# Patient Record
Sex: MALE | Race: WHITE | NOT HISPANIC OR LATINO | Employment: UNEMPLOYED | URBAN - NONMETROPOLITAN AREA
[De-identification: names, ages, dates, MRNs, and addresses within clinical notes are randomized per-mention and may not be internally consistent; named-entity substitution may affect disease eponyms.]

---

## 2017-03-23 ENCOUNTER — OFFICE VISIT (OUTPATIENT)
Dept: FAMILY MEDICINE CLINIC | Facility: CLINIC | Age: 67
End: 2017-03-23

## 2017-03-23 VITALS
SYSTOLIC BLOOD PRESSURE: 122 MMHG | DIASTOLIC BLOOD PRESSURE: 80 MMHG | OXYGEN SATURATION: 99 % | BODY MASS INDEX: 28.99 KG/M2 | WEIGHT: 214 LBS | HEIGHT: 72 IN | HEART RATE: 90 BPM

## 2017-03-23 DIAGNOSIS — E11.9 TYPE 2 DIABETES MELLITUS WITHOUT COMPLICATION, UNSPECIFIED LONG TERM INSULIN USE STATUS: Chronic | ICD-10-CM

## 2017-03-23 DIAGNOSIS — E78.5 HYPERLIPIDEMIA, UNSPECIFIED: Primary | Chronic | ICD-10-CM

## 2017-03-23 PROCEDURE — 99213 OFFICE O/P EST LOW 20 MIN: CPT | Performed by: INTERNAL MEDICINE

## 2017-03-23 RX ORDER — ROSUVASTATIN CALCIUM 5 MG/1
5 TABLET, COATED ORAL DAILY
Qty: 30 TABLET | Refills: 5 | Status: SHIPPED | OUTPATIENT
Start: 2017-03-23 | End: 2017-05-22 | Stop reason: SDUPTHER

## 2017-05-16 ENCOUNTER — OFFICE VISIT (OUTPATIENT)
Dept: FAMILY MEDICINE CLINIC | Facility: CLINIC | Age: 67
End: 2017-05-16

## 2017-05-16 VITALS
WEIGHT: 207.4 LBS | RESPIRATION RATE: 16 BRPM | DIASTOLIC BLOOD PRESSURE: 70 MMHG | OXYGEN SATURATION: 99 % | BODY MASS INDEX: 28.09 KG/M2 | HEIGHT: 72 IN | TEMPERATURE: 96.4 F | SYSTOLIC BLOOD PRESSURE: 116 MMHG | HEART RATE: 105 BPM

## 2017-05-16 DIAGNOSIS — E78.01 FAMILIAL HYPERCHOLESTEROLEMIA: ICD-10-CM

## 2017-05-16 DIAGNOSIS — M51.35 DDD (DEGENERATIVE DISC DISEASE), THORACOLUMBAR: ICD-10-CM

## 2017-05-16 DIAGNOSIS — Z79.4 TYPE 2 DIABETES MELLITUS WITH COMPLICATION, WITH LONG-TERM CURRENT USE OF INSULIN (HCC): ICD-10-CM

## 2017-05-16 DIAGNOSIS — E11.8 TYPE 2 DIABETES MELLITUS WITH COMPLICATION, WITH LONG-TERM CURRENT USE OF INSULIN (HCC): ICD-10-CM

## 2017-05-16 DIAGNOSIS — K21.9 GASTROESOPHAGEAL REFLUX DISEASE, ESOPHAGITIS PRESENCE NOT SPECIFIED: ICD-10-CM

## 2017-05-16 DIAGNOSIS — I10 ESSENTIAL HYPERTENSION: ICD-10-CM

## 2017-05-16 DIAGNOSIS — Z01.818 PRE-OP EVALUATION: Primary | ICD-10-CM

## 2017-05-16 PROCEDURE — 99214 OFFICE O/P EST MOD 30 MIN: CPT | Performed by: FAMILY MEDICINE

## 2017-05-16 PROCEDURE — 93000 ELECTROCARDIOGRAM COMPLETE: CPT | Performed by: FAMILY MEDICINE

## 2017-05-16 RX ORDER — LISINOPRIL AND HYDROCHLOROTHIAZIDE 25; 20 MG/1; MG/1
1 TABLET ORAL DAILY
COMMUNITY
Start: 2017-03-12 | End: 2020-02-19 | Stop reason: SDUPTHER

## 2017-05-16 RX ORDER — GABAPENTIN 300 MG/1
1 CAPSULE ORAL 4 TIMES DAILY PRN
COMMUNITY
Start: 2017-05-08 | End: 2022-12-15

## 2017-05-16 RX ORDER — GLIPIZIDE 5 MG/1
1 TABLET ORAL DAILY
COMMUNITY
Start: 2017-03-20 | End: 2017-05-22

## 2017-05-17 DIAGNOSIS — E11.8 TYPE 2 DIABETES MELLITUS WITH COMPLICATION, WITH LONG-TERM CURRENT USE OF INSULIN (HCC): Primary | ICD-10-CM

## 2017-05-17 DIAGNOSIS — Z79.4 TYPE 2 DIABETES MELLITUS WITH COMPLICATION, WITH LONG-TERM CURRENT USE OF INSULIN (HCC): Primary | ICD-10-CM

## 2017-05-17 DIAGNOSIS — E78.5 HYPERLIPIDEMIA, UNSPECIFIED HYPERLIPIDEMIA TYPE: ICD-10-CM

## 2017-05-17 LAB
ANION GAP SERPL CALCULATED.3IONS-SCNC: 10 MMOL/L (ref 5–15)
BASOPHILS # BLD AUTO: 0.04 10*3/MM3 (ref 0–0.2)
BASOPHILS NFR BLD AUTO: 0.4 % (ref 0–2)
BUN BLD-MCNC: 16 MG/DL (ref 8–25)
BUN/CREAT SERPL: 14.5 (ref 7–25)
CALCIUM SPEC-SCNC: 9.5 MG/DL (ref 8.4–10.8)
CHLORIDE SERPL-SCNC: 97 MMOL/L (ref 100–112)
CHOLEST SERPL-MCNC: 155 MG/DL (ref 150–200)
CO2 SERPL-SCNC: 31 MMOL/L (ref 20–32)
CREAT BLD-MCNC: 1.1 MG/DL (ref 0.4–1.3)
DEPRECATED RDW RBC AUTO: 47.6 FL (ref 35.1–43.9)
EOSINOPHIL # BLD AUTO: 0.26 10*3/MM3 (ref 0–0.7)
EOSINOPHIL NFR BLD AUTO: 2.7 % (ref 0–7)
ERYTHROCYTE [DISTWIDTH] IN BLOOD BY AUTOMATED COUNT: 16.1 % (ref 11.5–14.5)
GFR SERPL CREATININE-BSD FRML MDRD: 67 ML/MIN/1.73 (ref 49–113)
GLUCOSE BLD-MCNC: 146 MG/DL (ref 70–100)
HCT VFR BLD AUTO: 41.2 % (ref 39–49)
HDLC SERPL-MCNC: 38 MG/DL (ref 35–100)
HGB BLD-MCNC: 13.5 G/DL (ref 13.7–17.3)
LDLC SERPL CALC-MCNC: 85 MG/DL
LDLC/HDLC SERPL: 2.24 {RATIO}
LYMPHOCYTES # BLD AUTO: 2.25 10*3/MM3 (ref 0.6–4.2)
LYMPHOCYTES NFR BLD AUTO: 23.6 % (ref 10–50)
MCH RBC QN AUTO: 26.9 PG (ref 26.5–34)
MCHC RBC AUTO-ENTMCNC: 32.8 G/DL (ref 31.5–36.3)
MCV RBC AUTO: 82.2 FL (ref 80–98)
MONOCYTES # BLD AUTO: 0.72 10*3/MM3 (ref 0–0.9)
MONOCYTES NFR BLD AUTO: 7.6 % (ref 0–12)
NEUTROPHILS # BLD AUTO: 6.26 10*3/MM3 (ref 2–8.6)
NEUTROPHILS NFR BLD AUTO: 65.7 % (ref 37–80)
PLATELET # BLD AUTO: 248 10*3/MM3 (ref 150–450)
PMV BLD AUTO: 10.1 FL (ref 8–12)
POTASSIUM BLD-SCNC: 4.2 MMOL/L (ref 3.4–5.4)
RBC # BLD AUTO: 5.01 10*6/MM3 (ref 4.37–5.74)
SODIUM BLD-SCNC: 138 MMOL/L (ref 134–146)
TRIGL SERPL-MCNC: 159 MG/DL (ref 35–160)
VLDLC SERPL-MCNC: 31.8 MG/DL
WBC NRBC COR # BLD: 9.53 10*3/MM3 (ref 3.2–9.8)

## 2017-05-17 PROCEDURE — 83036 HEMOGLOBIN GLYCOSYLATED A1C: CPT | Performed by: FAMILY MEDICINE

## 2017-05-17 PROCEDURE — 85025 COMPLETE CBC W/AUTO DIFF WBC: CPT | Performed by: FAMILY MEDICINE

## 2017-05-17 PROCEDURE — 36415 COLL VENOUS BLD VENIPUNCTURE: CPT | Performed by: FAMILY MEDICINE

## 2017-05-17 PROCEDURE — 80048 BASIC METABOLIC PNL TOTAL CA: CPT | Performed by: FAMILY MEDICINE

## 2017-05-17 PROCEDURE — 82570 ASSAY OF URINE CREATININE: CPT | Performed by: FAMILY MEDICINE

## 2017-05-17 PROCEDURE — 82043 UR ALBUMIN QUANTITATIVE: CPT | Performed by: FAMILY MEDICINE

## 2017-05-17 PROCEDURE — 80061 LIPID PANEL: CPT | Performed by: FAMILY MEDICINE

## 2017-05-17 PROCEDURE — 84443 ASSAY THYROID STIM HORMONE: CPT | Performed by: FAMILY MEDICINE

## 2017-05-18 LAB
ALBUMIN UR-MCNC: 17.6 MG/L
CREAT UR-MCNC: 270.4 MG/DL
HBA1C MFR BLD: 10.39 % (ref 4–5.6)
MICROALBUMIN/CREAT UR: 65.1 MG/G (ref 0–30)
TSH SERPL DL<=0.05 MIU/L-ACNC: 2.77 MIU/ML (ref 0.46–4.68)

## 2017-05-19 RX ORDER — DEXLANSOPRAZOLE 60 MG/1
CAPSULE, DELAYED RELEASE ORAL
Qty: 90 CAPSULE | Refills: 2 | Status: CANCELLED | OUTPATIENT
Start: 2017-05-19

## 2017-05-22 ENCOUNTER — OFFICE VISIT (OUTPATIENT)
Dept: FAMILY MEDICINE CLINIC | Facility: CLINIC | Age: 67
End: 2017-05-22

## 2017-05-22 VITALS
TEMPERATURE: 96.8 F | BODY MASS INDEX: 28.04 KG/M2 | RESPIRATION RATE: 18 BRPM | DIASTOLIC BLOOD PRESSURE: 82 MMHG | HEART RATE: 108 BPM | SYSTOLIC BLOOD PRESSURE: 128 MMHG | OXYGEN SATURATION: 98 % | WEIGHT: 207 LBS | HEIGHT: 72 IN

## 2017-05-22 DIAGNOSIS — E78.01 FAMILIAL HYPERCHOLESTEROLEMIA: ICD-10-CM

## 2017-05-22 DIAGNOSIS — E11.8 TYPE 2 DIABETES MELLITUS WITH COMPLICATION, WITH LONG-TERM CURRENT USE OF INSULIN (HCC): ICD-10-CM

## 2017-05-22 DIAGNOSIS — Z79.4 TYPE 2 DIABETES MELLITUS WITH COMPLICATION, WITH LONG-TERM CURRENT USE OF INSULIN (HCC): ICD-10-CM

## 2017-05-22 DIAGNOSIS — I10 ESSENTIAL HYPERTENSION: Primary | ICD-10-CM

## 2017-05-22 PROCEDURE — 99214 OFFICE O/P EST MOD 30 MIN: CPT | Performed by: FAMILY MEDICINE

## 2017-05-22 RX ORDER — ROSUVASTATIN CALCIUM 5 MG/1
5 TABLET, COATED ORAL DAILY
Qty: 90 TABLET | Refills: 3 | Status: SHIPPED | OUTPATIENT
Start: 2017-05-22 | End: 2017-05-22 | Stop reason: SDUPTHER

## 2017-05-22 RX ORDER — ROSUVASTATIN CALCIUM 5 MG/1
5 TABLET, COATED ORAL DAILY
Qty: 30 TABLET | Refills: 3 | Status: SHIPPED | OUTPATIENT
Start: 2017-05-22 | End: 2019-12-17

## 2017-05-23 RX ORDER — DEXLANSOPRAZOLE 60 MG/1
60 CAPSULE, DELAYED RELEASE ORAL DAILY
Qty: 90 CAPSULE | Refills: 2 | Status: SHIPPED | OUTPATIENT
Start: 2017-05-23 | End: 2017-08-08 | Stop reason: SDUPTHER

## 2017-06-27 NOTE — TELEPHONE ENCOUNTER
Called patient to inform him that doc was going to send him some doxycycline 50 mg 2 cap twice daily. Patient understood and thanked and ask that it be sent to clinic pharmacy

## 2017-06-28 RX ORDER — DOXYCYCLINE 50 MG/1
100 CAPSULE ORAL 2 TIMES DAILY
Qty: 28 CAPSULE | Refills: 0 | Status: SHIPPED | OUTPATIENT
Start: 2017-06-28 | End: 2017-07-03

## 2017-07-03 ENCOUNTER — OFFICE VISIT (OUTPATIENT)
Dept: FAMILY MEDICINE CLINIC | Facility: CLINIC | Age: 67
End: 2017-07-03

## 2017-07-03 VITALS
SYSTOLIC BLOOD PRESSURE: 138 MMHG | HEART RATE: 95 BPM | DIASTOLIC BLOOD PRESSURE: 76 MMHG | BODY MASS INDEX: 29.04 KG/M2 | HEIGHT: 72 IN | OXYGEN SATURATION: 99 % | WEIGHT: 214.4 LBS | TEMPERATURE: 97.5 F | RESPIRATION RATE: 18 BRPM

## 2017-07-03 DIAGNOSIS — E11.8 TYPE 2 DIABETES MELLITUS WITH COMPLICATION, WITH LONG-TERM CURRENT USE OF INSULIN (HCC): ICD-10-CM

## 2017-07-03 DIAGNOSIS — Z79.4 TYPE 2 DIABETES MELLITUS WITH COMPLICATION, WITH LONG-TERM CURRENT USE OF INSULIN (HCC): ICD-10-CM

## 2017-07-03 DIAGNOSIS — J32.9 CHRONIC SINUSITIS, UNSPECIFIED LOCATION: Primary | ICD-10-CM

## 2017-07-03 PROCEDURE — 99213 OFFICE O/P EST LOW 20 MIN: CPT | Performed by: FAMILY MEDICINE

## 2017-07-03 PROCEDURE — 96372 THER/PROPH/DIAG INJ SC/IM: CPT | Performed by: FAMILY MEDICINE

## 2017-07-03 RX ORDER — METHOCARBAMOL 750 MG/1
1 TABLET, FILM COATED ORAL NIGHTLY PRN
Refills: 1 | COMMUNITY
Start: 2017-05-26 | End: 2018-02-05

## 2017-07-03 RX ORDER — GLIPIZIDE 5 MG/1
1 TABLET ORAL 2 TIMES DAILY
COMMUNITY
Start: 2017-06-26 | End: 2017-08-08 | Stop reason: SDDI

## 2017-07-03 RX ORDER — METFORMIN HYDROCHLORIDE 500 MG/1
500 TABLET, EXTENDED RELEASE ORAL
Qty: 30 TABLET | Refills: 6 | Status: SHIPPED | OUTPATIENT
Start: 2017-07-03 | End: 2017-08-08

## 2017-07-03 RX ORDER — CLINDAMYCIN HYDROCHLORIDE 300 MG/1
300 CAPSULE ORAL 3 TIMES DAILY
Qty: 30 CAPSULE | Refills: 0 | Status: SHIPPED | OUTPATIENT
Start: 2017-07-03 | End: 2017-07-14

## 2017-07-03 RX ORDER — CEFTRIAXONE 1 G/1
1 INJECTION, POWDER, FOR SOLUTION INTRAMUSCULAR; INTRAVENOUS ONCE
Status: COMPLETED | OUTPATIENT
Start: 2017-07-03 | End: 2017-07-03

## 2017-07-03 RX ORDER — FUROSEMIDE 40 MG/1
40 TABLET ORAL 2 TIMES DAILY
Qty: 30 TABLET | Refills: 1 | Status: SHIPPED | OUTPATIENT
Start: 2017-07-03 | End: 2017-08-28 | Stop reason: SDDI

## 2017-07-03 RX ADMIN — CEFTRIAXONE 1 G: 1 INJECTION, POWDER, FOR SOLUTION INTRAMUSCULAR; INTRAVENOUS at 14:15

## 2017-07-03 NOTE — PROGRESS NOTES
Subjective   Jadiel Dutton is a 66 y.o. male.   Chief Complaint   Patient presents with   • Ear Fullness     left is worse than right. patient states it feels like something is crawling around       History of Present Illness   Patient with history of chronic sinusitis is in today for reevaluation.  Patient has been complaining of left ear fullness and some pain for the last 2-3 weeks.  Has completed a course of antibiotics without improvement.  He is also complaining of his Humalog causing fluid retention and has not been taking that over the last 2-3 days.    The following portions of the patient's history were reviewed and updated as appropriate: allergies, current medications, past family history, past medical history, past social history, past surgical history and problem list.    Review of Systems   Constitutional: Positive for activity change and fatigue.   HENT: Positive for congestion, ear pain and sinus pressure.    Eyes: Negative.    Respiratory: Positive for shortness of breath.    Cardiovascular: Negative.    Gastrointestinal: Positive for constipation.   Endocrine: Negative.    Genitourinary: Negative.    Musculoskeletal: Positive for arthralgias and back pain.   Skin: Negative.    Allergic/Immunologic: Negative.    Neurological: Positive for weakness and numbness.   Hematological: Negative.    Psychiatric/Behavioral: Negative.    All other systems reviewed and are negative.      Objective   Physical Exam   Constitutional: He is oriented to person, place, and time. He appears well-developed and well-nourished.   HENT:   Head: Normocephalic and atraumatic.   Right Ear: External ear normal.   Left Ear: External ear normal.   Nose: Mucosal edema and rhinorrhea present.   Mouth/Throat: Oropharynx is clear and moist.   Eyes: Conjunctivae and EOM are normal. Pupils are equal, round, and reactive to light.   Neck: Normal range of motion. Neck supple.   Cardiovascular: Normal rate, regular rhythm, normal  heart sounds and intact distal pulses.  Exam reveals no gallop and no friction rub.    No murmur heard.  Pulmonary/Chest: Effort normal and breath sounds normal. He has no wheezes. He has no rales.   Abdominal: Soft. Bowel sounds are normal. He exhibits no mass. There is no tenderness. There is no rebound and no guarding.   obese   Musculoskeletal:        Right shoulder: He exhibits decreased range of motion and tenderness.        Lumbar back: He exhibits decreased range of motion and tenderness.   Neurological: He is alert and oriented to person, place, and time. He displays abnormal reflex. A sensory deficit is present. He exhibits normal muscle tone.   Skin: Skin is warm and dry. No rash noted.   Psychiatric: He has a normal mood and affect. His behavior is normal. Judgment and thought content normal.   Nursing note and vitals reviewed.      Assessment/Plan   Jadiel was seen today for ear fullness.    Diagnoses and all orders for this visit:    Chronic sinusitis, unspecified location  -     cefTRIAXone (ROCEPHIN) injection 1 g; Inject 1 g into the shoulder, thigh, or buttocks 1 (One) Time.    Type 2 diabetes mellitus with complication, with long-term current use of insulin    Other orders  -     clindamycin (CLEOCIN) 300 MG capsule; Take 1 capsule by mouth 3 (Three) Times a Day.  -     metFORMIN ER (GLUCOPHAGE XR) 500 MG 24 hr tablet; Take 1 tablet by mouth Daily With Breakfast.  -     furosemide (LASIX) 40 MG tablet; Take 1 tablet by mouth 2 (Two) Times a Day.

## 2017-07-14 ENCOUNTER — OFFICE VISIT (OUTPATIENT)
Dept: FAMILY MEDICINE CLINIC | Facility: CLINIC | Age: 67
End: 2017-07-14

## 2017-07-14 VITALS
WEIGHT: 218 LBS | OXYGEN SATURATION: 93 % | TEMPERATURE: 97.6 F | SYSTOLIC BLOOD PRESSURE: 130 MMHG | RESPIRATION RATE: 18 BRPM | BODY MASS INDEX: 29.53 KG/M2 | HEIGHT: 72 IN | HEART RATE: 103 BPM | DIASTOLIC BLOOD PRESSURE: 70 MMHG

## 2017-07-14 DIAGNOSIS — E11.8 TYPE 2 DIABETES MELLITUS WITH COMPLICATION, WITH LONG-TERM CURRENT USE OF INSULIN (HCC): ICD-10-CM

## 2017-07-14 DIAGNOSIS — J32.0 CHRONIC MAXILLARY SINUSITIS: Primary | ICD-10-CM

## 2017-07-14 DIAGNOSIS — K21.9 GASTROESOPHAGEAL REFLUX DISEASE, ESOPHAGITIS PRESENCE NOT SPECIFIED: ICD-10-CM

## 2017-07-14 DIAGNOSIS — Z79.4 TYPE 2 DIABETES MELLITUS WITH COMPLICATION, WITH LONG-TERM CURRENT USE OF INSULIN (HCC): ICD-10-CM

## 2017-07-14 DIAGNOSIS — I10 ESSENTIAL HYPERTENSION: ICD-10-CM

## 2017-07-14 PROCEDURE — 99213 OFFICE O/P EST LOW 20 MIN: CPT | Performed by: NURSE PRACTITIONER

## 2017-07-14 RX ORDER — DIAPER,BRIEF,INFANT-TODD,DISP
EACH MISCELLANEOUS 2 TIMES DAILY
Qty: 14 G | Refills: 0 | Status: SHIPPED | OUTPATIENT
Start: 2017-07-14 | End: 2017-08-11

## 2017-07-14 NOTE — PATIENT INSTRUCTIONS
Earache  An earache, also called otalgia, can be caused by many things. Pain from an earache can be sharp, dull, or burning. The pain may be temporary or constant.  Earaches can be caused by problems with the ear, such as infection in either the middle ear or the ear canal, injury, impacted ear wax, middle ear pressure, or a foreign body in the ear. Ear pain can also result from problems in other areas. This is called referred pain. For example, pain can come from a sore throat, a tooth infection, or problems with the jaw or the joint between the jaw and the skull (temporomandibular joint, or TMJ).  The cause of an earache is not always easy to identify. Watchful waiting may be appropriate for some earaches until a clear cause of the pain can be found.  HOME CARE INSTRUCTIONS  Watch your condition for any changes. The following actions may help to lessen any discomfort that you are feeling:  · Take medicines only as directed by your health care provider. This includes ear drops.  · Apply ice to your outer ear to help reduce pain.    Put ice in a plastic bag.    Place a towel between your skin and the bag.    Leave the ice on for 20 minutes, 2-3 times per day.  · Do not put anything in your ear other than medicine that is prescribed by your health care provider.  · Try resting in an upright position instead of lying down. This may help to reduce pressure in the middle ear and relieve pain.  · Chew gum if it helps to relieve your ear pain.  · Control any allergies that you have.  · Keep all follow-up visits as directed by your health care provider. This is important.  SEEK MEDICAL CARE IF:  · Your pain does not improve within 2 days.  · You have a fever.  · You have new or worsening symptoms.  SEEK IMMEDIATE MEDICAL CARE IF:  · You have a severe headache.  · You have a stiff neck.  · You have difficulty swallowing.  · You have redness or swelling behind your ear.  · You have drainage from your ear.  · You have hearing  loss.  · You feel dizzy.     This information is not intended to replace advice given to you by your health care provider. Make sure you discuss any questions you have with your health care provider.     Document Released: 08/04/2005 Document Revised: 01/08/2016 Document Reviewed: 07/19/2015  Elsevier Interactive Patient Education ©2017 [employer]

## 2017-07-14 NOTE — PROGRESS NOTES
"Chief Complaint   Patient presents with   • Earache     was in L ear now in both       Subjective   HPI   Jadiel Dutton is a 66 y.o. male presents to the office for evaluation of bilateral ear discomfort and \"chirping\" noise that began after his back surgery.   He has been treated by PCP for chronic sinusitis x 2 office visits. PO abx have been changed due to GI discomfort. He has multiple allergies and can not tolerate PO abx well.    He presents today with continued bilateral ear discomfort and inability to take cleocin. Associated symptoms include yellow nasal drainage, however he also states his sinuses feel \"dry\". He takes nightly zyrtec for allergies. He denies use of nasal sinus rinse.     Of note:  Patient would also like to discuss changing T2DM medications, back pain, GI discomfort, and possibly changing dixilant today.   He has also repeatedly told me that he has a serious allergy to ceftin, stating \"it burnt my stomach up over there in [place]\".       Earache    There is pain in both ears. This is a new problem. The current episode started 1 to 4 weeks ago. The problem occurs constantly. The problem has been unchanged. There has been no fever. The fever has been present for less than 1 day. The pain is at a severity of 8/10. The pain is mild. Associated symptoms include coughing and a sore throat. Pertinent negatives include no abdominal pain, diarrhea, ear discharge, headaches, rash, rhinorrhea or vomiting. He has tried antibiotics for the symptoms. The treatment provided no relief.       Family History   Problem Relation Age of Onset   • No Known Problems Other    • No Known Problems Mother    • No Known Problems Father    • No Known Problems Sister    • No Known Problems Brother    • No Known Problems Daughter    • No Known Problems Son    • No Known Problems Maternal Aunt    • No Known Problems Maternal Uncle    • No Known Problems Paternal Aunt    • No Known Problems Paternal Uncle    • No " Known Problems Maternal Grandmother    • No Known Problems Maternal Grandfather    • No Known Problems Paternal Grandmother    • No Known Problems Paternal Grandfather      Social History     Social History   • Marital status: Single     Spouse name: N/A   • Number of children: N/A   • Years of education: N/A     Occupational History   • Not on file.     Social History Main Topics   • Smoking status: Never Smoker   • Smokeless tobacco: Never Used   • Alcohol use No   • Drug use: No   • Sexual activity: Yes     Partners: Female     Other Topics Concern   • Not on file     Social History Narrative       The following portions of the patient's history were reviewed and updated as appropriate: allergies, current medications, past family history, past medical history, past social history, past surgical history and problem list.    Review of Systems   Constitutional: Positive for fatigue. Negative for activity change, appetite change, chills, fever and unexpected weight change.   HENT: Positive for ear pain, sinus pressure and sore throat. Negative for congestion, drooling, ear discharge, facial swelling, postnasal drip, rhinorrhea, trouble swallowing and voice change.    Eyes: Negative.  Negative for photophobia, pain, discharge and visual disturbance.   Respiratory: Positive for cough. Negative for apnea, choking and wheezing.    Cardiovascular: Negative.  Negative for chest pain, palpitations and leg swelling.   Gastrointestinal: Negative.  Negative for abdominal distention, abdominal pain, constipation, diarrhea, nausea and vomiting.   Endocrine: Negative.  Negative for polyphagia and polyuria.   Genitourinary: Negative.  Negative for decreased urine volume, difficulty urinating and dysuria.   Musculoskeletal: Positive for back pain. Negative for arthralgias, gait problem and myalgias.   Skin: Negative.  Negative for rash and wound.   Allergic/Immunologic: Negative.    Neurological: Negative.  Negative for dizziness,  "syncope, weakness, light-headedness, numbness and headaches.   Hematological: Negative.    Psychiatric/Behavioral: Negative.  Negative for confusion, decreased concentration and sleep disturbance. The patient is not nervous/anxious.      14 Point ROS completed with pertinent positives discussed. All other systems reviewed and are negative.       Objective   Encounter Vitals  /70  Pulse 103  Temp 97.6 °F (36.4 °C) (Tympanic)   Resp 18  Ht 72\" (182.9 cm)  Wt 218 lb (98.9 kg)  SpO2 93%  BMI 29.57 kg/m2    Physical Exam   Constitutional: He is oriented to person, place, and time. Vital signs are normal. He appears well-developed and well-nourished.   HENT:   Head: Normocephalic and atraumatic.   Right Ear: Tympanic membrane, external ear and ear canal normal.   Left Ear: Tympanic membrane, external ear and ear canal normal.   Nose: Left sinus exhibits maxillary sinus tenderness.   Mouth/Throat: Uvula is midline, oropharynx is clear and moist and mucous membranes are normal. No posterior oropharyngeal edema or posterior oropharyngeal erythema.   Dry skin noted to bilateral external ear canal  Appears to have dried soap on bilateral TM- white crackling appearance   Eyes: Lids are normal. Pupils are equal, round, and reactive to light.   Neck: Trachea normal and normal range of motion. Neck supple. No thyroid mass present.   Cardiovascular: Normal rate, regular rhythm, S1 normal, S2 normal, normal heart sounds and intact distal pulses.  Exam reveals no gallop and no friction rub.    No murmur heard.  Pulmonary/Chest: Effort normal and breath sounds normal. No respiratory distress. He has no wheezes. He has no rales.   Abdominal: Soft. Normal appearance and bowel sounds are normal. He exhibits no mass. There is no rebound and no guarding.   Musculoskeletal: Normal range of motion.   Lymphadenopathy:     He has no cervical adenopathy.   Neurological: He is alert and oriented to person, place, and time. He has " normal strength. No cranial nerve deficit or sensory deficit. Gait normal.   Skin: Skin is warm and dry. No rash noted.   Psychiatric: He has a normal mood and affect. His speech is normal and behavior is normal. Judgment and thought content normal. Cognition and memory are normal.   Nursing note and vitals reviewed.      Pertinent Labs  Orders Only on 05/17/2017   Component Date Value Ref Range Status   • TSH 05/17/2017 2.770  0.460 - 4.680 mIU/mL Final   • Microalbumin/Creatinine Ratio 05/17/2017 65.1* 0.0 - 30.0 mg/g Final   • Creatinine, Urine 05/17/2017 270.4  mg/dL Final   • Microalbumin, Urine 05/17/2017 17.6  mg/L Final   • Total Cholesterol 05/17/2017 155  150 - 200 mg/dL Final   • Triglycerides 05/17/2017 159  35 - 160 mg/dL Final   • HDL Cholesterol 05/17/2017 38  35 - 100 mg/dL Final   • LDL Cholesterol  05/17/2017 85  mg/dL Final   • VLDL Cholesterol 05/17/2017 31.8  mg/dL Final   • LDL/HDL Ratio 05/17/2017 2.24   Final   • Hemoglobin A1C 05/17/2017 10.39* 4 - 5.6 % Final   • Glucose 05/17/2017 146* 70 - 100 mg/dL Final   • BUN 05/17/2017 16  8 - 25 mg/dL Final   • Creatinine 05/17/2017 1.10  0.40 - 1.30 mg/dL Final   • Sodium 05/17/2017 138  134 - 146 mmol/L Final   • Potassium 05/17/2017 4.2  3.4 - 5.4 mmol/L Final   • Chloride 05/17/2017 97* 100 - 112 mmol/L Final   • CO2 05/17/2017 31.0  20.0 - 32.0 mmol/L Final   • Calcium 05/17/2017 9.5  8.4 - 10.8 mg/dL Final   • eGFR Non  Amer 05/17/2017 67  49 - 113 mL/min/1.73 Final   • BUN/Creatinine Ratio 05/17/2017 14.5  7.0 - 25.0 Final   • Anion Gap 05/17/2017 10.0  5.0 - 15.0 mmol/L Final   • WBC 05/17/2017 9.53  3.20 - 9.80 10*3/mm3 Final   • RBC 05/17/2017 5.01  4.37 - 5.74 10*6/mm3 Final   • Hemoglobin 05/17/2017 13.5* 13.7 - 17.3 g/dL Final   • Hematocrit 05/17/2017 41.2  39.0 - 49.0 % Final   • MCV 05/17/2017 82.2  80.0 - 98.0 fL Final   • MCH 05/17/2017 26.9  26.5 - 34.0 pg Final   • MCHC 05/17/2017 32.8  31.5 - 36.3 g/dL Final   • RDW  "05/17/2017 16.1* 11.5 - 14.5 % Final   • RDW-SD 05/17/2017 47.6* 35.1 - 43.9 fl Final   • MPV 05/17/2017 10.1  8.0 - 12.0 fL Final   • Platelets 05/17/2017 248  150 - 450 10*3/mm3 Final   • Neutrophil % 05/17/2017 65.7  37.0 - 80.0 % Final   • Lymphocyte % 05/17/2017 23.6  10.0 - 50.0 % Final   • Monocyte % 05/17/2017 7.6  0.0 - 12.0 % Final   • Eosinophil % 05/17/2017 2.7  0.0 - 7.0 % Final   • Basophil % 05/17/2017 0.4  0.0 - 2.0 % Final   • Neutrophils, Absolute 05/17/2017 6.26  2.00 - 8.60 10*3/mm3 Final   • Lymphocytes, Absolute 05/17/2017 2.25  0.60 - 4.20 10*3/mm3 Final   • Monocytes, Absolute 05/17/2017 0.72  0.00 - 0.90 10*3/mm3 Final   • Eosinophils, Absolute 05/17/2017 0.26  0.00 - 0.70 10*3/mm3 Final   • Basophils, Absolute 05/17/2017 0.04  0.00 - 0.20 10*3/mm3 Final     Labs have been independently reviewed    Key Imaging/Tracings/POC Testing    Assessment and Medications  Problems Addressed this Visit        Cardiovascular and Mediastinum    Essential hypertension       Digestive    Gastroesophageal reflux disease       Endocrine    Type 2 diabetes mellitus with complication, with long-term current use of insulin    Relevant Orders    Hemoglobin A1c    Basic Metabolic Panel      Other Visit Diagnoses     Chronic maxillary sinusitis    -  Primary    Relevant Orders    CT sinus w contrast    Ambulatory Referral to ENT (Otolaryngology)        Side effects of ordered medications discussed with patient.     Plan/Additional Notes/Instructions  Plan   1. Stop cleocin today  2. Discussed POC with Dr. Jiménez  3. Begin saline sinus rinses  4. No changes to other medications today  5. Will repeat A1c and BMP next month. Patient can f/u with PCP do discuss changing T2DM medications at that time.   6. Although patient has previously informed me that he can not take ceftin, he is now requesting ceftin \"to knock this out\". He states \"I need something\". Given his allergy list and reported GI issues, he is limited as " to what he can take PO. PE is negative for acute infection. I have discussed my findings with the the patient and have advised patient to d/c all ABX, as suggested by PCP, at this time. Discussed possibility of inoculated infection and poor likelihood of penetration with PO medication.   7. He is requesting to continue cleocin at BID dosing. Although this will likely ineffective, if he can tolerate this dosing, I am ok with continuing BID for remainder of 10 day regimen  8. Again, I have suggested to proceed with POC as advised by PCP.   9. CT of sinuses ordered  10. Refer to ENT for chronic sinusitis symptoms      Follow-Up  Return for After ordered studies are completed.    Patient/caregiver verbalizes understanding of all orders and instructions in this plan of care.       This document has been electronically signed by MARY Gloria on July 14, 2017 9:25 AM

## 2017-08-08 ENCOUNTER — OFFICE VISIT (OUTPATIENT)
Dept: FAMILY MEDICINE CLINIC | Facility: CLINIC | Age: 67
End: 2017-08-08

## 2017-08-08 VITALS
HEART RATE: 74 BPM | RESPIRATION RATE: 16 BRPM | OXYGEN SATURATION: 97 % | TEMPERATURE: 97.6 F | WEIGHT: 219.2 LBS | DIASTOLIC BLOOD PRESSURE: 80 MMHG | BODY MASS INDEX: 29.69 KG/M2 | HEIGHT: 72 IN | SYSTOLIC BLOOD PRESSURE: 132 MMHG

## 2017-08-08 DIAGNOSIS — R19.7 DIARRHEA, UNSPECIFIED TYPE: ICD-10-CM

## 2017-08-08 DIAGNOSIS — K21.9 GASTROESOPHAGEAL REFLUX DISEASE, ESOPHAGITIS PRESENCE NOT SPECIFIED: Primary | ICD-10-CM

## 2017-08-08 PROCEDURE — 99213 OFFICE O/P EST LOW 20 MIN: CPT | Performed by: FAMILY MEDICINE

## 2017-08-08 RX ORDER — BLOOD SUGAR DIAGNOSTIC
STRIP MISCELLANEOUS
COMMUNITY
Start: 2017-08-05

## 2017-08-08 RX ORDER — DEXLANSOPRAZOLE 60 MG/1
60 CAPSULE, DELAYED RELEASE ORAL DAILY
Qty: 90 CAPSULE | Refills: 2 | Status: SHIPPED | OUTPATIENT
Start: 2017-08-08 | End: 2017-08-18 | Stop reason: SDUPTHER

## 2017-08-08 NOTE — PROGRESS NOTES
Subjective   Jadiel Dutton is a 66 y.o. male.   Chief Complaint   Patient presents with   • Hernia     wanting referral       Abdominal Pain   This is a new problem. The current episode started 1 to 4 weeks ago. The onset quality is gradual. The problem occurs constantly. The problem has been waxing and waning. The pain is located in the epigastric region. The pain is at a severity of 6/10. The pain is moderate. The quality of the pain is colicky. The abdominal pain does not radiate. Associated symptoms include arthralgias, belching, constipation, diarrhea and nausea. The pain is aggravated by eating. The pain is relieved by bowel movements. He has tried proton pump inhibitors and antacids for the symptoms. The treatment provided moderate relief. His past medical history is significant for GERD.        The following portions of the patient's history were reviewed and updated as appropriate: allergies, current medications, past family history, past medical history, past social history, past surgical history and problem list.    Review of Systems   Constitutional: Positive for fatigue.   HENT: Negative.    Eyes: Negative.    Respiratory: Negative.    Cardiovascular: Negative.    Gastrointestinal: Positive for abdominal distention, abdominal pain, constipation, diarrhea and nausea.   Endocrine: Negative.    Genitourinary: Negative.    Musculoskeletal: Positive for arthralgias and back pain.   Skin: Negative.    Allergic/Immunologic: Negative.    Neurological: Negative.    Hematological: Negative.    Psychiatric/Behavioral: Negative.    All other systems reviewed and are negative.      Objective   Physical Exam   Constitutional: He is oriented to person, place, and time. He appears well-developed and well-nourished.   HENT:   Head: Normocephalic and atraumatic.   Right Ear: External ear normal.   Left Ear: External ear normal.   Nose: Mucosal edema and rhinorrhea present.   Mouth/Throat: Oropharynx is clear and  moist.   Eyes: Conjunctivae and EOM are normal. Pupils are equal, round, and reactive to light.   Neck: Normal range of motion. Neck supple.   Cardiovascular: Normal rate, regular rhythm, normal heart sounds and intact distal pulses.  Exam reveals no gallop and no friction rub.    No murmur heard.  Pulmonary/Chest: Effort normal and breath sounds normal. He has no wheezes. He has no rales.   Abdominal: Soft. Bowel sounds are normal. He exhibits no mass. There is no tenderness. There is no rebound and no guarding.   obese   Musculoskeletal:        Right shoulder: He exhibits decreased range of motion and tenderness.        Lumbar back: He exhibits decreased range of motion and tenderness.   Neurological: He is alert and oriented to person, place, and time. He displays abnormal reflex. A sensory deficit is present. He exhibits normal muscle tone.   Skin: Skin is warm and dry. No rash noted.   Psychiatric: He has a normal mood and affect. His behavior is normal. Judgment and thought content normal.   Nursing note and vitals reviewed.      Assessment/Plan   Jadiel was seen today for hernia.    Diagnoses and all orders for this visit:    Gastroesophageal reflux disease, esophagitis presence not specified  -     Ambulatory Referral to Gastroenterology    Diarrhea, unspecified type  -     Ambulatory Referral to Gastroenterology    Other orders  -     dexlansoprazole (DEXILANT) 60 MG capsule; Take 1 capsule by mouth Daily.

## 2017-08-11 ENCOUNTER — OFFICE VISIT (OUTPATIENT)
Dept: GASTROENTEROLOGY | Facility: CLINIC | Age: 67
End: 2017-08-11

## 2017-08-11 VITALS
BODY MASS INDEX: 29.84 KG/M2 | SYSTOLIC BLOOD PRESSURE: 152 MMHG | DIASTOLIC BLOOD PRESSURE: 82 MMHG | WEIGHT: 220.3 LBS | HEIGHT: 72 IN | HEART RATE: 76 BPM

## 2017-08-11 DIAGNOSIS — K21.9 GASTROESOPHAGEAL REFLUX DISEASE, ESOPHAGITIS PRESENCE NOT SPECIFIED: Primary | ICD-10-CM

## 2017-08-11 DIAGNOSIS — K59.03 DRUG-INDUCED CONSTIPATION: ICD-10-CM

## 2017-08-11 DIAGNOSIS — R10.84 GENERALIZED ABDOMINAL PAIN: ICD-10-CM

## 2017-08-11 DIAGNOSIS — R11.0 NAUSEA: ICD-10-CM

## 2017-08-11 DIAGNOSIS — R13.10 DYSPHAGIA, UNSPECIFIED TYPE: ICD-10-CM

## 2017-08-11 PROCEDURE — 99214 OFFICE O/P EST MOD 30 MIN: CPT | Performed by: NURSE PRACTITIONER

## 2017-08-11 RX ORDER — DEXTROSE AND SODIUM CHLORIDE 5; .45 G/100ML; G/100ML
30 INJECTION, SOLUTION INTRAVENOUS CONTINUOUS PRN
Status: CANCELLED | OUTPATIENT
Start: 2017-08-22

## 2017-08-11 RX ORDER — DEXTROSE AND SODIUM CHLORIDE 5; .45 G/100ML; G/100ML
30 INJECTION, SOLUTION INTRAVENOUS CONTINUOUS PRN
Status: CANCELLED | OUTPATIENT
Start: 2017-08-11

## 2017-08-11 RX ORDER — SODIUM, POTASSIUM,MAG SULFATES 17.5-3.13G
1 SOLUTION, RECONSTITUTED, ORAL ORAL EVERY 12 HOURS
Qty: 2 BOTTLE | Refills: 0 | Status: ON HOLD | OUTPATIENT
Start: 2017-08-11 | End: 2017-08-22

## 2017-08-11 NOTE — PROGRESS NOTES
Chief Complaint   Patient presents with   • Diarrhea   • Heartburn     GERD       Subjective    Jadiel Dutton is a 66 y.o. male. he is being seen for consultation today at the request of Brody Jiménez MD    History of Present Illness    66-year-old male presents with worsening GERD, dysphagia, nausea and changes in bowel habits.  States epigastric pain feels like razor blade in his chest and he started trulicity  about 3 weeks ago and had severe onset of nausea, vomiting, and diarrhea.  States those symptoms have slightly improved however he still having nausea and intermittent bouts of pain.  Reports sensation of food sitting in his mid chest.  He states he is chronically constipated and has been placed on opioids due to back surgery which has further worsened constipation.  He takes Dulcolax or over-the-counter laxatives to have a bowel movement.  He denies any melena or hematochezia.  He was seen by primary care and started on dexilant and Carafate with mild relief in symptoms.  He does have history of EGD noting slight stricture in the distal esophagus and gastritis.  He has followed up with Dr. Corbett in [facility] where it's felt that he has gastroparesis with delayed gastric emptying study done in 2014.  Plan; schedule patient for EGD due to worsening reflux possible ulceration and/or esophageal stricture.  Will start patient on Moban tic for opioid-induced constipation and schedule colonoscopy due to abdominal pain and changes in bowel habits.  Follow-up after test return to office sooner if needed.     The following portions of the patient's history were reviewed and updated as appropriate:   Past Medical History:   Diagnosis Date   • Abdominal pain    • Abnormal weight loss    • Acute bronchitis    • Anxiety state    • Benign essential hypertension    • Chronic sinusitis    • Constipation    • Cystitis    • Degenerative joint disease involving multiple joints    • Diabetic neuropathy       bilateral hands      • Diastolic dysfunction    • Diverticular disease of colon    • Dyspnea    • Epigastric pain    • Essential hypertension    • Gastroesophageal reflux disease    • Generalized anxiety disorder    • Hyperlipidemia    • Inflammatory bowel disease     Possible Inflammatory Bowel Disease      • Lumbar pain     Pain radiating to lumbar region of back   n      • Pleuritic pain    • Radiculopathy     site unspecified      • Type 2 diabetes mellitus    • Vesicular eczema of hands and feet      Past Surgical History:   Procedure Laterality Date   • CERVICAL SPINE SURGERY  2012    Surgery for spinal stenosis   • COLONOSCOPY  06/11/2012    Internal & external hemorrhoids found.   • ENDOSCOPY  06/11/2012    Slight stricture in the distal esophagus. Gastritis in stomach. Biopsy taken. Normal duodenum.   • ENDOSCOPY     • ENTEROSCOPY SMALL BOWEL  08/27/2012    Gastritis in stomach. Normal jejunum. Biopsy taken. Normal duodenum   • INJECTION OF MEDICATION  02/28/2014    Depo Medrol   • INJECTION OF MEDICATION  03/07/2014    Rocephin(2)     Family History   Problem Relation Age of Onset   • No Known Problems Other    • Hypertension Mother    • Heart attack Father    • No Known Problems Sister    • No Known Problems Brother    • No Known Problems Daughter    • No Known Problems Son    • No Known Problems Maternal Aunt    • No Known Problems Maternal Uncle    • No Known Problems Paternal Aunt    • No Known Problems Paternal Uncle    • No Known Problems Maternal Grandmother    • No Known Problems Maternal Grandfather    • No Known Problems Paternal Grandmother    • No Known Problems Paternal Grandfather        Current Outpatient Prescriptions   Medication Sig Dispense Refill   • dexlansoprazole (DEXILANT) 60 MG capsule Take 1 capsule by mouth Daily. 90 capsule 2   • furosemide (LASIX) 40 MG tablet Take 1 tablet by mouth 2 (Two) Times a Day. (Patient taking differently: Take 40 mg by mouth As Needed.) 30 tablet  1   • gabapentin (NEURONTIN) 300 MG capsule Take 1 capsule by mouth As Needed.     • HYDROcodone-acetaminophen (LORTAB) 7.5-325 MG per tablet Take 1 tablet by mouth Every 6 (Six) Hours As Needed for Moderate Pain (4-6).     • insulin detemir (LEVEMIR) 100 UNIT/ML injection Inject 65 Units under the skin Daily. 27 each 6   • Insulin Pen Needle (B-D UF III MINI PEN NEEDLES) 31G X 5 MM misc Use 4x daily with insulin pens 400 each 2   • lisinopril-hydrochlorothiazide (PRINZIDE,ZESTORETIC) 20-25 MG per tablet Take 1 tablet by mouth Daily.     • methocarbamol (ROBAXIN) 750 MG tablet Take 1 tablet by mouth Every Night.  1   • ONETOUCH VERIO test strip      • PROAIR  (90 BASE) MCG/ACT inhaler 2 puffs As Needed.     • rosuvastatin (CRESTOR) 5 MG tablet Take 1 tablet by mouth Daily. 30 tablet 3   • Naloxegol Oxalate 12.5 MG tablet Take 12.5 mg by mouth Daily. 30 tablet 5   • sodium-potassium-magnesium sulfates (SUPREP BOWEL PREP KIT) 17.5-3.13-1.6 GM/180ML solution oral solution Take 1 bottle by mouth Every 12 (Twelve) Hours. 2 bottle 0     No current facility-administered medications for this visit.      Allergies   Allergen Reactions   • Bactrim [Sulfamethoxazole-Trimethoprim]    • Ceftin [Cefuroxime Axetil]    • Penicillins    • Sulfa Antibiotics    • Ciprofloxacin Itching and Rash     Social History     Social History   • Marital status: Single     Spouse name: N/A   • Number of children: N/A   • Years of education: N/A     Social History Main Topics   • Smoking status: Never Smoker   • Smokeless tobacco: Never Used   • Alcohol use No   • Drug use: No   • Sexual activity: Yes     Partners: Female     Other Topics Concern   • None     Social History Narrative       Review of Systems  Review of Systems   Constitutional: Positive for appetite change and fatigue. Negative for activity change, chills, diaphoresis, fever and unexpected weight change.   HENT: Negative for sore throat and trouble swallowing.   "  Respiratory: Negative for shortness of breath.    Gastrointestinal: Positive for abdominal pain (feels like razor in midchest vibrating ) and constipation (takes ducolax, MOM daily. on opiods after back surgery ). Negative for abdominal distention, anal bleeding, blood in stool, diarrhea, nausea, rectal pain and vomiting.   Musculoskeletal: Negative for arthralgias.   Skin: Negative for pallor.   Neurological: Negative for light-headedness.        /82 (BP Location: Left arm, Patient Position: Sitting, Cuff Size: Adult)  Pulse 76  Ht 72\" (182.9 cm)  Wt 220 lb 4.8 oz (99.9 kg)  BMI 29.88 kg/m2    Objective    Physical Exam   Constitutional: He is oriented to person, place, and time. He appears well-developed and well-nourished. He is cooperative. No distress.   HENT:   Head: Normocephalic and atraumatic.   Neck: Normal range of motion. Neck supple. No thyromegaly present.   Cardiovascular: Normal rate, regular rhythm and normal heart sounds.    Pulmonary/Chest: Effort normal and breath sounds normal. He has no wheezes. He has no rhonchi. He has no rales.   Abdominal: Soft. Normal appearance and bowel sounds are normal. He exhibits no shifting dullness and no distension. There is no hepatosplenomegaly. There is tenderness in the epigastric area. There is no rigidity and no guarding. No hernia.   Lymphadenopathy:     He has no cervical adenopathy.   Neurological: He is alert and oriented to person, place, and time.   Skin: Skin is warm, dry and intact. No rash noted. No pallor.   Psychiatric: He has a normal mood and affect. His speech is normal.     Orders Only on 05/17/2017   Component Date Value Ref Range Status   • TSH 05/17/2017 2.770  0.460 - 4.680 mIU/mL Final   • Microalbumin/Creatinine Ratio 05/17/2017 65.1* 0.0 - 30.0 mg/g Final   • Creatinine, Urine 05/17/2017 270.4  mg/dL Final   • Microalbumin, Urine 05/17/2017 17.6  mg/L Final   • Total Cholesterol 05/17/2017 155  150 - 200 mg/dL Final   • " Triglycerides 05/17/2017 159  35 - 160 mg/dL Final   • HDL Cholesterol 05/17/2017 38  35 - 100 mg/dL Final   • LDL Cholesterol  05/17/2017 85  mg/dL Final   • VLDL Cholesterol 05/17/2017 31.8  mg/dL Final   • LDL/HDL Ratio 05/17/2017 2.24   Final   • Hemoglobin A1C 05/17/2017 10.39* 4 - 5.6 % Final   • Glucose 05/17/2017 146* 70 - 100 mg/dL Final   • BUN 05/17/2017 16  8 - 25 mg/dL Final   • Creatinine 05/17/2017 1.10  0.40 - 1.30 mg/dL Final   • Sodium 05/17/2017 138  134 - 146 mmol/L Final   • Potassium 05/17/2017 4.2  3.4 - 5.4 mmol/L Final   • Chloride 05/17/2017 97* 100 - 112 mmol/L Final   • CO2 05/17/2017 31.0  20.0 - 32.0 mmol/L Final   • Calcium 05/17/2017 9.5  8.4 - 10.8 mg/dL Final   • eGFR Non  Amer 05/17/2017 67  49 - 113 mL/min/1.73 Final   • BUN/Creatinine Ratio 05/17/2017 14.5  7.0 - 25.0 Final   • Anion Gap 05/17/2017 10.0  5.0 - 15.0 mmol/L Final   • WBC 05/17/2017 9.53  3.20 - 9.80 10*3/mm3 Final   • RBC 05/17/2017 5.01  4.37 - 5.74 10*6/mm3 Final   • Hemoglobin 05/17/2017 13.5* 13.7 - 17.3 g/dL Final   • Hematocrit 05/17/2017 41.2  39.0 - 49.0 % Final   • MCV 05/17/2017 82.2  80.0 - 98.0 fL Final   • MCH 05/17/2017 26.9  26.5 - 34.0 pg Final   • MCHC 05/17/2017 32.8  31.5 - 36.3 g/dL Final   • RDW 05/17/2017 16.1* 11.5 - 14.5 % Final   • RDW-SD 05/17/2017 47.6* 35.1 - 43.9 fl Final   • MPV 05/17/2017 10.1  8.0 - 12.0 fL Final   • Platelets 05/17/2017 248  150 - 450 10*3/mm3 Final   • Neutrophil % 05/17/2017 65.7  37.0 - 80.0 % Final   • Lymphocyte % 05/17/2017 23.6  10.0 - 50.0 % Final   • Monocyte % 05/17/2017 7.6  0.0 - 12.0 % Final   • Eosinophil % 05/17/2017 2.7  0.0 - 7.0 % Final   • Basophil % 05/17/2017 0.4  0.0 - 2.0 % Final   • Neutrophils, Absolute 05/17/2017 6.26  2.00 - 8.60 10*3/mm3 Final   • Lymphocytes, Absolute 05/17/2017 2.25  0.60 - 4.20 10*3/mm3 Final   • Monocytes, Absolute 05/17/2017 0.72  0.00 - 0.90 10*3/mm3 Final   • Eosinophils, Absolute 05/17/2017 0.26  0.00 -  0.70 10*3/mm3 Final   • Basophils, Absolute 05/17/2017 0.04  0.00 - 0.20 10*3/mm3 Final     Assessment/Plan      1. Gastroesophageal reflux disease, esophagitis presence not specified    2. Drug-induced constipation    3. Dysphagia, unspecified type    4. Generalized abdominal pain    5. Nausea    .       Orders placed during this encounter include:  EGD and     COLONOSCOPY (N/A)    Review and/or summary of lab tests, radiology, procedures, medications. Review and summary of old records and obtaining of history. The risks and benefits of my recommendations, as well as other treatment options were discussed with the patient today. Questions were answered.    New Medications Ordered This Visit   Medications   • Naloxegol Oxalate 12.5 MG tablet     Sig: Take 12.5 mg by mouth Daily.     Dispense:  30 tablet     Refill:  5   • sodium-potassium-magnesium sulfates (SUPREP BOWEL PREP KIT) 17.5-3.13-1.6 GM/180ML solution oral solution     Sig: Take 1 bottle by mouth Every 12 (Twelve) Hours.     Dispense:  2 bottle     Refill:  0       Follow-up: Return in about 4 weeks (around 9/8/2017).          This document has been electronically signed by MARY Bravo on August 14, 2017 8:33 AM             Results for orders placed or performed in visit on 05/17/17   CBC Auto Differential   Result Value Ref Range    WBC 9.53 3.20 - 9.80 10*3/mm3    RBC 5.01 4.37 - 5.74 10*6/mm3    Hemoglobin 13.5 (L) 13.7 - 17.3 g/dL    Hematocrit 41.2 39.0 - 49.0 %    MCV 82.2 80.0 - 98.0 fL    MCH 26.9 26.5 - 34.0 pg    MCHC 32.8 31.5 - 36.3 g/dL    RDW 16.1 (H) 11.5 - 14.5 %    RDW-SD 47.6 (H) 35.1 - 43.9 fl    MPV 10.1 8.0 - 12.0 fL    Platelets 248 150 - 450 10*3/mm3    Neutrophil % 65.7 37.0 - 80.0 %    Lymphocyte % 23.6 10.0 - 50.0 %    Monocyte % 7.6 0.0 - 12.0 %    Eosinophil % 2.7 0.0 - 7.0 %    Basophil % 0.4 0.0 - 2.0 %    Neutrophils, Absolute 6.26 2.00 - 8.60 10*3/mm3    Lymphocytes, Absolute 2.25 0.60 - 4.20 10*3/mm3    Monocytes,  Absolute 0.72 0.00 - 0.90 10*3/mm3    Eosinophils, Absolute 0.26 0.00 - 0.70 10*3/mm3    Basophils, Absolute 0.04 0.00 - 0.20 10*3/mm3   Microalbumin / Creatinine Urine Ratio   Result Value Ref Range    Microalbumin/Creatinine Ratio 65.1 (H) 0.0 - 30.0 mg/g    Creatinine, Urine 270.4 mg/dL    Microalbumin, Urine 17.6 mg/L   TSH   Result Value Ref Range    TSH 2.770 0.460 - 4.680 mIU/mL   Hemoglobin A1c   Result Value Ref Range    Hemoglobin A1C 10.39 (H) 4 - 5.6 %   Lipid Panel   Result Value Ref Range    Total Cholesterol 155 150 - 200 mg/dL    Triglycerides 159 35 - 160 mg/dL    HDL Cholesterol 38 35 - 100 mg/dL    LDL Cholesterol  85 mg/dL    VLDL Cholesterol 31.8 mg/dL    LDL/HDL Ratio 2.24    Basic Metabolic Panel   Result Value Ref Range    Glucose 146 (H) 70 - 100 mg/dL    BUN 16 8 - 25 mg/dL    Creatinine 1.10 0.40 - 1.30 mg/dL    Sodium 138 134 - 146 mmol/L    Potassium 4.2 3.4 - 5.4 mmol/L    Chloride 97 (L) 100 - 112 mmol/L    CO2 31.0 20.0 - 32.0 mmol/L    Calcium 9.5 8.4 - 10.8 mg/dL    eGFR Non  Amer 67 49 - 113 mL/min/1.73    BUN/Creatinine Ratio 14.5 7.0 - 25.0    Anion Gap 10.0 5.0 - 15.0 mmol/L   Results for orders placed or performed during the hospital encounter of 07/12/16   Basic metabolic panel   Result Value Ref Range    Glucose 213 (H) 70.0 - 100.0 mg/dl    BUN 13 8.0 - 25.0 mg/dl    Creatinine 1.2 0.4 - 1.3 mg/dl    Sodium 137.0 134 - 146 mmol/L    Potassium 3.6 3.4 - 5.4 mmol/L    Chloride 98.0 (L) 100.0 - 112.0 mmol/L    CO2 31.0 20.0 - 32.0 mmol/L    Calcium 9.6 8.4 - 10.8 mg/dl    GFR MDRD Non  61 49 - 113 mL/min/1.73 sq.M    GFR MDRD  74 49 - 113 mL/min/1.73 sq.M    Anion Gap 8.0 5.0 - 15.0 mmol/L   Results for orders placed or performed during the hospital encounter of 05/03/16   Basic metabolic panel   Result Value Ref Range    Glucose 220 (H) 70.0 - 100.0 mg/dl    BUN 14 8.0 - 25.0 mg/dl    Creatinine 1.3 0.4 - 1.3 mg/dl    Sodium 136.0 134 -  146 mmol/L    Potassium 3.5 3.4 - 5.4 mmol/L    Chloride 95.0 (L) 100.0 - 112.0 mmol/L    CO2 32.0 20.0 - 32.0 mmol/L    Calcium 8.6 8.4 - 10.8 mg/dl    GFR MDRD Non  56 49 - 113 mL/min/1.73 sq.M    GFR MDRD  67 49 - 113 mL/min/1.73 sq.M    Anion Gap 9.0 5.0 - 15.0 mmol/L   Results for orders placed or performed during the hospital encounter of 03/28/16   Protein, Random Urine, Ql   Result Value Ref Range    Protein, UA NEGATIVE NEGATIVE   CBC and Differential   Result Value Ref Range    WBC 7.7 3.2 - 9.8 x1000/uL    RBC 5.10 4.37 - 5.74 claudia/mm3    Hemoglobin 13.7 13.7 - 17.3 gm/dl    Hematocrit 41.2 39.0 - 49.0 %    MCV 80.8 80.0 - 98.0 fl    MCH 26.9 26.0 - 34.0 pg    MCHC 33.3 31.5 - 36.3 gm/dl    Platelets 238 150 - 450 x1000/mm3    RDW 15.5 (H) 11.5 - 14.5 %    MPV 10.8 8.0 - 12.0 fl    Neutrophil Rel % 58.2 37.0 - 80.0 %    Lymphocyte Rel % 24.4 10.0 - 50.0 %    Monocyte Rel % 8.4 0.0 - 12.0 %    Eosinophil Rel % 8.1 (H) 0.0 - 7.0 %    Basophil Rel % 0.9 0.0 - 2.0 %    nRBC 0     nRBC 0    TSH   Result Value Ref Range    TSH 2.77 0.46 - 4.68 uIU/ml   T4, free   Result Value Ref Range    Free T4 1.27 0.78 - 2.19 ng/dl   PSA   Result Value Ref Range    PSA 0.43 0.00 - 4.00 ng/ml   Hemoglobin A1c   Result Value Ref Range    Hemoglobin A1C 11.2 (H) 4.0 - 5.6 %TotHgb   Vitamin B12   Result Value Ref Range    Vitamin B-12 >1000 (H) 239 - 931 pg/ml   Lipid panel   Result Value Ref Range    Total Cholesterol 215 (H) 150 - 200 mg/dl    Triglycerides 356 (H) 35 - 160 mg/dl    HDL Cholesterol 32.5 (L) 35.0 - 100.0 mg/dl    LDL Cholesterol  111 mg/dl   Comprehensive metabolic panel   Result Value Ref Range    Glucose 216 (H) 70.0 - 100.0 mg/dl    BUN 14 8.0 - 25.0 mg/dl    Creatinine 1.4 (H) 0.4 - 1.3 mg/dl    Sodium 137.0 134 - 146 mmol/L    Potassium 4.6 3.4 - 5.4 mmol/L    Chloride 96.0 (L) 100.0 - 112.0 mmol/L    CO2 31.0 20.0 - 32.0 mmol/L    Calcium 9.2 8.4 - 10.8 mg/dl    Total  Protein 7.1 6.7 - 8.2 gm/dl    Albumin 3.7 3.2 - 5.5 gm/dl    Total Bilirubin 0.6 0.2 - 1.0 mg/dl    Alkaline Phosphatase 104 15 - 121 U/L    ALT (SGPT) 22 10 - 60 U/L    AST (SGOT) 31 10 - 60 U/L    GFR MDRD Non  51 49 - 113 mL/min/1.73 sq.M    GFR MDRD  62 49 - 113 mL/min/1.73 sq.M    Anion Gap 10.0 5.0 - 15.0 mmol/L     *Note: Due to a large number of results and/or encounters for the requested time period, some results have not been displayed. A complete set of results can be found in Results Review.

## 2017-08-17 RX ORDER — IBUPROFEN 200 MG
200 TABLET ORAL DAILY PRN
COMMUNITY
End: 2018-02-05

## 2017-08-17 RX ORDER — HYDROCODONE BITARTRATE AND ACETAMINOPHEN 10; 325 MG/1; MG/1
1 TABLET ORAL EVERY 6 HOURS PRN
COMMUNITY
End: 2018-12-26

## 2017-08-18 RX ORDER — DEXLANSOPRAZOLE 60 MG/1
60 CAPSULE, DELAYED RELEASE ORAL DAILY
Qty: 30 CAPSULE | Refills: 0 | Status: SHIPPED | OUTPATIENT
Start: 2017-08-18 | End: 2017-11-27 | Stop reason: SDUPTHER

## 2017-08-22 ENCOUNTER — ANESTHESIA (OUTPATIENT)
Dept: GASTROENTEROLOGY | Facility: HOSPITAL | Age: 67
End: 2017-08-22

## 2017-08-22 ENCOUNTER — HOSPITAL ENCOUNTER (OUTPATIENT)
Facility: HOSPITAL | Age: 67
Setting detail: HOSPITAL OUTPATIENT SURGERY
Discharge: HOME | End: 2017-08-22
Attending: INTERNAL MEDICINE | Admitting: INTERNAL MEDICINE

## 2017-08-22 ENCOUNTER — ANESTHESIA EVENT (OUTPATIENT)
Dept: GASTROENTEROLOGY | Facility: HOSPITAL | Age: 67
End: 2017-08-22

## 2017-08-22 VITALS
HEIGHT: 72 IN | BODY MASS INDEX: 29.26 KG/M2 | OXYGEN SATURATION: 99 % | DIASTOLIC BLOOD PRESSURE: 48 MMHG | SYSTOLIC BLOOD PRESSURE: 98 MMHG | HEART RATE: 82 BPM | RESPIRATION RATE: 20 BRPM | TEMPERATURE: 97.2 F | WEIGHT: 216.05 LBS

## 2017-08-22 DIAGNOSIS — R13.10 DYSPHAGIA, UNSPECIFIED TYPE: ICD-10-CM

## 2017-08-22 DIAGNOSIS — R11.0 NAUSEA: ICD-10-CM

## 2017-08-22 DIAGNOSIS — R10.84 GENERALIZED ABDOMINAL CRAMPING: ICD-10-CM

## 2017-08-22 DIAGNOSIS — K59.03 DRUG-INDUCED CONSTIPATION: ICD-10-CM

## 2017-08-22 DIAGNOSIS — K21.9 GASTROESOPHAGEAL REFLUX DISEASE, ESOPHAGITIS PRESENCE NOT SPECIFIED: ICD-10-CM

## 2017-08-22 LAB — GLUCOSE BLDC GLUCOMTR-MCNC: 103 MG/DL (ref 70–130)

## 2017-08-22 PROCEDURE — 43239 EGD BIOPSY SINGLE/MULTIPLE: CPT | Performed by: INTERNAL MEDICINE

## 2017-08-22 PROCEDURE — 88305 TISSUE EXAM BY PATHOLOGIST: CPT | Performed by: PATHOLOGY

## 2017-08-22 PROCEDURE — 88305 TISSUE EXAM BY PATHOLOGIST: CPT | Performed by: INTERNAL MEDICINE

## 2017-08-22 PROCEDURE — 25010000002 PROPOFOL 10 MG/ML EMULSION: Performed by: NURSE ANESTHETIST, CERTIFIED REGISTERED

## 2017-08-22 PROCEDURE — 45385 COLONOSCOPY W/LESION REMOVAL: CPT | Performed by: INTERNAL MEDICINE

## 2017-08-22 PROCEDURE — 25010000002 FENTANYL CITRATE (PF) 100 MCG/2ML SOLUTION: Performed by: NURSE ANESTHETIST, CERTIFIED REGISTERED

## 2017-08-22 PROCEDURE — 25010000002 MIDAZOLAM PER 1 MG: Performed by: NURSE ANESTHETIST, CERTIFIED REGISTERED

## 2017-08-22 PROCEDURE — 82962 GLUCOSE BLOOD TEST: CPT

## 2017-08-22 RX ORDER — PROMETHAZINE HYDROCHLORIDE 25 MG/ML
12.5 INJECTION, SOLUTION INTRAMUSCULAR; INTRAVENOUS ONCE AS NEEDED
Status: DISCONTINUED | OUTPATIENT
Start: 2017-08-22 | End: 2017-08-22 | Stop reason: HOSPADM

## 2017-08-22 RX ORDER — PROMETHAZINE HYDROCHLORIDE 25 MG/1
25 SUPPOSITORY RECTAL ONCE AS NEEDED
Status: DISCONTINUED | OUTPATIENT
Start: 2017-08-22 | End: 2017-08-22 | Stop reason: HOSPADM

## 2017-08-22 RX ORDER — FENTANYL CITRATE 50 UG/ML
INJECTION, SOLUTION INTRAMUSCULAR; INTRAVENOUS AS NEEDED
Status: DISCONTINUED | OUTPATIENT
Start: 2017-08-22 | End: 2017-08-22 | Stop reason: SURG

## 2017-08-22 RX ORDER — ONDANSETRON 2 MG/ML
4 INJECTION INTRAMUSCULAR; INTRAVENOUS ONCE AS NEEDED
Status: DISCONTINUED | OUTPATIENT
Start: 2017-08-22 | End: 2017-08-22 | Stop reason: HOSPADM

## 2017-08-22 RX ORDER — DEXTROSE AND SODIUM CHLORIDE 5; .45 G/100ML; G/100ML
30 INJECTION, SOLUTION INTRAVENOUS CONTINUOUS PRN
Status: DISCONTINUED | OUTPATIENT
Start: 2017-08-22 | End: 2017-08-22 | Stop reason: HOSPADM

## 2017-08-22 RX ORDER — MIDAZOLAM HYDROCHLORIDE 1 MG/ML
INJECTION INTRAMUSCULAR; INTRAVENOUS AS NEEDED
Status: DISCONTINUED | OUTPATIENT
Start: 2017-08-22 | End: 2017-08-22 | Stop reason: SURG

## 2017-08-22 RX ORDER — PROMETHAZINE HYDROCHLORIDE 25 MG/1
25 TABLET ORAL ONCE AS NEEDED
Status: DISCONTINUED | OUTPATIENT
Start: 2017-08-22 | End: 2017-08-22 | Stop reason: HOSPADM

## 2017-08-22 RX ORDER — DEXAMETHASONE SODIUM PHOSPHATE 4 MG/ML
8 INJECTION, SOLUTION INTRA-ARTICULAR; INTRALESIONAL; INTRAMUSCULAR; INTRAVENOUS; SOFT TISSUE ONCE AS NEEDED
Status: DISCONTINUED | OUTPATIENT
Start: 2017-08-22 | End: 2017-08-22 | Stop reason: HOSPADM

## 2017-08-22 RX ORDER — PROPOFOL 10 MG/ML
VIAL (ML) INTRAVENOUS AS NEEDED
Status: DISCONTINUED | OUTPATIENT
Start: 2017-08-22 | End: 2017-08-22 | Stop reason: SURG

## 2017-08-22 RX ADMIN — PROPOFOL 50 MG: 10 INJECTION, EMULSION INTRAVENOUS at 12:03

## 2017-08-22 RX ADMIN — DEXTROSE AND SODIUM CHLORIDE: 5; 450 INJECTION, SOLUTION INTRAVENOUS at 11:34

## 2017-08-22 RX ADMIN — PROPOFOL 50 MG: 10 INJECTION, EMULSION INTRAVENOUS at 11:52

## 2017-08-22 RX ADMIN — DEXTROSE AND SODIUM CHLORIDE 30 ML/HR: 5; 450 INJECTION, SOLUTION INTRAVENOUS at 11:22

## 2017-08-22 RX ADMIN — MIDAZOLAM 1 MG: 1 INJECTION INTRAMUSCULAR; INTRAVENOUS at 11:51

## 2017-08-22 RX ADMIN — PROPOFOL 50 MG: 10 INJECTION, EMULSION INTRAVENOUS at 12:10

## 2017-08-22 RX ADMIN — FENTANYL CITRATE 50 MCG: 50 INJECTION, SOLUTION INTRAMUSCULAR; INTRAVENOUS at 11:51

## 2017-08-22 RX ADMIN — PROPOFOL 50 MG: 10 INJECTION, EMULSION INTRAVENOUS at 11:59

## 2017-08-22 RX ADMIN — PROPOFOL 50 MG: 10 INJECTION, EMULSION INTRAVENOUS at 11:56

## 2017-08-22 RX ADMIN — PROPOFOL 50 MG: 10 INJECTION, EMULSION INTRAVENOUS at 12:05

## 2017-08-22 NOTE — H&P (VIEW-ONLY)
Chief Complaint   Patient presents with   • Diarrhea   • Heartburn     GERD       Subjective    Jadiel Dutton is a 66 y.o. male. he is being seen for consultation today at the request of Brody Jiménez MD    History of Present Illness    66-year-old male presents with worsening GERD, dysphagia, nausea and changes in bowel habits.  States epigastric pain feels like razor blade in his chest and he started trulicity  about 3 weeks ago and had severe onset of nausea, vomiting, and diarrhea.  States those symptoms have slightly improved however he still having nausea and intermittent bouts of pain.  Reports sensation of food sitting in his mid chest.  He states he is chronically constipated and has been placed on opioids due to back surgery which has further worsened constipation.  He takes Dulcolax or over-the-counter laxatives to have a bowel movement.  He denies any melena or hematochezia.  He was seen by primary care and started on dexilant and Carafate with mild relief in symptoms.  He does have history of EGD noting slight stricture in the distal esophagus and gastritis.  He has followed up with Dr. Corbett in [facility] where it's felt that he has gastroparesis with delayed gastric emptying study done in 2014.  Plan; schedule patient for EGD due to worsening reflux possible ulceration and/or esophageal stricture.  Will start patient on Moban tic for opioid-induced constipation and schedule colonoscopy due to abdominal pain and changes in bowel habits.  Follow-up after test return to office sooner if needed.     The following portions of the patient's history were reviewed and updated as appropriate:   Past Medical History:   Diagnosis Date   • Abdominal pain    • Abnormal weight loss    • Acute bronchitis    • Anxiety state    • Benign essential hypertension    • Chronic sinusitis    • Constipation    • Cystitis    • Degenerative joint disease involving multiple joints    • Diabetic neuropathy         bilateral hands      • Diastolic dysfunction    • Diverticular disease of colon    • Dyspnea    • Epigastric pain    • Essential hypertension    • Gastroesophageal reflux disease    • Generalized anxiety disorder    • Hyperlipidemia    • Inflammatory bowel disease     Possible Inflammatory Bowel Disease      • Lumbar pain     Pain radiating to lumbar region of back   n      • Pleuritic pain    • Radiculopathy     site unspecified      • Type 2 diabetes mellitus    • Vesicular eczema of hands and feet      Past Surgical History:   Procedure Laterality Date   • CERVICAL SPINE SURGERY  2012    Surgery for spinal stenosis   • COLONOSCOPY  06/11/2012    Internal & external hemorrhoids found.   • ENDOSCOPY  06/11/2012    Slight stricture in the distal esophagus. Gastritis in stomach. Biopsy taken. Normal duodenum.   • ENDOSCOPY     • ENTEROSCOPY SMALL BOWEL  08/27/2012    Gastritis in stomach. Normal jejunum. Biopsy taken. Normal duodenum   • INJECTION OF MEDICATION  02/28/2014    Depo Medrol   • INJECTION OF MEDICATION  03/07/2014    Rocephin(2)     Family History   Problem Relation Age of Onset   • No Known Problems Other    • Hypertension Mother    • Heart attack Father    • No Known Problems Sister    • No Known Problems Brother    • No Known Problems Daughter    • No Known Problems Son    • No Known Problems Maternal Aunt    • No Known Problems Maternal Uncle    • No Known Problems Paternal Aunt    • No Known Problems Paternal Uncle    • No Known Problems Maternal Grandmother    • No Known Problems Maternal Grandfather    • No Known Problems Paternal Grandmother    • No Known Problems Paternal Grandfather        Current Outpatient Prescriptions   Medication Sig Dispense Refill   • dexlansoprazole (DEXILANT) 60 MG capsule Take 1 capsule by mouth Daily. 90 capsule 2   • furosemide (LASIX) 40 MG tablet Take 1 tablet by mouth 2 (Two) Times a Day. (Patient taking differently: Take 40 mg by mouth As Needed.) 30 tablet  1   • gabapentin (NEURONTIN) 300 MG capsule Take 1 capsule by mouth As Needed.     • HYDROcodone-acetaminophen (LORTAB) 7.5-325 MG per tablet Take 1 tablet by mouth Every 6 (Six) Hours As Needed for Moderate Pain (4-6).     • insulin detemir (LEVEMIR) 100 UNIT/ML injection Inject 65 Units under the skin Daily. 27 each 6   • Insulin Pen Needle (B-D UF III MINI PEN NEEDLES) 31G X 5 MM misc Use 4x daily with insulin pens 400 each 2   • lisinopril-hydrochlorothiazide (PRINZIDE,ZESTORETIC) 20-25 MG per tablet Take 1 tablet by mouth Daily.     • methocarbamol (ROBAXIN) 750 MG tablet Take 1 tablet by mouth Every Night.  1   • ONETOUCH VERIO test strip      • PROAIR  (90 BASE) MCG/ACT inhaler 2 puffs As Needed.     • rosuvastatin (CRESTOR) 5 MG tablet Take 1 tablet by mouth Daily. 30 tablet 3   • Naloxegol Oxalate 12.5 MG tablet Take 12.5 mg by mouth Daily. 30 tablet 5   • sodium-potassium-magnesium sulfates (SUPREP BOWEL PREP KIT) 17.5-3.13-1.6 GM/180ML solution oral solution Take 1 bottle by mouth Every 12 (Twelve) Hours. 2 bottle 0     No current facility-administered medications for this visit.      Allergies   Allergen Reactions   • Bactrim [Sulfamethoxazole-Trimethoprim]    • Ceftin [Cefuroxime Axetil]    • Penicillins    • Sulfa Antibiotics    • Ciprofloxacin Itching and Rash     Social History     Social History   • Marital status: Single     Spouse name: N/A   • Number of children: N/A   • Years of education: N/A     Social History Main Topics   • Smoking status: Never Smoker   • Smokeless tobacco: Never Used   • Alcohol use No   • Drug use: No   • Sexual activity: Yes     Partners: Female     Other Topics Concern   • None     Social History Narrative       Review of Systems  Review of Systems   Constitutional: Positive for appetite change and fatigue. Negative for activity change, chills, diaphoresis, fever and unexpected weight change.   HENT: Negative for sore throat and trouble swallowing.   "  Respiratory: Negative for shortness of breath.    Gastrointestinal: Positive for abdominal pain (feels like razor in midchest vibrating ) and constipation (takes ducolax, MOM daily. on opiods after back surgery ). Negative for abdominal distention, anal bleeding, blood in stool, diarrhea, nausea, rectal pain and vomiting.   Musculoskeletal: Negative for arthralgias.   Skin: Negative for pallor.   Neurological: Negative for light-headedness.        /82 (BP Location: Left arm, Patient Position: Sitting, Cuff Size: Adult)  Pulse 76  Ht 72\" (182.9 cm)  Wt 220 lb 4.8 oz (99.9 kg)  BMI 29.88 kg/m2    Objective    Physical Exam   Constitutional: He is oriented to person, place, and time. He appears well-developed and well-nourished. He is cooperative. No distress.   HENT:   Head: Normocephalic and atraumatic.   Neck: Normal range of motion. Neck supple. No thyromegaly present.   Cardiovascular: Normal rate, regular rhythm and normal heart sounds.    Pulmonary/Chest: Effort normal and breath sounds normal. He has no wheezes. He has no rhonchi. He has no rales.   Abdominal: Soft. Normal appearance and bowel sounds are normal. He exhibits no shifting dullness and no distension. There is no hepatosplenomegaly. There is tenderness in the epigastric area. There is no rigidity and no guarding. No hernia.   Lymphadenopathy:     He has no cervical adenopathy.   Neurological: He is alert and oriented to person, place, and time.   Skin: Skin is warm, dry and intact. No rash noted. No pallor.   Psychiatric: He has a normal mood and affect. His speech is normal.     Orders Only on 05/17/2017   Component Date Value Ref Range Status   • TSH 05/17/2017 2.770  0.460 - 4.680 mIU/mL Final   • Microalbumin/Creatinine Ratio 05/17/2017 65.1* 0.0 - 30.0 mg/g Final   • Creatinine, Urine 05/17/2017 270.4  mg/dL Final   • Microalbumin, Urine 05/17/2017 17.6  mg/L Final   • Total Cholesterol 05/17/2017 155  150 - 200 mg/dL Final   • " Triglycerides 05/17/2017 159  35 - 160 mg/dL Final   • HDL Cholesterol 05/17/2017 38  35 - 100 mg/dL Final   • LDL Cholesterol  05/17/2017 85  mg/dL Final   • VLDL Cholesterol 05/17/2017 31.8  mg/dL Final   • LDL/HDL Ratio 05/17/2017 2.24   Final   • Hemoglobin A1C 05/17/2017 10.39* 4 - 5.6 % Final   • Glucose 05/17/2017 146* 70 - 100 mg/dL Final   • BUN 05/17/2017 16  8 - 25 mg/dL Final   • Creatinine 05/17/2017 1.10  0.40 - 1.30 mg/dL Final   • Sodium 05/17/2017 138  134 - 146 mmol/L Final   • Potassium 05/17/2017 4.2  3.4 - 5.4 mmol/L Final   • Chloride 05/17/2017 97* 100 - 112 mmol/L Final   • CO2 05/17/2017 31.0  20.0 - 32.0 mmol/L Final   • Calcium 05/17/2017 9.5  8.4 - 10.8 mg/dL Final   • eGFR Non  Amer 05/17/2017 67  49 - 113 mL/min/1.73 Final   • BUN/Creatinine Ratio 05/17/2017 14.5  7.0 - 25.0 Final   • Anion Gap 05/17/2017 10.0  5.0 - 15.0 mmol/L Final   • WBC 05/17/2017 9.53  3.20 - 9.80 10*3/mm3 Final   • RBC 05/17/2017 5.01  4.37 - 5.74 10*6/mm3 Final   • Hemoglobin 05/17/2017 13.5* 13.7 - 17.3 g/dL Final   • Hematocrit 05/17/2017 41.2  39.0 - 49.0 % Final   • MCV 05/17/2017 82.2  80.0 - 98.0 fL Final   • MCH 05/17/2017 26.9  26.5 - 34.0 pg Final   • MCHC 05/17/2017 32.8  31.5 - 36.3 g/dL Final   • RDW 05/17/2017 16.1* 11.5 - 14.5 % Final   • RDW-SD 05/17/2017 47.6* 35.1 - 43.9 fl Final   • MPV 05/17/2017 10.1  8.0 - 12.0 fL Final   • Platelets 05/17/2017 248  150 - 450 10*3/mm3 Final   • Neutrophil % 05/17/2017 65.7  37.0 - 80.0 % Final   • Lymphocyte % 05/17/2017 23.6  10.0 - 50.0 % Final   • Monocyte % 05/17/2017 7.6  0.0 - 12.0 % Final   • Eosinophil % 05/17/2017 2.7  0.0 - 7.0 % Final   • Basophil % 05/17/2017 0.4  0.0 - 2.0 % Final   • Neutrophils, Absolute 05/17/2017 6.26  2.00 - 8.60 10*3/mm3 Final   • Lymphocytes, Absolute 05/17/2017 2.25  0.60 - 4.20 10*3/mm3 Final   • Monocytes, Absolute 05/17/2017 0.72  0.00 - 0.90 10*3/mm3 Final   • Eosinophils, Absolute 05/17/2017 0.26  0.00 -  0.70 10*3/mm3 Final   • Basophils, Absolute 05/17/2017 0.04  0.00 - 0.20 10*3/mm3 Final     Assessment/Plan      1. Gastroesophageal reflux disease, esophagitis presence not specified    2. Drug-induced constipation    3. Dysphagia, unspecified type    4. Generalized abdominal pain    5. Nausea    .       Orders placed during this encounter include:  EGD and     COLONOSCOPY (N/A)    Review and/or summary of lab tests, radiology, procedures, medications. Review and summary of old records and obtaining of history. The risks and benefits of my recommendations, as well as other treatment options were discussed with the patient today. Questions were answered.    New Medications Ordered This Visit   Medications   • Naloxegol Oxalate 12.5 MG tablet     Sig: Take 12.5 mg by mouth Daily.     Dispense:  30 tablet     Refill:  5   • sodium-potassium-magnesium sulfates (SUPREP BOWEL PREP KIT) 17.5-3.13-1.6 GM/180ML solution oral solution     Sig: Take 1 bottle by mouth Every 12 (Twelve) Hours.     Dispense:  2 bottle     Refill:  0       Follow-up: Return in about 4 weeks (around 9/8/2017).          This document has been electronically signed by MARY Bravo on August 14, 2017 8:33 AM             Results for orders placed or performed in visit on 05/17/17   CBC Auto Differential   Result Value Ref Range    WBC 9.53 3.20 - 9.80 10*3/mm3    RBC 5.01 4.37 - 5.74 10*6/mm3    Hemoglobin 13.5 (L) 13.7 - 17.3 g/dL    Hematocrit 41.2 39.0 - 49.0 %    MCV 82.2 80.0 - 98.0 fL    MCH 26.9 26.5 - 34.0 pg    MCHC 32.8 31.5 - 36.3 g/dL    RDW 16.1 (H) 11.5 - 14.5 %    RDW-SD 47.6 (H) 35.1 - 43.9 fl    MPV 10.1 8.0 - 12.0 fL    Platelets 248 150 - 450 10*3/mm3    Neutrophil % 65.7 37.0 - 80.0 %    Lymphocyte % 23.6 10.0 - 50.0 %    Monocyte % 7.6 0.0 - 12.0 %    Eosinophil % 2.7 0.0 - 7.0 %    Basophil % 0.4 0.0 - 2.0 %    Neutrophils, Absolute 6.26 2.00 - 8.60 10*3/mm3    Lymphocytes, Absolute 2.25 0.60 - 4.20 10*3/mm3    Monocytes,  Absolute 0.72 0.00 - 0.90 10*3/mm3    Eosinophils, Absolute 0.26 0.00 - 0.70 10*3/mm3    Basophils, Absolute 0.04 0.00 - 0.20 10*3/mm3   Microalbumin / Creatinine Urine Ratio   Result Value Ref Range    Microalbumin/Creatinine Ratio 65.1 (H) 0.0 - 30.0 mg/g    Creatinine, Urine 270.4 mg/dL    Microalbumin, Urine 17.6 mg/L   TSH   Result Value Ref Range    TSH 2.770 0.460 - 4.680 mIU/mL   Hemoglobin A1c   Result Value Ref Range    Hemoglobin A1C 10.39 (H) 4 - 5.6 %   Lipid Panel   Result Value Ref Range    Total Cholesterol 155 150 - 200 mg/dL    Triglycerides 159 35 - 160 mg/dL    HDL Cholesterol 38 35 - 100 mg/dL    LDL Cholesterol  85 mg/dL    VLDL Cholesterol 31.8 mg/dL    LDL/HDL Ratio 2.24    Basic Metabolic Panel   Result Value Ref Range    Glucose 146 (H) 70 - 100 mg/dL    BUN 16 8 - 25 mg/dL    Creatinine 1.10 0.40 - 1.30 mg/dL    Sodium 138 134 - 146 mmol/L    Potassium 4.2 3.4 - 5.4 mmol/L    Chloride 97 (L) 100 - 112 mmol/L    CO2 31.0 20.0 - 32.0 mmol/L    Calcium 9.5 8.4 - 10.8 mg/dL    eGFR Non  Amer 67 49 - 113 mL/min/1.73    BUN/Creatinine Ratio 14.5 7.0 - 25.0    Anion Gap 10.0 5.0 - 15.0 mmol/L   Results for orders placed or performed during the hospital encounter of 07/12/16   Basic metabolic panel   Result Value Ref Range    Glucose 213 (H) 70.0 - 100.0 mg/dl    BUN 13 8.0 - 25.0 mg/dl    Creatinine 1.2 0.4 - 1.3 mg/dl    Sodium 137.0 134 - 146 mmol/L    Potassium 3.6 3.4 - 5.4 mmol/L    Chloride 98.0 (L) 100.0 - 112.0 mmol/L    CO2 31.0 20.0 - 32.0 mmol/L    Calcium 9.6 8.4 - 10.8 mg/dl    GFR MDRD Non  61 49 - 113 mL/min/1.73 sq.M    GFR MDRD  74 49 - 113 mL/min/1.73 sq.M    Anion Gap 8.0 5.0 - 15.0 mmol/L   Results for orders placed or performed during the hospital encounter of 05/03/16   Basic metabolic panel   Result Value Ref Range    Glucose 220 (H) 70.0 - 100.0 mg/dl    BUN 14 8.0 - 25.0 mg/dl    Creatinine 1.3 0.4 - 1.3 mg/dl    Sodium 136.0 134 -  146 mmol/L    Potassium 3.5 3.4 - 5.4 mmol/L    Chloride 95.0 (L) 100.0 - 112.0 mmol/L    CO2 32.0 20.0 - 32.0 mmol/L    Calcium 8.6 8.4 - 10.8 mg/dl    GFR MDRD Non  56 49 - 113 mL/min/1.73 sq.M    GFR MDRD  67 49 - 113 mL/min/1.73 sq.M    Anion Gap 9.0 5.0 - 15.0 mmol/L   Results for orders placed or performed during the hospital encounter of 03/28/16   Protein, Random Urine, Ql   Result Value Ref Range    Protein, UA NEGATIVE NEGATIVE   CBC and Differential   Result Value Ref Range    WBC 7.7 3.2 - 9.8 x1000/uL    RBC 5.10 4.37 - 5.74 claudia/mm3    Hemoglobin 13.7 13.7 - 17.3 gm/dl    Hematocrit 41.2 39.0 - 49.0 %    MCV 80.8 80.0 - 98.0 fl    MCH 26.9 26.0 - 34.0 pg    MCHC 33.3 31.5 - 36.3 gm/dl    Platelets 238 150 - 450 x1000/mm3    RDW 15.5 (H) 11.5 - 14.5 %    MPV 10.8 8.0 - 12.0 fl    Neutrophil Rel % 58.2 37.0 - 80.0 %    Lymphocyte Rel % 24.4 10.0 - 50.0 %    Monocyte Rel % 8.4 0.0 - 12.0 %    Eosinophil Rel % 8.1 (H) 0.0 - 7.0 %    Basophil Rel % 0.9 0.0 - 2.0 %    nRBC 0     nRBC 0    TSH   Result Value Ref Range    TSH 2.77 0.46 - 4.68 uIU/ml   T4, free   Result Value Ref Range    Free T4 1.27 0.78 - 2.19 ng/dl   PSA   Result Value Ref Range    PSA 0.43 0.00 - 4.00 ng/ml   Hemoglobin A1c   Result Value Ref Range    Hemoglobin A1C 11.2 (H) 4.0 - 5.6 %TotHgb   Vitamin B12   Result Value Ref Range    Vitamin B-12 >1000 (H) 239 - 931 pg/ml   Lipid panel   Result Value Ref Range    Total Cholesterol 215 (H) 150 - 200 mg/dl    Triglycerides 356 (H) 35 - 160 mg/dl    HDL Cholesterol 32.5 (L) 35.0 - 100.0 mg/dl    LDL Cholesterol  111 mg/dl   Comprehensive metabolic panel   Result Value Ref Range    Glucose 216 (H) 70.0 - 100.0 mg/dl    BUN 14 8.0 - 25.0 mg/dl    Creatinine 1.4 (H) 0.4 - 1.3 mg/dl    Sodium 137.0 134 - 146 mmol/L    Potassium 4.6 3.4 - 5.4 mmol/L    Chloride 96.0 (L) 100.0 - 112.0 mmol/L    CO2 31.0 20.0 - 32.0 mmol/L    Calcium 9.2 8.4 - 10.8 mg/dl    Total  Protein 7.1 6.7 - 8.2 gm/dl    Albumin 3.7 3.2 - 5.5 gm/dl    Total Bilirubin 0.6 0.2 - 1.0 mg/dl    Alkaline Phosphatase 104 15 - 121 U/L    ALT (SGPT) 22 10 - 60 U/L    AST (SGOT) 31 10 - 60 U/L    GFR MDRD Non  51 49 - 113 mL/min/1.73 sq.M    GFR MDRD  62 49 - 113 mL/min/1.73 sq.M    Anion Gap 10.0 5.0 - 15.0 mmol/L     *Note: Due to a large number of results and/or encounters for the requested time period, some results have not been displayed. A complete set of results can be found in Results Review.

## 2017-08-22 NOTE — ANESTHESIA POSTPROCEDURE EVALUATION
Patient: Jadiel Dutton    Procedure Summary     Date Anesthesia Start Anesthesia Stop Room / Location    08/22/17 1151 1222 [facility] ENDOSCOPY 1 / [facility] ENDOSCOPY       Procedure Diagnosis Surgeon Provider    ESOPHAGOGASTRODUODENOSCOPY possible dilation  (N/A Esophagus); COLONOSCOPY (N/A ) Drug-induced constipation; Gastroesophageal reflux disease, esophagitis presence not specified; Dysphagia, unspecified type; Nausea; Generalized abdominal cramping  (Drug-induced constipation [K59.03]; Gastroesophageal reflux disease, esophagitis presence not specified [K21.9]; Dysphagia, unspecified type [R13.10]) MD Heena Linton CRNA          Anesthesia Type: No value filed.  Last vitals  BP        Temp        Pulse       Resp        SpO2          Post Anesthesia Care and Evaluation    Patient location during evaluation: bedside  Patient participation: complete - patient participated  Level of consciousness: awake and alert  Pain management: adequate  Airway patency: patent  Anesthetic complications: No anesthetic complications    Cardiovascular status: acceptable  Respiratory status: acceptable  Hydration status: acceptable

## 2017-08-22 NOTE — PLAN OF CARE
Problem: Patient Care Overview (Adult)  Goal: Plan of Care Review    08/22/17 1227   Coping/Psychosocial Response Interventions   Plan Of Care Reviewed With patient   Patient Care Overview   Progress no change   Outcome Evaluation   Outcome Summary/Follow up Plan vss         Problem: GI Endoscopy (Adult)  Goal: Signs and Symptoms of Listed Potential Problems Will be Absent or Manageable (GI Endoscopy)    08/22/17 1227   GI Endoscopy   Problems Assessed (GI Endoscopy) all   Problems Present (GI Endoscopy) none

## 2017-08-22 NOTE — PLAN OF CARE
Problem: Patient Care Overview (Adult)  Goal: Plan of Care Review    08/22/17 1241   Coping/Psychosocial Response Interventions   Plan Of Care Reviewed With patient;family   Patient Care Overview   Progress no change         Problem: GI Endoscopy (Adult)  Goal: Signs and Symptoms of Listed Potential Problems Will be Absent or Manageable (GI Endoscopy)    08/22/17 1241   GI Endoscopy   Problems Assessed (GI Endoscopy) all   Problems Present (GI Endoscopy) none

## 2017-08-22 NOTE — ANESTHESIA PREPROCEDURE EVALUATION
Anesthesia Evaluation     Patient summary reviewed and Nursing notes reviewed   NPO Solid Status: > 8 hours  NPO Liquid Status: > 4 hours     Airway   Mallampati: III  TM distance: >3 FB  Neck ROM: full  no difficulty expected  Dental - normal exam     Pulmonary - normal exam   (+) a smoker Current, shortness of breath,   Cardiovascular - normal exam    (+) hypertension well controlled, hyperlipidemia      Neuro/Psych  (+) numbness, psychiatric history Anxiety,    GI/Hepatic/Renal/Endo    (+)  GERD poorly controlled, diabetes mellitus type 2,     Musculoskeletal     (+) back pain, chronic pain,   Abdominal  - normal exam   Substance History      OB/GYN          Other   (+) arthritis                                     Anesthesia Plan    ASA 3     Anesthetic plan and risks discussed with patient.

## 2017-08-23 LAB
LAB AP CASE REPORT: NORMAL
Lab: NORMAL
PATH REPORT.FINAL DX SPEC: NORMAL
PATH REPORT.GROSS SPEC: NORMAL

## 2017-08-28 ENCOUNTER — OFFICE VISIT (OUTPATIENT)
Dept: GASTROENTEROLOGY | Facility: CLINIC | Age: 67
End: 2017-08-28

## 2017-08-28 ENCOUNTER — OFFICE VISIT (OUTPATIENT)
Dept: OTOLARYNGOLOGY | Facility: CLINIC | Age: 67
End: 2017-08-28

## 2017-08-28 VITALS — HEIGHT: 72 IN | WEIGHT: 220 LBS | BODY MASS INDEX: 29.8 KG/M2 | TEMPERATURE: 97.5 F

## 2017-08-28 VITALS
DIASTOLIC BLOOD PRESSURE: 69 MMHG | HEART RATE: 96 BPM | BODY MASS INDEX: 29.87 KG/M2 | WEIGHT: 220.5 LBS | SYSTOLIC BLOOD PRESSURE: 108 MMHG | HEIGHT: 72 IN

## 2017-08-28 DIAGNOSIS — K59.03 DRUG-INDUCED CONSTIPATION: Primary | ICD-10-CM

## 2017-08-28 DIAGNOSIS — J34.2 NASAL SEPTAL DEVIATION: ICD-10-CM

## 2017-08-28 DIAGNOSIS — J34.3 NASAL TURBINATE HYPERTROPHY: ICD-10-CM

## 2017-08-28 DIAGNOSIS — H68.002 EUSTACHIAN CATARRH, LEFT: Primary | ICD-10-CM

## 2017-08-28 DIAGNOSIS — K21.9 GASTROESOPHAGEAL REFLUX DISEASE WITHOUT ESOPHAGITIS: ICD-10-CM

## 2017-08-28 DIAGNOSIS — R12 HEARTBURN: ICD-10-CM

## 2017-08-28 PROCEDURE — 99203 OFFICE O/P NEW LOW 30 MIN: CPT | Performed by: OTOLARYNGOLOGY

## 2017-08-28 PROCEDURE — 99213 OFFICE O/P EST LOW 20 MIN: CPT | Performed by: NURSE PRACTITIONER

## 2017-08-28 RX ORDER — RANITIDINE 300 MG/1
300 TABLET ORAL NIGHTLY
Qty: 90 TABLET | Refills: 3 | Status: SHIPPED | OUTPATIENT
Start: 2017-08-28 | End: 2018-02-05

## 2017-08-28 RX ORDER — AZELASTINE 1 MG/ML
2 SPRAY, METERED NASAL 2 TIMES DAILY
Qty: 30 ML | Refills: 11 | Status: SHIPPED | OUTPATIENT
Start: 2017-08-28 | End: 2018-11-08 | Stop reason: SDUPTHER

## 2017-08-28 RX ORDER — FLUTICASONE PROPIONATE 50 MCG
2 SPRAY, SUSPENSION (ML) NASAL 2 TIMES DAILY
Qty: 16 G | Refills: 11 | Status: SHIPPED | OUTPATIENT
Start: 2017-08-28 | End: 2021-05-20 | Stop reason: SDUPTHER

## 2017-08-28 NOTE — PROGRESS NOTES
Chief Complaint   Patient presents with   • Colonoscopy     follow up   • EGD     follow up       Subjective    Jadiel Dutton is a 66 y.o. male. he is here today for follow-up.    Heartburn   He complains of heartburn. He reports no abdominal pain, no early satiety, no nausea or no sore throat. This is a chronic problem. The current episode started more than 1 month ago. The problem has been waxing and waning. The heartburn wakes him from sleep. The heartburn changes with position. Pertinent negatives include no fatigue.   Constipation   This is a chronic problem. The current episode started more than 1 month ago. Pertinent negatives include no abdominal pain, diarrhea, fever, nausea, rectal pain or vomiting.   66-year-old male presents for EGD and colonoscopy follow-up.  States Movantic has been working well for constipation and he is able to have bowel movement daily or every other day.  Reports he had a severe episode of reflux 2 days ago after he ate a taco for dinner.  EGD noted normal esophagus, gastritis in the antrum.  Polyps in the fundus.  Duodenum was normal.  Antrum of stomach noted reactive gastropathy, polyp stomach was fundic gland polyp.  Colonoscopy had a fair prep and noted a few medium mouth diverticula in the sigmoid.  2 polyps were removed from the descending and mid transverse colon.  Nonbleeding hemorrhoids were found.  Polyps were tubular adenomas on pathology.  Plan; recommend daily fiber supplementation for diverticular disease.  Continue Movantic 25 mg per day.  Continue patient on Dexilant and Zantac at bedtime as needed for breakthrough symptoms.  Follow-up in 6 months return to office sooner if needed.         The following portions of the patient's history were reviewed and updated as appropriate:   Past Medical History:   Diagnosis Date   • Abdominal pain    • Abnormal weight loss    • Acute bronchitis    • Anxiety state    • Benign essential hypertension    • Chronic sinusitis     • Constipation    • Cystitis    • Degenerative joint disease involving multiple joints    • Diabetic neuropathy      bilateral hands      • Diastolic dysfunction    • Diverticular disease of colon    • Dyspnea    • Epigastric pain    • Essential hypertension    • Gastroesophageal reflux disease    • Generalized anxiety disorder    • Hyperlipidemia    • Inflammatory bowel disease     Possible Inflammatory Bowel Disease      • Lumbar pain     Pain radiating to lumbar region of back   n      • Pleuritic pain    • Radiculopathy     site unspecified      • Type 2 diabetes mellitus    • Vesicular eczema of hands and feet      Past Surgical History:   Procedure Laterality Date   • BACK SURGERY      LOWER BACK SURGERY   • COLONOSCOPY  06/11/2012    Internal & external hemorrhoids found.   • COLONOSCOPY N/A 8/22/2017    Procedure: COLONOSCOPY;  Surgeon: Cesar Hollis MD;  Location: [facility] ENDOSCOPY;  Service:    • ENDOSCOPY  06/11/2012    Slight stricture in the distal esophagus. Gastritis in stomach. Biopsy taken. Normal duodenum.   • ENDOSCOPY     • ENDOSCOPY N/A 8/22/2017    Procedure: ESOPHAGOGASTRODUODENOSCOPY possible dilation ;  Surgeon: Cesar Hollis MD;  Location: [facility] ENDOSCOPY;  Service:    • ENTEROSCOPY SMALL BOWEL  08/27/2012    Gastritis in stomach. Normal jejunum. Biopsy taken. Normal duodenum   • INJECTION OF MEDICATION  02/28/2014    Depo Medrol   • INJECTION OF MEDICATION  03/07/2014    Rocephin(2)     Family History   Problem Relation Age of Onset   • No Known Problems Other    • Hypertension Mother    • Heart attack Father    • No Known Problems Sister    • No Known Problems Brother    • No Known Problems Daughter    • No Known Problems Son    • No Known Problems Maternal Aunt    • No Known Problems Maternal Uncle    • No Known Problems Paternal Aunt    • No Known Problems Paternal Uncle    • No Known Problems Maternal Grandmother    • No Known Problems Maternal Grandfather    • No Known  Problems Paternal Grandmother    • No Known Problems Paternal Grandfather        Current Outpatient Prescriptions   Medication Sig Dispense Refill   • dexlansoprazole (DEXILANT) 60 MG capsule Take 1 capsule by mouth Daily. 30 capsule 0   • gabapentin (NEURONTIN) 300 MG capsule Take 1 capsule by mouth 4 (Four) Times a Day As Needed.     • HYDROcodone-acetaminophen (NORCO)  MG per tablet Take 1 tablet by mouth Every 6 (Six) Hours As Needed for Moderate Pain .     • ibuprofen (ADVIL,MOTRIN) 200 MG tablet Take 200 mg by mouth Daily As Needed for Mild Pain .     • insulin detemir (LEVEMIR) 100 UNIT/ML injection Inject 65 Units under the skin Daily. (Patient taking differently: Inject 60 Units under the skin Daily.) 27 each 6   • Insulin Pen Needle (B-D UF III MINI PEN NEEDLES) 31G X 5 MM misc Use 4x daily with insulin pens 400 each 2   • lisinopril-hydrochlorothiazide (PRINZIDE,ZESTORETIC) 20-25 MG per tablet Take 1 tablet by mouth Daily.     • methocarbamol (ROBAXIN) 750 MG tablet Take 1 tablet by mouth At Night As Needed.  1   • ONETOUCH VERIO test strip      • PROAIR  (90 BASE) MCG/ACT inhaler 2 puffs As Needed.     • rosuvastatin (CRESTOR) 5 MG tablet Take 1 tablet by mouth Daily. 30 tablet 3   • SAXagliptin (ONGLYZA) 5 MG tablet Take 5 mg by mouth Daily.     • Naloxegol Oxalate 25 MG tablet Take 25 mg by mouth Daily. 90 tablet 3   • raNITIdine (ZANTAC) 300 MG tablet Take 1 tablet by mouth Every Night. 90 tablet 3     No current facility-administered medications for this visit.      Allergies   Allergen Reactions   • Penicillins Shortness Of Breath   • Bactrim [Sulfamethoxazole-Trimethoprim] GI Intolerance   • Ceftin [Cefuroxime Axetil] GI Intolerance   • Sulfa Antibiotics GI Intolerance   • Ciprofloxacin Itching and Rash     Social History     Social History   • Marital status: Single     Spouse name: N/A   • Number of children: N/A   • Years of education: N/A     Social History Main Topics   • Smoking  "status: Never Smoker   • Smokeless tobacco: Never Used   • Alcohol use No   • Drug use: No   • Sexual activity: Yes     Partners: Female     Other Topics Concern   • None     Social History Narrative       Review of Systems  Review of Systems   Constitutional: Negative for activity change, appetite change, chills, diaphoresis, fatigue, fever and unexpected weight change.   HENT: Negative for sore throat and trouble swallowing.    Respiratory: Negative for shortness of breath.    Gastrointestinal: Positive for heartburn. Negative for abdominal distention, abdominal pain, anal bleeding, blood in stool, constipation, diarrhea, nausea, rectal pain and vomiting.   Musculoskeletal: Negative for arthralgias.   Skin: Negative for pallor.   Neurological: Negative for light-headedness.        /69 (BP Location: Left arm, Patient Position: Sitting, Cuff Size: Adult)  Pulse 96  Ht 72\" (182.9 cm)  Wt 220 lb 8 oz (100 kg)  BMI 29.91 kg/m2    Objective    Physical Exam   Constitutional: He is oriented to person, place, and time. He appears well-developed and well-nourished. He is cooperative. No distress.   HENT:   Head: Normocephalic and atraumatic.   Neck: Normal range of motion. Neck supple. No thyromegaly present.   Cardiovascular: Normal rate, regular rhythm and normal heart sounds.    Pulmonary/Chest: Effort normal and breath sounds normal. He has no wheezes. He has no rhonchi. He has no rales.   Abdominal: Soft. Normal appearance and bowel sounds are normal. He exhibits no shifting dullness, no distension, no fluid wave and no ascites. There is no hepatosplenomegaly. There is no tenderness. There is no rigidity and no guarding. No hernia.   Lymphadenopathy:     He has no cervical adenopathy.   Neurological: He is alert and oriented to person, place, and time.   Skin: Skin is warm, dry and intact. No rash noted. No pallor.   Psychiatric: He has a normal mood and affect. His speech is normal.     Admission on " 08/22/2017, Discharged on 08/22/2017   Component Date Value Ref Range Status   • Glucose 08/22/2017 103  70 - 130 mg/dL Final    Meter: [id number]: [id number] SMILEY DENT   • Case Report 08/22/2017    Final                    Value:Surgical Pathology Report                         Case: [id number]                                  Authorizing Provider:  Cesar Hollis MD    Collected:           08/22/2017 12:04 PM          Ordering Location:     [facility]             Received:            08/22/2017 02:19 PM                                 [place] [facility]                                                     Pathologist:           Amrit David MD                                                          Specimens:   1) - Gastric, Antrum, antrum bx                                                                     2) - Gastric, Body, gastric polyp                                                                   3) - Large Intestine, Right / Ascending Colon, right colon polyp  (hot snare)                       4) - Large Intestine, Transverse Colon, transverse colon polyp  (hot snare )              • Final Diagnosis 08/22/2017    Final                    Value:This result contains rich text formatting which cannot be displayed here.   • Gross Description 08/22/2017    Final                    Value:This result contains rich text formatting which cannot be displayed here.     Assessment/Plan      1. Drug-induced constipation    2. Gastroesophageal reflux disease without esophagitis    3. Heartburn    .       Review and/or summary of lab tests, radiology, procedures, medications. Review and summary of old records and obtaining of history. The risks and benefits of my recommendations, as well as other treatment options were discussed with the patient today. Questions were answered.    New Medications Ordered This Visit   Medications   • Naloxegol Oxalate 25 MG tablet     Sig: Take 25 mg by  mouth Daily.     Dispense:  90 tablet     Refill:  3   • raNITIdine (ZANTAC) 300 MG tablet     Sig: Take 1 tablet by mouth Every Night.     Dispense:  90 tablet     Refill:  3       Follow-up: Return in about 6 months (around 2/28/2018).          This document has been electronically signed by MARY Bravo on August 28, 2017 10:21 AM             Results for orders placed or performed during the hospital encounter of 08/22/17   Tissue Pathology Exam   Result Value Ref Range    Case Report       Surgical Pathology Report                         Case: [id number]                                  Authorizing Provider:  Cesar Hollis MD    Collected:           08/22/2017 12:04 PM          Ordering Location:     [facility]             Received:            08/22/2017 02:19 PM                                 [place] [facility]                                                     Pathologist:           Amrit David MD                                                          Specimens:   1) - Gastric, Antrum, antrum bx                                                                     2) - Gastric, Body, gastric polyp                                                                   3) - Large Intestine, Right / Ascending Colon, right colon polyp  (hot snare)                       4) - Large Intestine, Transverse Colon, transverse colon polyp  (hot snare )               Final Diagnosis       1.  MUCOSA, ANTRUM OF STOMACH:  REACTIVE GASTROPATHY.    2.  POLYP, BODY OF STOMACH:  FUNDIC GLAND POLYP.    3.  POLYP, ASCENDING COLON:  TUBULAR ADENOMA.    4.  POLYP, TRANSVERSE COLON:  TUBULAR ADENOMA.      Gross Description       Received for examination are 4 containers, each of which has nodular bits of white soft tissue measuring 0.3-0.5 cc in aggregate.  All specimens are embedded as labeled.  1A antrum of stomach; 2A polyp, body of stomach; 3A polyp, ascending colon (hot snare); 4A polyp, transverse  colon (hot snare).      Embedded Images     POC Glucose Fingerstick   Result Value Ref Range    Glucose 103 70 - 130 mg/dL   Results for orders placed or performed in visit on 05/17/17   CBC Auto Differential   Result Value Ref Range    WBC 9.53 3.20 - 9.80 10*3/mm3    RBC 5.01 4.37 - 5.74 10*6/mm3    Hemoglobin 13.5 (L) 13.7 - 17.3 g/dL    Hematocrit 41.2 39.0 - 49.0 %    MCV 82.2 80.0 - 98.0 fL    MCH 26.9 26.5 - 34.0 pg    MCHC 32.8 31.5 - 36.3 g/dL    RDW 16.1 (H) 11.5 - 14.5 %    RDW-SD 47.6 (H) 35.1 - 43.9 fl    MPV 10.1 8.0 - 12.0 fL    Platelets 248 150 - 450 10*3/mm3    Neutrophil % 65.7 37.0 - 80.0 %    Lymphocyte % 23.6 10.0 - 50.0 %    Monocyte % 7.6 0.0 - 12.0 %    Eosinophil % 2.7 0.0 - 7.0 %    Basophil % 0.4 0.0 - 2.0 %    Neutrophils, Absolute 6.26 2.00 - 8.60 10*3/mm3    Lymphocytes, Absolute 2.25 0.60 - 4.20 10*3/mm3    Monocytes, Absolute 0.72 0.00 - 0.90 10*3/mm3    Eosinophils, Absolute 0.26 0.00 - 0.70 10*3/mm3    Basophils, Absolute 0.04 0.00 - 0.20 10*3/mm3   Microalbumin / Creatinine Urine Ratio   Result Value Ref Range    Microalbumin/Creatinine Ratio 65.1 (H) 0.0 - 30.0 mg/g    Creatinine, Urine 270.4 mg/dL    Microalbumin, Urine 17.6 mg/L   TSH   Result Value Ref Range    TSH 2.770 0.460 - 4.680 mIU/mL   Hemoglobin A1c   Result Value Ref Range    Hemoglobin A1C 10.39 (H) 4 - 5.6 %   Lipid Panel   Result Value Ref Range    Total Cholesterol 155 150 - 200 mg/dL    Triglycerides 159 35 - 160 mg/dL    HDL Cholesterol 38 35 - 100 mg/dL    LDL Cholesterol  85 mg/dL    VLDL Cholesterol 31.8 mg/dL    LDL/HDL Ratio 2.24    Basic Metabolic Panel   Result Value Ref Range    Glucose 146 (H) 70 - 100 mg/dL    BUN 16 8 - 25 mg/dL    Creatinine 1.10 0.40 - 1.30 mg/dL    Sodium 138 134 - 146 mmol/L    Potassium 4.2 3.4 - 5.4 mmol/L    Chloride 97 (L) 100 - 112 mmol/L    CO2 31.0 20.0 - 32.0 mmol/L    Calcium 9.5 8.4 - 10.8 mg/dL    eGFR Non  Amer 67 49 - 113 mL/min/1.73    BUN/Creatinine Ratio  14.5 7.0 - 25.0    Anion Gap 10.0 5.0 - 15.0 mmol/L   Results for orders placed or performed during the hospital encounter of 07/12/16   Basic metabolic panel   Result Value Ref Range    Glucose 213 (H) 70.0 - 100.0 mg/dl    BUN 13 8.0 - 25.0 mg/dl    Creatinine 1.2 0.4 - 1.3 mg/dl    Sodium 137.0 134 - 146 mmol/L    Potassium 3.6 3.4 - 5.4 mmol/L    Chloride 98.0 (L) 100.0 - 112.0 mmol/L    CO2 31.0 20.0 - 32.0 mmol/L    Calcium 9.6 8.4 - 10.8 mg/dl    GFR MDRD Non  61 49 - 113 mL/min/1.73 sq.M    GFR MDRD  74 49 - 113 mL/min/1.73 sq.M    Anion Gap 8.0 5.0 - 15.0 mmol/L   Results for orders placed or performed during the hospital encounter of 05/03/16   Basic metabolic panel   Result Value Ref Range    Glucose 220 (H) 70.0 - 100.0 mg/dl    BUN 14 8.0 - 25.0 mg/dl    Creatinine 1.3 0.4 - 1.3 mg/dl    Sodium 136.0 134 - 146 mmol/L    Potassium 3.5 3.4 - 5.4 mmol/L    Chloride 95.0 (L) 100.0 - 112.0 mmol/L    CO2 32.0 20.0 - 32.0 mmol/L    Calcium 8.6 8.4 - 10.8 mg/dl    GFR MDRD Non  56 49 - 113 mL/min/1.73 sq.M    GFR MDRD  67 49 - 113 mL/min/1.73 sq.M    Anion Gap 9.0 5.0 - 15.0 mmol/L   Results for orders placed or performed during the hospital encounter of 03/28/16   Protein, Random Urine, Ql   Result Value Ref Range    Protein, UA NEGATIVE NEGATIVE   CBC and Differential   Result Value Ref Range    WBC 7.7 3.2 - 9.8 x1000/uL    RBC 5.10 4.37 - 5.74 claudia/mm3    Hemoglobin 13.7 13.7 - 17.3 gm/dl    Hematocrit 41.2 39.0 - 49.0 %    MCV 80.8 80.0 - 98.0 fl    MCH 26.9 26.0 - 34.0 pg    MCHC 33.3 31.5 - 36.3 gm/dl    Platelets 238 150 - 450 x1000/mm3    RDW 15.5 (H) 11.5 - 14.5 %    MPV 10.8 8.0 - 12.0 fl    Neutrophil Rel % 58.2 37.0 - 80.0 %    Lymphocyte Rel % 24.4 10.0 - 50.0 %    Monocyte Rel % 8.4 0.0 - 12.0 %    Eosinophil Rel % 8.1 (H) 0.0 - 7.0 %    Basophil Rel % 0.9 0.0 - 2.0 %    nRBC 0     nRBC 0    TSH   Result Value Ref Range    TSH 2.77  0.46 - 4.68 uIU/ml   T4, free   Result Value Ref Range    Free T4 1.27 0.78 - 2.19 ng/dl   PSA   Result Value Ref Range    PSA 0.43 0.00 - 4.00 ng/ml   Hemoglobin A1c   Result Value Ref Range    Hemoglobin A1C 11.2 (H) 4.0 - 5.6 %TotHgb   Vitamin B12   Result Value Ref Range    Vitamin B-12 >1000 (H) 239 - 931 pg/ml   Lipid panel   Result Value Ref Range    Total Cholesterol 215 (H) 150 - 200 mg/dl    Triglycerides 356 (H) 35 - 160 mg/dl    HDL Cholesterol 32.5 (L) 35.0 - 100.0 mg/dl    LDL Cholesterol  111 mg/dl   Comprehensive metabolic panel   Result Value Ref Range    Glucose 216 (H) 70.0 - 100.0 mg/dl    BUN 14 8.0 - 25.0 mg/dl    Creatinine 1.4 (H) 0.4 - 1.3 mg/dl    Sodium 137.0 134 - 146 mmol/L    Potassium 4.6 3.4 - 5.4 mmol/L    Chloride 96.0 (L) 100.0 - 112.0 mmol/L    CO2 31.0 20.0 - 32.0 mmol/L    Calcium 9.2 8.4 - 10.8 mg/dl    Total Protein 7.1 6.7 - 8.2 gm/dl    Albumin 3.7 3.2 - 5.5 gm/dl    Total Bilirubin 0.6 0.2 - 1.0 mg/dl    Alkaline Phosphatase 104 15 - 121 U/L    ALT (SGPT) 22 10 - 60 U/L    AST (SGOT) 31 10 - 60 U/L    GFR MDRD Non  51 49 - 113 mL/min/1.73 sq.M    GFR MDRD  62 49 - 113 mL/min/1.73 sq.M    Anion Gap 10.0 5.0 - 15.0 mmol/L     *Note: Due to a large number of results and/or encounters for the requested time period, some results have not been displayed. A complete set of results can be found in Results Review.

## 2017-08-28 NOTE — PROGRESS NOTES
Subjective   Jadiel Dutton is a 66 y.o. male.   CC: ear/sinus /throat  History of Present Illness     Patient complains of left-sided nasal instruction rhinitis was has been reflux is seeing a GI specialist started on Zantac as needed EGD and colonoscopy has history of allergy testing inhalant allergies also complains about tinnitus is like his left ear stopped up more than right says sore throat which is related apparently reflux which is just beginning treatment for received this medication 30 had a tonsillectomy.  Is not having   No fever or major changes voice   Allergic to many antibiotics    The following portions of the patient's history were reviewed and updated as appropriate: allergies, current medications, past family history, past medical history, past social history, past surgical history and problem list.      Jadiel Dutton reports that he has never smoked. He has never used smokeless tobacco. He reports that he does not drink alcohol or use illicit drugs.  Patient is not a tobacco user and has not been counseled for use of tobacco products    Family History   Problem Relation Age of Onset   • No Known Problems Other    • Hypertension Mother    • Heart attack Father    • No Known Problems Sister    • No Known Problems Brother    • No Known Problems Daughter    • No Known Problems Son    • No Known Problems Maternal Aunt    • No Known Problems Maternal Uncle    • No Known Problems Paternal Aunt    • No Known Problems Paternal Uncle    • No Known Problems Maternal Grandmother    • No Known Problems Maternal Grandfather    • No Known Problems Paternal Grandmother    • No Known Problems Paternal Grandfather          Current Outpatient Prescriptions:   •  dexlansoprazole (DEXILANT) 60 MG capsule, Take 1 capsule by mouth Daily., Disp: 30 capsule, Rfl: 0  •  gabapentin (NEURONTIN) 300 MG capsule, Take 1 capsule by mouth 4 (Four) Times a Day As Needed., Disp: , Rfl:   •  HYDROcodone-acetaminophen  (NORCO)  MG per tablet, Take 1 tablet by mouth Every 6 (Six) Hours As Needed for Moderate Pain ., Disp: , Rfl:   •  ibuprofen (ADVIL,MOTRIN) 200 MG tablet, Take 200 mg by mouth Daily As Needed for Mild Pain ., Disp: , Rfl:   •  insulin detemir (LEVEMIR) 100 UNIT/ML injection, Inject 65 Units under the skin Daily. (Patient taking differently: Inject 60 Units under the skin Daily.), Disp: 27 each, Rfl: 6  •  Insulin Pen Needle (B-D UF III MINI PEN NEEDLES) 31G X 5 MM misc, Use 4x daily with insulin pens, Disp: 400 each, Rfl: 2  •  lisinopril-hydrochlorothiazide (PRINZIDE,ZESTORETIC) 20-25 MG per tablet, Take 1 tablet by mouth Daily., Disp: , Rfl:   •  methocarbamol (ROBAXIN) 750 MG tablet, Take 1 tablet by mouth At Night As Needed., Disp: , Rfl: 1  •  Naloxegol Oxalate 25 MG tablet, Take 25 mg by mouth Daily., Disp: 90 tablet, Rfl: 3  •  ONETOUCH VERIO test strip, , Disp: , Rfl:   •  PROAIR  (90 BASE) MCG/ACT inhaler, 2 puffs As Needed., Disp: , Rfl:   •  raNITIdine (ZANTAC) 300 MG tablet, Take 1 tablet by mouth Every Night., Disp: 90 tablet, Rfl: 3  •  rosuvastatin (CRESTOR) 5 MG tablet, Take 1 tablet by mouth Daily., Disp: 30 tablet, Rfl: 3  •  SAXagliptin (ONGLYZA) 5 MG tablet, Take 5 mg by mouth Daily., Disp: , Rfl:   •  azelastine (ASTELIN) 0.1 % nasal spray, 2 sprays into each nostril 2 (Two) Times a Day. Use in each nostril as directed, Disp: 30 mL, Rfl: 11  •  fluticasone (FLONASE) 50 MCG/ACT nasal spray, 2 sprays into each nostril 2 (Two) Times a Day for 30 days., Disp: 16 g, Rfl: 11    Allergies   Allergen Reactions   • Penicillins Shortness Of Breath   • Bactrim [Sulfamethoxazole-Trimethoprim] GI Intolerance   • Ceftin [Cefuroxime Axetil] GI Intolerance   • Sulfa Antibiotics GI Intolerance   • Ciprofloxacin Itching and Rash       Past Medical History:   Diagnosis Date   • Abdominal pain    • Abnormal weight loss    • Acute bronchitis    • Anxiety state    • Benign essential hypertension    •  Chronic sinusitis    • Constipation    • Cystitis    • Degenerative joint disease involving multiple joints    • Diabetic neuropathy      bilateral hands      • Diastolic dysfunction    • Diverticular disease of colon    • Dyspnea    • Epigastric pain    • Essential hypertension    • Gastroesophageal reflux disease    • Generalized anxiety disorder    • Hyperlipidemia    • Inflammatory bowel disease     Possible Inflammatory Bowel Disease      • Lumbar pain     Pain radiating to lumbar region of back   n      • Pleuritic pain    • Radiculopathy     site unspecified      • Type 2 diabetes mellitus    • Vesicular eczema of hands and feet          Review of Systems   HENT: Positive for congestion and sore throat.    Respiratory: Positive for cough.    Gastrointestinal:        Heart burn   Musculoskeletal: Positive for back pain.   Neurological: Positive for numbness.   All other systems reviewed and are negative.          Objective   Physical Exam   Constitutional: He is oriented to person, place, and time. He appears well-developed and well-nourished.   HENT:   Head: Normocephalic and atraumatic.   Right Ear: Hearing, tympanic membrane, external ear and ear canal normal.   Left Ear: Hearing, tympanic membrane, external ear and ear canal normal.   Nose: Mucosal edema, rhinorrhea and septal deviation present. No nasal deformity. No epistaxis. Right sinus exhibits no maxillary sinus tenderness and no frontal sinus tenderness. Left sinus exhibits no maxillary sinus tenderness and no frontal sinus tenderness.   Mouth/Throat: Uvula is midline, oropharynx is clear and moist and mucous membranes are normal. No oral lesions. No trismus in the jaw. Normal dentition. No oropharyngeal exudate or posterior oropharyngeal edema. Tonsils are 0 on the right. Tonsils are 0 on the left. No tonsillar exudate.   Eyes: Conjunctivae and EOM are normal. Pupils are equal, round, and reactive to light.   Neck: Normal range of motion. Neck  supple. No JVD present. No tracheal deviation present. No thyromegaly present.   Cardiovascular: Normal rate and regular rhythm.    Pulmonary/Chest: Effort normal and breath sounds normal.   Musculoskeletal: Normal range of motion.   Lymphadenopathy:        Head (right side): No submental, no submandibular, no tonsillar, no preauricular, no posterior auricular and no occipital adenopathy present.        Head (left side): No submental, no submandibular, no tonsillar, no preauricular, no posterior auricular and no occipital adenopathy present.     He has no cervical adenopathy.        Right cervical: No superficial cervical, no deep cervical and no posterior cervical adenopathy present.       Left cervical: No superficial cervical, no deep cervical and no posterior cervical adenopathy present.   Neurological: He is alert and oriented to person, place, and time. No cranial nerve deficit.   Skin: Skin is warm.   Psychiatric: He has a normal mood and affect. His speech is normal and behavior is normal. Thought content normal.   Nursing note and vitals reviewed.            Assessment/Plan   Jadiel was seen today for sore throat, ear problem and sinusitis.    Diagnoses and all orders for this visit:    Eustachian catarrh, left    Nasal septal deviation    Nasal turbinate hypertrophy    Other orders  -     fluticasone (FLONASE) 50 MCG/ACT nasal spray; 2 sprays into each nostril 2 (Two) Times a Day for 30 days.  -     azelastine (ASTELIN) 0.1 % nasal spray; 2 sprays into each nostril 2 (Two) Times a Day. Use in each nostril as directed      Symptoms persist will need to consider CT sinus certainly avoid antibiotics much as possible because was drug allergies.  Consider nasal sprays to help with eustachian tube problems no evidence of fluid.  An audiogram tympanogram follow-up is GI specialist is treating sore throat for his reflux parasternal Zantac soon.    He is going to see his neurosurgeon near future

## 2017-09-25 ENCOUNTER — TELEPHONE (OUTPATIENT)
Dept: OTOLARYNGOLOGY | Facility: CLINIC | Age: 67
End: 2017-09-25

## 2017-09-25 RX ORDER — DOXYCYCLINE HYCLATE 100 MG/1
100 TABLET, DELAYED RELEASE ORAL 2 TIMES DAILY
Qty: 20 TABLET | Refills: 0 | Status: SHIPPED | OUTPATIENT
Start: 2017-09-25 | End: 2018-01-08

## 2017-09-25 NOTE — TELEPHONE ENCOUNTER
Patient called the ENT  stating the following ----- Message from Manju Gaytan sent at 9/25/2017 12:10 PM CDT -----  Contact: [phone]  Patient called and would like to speak with someone. He said a few days ago he pulled a thick blood crusted piece out of his nose and then he said he is getting a lot of thick mucus and he think he needs an antibiotic. He states he feels like he is running a little bit of a fever, but he has not taken his temperature yet.       After speaking with , he has decided to call in  Doxycycline 100mg, 2 times daily. He also stated that the patient should call his eye DR who he is currently post op with and make him aware of this issue to see if any medications on that end need to be changed. We will follow up with this patient in the office on Friday. Patient understands all of the above. He is to call with any other questions or concerns.

## 2017-11-27 RX ORDER — DEXLANSOPRAZOLE 60 MG/1
60 CAPSULE, DELAYED RELEASE ORAL DAILY
Qty: 90 CAPSULE | Refills: 2 | Status: SHIPPED | OUTPATIENT
Start: 2017-11-27 | End: 2018-08-02 | Stop reason: SDUPTHER

## 2018-01-03 ENCOUNTER — OFFICE VISIT (OUTPATIENT)
Dept: FAMILY MEDICINE CLINIC | Facility: CLINIC | Age: 68
End: 2018-01-03

## 2018-01-03 VITALS
HEART RATE: 123 BPM | OXYGEN SATURATION: 98 % | TEMPERATURE: 99 F | RESPIRATION RATE: 16 BRPM | DIASTOLIC BLOOD PRESSURE: 78 MMHG | WEIGHT: 223 LBS | HEIGHT: 72 IN | SYSTOLIC BLOOD PRESSURE: 132 MMHG | BODY MASS INDEX: 30.2 KG/M2

## 2018-01-03 DIAGNOSIS — Z79.4 TYPE 2 DIABETES MELLITUS WITH COMPLICATION, WITH LONG-TERM CURRENT USE OF INSULIN (HCC): ICD-10-CM

## 2018-01-03 DIAGNOSIS — J32.4 CHRONIC PANSINUSITIS: Primary | ICD-10-CM

## 2018-01-03 DIAGNOSIS — E11.8 TYPE 2 DIABETES MELLITUS WITH COMPLICATION, WITH LONG-TERM CURRENT USE OF INSULIN (HCC): ICD-10-CM

## 2018-01-03 PROCEDURE — 96372 THER/PROPH/DIAG INJ SC/IM: CPT | Performed by: FAMILY MEDICINE

## 2018-01-03 PROCEDURE — 99214 OFFICE O/P EST MOD 30 MIN: CPT | Performed by: FAMILY MEDICINE

## 2018-01-03 RX ORDER — CEFDINIR 300 MG/1
300 CAPSULE ORAL 2 TIMES DAILY
Qty: 20 CAPSULE | Refills: 0 | Status: SHIPPED | OUTPATIENT
Start: 2018-01-03 | End: 2018-01-29

## 2018-01-03 RX ORDER — METHYLPREDNISOLONE ACETATE 80 MG/ML
80 INJECTION, SUSPENSION INTRA-ARTICULAR; INTRALESIONAL; INTRAMUSCULAR; SOFT TISSUE ONCE
Status: COMPLETED | OUTPATIENT
Start: 2018-01-03 | End: 2018-01-03

## 2018-01-03 RX ORDER — OMEPRAZOLE 40 MG/1
1 CAPSULE, DELAYED RELEASE ORAL DAILY
COMMUNITY
Start: 2017-11-20 | End: 2018-02-05

## 2018-01-03 RX ORDER — LIRAGLUTIDE 6 MG/ML
1.8 INJECTION SUBCUTANEOUS DAILY
COMMUNITY
Start: 2017-10-07 | End: 2018-11-08

## 2018-01-03 RX ORDER — CEFTRIAXONE 1 G/1
1 INJECTION, POWDER, FOR SOLUTION INTRAMUSCULAR; INTRAVENOUS ONCE
Status: COMPLETED | OUTPATIENT
Start: 2018-01-03 | End: 2018-01-03

## 2018-01-03 RX ADMIN — CEFTRIAXONE 1 G: 1 INJECTION, POWDER, FOR SOLUTION INTRAMUSCULAR; INTRAVENOUS at 13:36

## 2018-01-03 RX ADMIN — METHYLPREDNISOLONE ACETATE 80 MG: 80 INJECTION, SUSPENSION INTRA-ARTICULAR; INTRALESIONAL; INTRAMUSCULAR; SOFT TISSUE at 13:36

## 2018-01-03 NOTE — PROGRESS NOTES
Subjective   Jadiel Dutton is a 67 y.o. male.   Chief Complaint   Patient presents with   • Sinusitis     onset last thursday, complaining of headache, and bodyaches       Sinusitis   This is a chronic problem. The current episode started in the past 7 days. The problem has been gradually worsening since onset. Associated symptoms include congestion, coughing, headaches, sinus pressure, sneezing and a sore throat.        The following portions of the patient's history were reviewed and updated as appropriate: allergies, current medications, past family history, past medical history, past social history, past surgical history and problem list.    Review of Systems   Constitutional: Negative.    HENT: Positive for congestion, sinus pressure, sneezing and sore throat.    Eyes: Negative.    Respiratory: Positive for cough.    Cardiovascular: Negative.    Gastrointestinal: Negative.    Endocrine: Negative.    Genitourinary: Negative.    Musculoskeletal: Negative.    Skin: Negative.    Allergic/Immunologic: Negative.    Neurological: Positive for headaches.   Hematological: Negative.    Psychiatric/Behavioral: Negative.    All other systems reviewed and are negative.      Objective   Physical Exam   Constitutional: He is oriented to person, place, and time. He appears well-developed and well-nourished.   HENT:   Head: Normocephalic and atraumatic.   Right Ear: External ear normal.   Left Ear: External ear normal.   Nose: Mucosal edema and rhinorrhea present. Right sinus exhibits maxillary sinus tenderness and frontal sinus tenderness. Left sinus exhibits maxillary sinus tenderness and frontal sinus tenderness.   Mouth/Throat: Posterior oropharyngeal edema and posterior oropharyngeal erythema present.   Eyes: Conjunctivae and EOM are normal. Pupils are equal, round, and reactive to light.   Neck: Normal range of motion. Neck supple.   Cardiovascular: Normal rate, regular rhythm, normal heart sounds and intact distal  pulses.  Exam reveals no gallop and no friction rub.    No murmur heard.  Pulmonary/Chest: Effort normal and breath sounds normal. He has no wheezes. He has no rales.   Abdominal: Soft. Bowel sounds are normal. He exhibits no mass. There is no tenderness. There is no rebound and no guarding.   obese   Musculoskeletal:        Right shoulder: He exhibits decreased range of motion and tenderness.        Lumbar back: He exhibits decreased range of motion and tenderness.   Neurological: He is alert and oriented to person, place, and time. He displays abnormal reflex. A sensory deficit is present. He exhibits normal muscle tone.   Skin: Skin is warm and dry. No rash noted.   Psychiatric: He has a normal mood and affect. His behavior is normal. Judgment and thought content normal.   Nursing note and vitals reviewed.      Assessment/Plan   Jadiel was seen today for sinusitis.    Diagnoses and all orders for this visit:    Chronic pansinusitis  -     methylPREDNISolone acetate (DEPO-medrol) injection 80 mg; Inject 1 mL into the shoulder, thigh, or buttocks 1 (One) Time.  -     cefTRIAXone (ROCEPHIN) injection 1 g; Inject 1 g into the shoulder, thigh, or buttocks 1 (One) Time.    Type 2 diabetes mellitus with complication, with long-term current use of insulin    Other orders  -     cefdinir (OMNICEF) 300 MG capsule; Take 1 capsule by mouth 2 (Two) Times a Day.

## 2018-01-08 ENCOUNTER — OFFICE VISIT (OUTPATIENT)
Dept: FAMILY MEDICINE CLINIC | Facility: CLINIC | Age: 68
End: 2018-01-08

## 2018-01-08 VITALS
HEIGHT: 72 IN | OXYGEN SATURATION: 96 % | HEART RATE: 95 BPM | DIASTOLIC BLOOD PRESSURE: 88 MMHG | BODY MASS INDEX: 29.72 KG/M2 | RESPIRATION RATE: 16 BRPM | TEMPERATURE: 96.8 F | WEIGHT: 219.4 LBS | SYSTOLIC BLOOD PRESSURE: 130 MMHG

## 2018-01-08 DIAGNOSIS — J40 BRONCHITIS: Primary | ICD-10-CM

## 2018-01-08 DIAGNOSIS — J32.4 CHRONIC PANSINUSITIS: ICD-10-CM

## 2018-01-08 PROCEDURE — 99213 OFFICE O/P EST LOW 20 MIN: CPT | Performed by: FAMILY MEDICINE

## 2018-01-08 PROCEDURE — 96372 THER/PROPH/DIAG INJ SC/IM: CPT | Performed by: FAMILY MEDICINE

## 2018-01-08 RX ORDER — AZITHROMYCIN 250 MG/1
TABLET, FILM COATED ORAL
Qty: 6 TABLET | Refills: 0 | Status: SHIPPED | OUTPATIENT
Start: 2018-01-08 | End: 2018-01-29

## 2018-01-08 RX ORDER — ALBUTEROL SULFATE 90 UG/1
2 AEROSOL, METERED RESPIRATORY (INHALATION) EVERY 4 HOURS PRN
Qty: 1 INHALER | Refills: 2 | Status: SHIPPED | OUTPATIENT
Start: 2018-01-08 | End: 2019-12-31 | Stop reason: SDUPTHER

## 2018-01-08 RX ORDER — CEFTRIAXONE 1 G/1
1 INJECTION, POWDER, FOR SOLUTION INTRAMUSCULAR; INTRAVENOUS ONCE
Status: COMPLETED | OUTPATIENT
Start: 2018-01-08 | End: 2018-01-08

## 2018-01-08 RX ADMIN — CEFTRIAXONE 1 G: 1 INJECTION, POWDER, FOR SOLUTION INTRAMUSCULAR; INTRAVENOUS at 13:40

## 2018-01-08 NOTE — PROGRESS NOTES
Subjective   Jadiel Dutton is a 67 y.o. male.   Chief Complaint   Patient presents with   • URI     patient states he has gotten a little better but has now moved into chest and is worse at night       URI    This is a recurrent problem. The current episode started 1 to 4 weeks ago. The problem has been gradually worsening. There has been no fever. Associated symptoms include congestion, coughing, headaches, a plugged ear sensation, rhinorrhea, sinus pain, sneezing and a sore throat. Treatments tried: omnicef. The treatment provided mild relief.        The following portions of the patient's history were reviewed and updated as appropriate: allergies, current medications, past family history, past medical history, past social history, past surgical history and problem list.    Review of Systems   Constitutional: Negative.    HENT: Positive for congestion, rhinorrhea, sinus pain, sinus pressure, sneezing and sore throat.    Eyes: Negative.    Respiratory: Positive for cough.    Cardiovascular: Negative.    Gastrointestinal: Negative.    Endocrine: Negative.    Genitourinary: Negative.    Musculoskeletal: Negative.    Skin: Negative.    Allergic/Immunologic: Negative.    Neurological: Positive for headaches.   Hematological: Negative.    Psychiatric/Behavioral: Negative.    All other systems reviewed and are negative.      Objective   Physical Exam   Constitutional: He is oriented to person, place, and time. He appears well-developed and well-nourished.   HENT:   Head: Normocephalic and atraumatic.   Right Ear: External ear normal.   Left Ear: External ear normal.   Nose: Mucosal edema and rhinorrhea present. Right sinus exhibits maxillary sinus tenderness and frontal sinus tenderness. Left sinus exhibits maxillary sinus tenderness and frontal sinus tenderness.   Mouth/Throat: Posterior oropharyngeal edema and posterior oropharyngeal erythema present.   Eyes: Conjunctivae and EOM are normal. Pupils are equal,  round, and reactive to light.   Neck: Normal range of motion. Neck supple.   Cardiovascular: Normal rate, regular rhythm, normal heart sounds and intact distal pulses.  Exam reveals no gallop and no friction rub.    No murmur heard.  Pulmonary/Chest: Effort normal. He has wheezes. He has no rales.   Abdominal: Soft. Bowel sounds are normal. He exhibits no mass. There is no tenderness. There is no rebound and no guarding.   obese   Musculoskeletal:        Right shoulder: He exhibits decreased range of motion and tenderness.        Lumbar back: He exhibits decreased range of motion and tenderness.   Neurological: He is alert and oriented to person, place, and time. He displays abnormal reflex. A sensory deficit is present. He exhibits normal muscle tone.   Skin: Skin is warm and dry. No rash noted.   Psychiatric: He has a normal mood and affect. His behavior is normal. Judgment and thought content normal.   Nursing note and vitals reviewed.      Assessment/Plan   Jadiel was seen today for uri.    Diagnoses and all orders for this visit:    Bronchitis  -     cefTRIAXone (ROCEPHIN) injection 1 g; Inject 1 g into the shoulder, thigh, or buttocks 1 (One) Time.    Chronic pansinusitis    Other orders  -     azithromycin (ZITHROMAX Z-RIVERA) 250 MG tablet; Take 2 tablets the first day, then 1 tablet daily for 4 days.

## 2018-01-29 ENCOUNTER — OFFICE VISIT (OUTPATIENT)
Dept: PODIATRY | Facility: CLINIC | Age: 68
End: 2018-01-29

## 2018-01-29 VITALS
OXYGEN SATURATION: 100 % | SYSTOLIC BLOOD PRESSURE: 141 MMHG | HEIGHT: 72 IN | WEIGHT: 219.36 LBS | HEART RATE: 95 BPM | DIASTOLIC BLOOD PRESSURE: 83 MMHG | BODY MASS INDEX: 29.71 KG/M2

## 2018-01-29 DIAGNOSIS — E11.42 DIABETIC POLYNEUROPATHY ASSOCIATED WITH TYPE 2 DIABETES MELLITUS (HCC): ICD-10-CM

## 2018-01-29 DIAGNOSIS — M20.42 HAMMER TOE OF LEFT FOOT: ICD-10-CM

## 2018-01-29 DIAGNOSIS — L97.522 SKIN ULCER OF TOE OF LEFT FOOT WITH FAT LAYER EXPOSED (HCC): Primary | ICD-10-CM

## 2018-01-29 DIAGNOSIS — M79.672 FOOT PAIN, LEFT: ICD-10-CM

## 2018-01-29 DIAGNOSIS — Z89.422 HISTORY OF AMPUTATION OF LESSER TOE OF LEFT FOOT (HCC): ICD-10-CM

## 2018-01-29 PROCEDURE — 11042 DBRDMT SUBQ TIS 1ST 20SQCM/<: CPT | Performed by: PODIATRIST

## 2018-01-29 PROCEDURE — 99203 OFFICE O/P NEW LOW 30 MIN: CPT | Performed by: PODIATRIST

## 2018-02-01 ENCOUNTER — TRANSCRIBE ORDERS (OUTPATIENT)
Dept: PODIATRY | Facility: CLINIC | Age: 68
End: 2018-02-01

## 2018-02-01 ENCOUNTER — APPOINTMENT (OUTPATIENT)
Dept: PHYSICAL THERAPY | Facility: HOSPITAL | Age: 68
End: 2018-02-01

## 2018-02-01 DIAGNOSIS — M77.40 METATARSALGIA, UNSPECIFIED LATERALITY: Primary | ICD-10-CM

## 2018-02-05 ENCOUNTER — OFFICE VISIT (OUTPATIENT)
Dept: FAMILY MEDICINE CLINIC | Facility: CLINIC | Age: 68
End: 2018-02-05

## 2018-02-05 VITALS
DIASTOLIC BLOOD PRESSURE: 76 MMHG | RESPIRATION RATE: 16 BRPM | SYSTOLIC BLOOD PRESSURE: 122 MMHG | WEIGHT: 220.6 LBS | OXYGEN SATURATION: 95 % | HEART RATE: 118 BPM | TEMPERATURE: 97.2 F | HEIGHT: 72 IN | BODY MASS INDEX: 29.88 KG/M2

## 2018-02-05 DIAGNOSIS — T14.8XXA MUSCLE STRAIN: Primary | ICD-10-CM

## 2018-02-05 DIAGNOSIS — M51.35 DDD (DEGENERATIVE DISC DISEASE), THORACOLUMBAR: ICD-10-CM

## 2018-02-05 DIAGNOSIS — J32.4 CHRONIC PANSINUSITIS: ICD-10-CM

## 2018-02-05 PROCEDURE — 99213 OFFICE O/P EST LOW 20 MIN: CPT | Performed by: FAMILY MEDICINE

## 2018-02-05 PROCEDURE — 96372 THER/PROPH/DIAG INJ SC/IM: CPT | Performed by: FAMILY MEDICINE

## 2018-02-05 RX ORDER — AZITHROMYCIN 500 MG/1
500 TABLET, FILM COATED ORAL DAILY
Qty: 5 TABLET | Refills: 0 | Status: SHIPPED | OUTPATIENT
Start: 2018-02-05 | End: 2018-02-26

## 2018-02-05 RX ORDER — KETOROLAC TROMETHAMINE 10 MG/1
10 TABLET, FILM COATED ORAL EVERY 6 HOURS PRN
Qty: 20 TABLET | Refills: 0 | Status: SHIPPED | OUTPATIENT
Start: 2018-02-05 | End: 2018-02-26

## 2018-02-05 NOTE — PROGRESS NOTES
Subjective   Jadiel Dutton is a 67 y.o. male.   Chief Complaint   Patient presents with   • Back Pain     upper right side near shoulder blade. patient was plugging up toaster and patient heard something pop.  onset a couple weeks ago       Back Pain   This is a new problem. The current episode started 1 to 4 weeks ago. The problem occurs daily. The problem is unchanged. The pain is present in the thoracic spine. The quality of the pain is described as aching and burning. The pain is at a severity of 4/10. The pain is moderate. The pain is worse during the day. The symptoms are aggravated by bending and position. Associated symptoms include headaches.        The following portions of the patient's history were reviewed and updated as appropriate: allergies, current medications, past family history, past medical history, past social history, past surgical history and problem list.    Review of Systems   Constitutional: Negative.    HENT: Positive for congestion, sinus pain and sinus pressure.    Eyes: Negative.    Respiratory: Positive for cough.    Cardiovascular: Negative.    Gastrointestinal: Negative.    Endocrine: Negative.    Genitourinary: Negative.    Musculoskeletal: Positive for arthralgias and back pain.   Skin: Negative.    Allergic/Immunologic: Negative.    Neurological: Positive for headaches.   Hematological: Negative.    Psychiatric/Behavioral: Negative.    All other systems reviewed and are negative.      Objective   Physical Exam   Constitutional: He is oriented to person, place, and time. He appears well-developed and well-nourished.   HENT:   Head: Normocephalic and atraumatic.   Right Ear: External ear normal.   Left Ear: External ear normal.   Nose: Mucosal edema and rhinorrhea present. Right sinus exhibits maxillary sinus tenderness and frontal sinus tenderness. Left sinus exhibits maxillary sinus tenderness and frontal sinus tenderness.   Mouth/Throat: Posterior oropharyngeal edema and  posterior oropharyngeal erythema present.   Eyes: Conjunctivae and EOM are normal. Pupils are equal, round, and reactive to light.   Neck: Normal range of motion. Neck supple.   Cardiovascular: Normal rate, regular rhythm, normal heart sounds and intact distal pulses.  Exam reveals no gallop and no friction rub.    No murmur heard.  Pulmonary/Chest: Effort normal. He has wheezes. He has no rales.   Abdominal: Soft. Bowel sounds are normal. He exhibits no mass. There is no tenderness. There is no rebound and no guarding.   obese   Musculoskeletal:        Right shoulder: He exhibits decreased range of motion and tenderness.        Lumbar back: He exhibits decreased range of motion and tenderness.   Neurological: He is alert and oriented to person, place, and time. He displays abnormal reflex. A sensory deficit is present. He exhibits normal muscle tone.   Skin: Skin is warm and dry. No rash noted.   Psychiatric: He has a normal mood and affect. His behavior is normal. Judgment and thought content normal.   Nursing note and vitals reviewed.      Assessment/Plan   Jdaiel was seen today for back pain.    Diagnoses and all orders for this visit:    Muscle strain  -     ketorolac (TORADOL) injection 60 mg; Inject 2 mL into the shoulder, thigh, or buttocks 1 (One) Time.    DDD (degenerative disc disease), thoracolumbar    Chronic pansinusitis    Other orders  -     ketorolac (TORADOL) 10 MG tablet; Take 1 tablet by mouth Every 6 (Six) Hours As Needed for Moderate Pain .  -     azithromycin (ZITHROMAX) 500 MG tablet; Take 1 tablet by mouth Daily.

## 2018-02-26 ENCOUNTER — OFFICE VISIT (OUTPATIENT)
Dept: GASTROENTEROLOGY | Facility: CLINIC | Age: 68
End: 2018-02-26

## 2018-02-26 VITALS
BODY MASS INDEX: 29.42 KG/M2 | DIASTOLIC BLOOD PRESSURE: 76 MMHG | HEART RATE: 101 BPM | HEIGHT: 72 IN | SYSTOLIC BLOOD PRESSURE: 128 MMHG | WEIGHT: 217.2 LBS

## 2018-02-26 DIAGNOSIS — K21.00 GASTROESOPHAGEAL REFLUX DISEASE WITH ESOPHAGITIS: ICD-10-CM

## 2018-02-26 DIAGNOSIS — T40.2X5A THERAPEUTIC OPIOID-INDUCED CONSTIPATION (OIC): Primary | ICD-10-CM

## 2018-02-26 DIAGNOSIS — K59.03 THERAPEUTIC OPIOID-INDUCED CONSTIPATION (OIC): Primary | ICD-10-CM

## 2018-02-26 PROCEDURE — 99213 OFFICE O/P EST LOW 20 MIN: CPT | Performed by: NURSE PRACTITIONER

## 2018-02-26 NOTE — PROGRESS NOTES
"Chief Complaint   Patient presents with   • Constipation       Subjective    Jadiel Dutton is a 67 y.o. male. he is here today for follow-up.    Constipation   This is a chronic problem. The current episode started more than 1 month ago. Pertinent negatives include no abdominal pain, diarrhea, fever, nausea, rectal pain or vomiting.   Heartburn   He complains of heartburn. He reports no abdominal pain, no early satiety, no nausea or no sore throat. This is a chronic problem. The current episode started more than 1 month ago. The problem has been waxing and waning. The heartburn wakes him from sleep. The heartburn changes with position. Pertinent negatives include no fatigue.   66-year-old male presents for follow-up.  States Movosman has not been working as well. Requiring dulcolax about three times per week. Reports symptoms of belching and reflux or worsen with intake.  Pizza for dinner last night.  Denies abdominal pain however reports abdominal \"rawness\".  Uncertain if this is caused by injections of insulin.  He had a viral illness last Thursday about 24 hours of nausea vomiting diarrhea that is resolved.   EGD 8/22/17-noted normal esophagus, gastritis in the antrum.  Polyps in the fundus.  Duodenum was normal.  Antrum of stomach noted reactive gastropathy, polyp stomach was fundic gland polyp.  Colonoscopy 8/22/17 had a fair prep and noted a few medium mouth diverticula in the sigmoid.  2 polyps were removed from the descending and mid transverse colon.  Nonbleeding hemorrhoids were found.  Polyps were tubular adenomas on pathology.  Plan; recommend daily fiber supplementation for diverticular disease.  We'll discontinue Moban tic and start patient on some Symproic daily.  Professional samples are provided with [facility] expiration date recorded.  Follow-up in 6 weeks return to office sooner if needed.         The following portions of the patient's history were reviewed and updated as appropriate:   Past " Medical History:   Diagnosis Date   • Abdominal pain    • Abnormal weight loss    • Acute bronchitis    • Anxiety state    • Benign essential hypertension    • Chronic sinusitis    • Constipation    • Cystitis    • Degenerative joint disease involving multiple joints    • Diabetic neuropathy      bilateral hands      • Diastolic dysfunction    • Diverticular disease of colon    • Dyspnea    • Epigastric pain    • Essential hypertension    • Gastroesophageal reflux disease    • Generalized anxiety disorder    • Hyperlipidemia    • Inflammatory bowel disease     Possible Inflammatory Bowel Disease      • Lumbar pain     Pain radiating to lumbar region of back   n      • Pleuritic pain    • Radiculopathy     site unspecified      • Type 2 diabetes mellitus    • Vesicular eczema of hands and feet      Past Surgical History:   Procedure Laterality Date   • BACK SURGERY      LOWER BACK SURGERY   • CARPAL TUNNEL RELEASE Bilateral    • COLONOSCOPY  06/11/2012    Internal & external hemorrhoids found.   • COLONOSCOPY N/A 8/22/2017    Procedure: COLONOSCOPY;  Surgeon: Cesar Hollis MD;  Location: [facility] ENDOSCOPY;  Service:    • ENDOSCOPY  06/11/2012    Slight stricture in the distal esophagus. Gastritis in stomach. Biopsy taken. Normal duodenum.   • ENDOSCOPY     • ENDOSCOPY N/A 8/22/2017    Procedure: ESOPHAGOGASTRODUODENOSCOPY possible dilation ;  Surgeon: Cesar Hollis MD;  Location: [facility] ENDOSCOPY;  Service:    • ENTEROSCOPY SMALL BOWEL  08/27/2012    Gastritis in stomach. Normal jejunum. Biopsy taken. Normal duodenum   • FOOT SURGERY     • INJECTION OF MEDICATION  02/28/2014    Depo Medrol   • INJECTION OF MEDICATION  03/07/2014    Rocephin(2)   • ULNAR TUNNEL RELEASE       Family History   Problem Relation Age of Onset   • No Known Problems Other    • Hypertension Mother    • Heart attack Father    • No Known Problems Sister    • No Known Problems Brother    • No Known Problems Daughter    • No Known  Problems Son    • No Known Problems Maternal Aunt    • No Known Problems Maternal Uncle    • No Known Problems Paternal Aunt    • No Known Problems Paternal Uncle    • No Known Problems Maternal Grandmother    • No Known Problems Maternal Grandfather    • No Known Problems Paternal Grandmother    • No Known Problems Paternal Grandfather        Current Outpatient Prescriptions   Medication Sig Dispense Refill   • albuterol (PROVENTIL HFA;VENTOLIN HFA) 108 (90 Base) MCG/ACT inhaler Inhale 2 puffs Every 4 (Four) Hours As Needed for Wheezing. 1 inhaler 2   • azelastine (ASTELIN) 0.1 % nasal spray 2 sprays into each nostril 2 (Two) Times a Day. Use in each nostril as directed 30 mL 11   • DEXILANT 60 MG capsule Take 1 capsule by mouth Daily. 90 capsule 2   • gabapentin (NEURONTIN) 300 MG capsule Take 1 capsule by mouth 4 (Four) Times a Day As Needed.     • HYDROcodone-acetaminophen (NORCO)  MG per tablet Take 1 tablet by mouth Every 6 (Six) Hours As Needed for Moderate Pain .     • insulin detemir (LEVEMIR) 100 UNIT/ML injection Inject 65 Units under the skin Daily. (Patient taking differently: Inject 60 Units under the skin Daily.) 27 each 6   • Insulin Pen Needle (B-D UF III MINI PEN NEEDLES) 31G X 5 MM misc Use 4x daily with insulin pens 400 each 2   • lisinopril-hydrochlorothiazide (PRINZIDE,ZESTORETIC) 20-25 MG per tablet Take 1 tablet by mouth Daily.     • ondansetron ODT (ZOFRAN-ODT) 4 MG disintegrating tablet Take 1 tablet by mouth Every 6 (Six) Hours As Needed for Nausea or Vomiting. 12 tablet 0   • ONETOUCH VERIO test strip      • rosuvastatin (CRESTOR) 5 MG tablet Take 1 tablet by mouth Daily. 30 tablet 3   • VICTOZA 18 MG/3ML solution pen-injector injection Inject 1.8 mg under the skin Daily.       No current facility-administered medications for this visit.      Allergies   Allergen Reactions   • Penicillins Shortness Of Breath   • Bactrim [Sulfamethoxazole-Trimethoprim] GI Intolerance   • Ceftin  "[Cefuroxime Axetil] GI Intolerance   • Sulfa Antibiotics GI Intolerance   • Ciprofloxacin Itching and Rash     Social History     Social History   • Marital status: Single     Spouse name: N/A   • Number of children: N/A   • Years of education: N/A     Social History Main Topics   • Smoking status: Never Smoker   • Smokeless tobacco: Never Used   • Alcohol use No   • Drug use: No   • Sexual activity: Yes     Partners: Female     Other Topics Concern   • None     Social History Narrative       Review of Systems  Review of Systems   Constitutional: Negative for activity change, appetite change, chills, diaphoresis, fatigue, fever and unexpected weight change.   HENT: Negative for sore throat and trouble swallowing.    Respiratory: Negative for shortness of breath.    Gastrointestinal: Positive for constipation and heartburn. Negative for abdominal distention, abdominal pain, anal bleeding, blood in stool, diarrhea, nausea, rectal pain and vomiting.   Musculoskeletal: Negative for arthralgias.   Skin: Negative for pallor.   Neurological: Negative for light-headedness.        /76 (BP Location: Left arm, Patient Position: Sitting, Cuff Size: Adult)  Pulse 101  Ht 182.9 cm (72.01\")  Wt 98.5 kg (217 lb 3.2 oz)  BMI 29.45 kg/m2    Objective    Physical Exam   Constitutional: He is oriented to person, place, and time. He appears well-developed and well-nourished. He is cooperative. No distress.   HENT:   Head: Normocephalic and atraumatic.   Neck: Normal range of motion. Neck supple. No thyromegaly present.   Cardiovascular: Normal rate, regular rhythm and normal heart sounds.    Pulmonary/Chest: Effort normal and breath sounds normal. He has no wheezes. He has no rhonchi. He has no rales.   Abdominal: Soft. Normal appearance and bowel sounds are normal. He exhibits no shifting dullness, no distension, no fluid wave and no ascites. There is no hepatosplenomegaly. There is no tenderness. There is no rigidity and no " guarding. No hernia.   Lymphadenopathy:     He has no cervical adenopathy.   Neurological: He is alert and oriented to person, place, and time.   Skin: Skin is warm, dry and intact. No rash noted. No pallor.   Psychiatric: He has a normal mood and affect. His speech is normal.     Admission on 08/22/2017, Discharged on 08/22/2017   Component Date Value Ref Range Status   • Glucose 08/22/2017 103  70 - 130 mg/dL Final    Meter: [id number]: [id number] SMILEY DENT   • Case Report 08/22/2017    Final                    Value:Surgical Pathology Report                         Case: [id number]                                  Authorizing Provider:  Cesar Hollis MD    Collected:           08/22/2017 12:04 PM          Ordering Location:     [facility]             Received:            08/22/2017 02:19 PM                                 [facility]                                                     Pathologist:           Amrit David MD                                                          Specimens:   1) - Gastric, Antrum, antrum bx                                                                     2) - Gastric, Body, gastric polyp                                                                   3) - Large Intestine, Right / Ascending Colon, right colon polyp  (hot snare)                       4) - Large Intestine, Transverse Colon, transverse colon polyp  (hot snare )              • Final Diagnosis 08/22/2017    Final                    Value:This result contains rich text formatting which cannot be displayed here.   • Gross Description 08/22/2017    Final                    Value:This result contains rich text formatting which cannot be displayed here.     Assessment/Plan      1. Therapeutic opioid-induced constipation (OIC)    2. Gastroesophageal reflux disease with esophagitis    .       Review and/or summary of lab tests, radiology, procedures, medications. Review and summary  of old records and obtaining of history. The risks and benefits of my recommendations, as well as other treatment options were discussed with the patient today. Questions were answered.    No orders of the defined types were placed in this encounter.      Follow-up: Return in about 6 weeks (around 4/9/2018).          This document has been electronically signed by MARY Bravo on February 26, 2018 1:34 PM             Results for orders placed or performed during the hospital encounter of 08/22/17   Tissue Pathology Exam   Result Value Ref Range    Case Report       Surgical Pathology Report                         Case: [id number]                                  Authorizing Provider:  Cesar Hollis MD    Collected:           08/22/2017 12:04 PM          Ordering Location:     [facility]             Received:            08/22/2017 02:19 PM                                 [place] [facility]                                                     Pathologist:           Amrit David MD                                                          Specimens:   1) - Gastric, Antrum, antrum bx                                                                     2) - Gastric, Body, gastric polyp                                                                   3) - Large Intestine, Right / Ascending Colon, right colon polyp  (hot snare)                       4) - Large Intestine, Transverse Colon, transverse colon polyp  (hot snare )               Final Diagnosis       1.  MUCOSA, ANTRUM OF STOMACH:  REACTIVE GASTROPATHY.    2.  POLYP, BODY OF STOMACH:  FUNDIC GLAND POLYP.    3.  POLYP, ASCENDING COLON:  TUBULAR ADENOMA.    4.  POLYP, TRANSVERSE COLON:  TUBULAR ADENOMA.      Gross Description       Received for examination are 4 containers, each of which has nodular bits of white soft tissue measuring 0.3-0.5 cc in aggregate.  All specimens are embedded as labeled.  1A antrum of stomach; 2A polyp, body of  stomach; 3A polyp, ascending colon (hot snare); 4A polyp, transverse colon (hot snare).      Embedded Images     POC Glucose Fingerstick   Result Value Ref Range    Glucose 103 70 - 130 mg/dL   Results for orders placed or performed in visit on 05/17/17   CBC Auto Differential   Result Value Ref Range    WBC 9.53 3.20 - 9.80 10*3/mm3    RBC 5.01 4.37 - 5.74 10*6/mm3    Hemoglobin 13.5 (L) 13.7 - 17.3 g/dL    Hematocrit 41.2 39.0 - 49.0 %    MCV 82.2 80.0 - 98.0 fL    MCH 26.9 26.5 - 34.0 pg    MCHC 32.8 31.5 - 36.3 g/dL    RDW 16.1 (H) 11.5 - 14.5 %    RDW-SD 47.6 (H) 35.1 - 43.9 fl    MPV 10.1 8.0 - 12.0 fL    Platelets 248 150 - 450 10*3/mm3    Neutrophil % 65.7 37.0 - 80.0 %    Lymphocyte % 23.6 10.0 - 50.0 %    Monocyte % 7.6 0.0 - 12.0 %    Eosinophil % 2.7 0.0 - 7.0 %    Basophil % 0.4 0.0 - 2.0 %    Neutrophils, Absolute 6.26 2.00 - 8.60 10*3/mm3    Lymphocytes, Absolute 2.25 0.60 - 4.20 10*3/mm3    Monocytes, Absolute 0.72 0.00 - 0.90 10*3/mm3    Eosinophils, Absolute 0.26 0.00 - 0.70 10*3/mm3    Basophils, Absolute 0.04 0.00 - 0.20 10*3/mm3   Microalbumin / Creatinine Urine Ratio   Result Value Ref Range    Microalbumin/Creatinine Ratio 65.1 (H) 0.0 - 30.0 mg/g    Creatinine, Urine 270.4 mg/dL    Microalbumin, Urine 17.6 mg/L   TSH   Result Value Ref Range    TSH 2.770 0.460 - 4.680 mIU/mL   Hemoglobin A1c   Result Value Ref Range    Hemoglobin A1C 10.39 (H) 4 - 5.6 %   Lipid Panel   Result Value Ref Range    Total Cholesterol 155 150 - 200 mg/dL    Triglycerides 159 35 - 160 mg/dL    HDL Cholesterol 38 35 - 100 mg/dL    LDL Cholesterol  85 mg/dL    VLDL Cholesterol 31.8 mg/dL    LDL/HDL Ratio 2.24    Basic Metabolic Panel   Result Value Ref Range    Glucose 146 (H) 70 - 100 mg/dL    BUN 16 8 - 25 mg/dL    Creatinine 1.10 0.40 - 1.30 mg/dL    Sodium 138 134 - 146 mmol/L    Potassium 4.2 3.4 - 5.4 mmol/L    Chloride 97 (L) 100 - 112 mmol/L    CO2 31.0 20.0 - 32.0 mmol/L    Calcium 9.5 8.4 - 10.8 mg/dL     eGFR Non  Amer 67 49 - 113 mL/min/1.73    BUN/Creatinine Ratio 14.5 7.0 - 25.0    Anion Gap 10.0 5.0 - 15.0 mmol/L   Results for orders placed or performed during the hospital encounter of 07/12/16   Basic metabolic panel   Result Value Ref Range    Glucose 213 (H) 70.0 - 100.0 mg/dl    BUN 13 8.0 - 25.0 mg/dl    Creatinine 1.2 0.4 - 1.3 mg/dl    Sodium 137.0 134 - 146 mmol/L    Potassium 3.6 3.4 - 5.4 mmol/L    Chloride 98.0 (L) 100.0 - 112.0 mmol/L    CO2 31.0 20.0 - 32.0 mmol/L    Calcium 9.6 8.4 - 10.8 mg/dl    GFR MDRD Non  61 49 - 113 mL/min/1.73 sq.M    GFR MDRD  74 49 - 113 mL/min/1.73 sq.M    Anion Gap 8.0 5.0 - 15.0 mmol/L   Results for orders placed or performed during the hospital encounter of 05/03/16   Basic metabolic panel   Result Value Ref Range    Glucose 220 (H) 70.0 - 100.0 mg/dl    BUN 14 8.0 - 25.0 mg/dl    Creatinine 1.3 0.4 - 1.3 mg/dl    Sodium 136.0 134 - 146 mmol/L    Potassium 3.5 3.4 - 5.4 mmol/L    Chloride 95.0 (L) 100.0 - 112.0 mmol/L    CO2 32.0 20.0 - 32.0 mmol/L    Calcium 8.6 8.4 - 10.8 mg/dl    GFR MDRD Non  56 49 - 113 mL/min/1.73 sq.M    GFR MDRD  67 49 - 113 mL/min/1.73 sq.M    Anion Gap 9.0 5.0 - 15.0 mmol/L   Results for orders placed or performed during the hospital encounter of 03/28/16   Protein, Random Urine, Ql   Result Value Ref Range    Protein, UA NEGATIVE NEGATIVE   CBC and Differential   Result Value Ref Range    WBC 7.7 3.2 - 9.8 x1000/uL    RBC 5.10 4.37 - 5.74 claudia/mm3    Hemoglobin 13.7 13.7 - 17.3 gm/dl    Hematocrit 41.2 39.0 - 49.0 %    MCV 80.8 80.0 - 98.0 fl    MCH 26.9 26.0 - 34.0 pg    MCHC 33.3 31.5 - 36.3 gm/dl    Platelets 238 150 - 450 x1000/mm3    RDW 15.5 (H) 11.5 - 14.5 %    MPV 10.8 8.0 - 12.0 fl    Neutrophil Rel % 58.2 37.0 - 80.0 %    Lymphocyte Rel % 24.4 10.0 - 50.0 %    Monocyte Rel % 8.4 0.0 - 12.0 %    Eosinophil Rel % 8.1 (H) 0.0 - 7.0 %    Basophil Rel % 0.9 0.0 -  2.0 %    nRBC 0     nRBC 0    TSH   Result Value Ref Range    TSH 2.77 0.46 - 4.68 uIU/ml   T4, free   Result Value Ref Range    Free T4 1.27 0.78 - 2.19 ng/dl   PSA   Result Value Ref Range    PSA 0.43 0.00 - 4.00 ng/ml   Hemoglobin A1c   Result Value Ref Range    Hemoglobin A1C 11.2 (H) 4.0 - 5.6 %TotHgb   Vitamin B12   Result Value Ref Range    Vitamin B-12 >1000 (H) 239 - 931 pg/ml   Lipid panel   Result Value Ref Range    Total Cholesterol 215 (H) 150 - 200 mg/dl    Triglycerides 356 (H) 35 - 160 mg/dl    HDL Cholesterol 32.5 (L) 35.0 - 100.0 mg/dl    LDL Cholesterol  111 mg/dl   Comprehensive metabolic panel   Result Value Ref Range    Glucose 216 (H) 70.0 - 100.0 mg/dl    BUN 14 8.0 - 25.0 mg/dl    Creatinine 1.4 (H) 0.4 - 1.3 mg/dl    Sodium 137.0 134 - 146 mmol/L    Potassium 4.6 3.4 - 5.4 mmol/L    Chloride 96.0 (L) 100.0 - 112.0 mmol/L    CO2 31.0 20.0 - 32.0 mmol/L    Calcium 9.2 8.4 - 10.8 mg/dl    Total Protein 7.1 6.7 - 8.2 gm/dl    Albumin 3.7 3.2 - 5.5 gm/dl    Total Bilirubin 0.6 0.2 - 1.0 mg/dl    Alkaline Phosphatase 104 15 - 121 U/L    ALT (SGPT) 22 10 - 60 U/L    AST (SGOT) 31 10 - 60 U/L    GFR MDRD Non  51 49 - 113 mL/min/1.73 sq.M    GFR MDRD  62 49 - 113 mL/min/1.73 sq.M    Anion Gap 10.0 5.0 - 15.0 mmol/L     *Note: Due to a large number of results and/or encounters for the requested time period, some results have not been displayed. A complete set of results can be found in Results Review.

## 2018-04-23 ENCOUNTER — OFFICE VISIT (OUTPATIENT)
Dept: GASTROENTEROLOGY | Facility: CLINIC | Age: 68
End: 2018-04-23

## 2018-04-23 VITALS
BODY MASS INDEX: 29.28 KG/M2 | HEIGHT: 72 IN | SYSTOLIC BLOOD PRESSURE: 142 MMHG | HEART RATE: 92 BPM | WEIGHT: 216.2 LBS | DIASTOLIC BLOOD PRESSURE: 86 MMHG

## 2018-04-23 DIAGNOSIS — T40.2X5A THERAPEUTIC OPIOID-INDUCED CONSTIPATION (OIC): ICD-10-CM

## 2018-04-23 DIAGNOSIS — K59.03 THERAPEUTIC OPIOID-INDUCED CONSTIPATION (OIC): ICD-10-CM

## 2018-04-23 DIAGNOSIS — K21.9 GASTROESOPHAGEAL REFLUX DISEASE WITHOUT ESOPHAGITIS: Primary | ICD-10-CM

## 2018-04-23 PROCEDURE — 99214 OFFICE O/P EST MOD 30 MIN: CPT | Performed by: NURSE PRACTITIONER

## 2018-04-23 RX ORDER — SUCRALFATE ORAL 1 G/10ML
1 SUSPENSION ORAL
Qty: 1260 ML | Refills: 0 | Status: SHIPPED | OUTPATIENT
Start: 2018-04-23 | End: 2018-11-08

## 2018-04-23 NOTE — PATIENT INSTRUCTIONS
Constipation, Adult  Constipation is when a person has fewer bowel movements in a week than normal, has difficulty having a bowel movement, or has stools that are dry, hard, or larger than normal. Constipation may be caused by an underlying condition. It may become worse with age if a person takes certain medicines and does not take in enough fluids.  Follow these instructions at home:  Eating and drinking     · Eat foods that have a lot of fiber, such as fresh fruits and vegetables, whole grains, and beans.  · Limit foods that are high in fat, low in fiber, or overly processed, such as french fries, hamburgers, cookies, candies, and soda.  · Drink enough fluid to keep your urine clear or pale yellow.  General instructions   · Exercise regularly or as told by your health care provider.  · Go to the restroom when you have the urge to go. Do not hold it in.  · Take over-the-counter and prescription medicines only as told by your health care provider. These include any fiber supplements.  · Practice pelvic floor retraining exercises, such as deep breathing while relaxing the lower abdomen and pelvic floor relaxation during bowel movements.  · Watch your condition for any changes.  · Keep all follow-up visits as told by your health care provider. This is important.  Contact a health care provider if:  · You have pain that gets worse.  · You have a fever.  · You do not have a bowel movement after 4 days.  · You vomit.  · You are not hungry.  · You lose weight.  · You are bleeding from the anus.  · You have thin, pencil-like stools.  Get help right away if:  · You have a fever and your symptoms suddenly get worse.  · You leak stool or have blood in your stool.  · Your abdomen is bloated.  · You have severe pain in your abdomen.  · You feel dizzy or you faint.  This information is not intended to replace advice given to you by your health care provider. Make sure you discuss any questions you have with your health care  provider.  Document Released: 09/15/2005 Document Revised: 07/07/2017 Document Reviewed: 06/07/2017  [employer] Interactive Patient Education © 2017 [employer]  MyPlate from [employer]  The general, healthful diet is based on the 2010 Dietary Guidelines for Americans. The amount of food you need to eat from each food group depends on your age, sex, and level of physical activity and can be individualized by a dietitian. Go to ChooseMyPlate.gov for more information.  What do I need to know about the MyPlate plan?  · Enjoy your food, but eat less.  · Avoid oversized portions.  ¨ ½ of your plate should include fruits and vegetables.  ¨ ¼ of your plate should be grains.  ¨ ¼ of your plate should be protein.  Grains   · Make at least half of your grains whole grains.  · For a 2,000 calorie daily food plan, eat 6 oz every day.  · 1 oz is about 1 slice bread, 1 cup cereal, or ½ cup cooked rice, cereal, or pasta.  Vegetables   · Make half your plate fruits and vegetables.  · For a 2,000 calorie daily food plan, eat 2½ cups every day.  · 1 cup is about 1 cup raw or cooked vegetables or vegetable juice or 2 cups raw leafy greens.  Fruits   · Make half your plate fruits and vegetables.  · For a 2,000 calorie daily food plan, eat 2 cups every day.  · 1 cup is about 1 cup fruit or 100% fruit juice or ½ cup dried fruit.  Protein   · For a 2,000 calorie daily food plan, eat 5½ oz every day.  · 1 oz is about 1 oz meat, poultry, or fish, ¼ cup cooked beans, 1 egg, 1 Tbsp peanut butter, or ½ oz nuts or seeds.  Dairy   · Switch to fat-free or low-fat (1%) milk.  · For a 2,000 calorie daily food plan, eat 3 cups every day.  · 1 cup is about 1 cup milk or yogurt or soy milk (soy beverage), 1½ oz natural cheese, or 2 oz processed cheese.  Fats, Oils, and Empty Calories   · Only small amounts of oils are recommended.  · Empty calories are calories from solid fats or added sugars.  · Compare sodium in foods like soup, bread, and frozen meals.  Choose the foods with lower numbers.  · Drink water instead of sugary drinks.  What foods can I eat?  Grains   Whole grains such as whole wheat, quinoa, millet, and bulgur. Bread, rolls, and pasta made from whole grains. Brown or wild rice. Hot or cold cereals made from whole grains and without added sugar.  Vegetables   All fresh vegetables, especially fresh red, dark green, or orange vegetables. Peas and beans. Low-sodium frozen or canned vegetables prepared without added salt. Low-sodium vegetable juices.  Fruits   All fresh, frozen, and dried fruits. Canned fruit packed in water or fruit juice without added sugar. Fruit juices without added sugar.  Meats and Other Protein Sources   Boiled, baked, or grilled lean meat trimmed of fat. Skinless poultry. Fresh seafood and shellfish. Canned seafood packed in water. Unsalted nuts and unsalted nut butters. Tofu. Dried beans and pea. Eggs.  Dairy   Low-fat or fat-free milk, yogurt, and cheeses.  Sweets and Desserts   Frozen desserts made from low-fat milk.  Fats and Oils   Olive, peanut, and canola oils and margarine. Salad dressing and mayonnaise made from these oils.  Other   Soups and casseroles made from allowed ingredients and without added fat or salt.  The items listed above may not be a complete list of recommended foods or beverages. Contact your dietitian for more options.   What foods are not recommended?  Grains   Sweetened, low-fiber cereals. Packaged baked goods. Snack crackers and chips. Cheese crackers, butter crackers, and biscuits. Frozen waffles, sweet breads, doughnuts, pastries, packaged baking mixes, pancakes, cakes, and cookies.  Vegetables   Regular canned or frozen vegetables or vegetables prepared with salt. Canned tomatoes. Canned tomato sauce. Fried vegetables. Vegetables in cream sauce or cheese sauce.  Fruits   Fruits packed in syrup or made with added sugar.  Meats and Other Protein Sources   Marbled or fatty meats such as ribs. Poultry  with skin. Fried meats, poultry, eggs, or fish. Sausages, hot dogs, and deli meats such as pastrami, bologna, or salami.  Dairy   Whole milk, cream, cheeses made from whole milk, sour cream. Ice cream or yogurt made from whole milk or with added sugar.  Beverages   For adults, no more than one alcoholic drink per day. Regular soft drinks or other sugary beverages. Juice drinks.  Sweets and Desserts   Sugary or fatty desserts, candy, and other sweets.  Fats and Oils   Solid shortening or partially hydrogenated oils. Solid margarine. Margarine that contains trans fats. Butter.  The items listed above may not be a complete list of foods and beverages to avoid. Contact your dietitian for more information.   This information is not intended to replace advice given to you by your health care provider. Make sure you discuss any questions you have with your health care provider.  Document Released: 01/06/2009 Document Revised: 05/25/2017 Document Reviewed: 11/26/2014  [employer] Interactive Patient Education © 2017 [employer]  BMI for Adults  Body mass index (BMI) is a number that is calculated from a person's weight and height. In most adults, the number is used to find how much of an adult's weight is made up of fat. BMI is not as accurate as a direct measure of body fat.  How is BMI calculated?  BMI is calculated by dividing weight in kilograms by height in meters squared. It can also be calculated by dividing weight in pounds by height in inches squared, then multiplying the resulting number by 703. Charts are available to help you find your BMI quickly and easily without doing this calculation.  How is BMI interpreted?  Health care professionals use BMI charts to identify whether an adult is underweight, at a normal weight, or overweight based on the following guidelines:  · Underweight: BMI less than 18.5.  · Normal weight: BMI between 18.5 and 24.9.  · Overweight: BMI between 25 and 29.9.  · Obese: BMI of 30 and  above.  BMI is usually interpreted the same for males and females.  Weight includes both fat and muscle, so someone with a muscular build, such as an athlete, may have a BMI that is higher than 24.9. In cases like these, BMI may not accurately depict body fat. To determine if excess body fat is the cause of a BMI of 25 or higher, further assessments may need to be done by a health care provider.  Why is BMI a useful tool?  BMI is used to identify a possible weight problem that may be related to a medical problem or may increase the risk for medical problems. BMI can also be used to promote changes to reach a healthy weight.  This information is not intended to replace advice given to you by your health care provider. Make sure you discuss any questions you have with your health care provider.  Document Released: 08/29/2005 Document Revised: 04/27/2017 Document Reviewed: 05/15/2015  [employer] Interactive Patient Education © 2017 [employer]

## 2018-04-23 NOTE — PROGRESS NOTES
Chief Complaint   Patient presents with   • Constipation       Subjective    Jadiel Dutton is a 67 y.o. male. he is here today for follow-up.    Patient presents for follow-up regarding opioid induced constipation and reflux.  At last visit he was given samples of symproic however states he has not taken them due to finishing up his refill prescription of Movantic.  Had some worsening upper abdominal pain described as burning.  He would like refill Carafate which he took several years ago.  States dexilant mostly controls his reflux symptoms.  Generally tries to avoid gastric irritants.  He is trying to wean off pain medication however has follow-up with pain management in the next month.    Constipation   This is a chronic problem. The current episode started more than 1 year ago. The problem has been gradually improving since onset. His stool frequency is 2 to 3 times per week. The stool is described as firm. The patient is on a high fiber diet. He exercises regularly (as tolerated with back pain ). There has been adequate water intake. Associated symptoms include abdominal pain, bloating and flatus. Pertinent negatives include no anorexia, back pain, diarrhea, difficulty urinating, fecal incontinence, fever, hematochezia, hemorrhoids, melena, nausea, rectal pain, vomiting or weight loss. The treatment provided mild relief.   Heartburn   He complains of abdominal pain, belching, early satiety, heartburn and a hoarse voice. He reports no chest pain, no choking, no coughing, no dysphagia, no globus sensation, no nausea, no sore throat, no stridor, no tooth decay, no water brash or no wheezing. This is a chronic problem. The current episode started more than 1 year ago. The problem occurs frequently. The problem has been waxing and waning. The heartburn is located in the substernum. The heartburn is of mild intensity. The heartburn does not wake him from sleep. The heartburn does not limit his activity. The  heartburn doesn't change with position. The symptoms are aggravated by certain foods. Pertinent negatives include no fatigue, melena or weight loss. He has tried a PPI (dexilant ) for the symptoms. The treatment provided moderate relief.     Plan; we'll continue patient on dexilant for heartburn at Carafate before meals and bedtime.  He will continue Movantic at this time.  If he would like to change to simple acute contact offices we'll send a prescription in.  He is recommended to increase fiber water intake and activity as tolerated.  Follow-up in 6 months return to office sooner if needed.       The following portions of the patient's history were reviewed and updated as appropriate:   Past Medical History:   Diagnosis Date   • Abdominal pain    • Abnormal weight loss    • Acute bronchitis    • Anxiety state    • Benign essential hypertension    • Chronic sinusitis    • Constipation    • Cystitis    • Degenerative joint disease involving multiple joints    • Diabetic neuropathy      bilateral hands      • Diastolic dysfunction    • Diverticular disease of colon    • Dyspnea    • Epigastric pain    • Essential hypertension    • Gastroesophageal reflux disease    • Generalized anxiety disorder    • Hyperlipidemia    • Inflammatory bowel disease     Possible Inflammatory Bowel Disease      • Lumbar pain     Pain radiating to lumbar region of back   n      • Pleuritic pain    • Radiculopathy     site unspecified      • Type 2 diabetes mellitus    • Vesicular eczema of hands and feet      Past Surgical History:   Procedure Laterality Date   • BACK SURGERY      LOWER BACK SURGERY   • CARPAL TUNNEL RELEASE Bilateral    • COLONOSCOPY  06/11/2012    Internal & external hemorrhoids found.   • COLONOSCOPY N/A 8/22/2017    Procedure: COLONOSCOPY;  Surgeon: Cesar Hollis MD;  Location: [facility] ENDOSCOPY;  Service:    • ENDOSCOPY  06/11/2012    Slight stricture in the distal esophagus. Gastritis in stomach. Biopsy  taken. Normal duodenum.   • ENDOSCOPY     • ENDOSCOPY N/A 8/22/2017    Procedure: ESOPHAGOGASTRODUODENOSCOPY possible dilation ;  Surgeon: Cesar Hollis MD;  Location: [facility] ENDOSCOPY;  Service:    • ENTEROSCOPY SMALL BOWEL  08/27/2012    Gastritis in stomach. Normal jejunum. Biopsy taken. Normal duodenum   • FOOT SURGERY     • INJECTION OF MEDICATION  02/28/2014    Depo Medrol   • INJECTION OF MEDICATION  03/07/2014    Rocephin(2)   • ULNAR TUNNEL RELEASE       Family History   Problem Relation Age of Onset   • No Known Problems Other    • Hypertension Mother    • Heart attack Father    • No Known Problems Sister    • No Known Problems Brother    • No Known Problems Daughter    • No Known Problems Son    • No Known Problems Maternal Aunt    • No Known Problems Maternal Uncle    • No Known Problems Paternal Aunt    • No Known Problems Paternal Uncle    • No Known Problems Maternal Grandmother    • No Known Problems Maternal Grandfather    • No Known Problems Paternal Grandmother    • No Known Problems Paternal Grandfather        Current Outpatient Prescriptions   Medication Sig Dispense Refill   • albuterol (PROVENTIL HFA;VENTOLIN HFA) 108 (90 Base) MCG/ACT inhaler Inhale 2 puffs Every 4 (Four) Hours As Needed for Wheezing. 1 inhaler 2   • azelastine (ASTELIN) 0.1 % nasal spray 2 sprays into each nostril 2 (Two) Times a Day. Use in each nostril as directed 30 mL 11   • DEXILANT 60 MG capsule Take 1 capsule by mouth Daily. 90 capsule 2   • gabapentin (NEURONTIN) 300 MG capsule Take 1 capsule by mouth 4 (Four) Times a Day As Needed.     • HYDROcodone-acetaminophen (NORCO)  MG per tablet Take 1 tablet by mouth Every 6 (Six) Hours As Needed for Moderate Pain .     • insulin detemir (LEVEMIR) 100 UNIT/ML injection Inject 65 Units under the skin Daily. (Patient taking differently: Inject 60 Units under the skin Daily.) 27 each 6   • Insulin Pen Needle (B-D UF III MINI PEN NEEDLES) 31G X 5 MM misc Use 4x  daily with insulin pens 400 each 2   • lisinopril-hydrochlorothiazide (PRINZIDE,ZESTORETIC) 20-25 MG per tablet Take 1 tablet by mouth Daily.     • ondansetron ODT (ZOFRAN-ODT) 4 MG disintegrating tablet Take 1 tablet by mouth Every 6 (Six) Hours As Needed for Nausea or Vomiting. 12 tablet 0   • ONETOUCH VERIO test strip      • rosuvastatin (CRESTOR) 5 MG tablet Take 1 tablet by mouth Daily. 30 tablet 3   • VICTOZA 18 MG/3ML solution pen-injector injection Inject 1.8 mg under the skin Daily.     • sucralfate (CARAFATE) 1 GM/10ML suspension Take 10 mL by mouth 4 (Four) Times a Day With Meals & at Bedtime. 1260 mL 0     No current facility-administered medications for this visit.      Allergies   Allergen Reactions   • Penicillins Shortness Of Breath   • Bactrim [Sulfamethoxazole-Trimethoprim] GI Intolerance   • Ceftin [Cefuroxime Axetil] GI Intolerance   • Sulfa Antibiotics GI Intolerance   • Ciprofloxacin Itching and Rash     Social History     Social History   • Marital status: Single     Social History Main Topics   • Smoking status: Never Smoker   • Smokeless tobacco: Never Used   • Alcohol use No   • Drug use: No   • Sexual activity: Yes     Partners: Female     Other Topics Concern   • Not on file       Review of Systems  Review of Systems   Constitutional: Negative for activity change, appetite change, chills, diaphoresis, fatigue, fever, unexpected weight change and weight loss.   HENT: Positive for hoarse voice. Negative for sore throat and trouble swallowing.    Respiratory: Negative for cough, choking, shortness of breath and wheezing.    Cardiovascular: Negative for chest pain.   Gastrointestinal: Positive for abdominal distention (belching worsened with worsening constipation ), abdominal pain, bloating, constipation, flatus and heartburn. Negative for anal bleeding, anorexia, blood in stool, diarrhea, dysphagia, hematochezia, hemorrhoids, melena, nausea, rectal pain and vomiting.   Genitourinary:  "Negative for difficulty urinating.   Musculoskeletal: Negative for arthralgias and back pain.   Skin: Negative for pallor.   Neurological: Negative for light-headedness.        /86   Pulse 92   Ht 182.9 cm (72.01\")   Wt 98.1 kg (216 lb 3.2 oz)   BMI 29.32 kg/m²     Objective    Physical Exam   Constitutional: He is oriented to person, place, and time. He appears well-developed and well-nourished. He is cooperative. No distress.   HENT:   Head: Normocephalic and atraumatic.   Neck: Normal range of motion. Neck supple. No thyromegaly present.   Cardiovascular: Normal rate, regular rhythm and normal heart sounds.    Pulmonary/Chest: Effort normal and breath sounds normal. He has no wheezes. He has no rhonchi. He has no rales.   Abdominal: Soft. Normal appearance and bowel sounds are normal. He exhibits no distension and no fluid wave. There is no hepatosplenomegaly. There is tenderness in the right upper quadrant, epigastric area and left upper quadrant. There is no rigidity and no guarding. No hernia.   Lymphadenopathy:     He has no cervical adenopathy.   Neurological: He is alert and oriented to person, place, and time.   Skin: Skin is warm, dry and intact. No rash noted. No pallor.   Psychiatric: He has a normal mood and affect. His speech is normal.     Admission on 08/22/2017, Discharged on 08/22/2017   Component Date Value Ref Range Status   • Glucose 08/22/2017 103  70 - 130 mg/dL Final   • Case Report 08/23/2017    Final                    Value:Surgical Pathology Report                         Case: [id number]                                  Authorizing Provider:  Cesar Hollis MD    Collected:           08/22/2017 12:04 PM          Ordering Location:     [facility]             Received:            08/22/2017 02:19 PM                                 [place] [facility]                                                     Pathologist:           Amrit David MD                      "                                     Specimens:   1) - Gastric, Antrum, antrum bx                                                                     2) - Gastric, Body, gastric polyp                                                                   3) - Large Intestine, Right / Ascending Colon, right colon polyp  (hot snare)                       4) - Large Intestine, Transverse Colon, transverse colon polyp  (hot snare )              • Final Diagnosis 08/23/2017    Final                    Value:This result contains rich text formatting which cannot be displayed here.   • Gross Description 08/23/2017    Final                    Value:This result contains rich text formatting which cannot be displayed here.     Assessment/Plan      1. Gastroesophageal reflux disease without esophagitis    2. Therapeutic opioid-induced constipation (OIC)    .       Orders placed during this encounter include:  No orders of the defined types were placed in this encounter.      * Surgery not found *    Review and/or summary of lab tests, radiology, procedures, medications. Review and summary of old records and obtaining of history. The risks and benefits of my recommendations, as well as other treatment options were discussed with the patient today. Questions were answered.    New Medications Ordered This Visit   Medications   • sucralfate (CARAFATE) 1 GM/10ML suspension     Sig: Take 10 mL by mouth 4 (Four) Times a Day With Meals & at Bedtime.     Dispense:  1260 mL     Refill:  0       Follow-up: Return in about 6 months (around 10/23/2018).          This document has been electronically signed by MARY Bravo on April 23, 2018 11:13 AM             Results for orders placed or performed during the hospital encounter of 08/22/17   Tissue Pathology Exam   Result Value Ref Range    Case Report       Surgical Pathology Report                         Case: [id number]                                  Authorizing Provider:  Cesar ROJAS  MD Telma    Collected:           08/22/2017 12:04 PM          Ordering Location:     [facility]             Received:            08/22/2017 02:19 PM                                 [facility]                                                     Pathologist:           Amrit David MD                                                          Specimens:   1) - Gastric, Antrum, antrum bx                                                                     2) - Gastric, Body, gastric polyp                                                                   3) - Large Intestine, Right / Ascending Colon, right colon polyp  (hot snare)                       4) - Large Intestine, Transverse Colon, transverse colon polyp  (hot snare )               Final Diagnosis       1.  MUCOSA, ANTRUM OF STOMACH:  REACTIVE GASTROPATHY.    2.  POLYP, BODY OF STOMACH:  FUNDIC GLAND POLYP.    3.  POLYP, ASCENDING COLON:  TUBULAR ADENOMA.    4.  POLYP, TRANSVERSE COLON:  TUBULAR ADENOMA.      Gross Description       Received for examination are 4 containers, each of which has nodular bits of white soft tissue measuring 0.3-0.5 cc in aggregate.  All specimens are embedded as labeled.  1A antrum of stomach; 2A polyp, body of stomach; 3A polyp, ascending colon (hot snare); 4A polyp, transverse colon (hot snare).      Embedded Images     POC Glucose Fingerstick   Result Value Ref Range    Glucose 103 70 - 130 mg/dL   Results for orders placed or performed in visit on 05/17/17   CBC Auto Differential   Result Value Ref Range    WBC 9.53 3.20 - 9.80 10*3/mm3    RBC 5.01 4.37 - 5.74 10*6/mm3    Hemoglobin 13.5 (L) 13.7 - 17.3 g/dL    Hematocrit 41.2 39.0 - 49.0 %    MCV 82.2 80.0 - 98.0 fL    MCH 26.9 26.5 - 34.0 pg    MCHC 32.8 31.5 - 36.3 g/dL    RDW 16.1 (H) 11.5 - 14.5 %    RDW-SD 47.6 (H) 35.1 - 43.9 fl    MPV 10.1 8.0 - 12.0 fL    Platelets 248 150 - 450 10*3/mm3    Neutrophil % 65.7 37.0 - 80.0 %    Lymphocyte % 23.6  10.0 - 50.0 %    Monocyte % 7.6 0.0 - 12.0 %    Eosinophil % 2.7 0.0 - 7.0 %    Basophil % 0.4 0.0 - 2.0 %    Neutrophils, Absolute 6.26 2.00 - 8.60 10*3/mm3    Lymphocytes, Absolute 2.25 0.60 - 4.20 10*3/mm3    Monocytes, Absolute 0.72 0.00 - 0.90 10*3/mm3    Eosinophils, Absolute 0.26 0.00 - 0.70 10*3/mm3    Basophils, Absolute 0.04 0.00 - 0.20 10*3/mm3   Microalbumin / Creatinine Urine Ratio   Result Value Ref Range    Microalbumin/Creatinine Ratio 65.1 (H) 0.0 - 30.0 mg/g    Creatinine, Urine 270.4 mg/dL    Microalbumin, Urine 17.6 mg/L   TSH   Result Value Ref Range    TSH 2.770 0.460 - 4.680 mIU/mL   Hemoglobin A1c   Result Value Ref Range    Hemoglobin A1C 10.39 (H) 4 - 5.6 %   Lipid Panel   Result Value Ref Range    Total Cholesterol 155 150 - 200 mg/dL    Triglycerides 159 35 - 160 mg/dL    HDL Cholesterol 38 35 - 100 mg/dL    LDL Cholesterol  85 mg/dL    VLDL Cholesterol 31.8 mg/dL    LDL/HDL Ratio 2.24    Basic Metabolic Panel   Result Value Ref Range    Glucose 146 (H) 70 - 100 mg/dL    BUN 16 8 - 25 mg/dL    Creatinine 1.10 0.40 - 1.30 mg/dL    Sodium 138 134 - 146 mmol/L    Potassium 4.2 3.4 - 5.4 mmol/L    Chloride 97 (L) 100 - 112 mmol/L    CO2 31.0 20.0 - 32.0 mmol/L    Calcium 9.5 8.4 - 10.8 mg/dL    eGFR Non  Amer 67 49 - 113 mL/min/1.73    BUN/Creatinine Ratio 14.5 7.0 - 25.0    Anion Gap 10.0 5.0 - 15.0 mmol/L   Results for orders placed or performed during the hospital encounter of 07/12/16   Basic metabolic panel   Result Value Ref Range    Glucose 213 (H) 70.0 - 100.0 mg/dl    BUN 13 8.0 - 25.0 mg/dl    Creatinine 1.2 0.4 - 1.3 mg/dl    Sodium 137.0 134 - 146 mmol/L    Potassium 3.6 3.4 - 5.4 mmol/L    Chloride 98.0 (L) 100.0 - 112.0 mmol/L    CO2 31.0 20.0 - 32.0 mmol/L    Calcium 9.6 8.4 - 10.8 mg/dl    GFR MDRD Non  61 49 - 113 mL/min/1.73 sq.M    GFR MDRD  74 49 - 113 mL/min/1.73 sq.M    Anion Gap 8.0 5.0 - 15.0 mmol/L   Results for orders placed or  performed during the hospital encounter of 05/03/16   Basic metabolic panel   Result Value Ref Range    Glucose 220 (H) 70.0 - 100.0 mg/dl    BUN 14 8.0 - 25.0 mg/dl    Creatinine 1.3 0.4 - 1.3 mg/dl    Sodium 136.0 134 - 146 mmol/L    Potassium 3.5 3.4 - 5.4 mmol/L    Chloride 95.0 (L) 100.0 - 112.0 mmol/L    CO2 32.0 20.0 - 32.0 mmol/L    Calcium 8.6 8.4 - 10.8 mg/dl    GFR MDRD Non  56 49 - 113 mL/min/1.73 sq.M    GFR MDRD  67 49 - 113 mL/min/1.73 sq.M    Anion Gap 9.0 5.0 - 15.0 mmol/L   Results for orders placed or performed during the hospital encounter of 03/28/16   Protein, Random Urine, Ql   Result Value Ref Range    Protein, UA NEGATIVE NEGATIVE   CBC and Differential   Result Value Ref Range    WBC 7.7 3.2 - 9.8 x1000/uL    RBC 5.10 4.37 - 5.74 claudia/mm3    Hemoglobin 13.7 13.7 - 17.3 gm/dl    Hematocrit 41.2 39.0 - 49.0 %    MCV 80.8 80.0 - 98.0 fl    MCH 26.9 26.0 - 34.0 pg    MCHC 33.3 31.5 - 36.3 gm/dl    Platelets 238 150 - 450 x1000/mm3    RDW 15.5 (H) 11.5 - 14.5 %    MPV 10.8 8.0 - 12.0 fl    Neutrophil Rel % 58.2 37.0 - 80.0 %    Lymphocyte Rel % 24.4 10.0 - 50.0 %    Monocyte Rel % 8.4 0.0 - 12.0 %    Eosinophil Rel % 8.1 (H) 0.0 - 7.0 %    Basophil Rel % 0.9 0.0 - 2.0 %    nRBC 0     nRBC 0    TSH   Result Value Ref Range    TSH 2.77 0.46 - 4.68 uIU/ml   T4, free   Result Value Ref Range    Free T4 1.27 0.78 - 2.19 ng/dl   PSA   Result Value Ref Range    PSA 0.43 0.00 - 4.00 ng/ml   Hemoglobin A1c   Result Value Ref Range    Hemoglobin A1C 11.2 (H) 4.0 - 5.6 %TotHgb   Vitamin B12   Result Value Ref Range    Vitamin B-12 >1000 (H) 239 - 931 pg/ml   Lipid panel   Result Value Ref Range    Total Cholesterol 215 (H) 150 - 200 mg/dl    Triglycerides 356 (H) 35 - 160 mg/dl    HDL Cholesterol 32.5 (L) 35.0 - 100.0 mg/dl    LDL Cholesterol  111 mg/dl   Comprehensive metabolic panel   Result Value Ref Range    Glucose 216 (H) 70.0 - 100.0 mg/dl    BUN 14 8.0 - 25.0 mg/dl     Creatinine 1.4 (H) 0.4 - 1.3 mg/dl    Sodium 137.0 134 - 146 mmol/L    Potassium 4.6 3.4 - 5.4 mmol/L    Chloride 96.0 (L) 100.0 - 112.0 mmol/L    CO2 31.0 20.0 - 32.0 mmol/L    Calcium 9.2 8.4 - 10.8 mg/dl    Total Protein 7.1 6.7 - 8.2 gm/dl    Albumin 3.7 3.2 - 5.5 gm/dl    Total Bilirubin 0.6 0.2 - 1.0 mg/dl    Alkaline Phosphatase 104 15 - 121 U/L    ALT (SGPT) 22 10 - 60 U/L    AST (SGOT) 31 10 - 60 U/L    GFR MDRD Non  51 49 - 113 mL/min/1.73 sq.M    GFR MDRD  62 49 - 113 mL/min/1.73 sq.M    Anion Gap 10.0 5.0 - 15.0 mmol/L     *Note: Due to a large number of results and/or encounters for the requested time period, some results have not been displayed. A complete set of results can be found in Results Review.

## 2018-05-29 RX ORDER — INSULIN DETEMIR 100 [IU]/ML
INJECTION, SOLUTION SUBCUTANEOUS
Qty: 15 ML | Refills: 12 | Status: SHIPPED | OUTPATIENT
Start: 2018-05-29 | End: 2018-11-08

## 2018-08-02 RX ORDER — DEXLANSOPRAZOLE 60 MG/1
CAPSULE, DELAYED RELEASE ORAL
Qty: 90 CAPSULE | Refills: 2 | Status: SHIPPED | OUTPATIENT
Start: 2018-08-02 | End: 2019-12-17

## 2018-08-28 ENCOUNTER — OFFICE VISIT (OUTPATIENT)
Dept: WOUND CARE | Facility: HOSPITAL | Age: 68
End: 2018-08-28

## 2018-08-28 ENCOUNTER — HOSPITAL ENCOUNTER (OUTPATIENT)
Dept: GENERAL RADIOLOGY | Facility: HOSPITAL | Age: 68
Discharge: HOME | End: 2018-08-28
Admitting: NURSE PRACTITIONER

## 2018-08-28 ENCOUNTER — LAB REQUISITION (OUTPATIENT)
Dept: LAB | Facility: HOSPITAL | Age: 68
End: 2018-08-28

## 2018-08-28 DIAGNOSIS — M86.9 DIABETIC FOOT ULCER WITH OSTEOMYELITIS (HCC): ICD-10-CM

## 2018-08-28 DIAGNOSIS — E11.69 DIABETIC FOOT ULCER WITH OSTEOMYELITIS (HCC): ICD-10-CM

## 2018-08-28 DIAGNOSIS — L97.509 DIABETIC FOOT ULCER WITH OSTEOMYELITIS (HCC): ICD-10-CM

## 2018-08-28 DIAGNOSIS — E11.621 DIABETIC FOOT ULCER WITH OSTEOMYELITIS (HCC): ICD-10-CM

## 2018-08-28 DIAGNOSIS — E11.621 TYPE 2 DIABETES MELLITUS WITH FOOT ULCER (CODE) (HCC): ICD-10-CM

## 2018-08-28 LAB
ALBUMIN SERPL-MCNC: 3.7 G/DL (ref 3.4–4.8)
ALBUMIN/GLOB SERPL: 1.1 G/DL (ref 1.1–1.8)
ALP SERPL-CCNC: 99 U/L (ref 38–126)
ALT SERPL W P-5'-P-CCNC: 27 U/L (ref 21–72)
ANION GAP SERPL CALCULATED.3IONS-SCNC: 9 MMOL/L (ref 5–15)
AST SERPL-CCNC: 28 U/L (ref 17–59)
BASOPHILS # BLD AUTO: 0.05 10*3/MM3 (ref 0–0.2)
BASOPHILS NFR BLD AUTO: 0.5 % (ref 0–2)
BILIRUB SERPL-MCNC: 0.3 MG/DL (ref 0.2–1.3)
BUN BLD-MCNC: 7 MG/DL (ref 7–21)
BUN/CREAT SERPL: 6 (ref 7–25)
CALCIUM SPEC-SCNC: 9.2 MG/DL (ref 8.4–10.2)
CHLORIDE SERPL-SCNC: 97 MMOL/L (ref 95–110)
CO2 SERPL-SCNC: 31 MMOL/L (ref 22–31)
CREAT BLD-MCNC: 1.16 MG/DL (ref 0.7–1.3)
CRP SERPL-MCNC: 1.2 MG/DL (ref 0–1)
DEPRECATED RDW RBC AUTO: 45.6 FL (ref 35.1–43.9)
EOSINOPHIL # BLD AUTO: 0.67 10*3/MM3 (ref 0–0.7)
EOSINOPHIL NFR BLD AUTO: 6.4 % (ref 0–7)
ERYTHROCYTE [DISTWIDTH] IN BLOOD BY AUTOMATED COUNT: 15.7 % (ref 11.5–14.5)
ERYTHROCYTE [SEDIMENTATION RATE] IN BLOOD: 20 MM/HR (ref 0–15)
GFR SERPL CREATININE-BSD FRML MDRD: 63 ML/MIN/1.73 (ref 49–113)
GLOBULIN UR ELPH-MCNC: 3.4 GM/DL (ref 2.3–3.5)
GLUCOSE BLD-MCNC: 138 MG/DL (ref 60–100)
HBA1C MFR BLD: 8.2 % (ref 4–5.6)
HCT VFR BLD AUTO: 38.6 % (ref 39–49)
HGB BLD-MCNC: 12.3 G/DL (ref 13.7–17.3)
IMM GRANULOCYTES # BLD: 0.02 10*3/MM3 (ref 0–0.02)
IMM GRANULOCYTES NFR BLD: 0.2 % (ref 0–0.5)
LYMPHOCYTES # BLD AUTO: 2.11 10*3/MM3 (ref 0.6–4.2)
LYMPHOCYTES NFR BLD AUTO: 20 % (ref 10–50)
MCH RBC QN AUTO: 25.4 PG (ref 26.5–34)
MCHC RBC AUTO-ENTMCNC: 31.9 G/DL (ref 31.5–36.3)
MCV RBC AUTO: 79.8 FL (ref 80–98)
MONOCYTES # BLD AUTO: 0.85 10*3/MM3 (ref 0–0.9)
MONOCYTES NFR BLD AUTO: 8.1 % (ref 0–12)
NEUTROPHILS # BLD AUTO: 6.85 10*3/MM3 (ref 2–8.6)
NEUTROPHILS NFR BLD AUTO: 64.8 % (ref 37–80)
PLATELET # BLD AUTO: 265 10*3/MM3 (ref 150–450)
PMV BLD AUTO: 9.9 FL (ref 8–12)
POTASSIUM BLD-SCNC: 4.6 MMOL/L (ref 3.5–5.1)
PROT SERPL-MCNC: 7.1 G/DL (ref 6.3–8.6)
RBC # BLD AUTO: 4.84 10*6/MM3 (ref 4.37–5.74)
SODIUM BLD-SCNC: 137 MMOL/L (ref 137–145)
WBC NRBC COR # BLD: 10.55 10*3/MM3 (ref 3.2–9.8)

## 2018-08-28 PROCEDURE — 85651 RBC SED RATE NONAUTOMATED: CPT | Performed by: NURSE PRACTITIONER

## 2018-08-28 PROCEDURE — 73630 X-RAY EXAM OF FOOT: CPT

## 2018-08-28 PROCEDURE — 83036 HEMOGLOBIN GLYCOSYLATED A1C: CPT | Performed by: NURSE PRACTITIONER

## 2018-08-28 PROCEDURE — 80053 COMPREHEN METABOLIC PANEL: CPT | Performed by: NURSE PRACTITIONER

## 2018-08-28 PROCEDURE — 86140 C-REACTIVE PROTEIN: CPT | Performed by: NURSE PRACTITIONER

## 2018-08-28 PROCEDURE — 36415 COLL VENOUS BLD VENIPUNCTURE: CPT | Performed by: NURSE PRACTITIONER

## 2018-08-28 PROCEDURE — 11055 PARING/CUTG B9 HYPRKER LES 1: CPT

## 2018-08-28 PROCEDURE — 85025 COMPLETE CBC W/AUTO DIFF WBC: CPT | Performed by: NURSE PRACTITIONER

## 2018-08-28 PROCEDURE — G0463 HOSPITAL OUTPT CLINIC VISIT: HCPCS

## 2018-09-04 ENCOUNTER — OFFICE VISIT (OUTPATIENT)
Dept: WOUND CARE | Facility: HOSPITAL | Age: 68
End: 2018-09-04

## 2018-09-04 PROCEDURE — 11055 PARING/CUTG B9 HYPRKER LES 1: CPT

## 2018-09-06 ENCOUNTER — HOSPITAL ENCOUNTER (OUTPATIENT)
Dept: MRI IMAGING | Facility: HOSPITAL | Age: 68
Discharge: HOME | End: 2018-09-06
Admitting: NURSE PRACTITIONER

## 2018-09-06 DIAGNOSIS — M79.672 LEFT FOOT PAIN: ICD-10-CM

## 2018-09-06 DIAGNOSIS — E11.69 DIABETIC FOOT ULCER WITH OSTEOMYELITIS (HCC): ICD-10-CM

## 2018-09-06 DIAGNOSIS — M86.9 DIABETIC FOOT ULCER WITH OSTEOMYELITIS (HCC): ICD-10-CM

## 2018-09-06 DIAGNOSIS — E11.621 DIABETIC FOOT ULCER WITH OSTEOMYELITIS (HCC): ICD-10-CM

## 2018-09-06 DIAGNOSIS — L97.509 DIABETIC FOOT ULCER WITH OSTEOMYELITIS (HCC): ICD-10-CM

## 2018-09-06 PROCEDURE — 25010000002 GADOTERIDOL PER 1 ML: Performed by: RADIOLOGY

## 2018-09-06 PROCEDURE — 73720 MRI LWR EXTREMITY W/O&W/DYE: CPT

## 2018-09-06 PROCEDURE — A9576 INJ PROHANCE MULTIPACK: HCPCS | Performed by: RADIOLOGY

## 2018-09-06 RX ADMIN — GADOTERIDOL 20 ML: 279.3 INJECTION, SOLUTION INTRAVENOUS at 18:36

## 2018-09-11 ENCOUNTER — OFFICE VISIT (OUTPATIENT)
Dept: WOUND CARE | Facility: HOSPITAL | Age: 68
End: 2018-09-11

## 2018-09-18 ENCOUNTER — TELEPHONE (OUTPATIENT)
Dept: PODIATRY | Facility: CLINIC | Age: 68
End: 2018-09-18

## 2018-09-18 NOTE — TELEPHONE ENCOUNTER
ADRIA:    PATIENT WAS SEEN IN January,  HE IS HAVING TROUBLE OUT OF FOOT AND REMEMBER DR COVINGTON RECOMMEND HIM TO GET ORTHOTICS WAS WANTING TO KNOW WHERE HE RECOMMENDS.  THERE WAS AN ORDER PLACE FOR CUSTOM ORTHOTICS WITH [facility] BUT PER REFERRAL PATIENT DECIDED HE DIDN'T NEED THEM AT THE TIME.  CAN WE RESUBMIT THAT ORDER OR WOULD PATIENT NEED AN APPOINTMENT TO BE SEEN FIRST

## 2018-09-20 ENCOUNTER — LAB REQUISITION (OUTPATIENT)
Dept: LAB | Facility: HOSPITAL | Age: 68
End: 2018-09-20

## 2018-09-20 ENCOUNTER — OFFICE VISIT (OUTPATIENT)
Dept: WOUND CARE | Facility: HOSPITAL | Age: 68
End: 2018-09-20

## 2018-09-20 DIAGNOSIS — L97.521 NON-PRESSURE CHRONIC ULCER OF OTHER PART OF LEFT FOOT LIMITED TO BREAKDOWN OF SKIN (HCC): ICD-10-CM

## 2018-09-20 DIAGNOSIS — M79.672 PAIN OF LEFT FOOT: ICD-10-CM

## 2018-09-20 LAB — KOH PREP NAIL: NORMAL

## 2018-09-20 PROCEDURE — 87205 SMEAR GRAM STAIN: CPT | Performed by: NURSE PRACTITIONER

## 2018-09-20 PROCEDURE — 87102 FUNGUS ISOLATION CULTURE: CPT | Performed by: NURSE PRACTITIONER

## 2018-09-20 PROCEDURE — 87220 TISSUE EXAM FOR FUNGI: CPT | Performed by: NURSE PRACTITIONER

## 2018-09-20 PROCEDURE — 87070 CULTURE OTHR SPECIMN AEROBIC: CPT | Performed by: NURSE PRACTITIONER

## 2018-09-21 ENCOUNTER — TRANSCRIBE ORDERS (OUTPATIENT)
Dept: PODIATRY | Facility: CLINIC | Age: 68
End: 2018-09-21

## 2018-09-21 DIAGNOSIS — Z89.429: ICD-10-CM

## 2018-09-21 DIAGNOSIS — M77.40 METATARSALGIA, UNSPECIFIED LATERALITY: Primary | ICD-10-CM

## 2018-09-21 DIAGNOSIS — E11.40 DIABETIC NEUROPATHY, PAINFUL (HCC): ICD-10-CM

## 2018-09-23 LAB
BACTERIA SPEC AEROBE CULT: NORMAL
GRAM STN SPEC: NORMAL

## 2018-09-24 RX ORDER — FLURBIPROFEN SODIUM 0.3 MG/ML
SOLUTION/ DROPS OPHTHALMIC
Qty: 360 EACH | Refills: 2 | Status: SHIPPED | OUTPATIENT
Start: 2018-09-24

## 2018-09-25 ENCOUNTER — OFFICE VISIT (OUTPATIENT)
Dept: FAMILY MEDICINE CLINIC | Facility: CLINIC | Age: 68
End: 2018-09-25

## 2018-09-25 VITALS
OXYGEN SATURATION: 99 % | DIASTOLIC BLOOD PRESSURE: 80 MMHG | WEIGHT: 224.2 LBS | TEMPERATURE: 99 F | HEIGHT: 72 IN | BODY MASS INDEX: 30.37 KG/M2 | HEART RATE: 89 BPM | SYSTOLIC BLOOD PRESSURE: 130 MMHG

## 2018-09-25 DIAGNOSIS — J32.4 CHRONIC PANSINUSITIS: Primary | ICD-10-CM

## 2018-09-25 PROCEDURE — 96372 THER/PROPH/DIAG INJ SC/IM: CPT | Performed by: FAMILY MEDICINE

## 2018-09-25 PROCEDURE — 99213 OFFICE O/P EST LOW 20 MIN: CPT | Performed by: FAMILY MEDICINE

## 2018-09-25 RX ORDER — DOXYCYCLINE HYCLATE 100 MG/1
100 CAPSULE ORAL 2 TIMES DAILY
Qty: 20 CAPSULE | Refills: 0 | Status: SHIPPED | OUTPATIENT
Start: 2018-09-25 | End: 2018-10-12

## 2018-09-25 RX ORDER — CEFTRIAXONE 1 G/1
1 INJECTION, POWDER, FOR SOLUTION INTRAMUSCULAR; INTRAVENOUS ONCE
Status: COMPLETED | OUTPATIENT
Start: 2018-09-25 | End: 2018-09-25

## 2018-09-25 RX ADMIN — CEFTRIAXONE 1 G: 1 INJECTION, POWDER, FOR SOLUTION INTRAMUSCULAR; INTRAVENOUS at 10:17

## 2018-09-25 NOTE — PROGRESS NOTES
Subjective   Jadiel Dutton is a 68 y.o. male.   Chief Complaint   Patient presents with   • Cough     x 1 week     • Nasal Congestion       Sinusitis   This is a chronic problem. The current episode started 1 to 4 weeks ago. The problem has been gradually worsening since onset. There has been no fever. Associated symptoms include congestion, coughing, headaches, a hoarse voice and a sore throat.        The following portions of the patient's history were reviewed and updated as appropriate: allergies, current medications, past family history, past medical history, past social history, past surgical history and problem list.    Review of Systems   Constitutional: Negative.    HENT: Positive for congestion, hoarse voice and sore throat.    Eyes: Negative.    Respiratory: Positive for cough.    Cardiovascular: Negative.    Gastrointestinal: Negative.    Endocrine: Negative.    Genitourinary: Negative.    Musculoskeletal: Negative.    Skin: Negative.    Allergic/Immunologic: Negative.    Neurological: Positive for headaches.   Hematological: Negative.    Psychiatric/Behavioral: Negative.    All other systems reviewed and are negative.      Objective   Physical Exam   Constitutional: He is oriented to person, place, and time. He appears well-developed and well-nourished.   HENT:   Head: Normocephalic and atraumatic.   Right Ear: External ear normal.   Left Ear: External ear normal.   Nose: Mucosal edema and rhinorrhea present. Right sinus exhibits maxillary sinus tenderness and frontal sinus tenderness. Left sinus exhibits maxillary sinus tenderness and frontal sinus tenderness.   Mouth/Throat: Posterior oropharyngeal edema and posterior oropharyngeal erythema present.   Eyes: Pupils are equal, round, and reactive to light. Conjunctivae and EOM are normal.   Neck: Normal range of motion. Neck supple.   Cardiovascular: Normal rate, regular rhythm, normal heart sounds and intact distal pulses.  Exam reveals no gallop  and no friction rub.    No murmur heard.  Pulmonary/Chest: Effort normal. He has wheezes. He has no rales.   Abdominal: Soft. Bowel sounds are normal. He exhibits no mass. There is no tenderness. There is no rebound and no guarding.   obese   Musculoskeletal:        Right shoulder: He exhibits decreased range of motion and tenderness.        Lumbar back: He exhibits decreased range of motion and tenderness.   Neurological: He is alert and oriented to person, place, and time. He displays abnormal reflex. A sensory deficit is present. He exhibits normal muscle tone.   Skin: Skin is warm and dry. No rash noted.   Psychiatric: He has a normal mood and affect. His behavior is normal. Judgment and thought content normal.   Nursing note and vitals reviewed.      Assessment/Plan   Jadiel was seen today for cough and nasal congestion.    Diagnoses and all orders for this visit:    Chronic pansinusitis  -     cefTRIAXone (ROCEPHIN) injection 1 g; Inject 1 g into the appropriate muscle as directed by prescriber 1 (One) Time.    Other orders  -     doxycycline (VIBRAMYCIN) 100 MG capsule; Take 1 capsule by mouth 2 (Two) Times a Day.

## 2018-09-26 ENCOUNTER — HOSPITAL ENCOUNTER (OUTPATIENT)
Dept: PHYSICAL THERAPY | Facility: HOSPITAL | Age: 68
Setting detail: THERAPIES SERIES
Discharge: HOME | End: 2018-09-26
Attending: PODIATRIST

## 2018-09-26 DIAGNOSIS — E11.40 TYPE 2 DIABETES MELLITUS WITH DIABETIC NEUROPATHY, UNSPECIFIED LONG TERM INSULIN USE STATUS: Primary | ICD-10-CM

## 2018-09-26 DIAGNOSIS — Z89.422 STATUS POST AMPUTATION OF LESSER TOE OF LEFT FOOT (HCC): ICD-10-CM

## 2018-09-26 DIAGNOSIS — M77.42 METATARSALGIA OF LEFT FOOT: ICD-10-CM

## 2018-09-26 PROCEDURE — G8978 MOBILITY CURRENT STATUS: HCPCS | Performed by: PHYSICAL THERAPIST

## 2018-09-26 PROCEDURE — G8979 MOBILITY GOAL STATUS: HCPCS | Performed by: PHYSICAL THERAPIST

## 2018-09-26 PROCEDURE — 97161 PT EVAL LOW COMPLEX 20 MIN: CPT | Performed by: PHYSICAL THERAPIST

## 2018-09-26 NOTE — THERAPY EVALUATION
Outpatient Physical Therapy Ortho Initial Evaluation  [facility]     Patient Name: Jadiel Dutton  : 1950  MRN: [id number]  Today's Date: 2018      Visit Date: 2018    Patient Active Problem List   Diagnosis   • Essential hypertension   • Familial hypercholesterolemia   • Type 2 diabetes mellitus with complication, with long-term current use of insulin (CMS/HCC)   • DDD (degenerative disc disease), thoracolumbar   • Gastroesophageal reflux disease   • Left lower quadrant pain   • Injury of kidney   • Cellulitis   • Spinal stenosis of cervical region   • Degeneration of thoracic or thoracolumbar intervertebral disc   • Abscess of foot   • Hypokalemia   • Other specified mononeuropathies   • Leukocytosis   • Neuropathic pain syndrome (non-herpetic)   • Systemic infection (CMS/HCC)   • Spinal stenosis of lumbar region   • Tachycardia   • Spondylolisthesis   • Drug-induced constipation   • Dysphagia   • Nausea   • Generalized abdominal cramping   • Chronic pansinusitis        Past Medical History:   Diagnosis Date   • Abdominal pain    • Abnormal weight loss    • Acute bronchitis    • Anxiety state    • Benign essential hypertension    • Chronic sinusitis    • Constipation    • Cystitis    • Degenerative joint disease involving multiple joints    • Diabetic neuropathy (CMS/HCC)      bilateral hands      • Diastolic dysfunction    • Diverticular disease of colon    • Dyspnea    • Epigastric pain    • Essential hypertension    • Gastroesophageal reflux disease    • Generalized anxiety disorder    • Hyperlipidemia    • Inflammatory bowel disease     Possible Inflammatory Bowel Disease      • Lumbar pain     Pain radiating to lumbar region of back   n      • Pleuritic pain    • Radiculopathy     site unspecified      • Type 2 diabetes mellitus (CMS/HCC)    • Vesicular eczema of hands and feet         Past Surgical History:   Procedure Laterality Date   • BACK SURGERY      LOWER BACK SURGERY    • CARPAL TUNNEL RELEASE Bilateral    • COLONOSCOPY  06/11/2012    Internal & external hemorrhoids found.   • COLONOSCOPY N/A 8/22/2017    Procedure: COLONOSCOPY;  Surgeon: Cesar Hollis MD;  Location: [facility] ENDOSCOPY;  Service:    • ENDOSCOPY  06/11/2012    Slight stricture in the distal esophagus. Gastritis in stomach. Biopsy taken. Normal duodenum.   • ENDOSCOPY     • ENDOSCOPY N/A 8/22/2017    Procedure: ESOPHAGOGASTRODUODENOSCOPY possible dilation ;  Surgeon: Cesar Hollis MD;  Location: [facility] ENDOSCOPY;  Service:    • ENTEROSCOPY SMALL BOWEL  08/27/2012    Gastritis in stomach. Normal jejunum. Biopsy taken. Normal duodenum   • FOOT SURGERY     • INJECTION OF MEDICATION  02/28/2014    Depo Medrol   • INJECTION OF MEDICATION  03/07/2014    Rocephin(2)   • ULNAR TUNNEL RELEASE     Medications (Admitted on 9/26/2018)    albuterol (PROVENTIL HFA;VENTOLIN HFA) 108 (90 Base) MCG/ACT inhaler    azelastine (ASTELIN) 0.1 % nasal spray    B-D UF III MINI PEN NEEDLES 31G X 5 MM misc    DEXILANT 60 MG capsule    doxycycline (VIBRAMYCIN) 100 MG capsule    gabapentin (NEURONTIN) 300 MG capsule    HYDROcodone-acetaminophen (NORCO)  MG per tablet    insulin detemir (LEVEMIR) 100 UNIT/ML injection    Insulin NPH Isophane & Regular (HUMULIN 70/30 KWIKPEN) (70-30) 100 UNIT/ML suspension pen-injector    LEVEMIR FLEXTOUCH 100 UNIT/ML injection    lisinopril-hydrochlorothiazide (PRINZIDE,ZESTORETIC) 20-25 MG per tablet    ondansetron ODT (ZOFRAN-ODT) 4 MG disintegrating tablet    ONETOUCH VERIO test strip    rosuvastatin (CRESTOR) 5 MG tablet    Semaglutide (OZEMPIC) 0.25 or 0.5 MG/DOSE solution pen-injector    sucralfate (CARAFATE) 1 GM/10ML suspension    VICTOZA 18 MG/3ML solution pen-injector injection   ALLERGIES: Penicillins, bactrim, Ceftin, Sulf Antibiotics, Ciprofloxacin    Visit Dx:     ICD-10-CM ICD-9-CM   1. Type 2 diabetes mellitus with diabetic neuropathy, unspecified long term insulin use status  (CMS/[facility]) E11.40 250.60     357.2   2. Status post amputation of lesser toe of left foot (CMS/[facility]) Z89.422 V49.72   3. Metatarsalgia of left foot M77.42 726.70             Patient History     Row Name 09/26/18 1000             History    Chief Complaint Ulcer, wound or other skin conditions  -DD      Date Current Problem(s) Began --   2016  -DD      Brief Description of Current Complaint Callous that blistered and made a sore led to the 5th L digit being amputated  now says the surgeon did a bad job and didn't angle the bone and it continues to rub on the shoe and make a sore due to callousing.  -DD      Occupation/sports/leisure activities strip , electician  -DD        User Key  (r) = Recorded By, (t) = Taken By, (c) = Cosigned By    Initials Name Provider Type    DD Carina Miller, PT Physical Therapist                PT Ortho     Row Name 09/26/18 1000       Subjective Comments    Subjective Comments needs offloading insert  -DD       Subjective Pain    Able to rate subjective pain? yes  -DD    Pre-Treatment Pain Level 4  -DD    Post-Treatment Pain Level 4  -DD       Posture/Observations    Posture/Observations Comments Normal gait pattern. wound is bandaged.   -DD       General ROM    GENERAL ROM COMMENTS WNL  -DD       MMT (Manual Muscle Testing)    General MMT Comments 5/5  -DD       Sensation    Sensation WNL? WFL  -DD       Ankle Foot Orthosis Management    Type (Ankle/Foot Orthosis) bilateral  -DD    Fabrication Comment (Ankle Foot Orthosis) Tenderfoot medium with 5th toe filler on L  -DD    Functional Design (Ankle Foot Orthosis) dynamic orthosis  -DD    Therapeutic Indications (Ankle Foot Orthosis) rest/inflammation reduction  -DD    Wearing Schedule (Ankle Foot Orthosis) wear with activity/work  -DD    Orthosis Training (Ankle Foot Orthosis) patient  -DD    Sensory Assessment (Ankle Foot Orthosis) WFL  -DD    Skin Assessment (Ankle Foot Orthosis) open ulcer Left lateral mid foot  -DD       User Key  (r) = Recorded By, (t) = Taken By, (c) = Cosigned By    Initials Name Provider Type    Carina Marroquin PT Physical Therapist                  PT OP Goals     Row Name 09/26/18 1000          PT Short Term Goals    STG Date to Achieve 10/17/18  -DD     STG 1 Independent in wear and care schedule of orthosis  -DD     STG 2 Patient understand indications for follow-up for adjustments needed  -DD     STG 3 Patient to be fitted within 3 week timeframe  -DD        Time Calculation    PT Goal Re-Cert Due Date 10/17/18  -DD       User Key  (r) = Recorded By, (t) = Taken By, (c) = Cosigned By    Initials Name Provider Type    Carina Marroquin, PT Physical Therapist                PT Assessment/Plan     Row Name 09/26/18 1041          PT Assessment    Functional Limitations Impaired gait  -DD     Impairments Gait;Pain  -DD     Assessment Comments Diabetic ulcer at the lateral left foot. here for orthotic fabrication to off load pressure point.  -DD     Please refer to paper survey for additional self-reported information No  -DD     Rehab Potential Good  -DD     Patient/caregiver participated in establishment of treatment plan and goals Yes  -DD        PT Plan    PT Frequency 1x/week  -DD     Predicted Duration of Therapy Intervention (Therapy Eval) orthotic fitting  -DD     Planned CPT's? PT EVAL LOW COMPLEXITY: 47383;PT ORTHOTIC MGMT/TRAIN EA 15 MIN: 53119  -DD     Physical Therapy Interventions (Optional Details) orthotic fitting/training  -DD     PT Plan Comments Call pt to schedule fitting appointment.  -DD       User Key  (r) = Recorded By, (t) = Taken By, (c) = Cosigned By    Initials Name Provider Type    Carina Marroquin PT Physical Therapist                  Exercises     Row Name 09/26/18 1000             Subjective Comments    Subjective Comments needs offloading insert  -DD         Subjective Pain    Able to rate subjective pain? yes  -DD      Pre-Treatment Pain Level 4   -DD      Post-Treatment Pain Level 4  -DD        User Key  (r) = Recorded By, (t) = Taken By, (c) = Cosigned By    Initials Name Provider Type    DD Carina Miller, PT Physical Therapist           Time Calculation:     Therapy Suggested Charges     Code   Minutes Charges    None             Start Time: 0938  Stop Time: 1025  Time Calculation (min): 47 min  Total Timed Code Minutes- PT: 0 minute(s)     Therapy Charges for Today     Code Description Service Date Service Provider Modifiers Qty    25939892619 HC PT MOBILITY CURRENT 9/26/2018 Carina Miller, PT GP, CJ 1    20240453516 HC PT MOBILITY PROJECTED 9/26/2018 Carina Milelr, PT GP, CJ 1    80130555723 HC PT EVAL LOW COMPLEXITY 2 9/26/2018 Carina Miller, PT GP 1    [id number]  PT-CUSTOM ORTHOTICS-LEVEL 2 9/26/2018 Carina Miller, PT  1          PT G-Codes  PT Professional Judgement Used?: Yes  Functional Limitation: Mobility: Walking and moving around  Mobility: Walking and Moving Around Current Status (): At least 20 percent but less than 40 percent impaired, limited or restricted  Mobility: Walking and Moving Around Goal Status (): At least 20 percent but less than 40 percent impaired, limited or restricted         Carina Miller PT, ATC, DPT  9/26/2018

## 2018-09-27 ENCOUNTER — OFFICE VISIT (OUTPATIENT)
Dept: WOUND CARE | Facility: HOSPITAL | Age: 68
End: 2018-09-27

## 2018-10-03 ENCOUNTER — APPOINTMENT (OUTPATIENT)
Dept: WOUND CARE | Facility: HOSPITAL | Age: 68
End: 2018-10-03
Attending: THORACIC SURGERY (CARDIOTHORACIC VASCULAR SURGERY)

## 2018-10-12 ENCOUNTER — OFFICE VISIT (OUTPATIENT)
Dept: FAMILY MEDICINE CLINIC | Facility: CLINIC | Age: 68
End: 2018-10-12

## 2018-10-12 VITALS
TEMPERATURE: 97.2 F | HEIGHT: 72 IN | SYSTOLIC BLOOD PRESSURE: 130 MMHG | BODY MASS INDEX: 30.07 KG/M2 | HEART RATE: 90 BPM | WEIGHT: 222 LBS | DIASTOLIC BLOOD PRESSURE: 84 MMHG | OXYGEN SATURATION: 99 %

## 2018-10-12 DIAGNOSIS — E11.8 TYPE 2 DIABETES MELLITUS WITH COMPLICATION, WITH LONG-TERM CURRENT USE OF INSULIN (HCC): ICD-10-CM

## 2018-10-12 DIAGNOSIS — Z79.4 TYPE 2 DIABETES MELLITUS WITH COMPLICATION, WITH LONG-TERM CURRENT USE OF INSULIN (HCC): ICD-10-CM

## 2018-10-12 DIAGNOSIS — J32.4 CHRONIC PANSINUSITIS: Primary | ICD-10-CM

## 2018-10-12 PROCEDURE — 99213 OFFICE O/P EST LOW 20 MIN: CPT | Performed by: FAMILY MEDICINE

## 2018-10-12 PROCEDURE — 96372 THER/PROPH/DIAG INJ SC/IM: CPT | Performed by: FAMILY MEDICINE

## 2018-10-12 RX ORDER — CEFTRIAXONE 1 G/1
1 INJECTION, POWDER, FOR SOLUTION INTRAMUSCULAR; INTRAVENOUS ONCE
Status: COMPLETED | OUTPATIENT
Start: 2018-10-12 | End: 2018-10-12

## 2018-10-12 RX ORDER — METHYLPREDNISOLONE ACETATE 80 MG/ML
80 INJECTION, SUSPENSION INTRA-ARTICULAR; INTRALESIONAL; INTRAMUSCULAR; SOFT TISSUE ONCE
Status: COMPLETED | OUTPATIENT
Start: 2018-10-12 | End: 2018-10-12

## 2018-10-12 RX ORDER — AZITHROMYCIN 500 MG/1
500 TABLET, FILM COATED ORAL DAILY
Qty: 5 TABLET | Refills: 0 | Status: SHIPPED | OUTPATIENT
Start: 2018-10-12 | End: 2018-11-08

## 2018-10-12 RX ADMIN — CEFTRIAXONE 1 G: 1 INJECTION, POWDER, FOR SOLUTION INTRAMUSCULAR; INTRAVENOUS at 10:20

## 2018-10-12 RX ADMIN — METHYLPREDNISOLONE ACETATE 80 MG: 80 INJECTION, SUSPENSION INTRA-ARTICULAR; INTRALESIONAL; INTRAMUSCULAR; SOFT TISSUE at 10:20

## 2018-10-12 NOTE — PROGRESS NOTES
Subjective   Jadiel Dutton is a 68 y.o. male.   Chief Complaint   Patient presents with   • Nasal Congestion     and Chest Congestion X 1 week        Sinus Problem   This is a recurrent problem. The current episode started in the past 7 days. The problem has been waxing and waning since onset. There has been no fever. Associated symptoms include congestion, coughing, headaches, sinus pressure, sneezing and a sore throat. Past treatments include antibiotics. The treatment provided moderate relief.        The following portions of the patient's history were reviewed and updated as appropriate: allergies, current medications, past family history, past medical history, past social history, past surgical history and problem list.    Review of Systems   Constitutional: Negative.    HENT: Positive for congestion, sinus pressure, sneezing and sore throat.    Eyes: Negative.    Respiratory: Positive for cough.    Cardiovascular: Negative.    Gastrointestinal: Negative.    Endocrine: Negative.    Genitourinary: Negative.    Musculoskeletal: Negative.    Skin: Negative.    Allergic/Immunologic: Negative.    Neurological: Positive for headaches.   Hematological: Negative.    Psychiatric/Behavioral: Negative.    All other systems reviewed and are negative.      Objective   Physical Exam   Constitutional: He is oriented to person, place, and time. He appears well-developed and well-nourished.   HENT:   Head: Normocephalic and atraumatic.   Right Ear: External ear normal.   Left Ear: External ear normal.   Nose: Mucosal edema and rhinorrhea present. Right sinus exhibits maxillary sinus tenderness and frontal sinus tenderness. Left sinus exhibits maxillary sinus tenderness and frontal sinus tenderness.   Mouth/Throat: Posterior oropharyngeal edema and posterior oropharyngeal erythema present.   Eyes: Pupils are equal, round, and reactive to light. Conjunctivae and EOM are normal.   Neck: Normal range of motion. Neck supple.    Cardiovascular: Normal rate, regular rhythm, normal heart sounds and intact distal pulses.  Exam reveals no gallop and no friction rub.    No murmur heard.  Pulmonary/Chest: Effort normal. He has wheezes. He has no rales.   Abdominal: Soft. Bowel sounds are normal. He exhibits no mass. There is no tenderness. There is no rebound and no guarding.   obese   Musculoskeletal:        Right shoulder: He exhibits decreased range of motion and tenderness.        Lumbar back: He exhibits decreased range of motion and tenderness.   Neurological: He is alert and oriented to person, place, and time. He displays abnormal reflex. A sensory deficit is present. He exhibits normal muscle tone.   Skin: Skin is warm and dry. No rash noted.   Psychiatric: He has a normal mood and affect. His behavior is normal. Judgment and thought content normal.   Nursing note and vitals reviewed.      Assessment/Plan   Jadiel was seen today for nasal congestion.    Diagnoses and all orders for this visit:    Chronic pansinusitis  -     methylPREDNISolone acetate (DEPO-medrol) injection 80 mg; Inject 1 mL into the appropriate muscle as directed by prescriber 1 (One) Time.  -     cefTRIAXone (ROCEPHIN) injection 1 g; Inject 1 g into the appropriate muscle as directed by prescriber 1 (One) Time.    Type 2 diabetes mellitus with complication, with long-term current use of insulin (CMS/[facility])    Other orders  -     azithromycin (ZITHROMAX) 500 MG tablet; Take 1 tablet by mouth Daily.

## 2018-10-17 ENCOUNTER — OFFICE VISIT (OUTPATIENT)
Dept: WOUND CARE | Facility: HOSPITAL | Age: 68
End: 2018-10-17

## 2018-10-18 LAB — FUNGUS WND CULT: NORMAL

## 2018-10-24 ENCOUNTER — OFFICE VISIT (OUTPATIENT)
Dept: WOUND CARE | Facility: HOSPITAL | Age: 68
End: 2018-10-24

## 2018-10-30 ENCOUNTER — TELEPHONE (OUTPATIENT)
Dept: FAMILY MEDICINE CLINIC | Facility: CLINIC | Age: 68
End: 2018-10-30

## 2018-10-30 NOTE — TELEPHONE ENCOUNTER
Left message letting patient know that he would not be able to establish care with Dr. Barajas at this time.

## 2018-10-30 NOTE — TELEPHONE ENCOUNTER
----- Message from Alma Franco LPN sent at 10/30/2018  1:18 PM CDT -----  Regarding: FW: ESTABLISH CARE  Contact: [phone]      ----- Message -----  From: Jatin Barajas MD  Sent: 10/30/2018   1:15 PM  To: Alma Franco LPN  Subject: RE: ESTABLISH CARE                               No    ----- Message -----  From: Alma Franco LPN  Sent: 10/30/2018  12:15 PM  To: Jatin Barajas MD  Subject: FW: ESTABLISH CARE                                   ----- Message -----  From: Concecpion Gomez  Sent: 10/30/2018  11:42 AM  To: Alma Franco LPN  Subject: ESTABLISH CARE                                    Patient called and wants to established care with Dr. Barajas.

## 2018-10-31 ENCOUNTER — OFFICE VISIT (OUTPATIENT)
Dept: WOUND CARE | Facility: HOSPITAL | Age: 68
End: 2018-10-31

## 2018-11-01 ENCOUNTER — OFFICE VISIT (OUTPATIENT)
Dept: PODIATRY | Facility: CLINIC | Age: 68
End: 2018-11-01

## 2018-11-01 VITALS — HEIGHT: 72 IN | WEIGHT: 229 LBS | BODY MASS INDEX: 31.02 KG/M2

## 2018-11-01 DIAGNOSIS — E11.42 DIABETIC POLYNEUROPATHY ASSOCIATED WITH TYPE 2 DIABETES MELLITUS (HCC): ICD-10-CM

## 2018-11-01 DIAGNOSIS — L97.522 SKIN ULCER OF TOE OF LEFT FOOT WITH FAT LAYER EXPOSED (HCC): Primary | ICD-10-CM

## 2018-11-01 DIAGNOSIS — Z89.422 HISTORY OF AMPUTATION OF LESSER TOE OF LEFT FOOT (HCC): ICD-10-CM

## 2018-11-01 PROCEDURE — 11042 DBRDMT SUBQ TIS 1ST 20SQCM/<: CPT | Performed by: PODIATRIST

## 2018-11-01 PROCEDURE — 99213 OFFICE O/P EST LOW 20 MIN: CPT | Performed by: PODIATRIST

## 2018-11-01 NOTE — PROGRESS NOTES
Jadiel Dutton  1950  68 y.o. male   PCP- Dr. Jiménez   complaints of left foot pain, patient states that he sees wound care  11/01/2018    Chief Complaint   Patient presents with   • Left Foot - Pain           History of Present Illness    Jadiel Dutton is a 68 y.o. male who presents for diabetic foot examination and and evaluation of left midfoot ulceration.  He seems wound care for this issue.  When I previously saw him I sent him for custom orthotics which he states helps but he is curious if there is anything that can be done to take additional pressure off of the wound site.  He does have a history of prior partial fifth ray amputation.    Past Medical History:   Diagnosis Date   • Abdominal pain    • Abnormal weight loss    • Acute bronchitis    • Anxiety state    • Benign essential hypertension    • Chronic sinusitis    • Constipation    • Cystitis    • Degenerative joint disease involving multiple joints    • Diabetic neuropathy (CMS/HCC)      bilateral hands      • Diastolic dysfunction    • Diverticular disease of colon    • Dyspnea    • Epigastric pain    • Essential hypertension    • Gastroesophageal reflux disease    • Generalized anxiety disorder    • Hyperlipidemia    • Inflammatory bowel disease     Possible Inflammatory Bowel Disease      • Lumbar pain     Pain radiating to lumbar region of back   n      • Pleuritic pain    • Radiculopathy     site unspecified      • Type 2 diabetes mellitus (CMS/HCC)    • Vesicular eczema of hands and feet          Past Surgical History:   Procedure Laterality Date   • BACK SURGERY      LOWER BACK SURGERY   • CARPAL TUNNEL RELEASE Bilateral    • COLONOSCOPY  06/11/2012    Internal & external hemorrhoids found.   • COLONOSCOPY N/A 8/22/2017    Procedure: COLONOSCOPY;  Surgeon: Cesar Hollis MD;  Location: [facility] ENDOSCOPY;  Service:    • ENDOSCOPY  06/11/2012    Slight stricture in the distal esophagus. Gastritis in stomach. Biopsy taken.  Normal duodenum.   • ENDOSCOPY     • ENDOSCOPY N/A 8/22/2017    Procedure: ESOPHAGOGASTRODUODENOSCOPY possible dilation ;  Surgeon: Cesar Hollis MD;  Location: [facility] ENDOSCOPY;  Service:    • ENTEROSCOPY SMALL BOWEL  08/27/2012    Gastritis in stomach. Normal jejunum. Biopsy taken. Normal duodenum   • FOOT SURGERY     • INJECTION OF MEDICATION  02/28/2014    Depo Medrol   • INJECTION OF MEDICATION  03/07/2014    Rocephin(2)   • ULNAR TUNNEL RELEASE           Family History   Problem Relation Age of Onset   • No Known Problems Other    • Hypertension Mother    • Heart attack Father    • No Known Problems Sister    • No Known Problems Brother    • No Known Problems Daughter    • No Known Problems Son    • No Known Problems Maternal Aunt    • No Known Problems Maternal Uncle    • No Known Problems Paternal Aunt    • No Known Problems Paternal Uncle    • No Known Problems Maternal Grandmother    • No Known Problems Maternal Grandfather    • No Known Problems Paternal Grandmother    • No Known Problems Paternal Grandfather          Social History     Social History   • Marital status: Single     Spouse name: N/A   • Number of children: N/A   • Years of education: N/A     Occupational History   • Not on file.     Social History Main Topics   • Smoking status: Never Smoker   • Smokeless tobacco: Never Used   • Alcohol use No   • Drug use: No   • Sexual activity: Yes     Partners: Female     Other Topics Concern   • Not on file     Social History Narrative   • No narrative on file         Current Outpatient Prescriptions   Medication Sig Dispense Refill   • albuterol (PROVENTIL HFA;VENTOLIN HFA) 108 (90 Base) MCG/ACT inhaler Inhale 2 puffs Every 4 (Four) Hours As Needed for Wheezing. 1 inhaler 2   • azelastine (ASTELIN) 0.1 % nasal spray 2 sprays into each nostril 2 (Two) Times a Day. Use in each nostril as directed 30 mL 11   • azithromycin (ZITHROMAX) 500 MG tablet Take 1 tablet by mouth Daily. 5 tablet 0   • B-D  "UF III MINI PEN NEEDLES 31G X 5 MM misc USE FOUR TIMES A DAY WITH INSULIN PENS 360 each 2   • DEXILANT 60 MG capsule TAKE 1 CAPSULE DAILY 90 capsule 2   • gabapentin (NEURONTIN) 300 MG capsule Take 1 capsule by mouth 4 (Four) Times a Day As Needed.     • HYDROcodone-acetaminophen (NORCO)  MG per tablet Take 1 tablet by mouth Every 6 (Six) Hours As Needed for Moderate Pain .     • insulin detemir (LEVEMIR) 100 UNIT/ML injection Inject 65 Units under the skin Daily. (Patient taking differently: Inject 60 Units under the skin Daily.) 27 each 6   • Insulin NPH Isophane & Regular (HUMULIN 70/30 KWIKPEN) (70-30) 100 UNIT/ML suspension pen-injector INJECT 55 UNITS SUB Q BEFORE BREAKFAST, 65 UNITS BEFORE SUPPER, 30 MINUTES BEFORE BREAKFAST AND SUPPER     • LEVEMIR FLEXTOUCH 100 UNIT/ML injection INJECT 65 UNITS SUB-Q DAILY 15 mL 12   • lisinopril-hydrochlorothiazide (PRINZIDE,ZESTORETIC) 20-25 MG per tablet Take 1 tablet by mouth Daily.     • ondansetron ODT (ZOFRAN-ODT) 4 MG disintegrating tablet Take 1 tablet by mouth Every 6 (Six) Hours As Needed for Nausea or Vomiting. 12 tablet 0   • ONETOUCH VERIO test strip      • rosuvastatin (CRESTOR) 5 MG tablet Take 1 tablet by mouth Daily. 30 tablet 3   • Semaglutide (OZEMPIC) 0.25 or 0.5 MG/DOSE solution pen-injector AS DIRECTED 1 MG EVERY WEEK     • sucralfate (CARAFATE) 1 GM/10ML suspension Take 10 mL by mouth 4 (Four) Times a Day With Meals & at Bedtime. 1260 mL 0   • VICTOZA 18 MG/3ML solution pen-injector injection Inject 1.8 mg under the skin Daily.       No current facility-administered medications for this visit.          OBJECTIVE    Ht 182.9 cm (72\")   Wt 104 kg (229 lb)   BMI 31.06 kg/m²       Review of Systems   Constitutional: Negative.    HENT: Negative.    Eyes: Negative.    Respiratory: Negative.    Cardiovascular: Negative.    Gastrointestinal: Negative.    Endocrine: Negative.    Genitourinary: Negative.    Musculoskeletal: Positive for back pain. "   Skin: Negative.    Allergic/Immunologic: Negative.    Neurological: Negative.    Hematological: Negative.    Psychiatric/Behavioral: Negative.          Physical Exam   Constitutional: he appears well-developed and well-nourished.   HEENT: Normocephalic. Atraumatic  CV: No tenderness. RRR  Resp: Non-labored respiration. No wheezes.   Psychiatric: he has a normal mood and affect. his   behavior is normal.      Lower Extremity Exam:  Vascular: DP/PT pulses palpable 1+.   Negative hair growth.   Minimal perimalleolar edema  Neuro: Protective sensation absent, b/l.  DTRs intact  Integument: Full thickness ulcer to plantar lateral forefoot measuring approximately 1 cm in diameter.  Granular base with surrounding hyperkeratosis.    Atrophic skin noted b/l  No masses  Musculoskeletal: LE muscle strength 5/5.   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      Left foot wound debridement:  Risks and benefits discussed.  Left forefoot ulcer debrided of surrounding hyperkeratosis and devitalized tissue with 15 blade to level of subq  Pressure hemostasis obtained  Abx ointment and dsd applied.          ASSESSMENT AND PLAN    Jadiel was seen today for pain.    Diagnoses and all orders for this visit:    Skin ulcer of toe of left foot with fat layer exposed (CMS/HCC)    Diabetic polyneuropathy associated with type 2 diabetes mellitus (CMS/HCC)    History of amputation of lesser toe of left foot (CMS/HCC)      -Comprehensive DM foot exam performed. Pt educated on importance of tight glucose control and daily foot checks.   -Pt educated on standard wound care staples including offloading, dressing changes, and serial debridements.  -Wound debridement as above  -Continue dressing changes per wound care center.  -Modification of custom insole for additional offloading performed.  -Discussed  possible additional resection of bone and his prior amputation site and gastrocnemius recession to offload the area.  Patient would like to hold off on this option at this time.  -Follow up as needed            This document has been electronically signed by Jacoby Serrano DPM on November 3, 2018 1:58 PM     EMR Dragon/Transcription disclaimer:   Much of this encounter note is an electronic transcription/translation of spoken language to printed text. The electronic translation of spoken language may permit erroneous, or at times, nonsensical words or phrases to be inadvertently transcribed; Although I have reviewed the note for such errors, some may still exist.    Jacoby Serrano DPM  11/3/2018  1:58 PM

## 2018-11-08 ENCOUNTER — OFFICE VISIT (OUTPATIENT)
Dept: FAMILY MEDICINE CLINIC | Facility: CLINIC | Age: 68
End: 2018-11-08

## 2018-11-08 VITALS
WEIGHT: 227 LBS | BODY MASS INDEX: 30.75 KG/M2 | DIASTOLIC BLOOD PRESSURE: 78 MMHG | SYSTOLIC BLOOD PRESSURE: 132 MMHG | OXYGEN SATURATION: 99 % | TEMPERATURE: 97 F | HEIGHT: 72 IN | HEART RATE: 102 BPM

## 2018-11-08 DIAGNOSIS — M79.2 NEUROPATHIC PAIN SYNDROME (NON-HERPETIC): ICD-10-CM

## 2018-11-08 DIAGNOSIS — I10 ESSENTIAL HYPERTENSION: ICD-10-CM

## 2018-11-08 DIAGNOSIS — M48.061 SPINAL STENOSIS OF LUMBAR REGION, UNSPECIFIED WHETHER NEUROGENIC CLAUDICATION PRESENT: ICD-10-CM

## 2018-11-08 DIAGNOSIS — M43.10 SPONDYLOLISTHESIS, UNSPECIFIED SPINAL REGION: ICD-10-CM

## 2018-11-08 DIAGNOSIS — J30.2 SEASONAL ALLERGIC RHINITIS, UNSPECIFIED TRIGGER: ICD-10-CM

## 2018-11-08 DIAGNOSIS — Z00.00 INITIAL MEDICARE ANNUAL WELLNESS VISIT: Primary | ICD-10-CM

## 2018-11-08 DIAGNOSIS — Z79.4 TYPE 2 DIABETES MELLITUS WITH COMPLICATION, WITH LONG-TERM CURRENT USE OF INSULIN (HCC): ICD-10-CM

## 2018-11-08 DIAGNOSIS — J32.4 CHRONIC PANSINUSITIS: ICD-10-CM

## 2018-11-08 DIAGNOSIS — K21.9 GASTROESOPHAGEAL REFLUX DISEASE WITHOUT ESOPHAGITIS: ICD-10-CM

## 2018-11-08 DIAGNOSIS — E11.8 TYPE 2 DIABETES MELLITUS WITH COMPLICATION, WITH LONG-TERM CURRENT USE OF INSULIN (HCC): ICD-10-CM

## 2018-11-08 DIAGNOSIS — E78.01 FAMILIAL HYPERCHOLESTEROLEMIA: ICD-10-CM

## 2018-11-08 PROBLEM — F41.1 ANXIETY STATE: Status: ACTIVE | Noted: 2018-11-08

## 2018-11-08 PROCEDURE — G0438 PPPS, INITIAL VISIT: HCPCS | Performed by: NURSE PRACTITIONER

## 2018-11-08 PROCEDURE — 99214 OFFICE O/P EST MOD 30 MIN: CPT | Performed by: NURSE PRACTITIONER

## 2018-11-08 RX ORDER — AZELASTINE 1 MG/ML
2 SPRAY, METERED NASAL DAILY
Qty: 30 ML | Refills: 11 | Status: SHIPPED | OUTPATIENT
Start: 2018-11-08 | End: 2019-11-04

## 2018-11-08 RX ORDER — AZITHROMYCIN 250 MG/1
TABLET, FILM COATED ORAL
Qty: 6 TABLET | Refills: 0 | OUTPATIENT
Start: 2018-11-08 | End: 2018-11-23

## 2018-11-08 NOTE — PROGRESS NOTES
QUICK REFERENCE INFORMATION:  The ABCs of the Annual Wellness Visit    Initial Medicare Wellness Visit    HEALTH RISK ASSESSMENT    1950    Recent Hospitalizations:  No hospitalization(s) within the last year..        Current Medical Providers:  Patient Care Team:  Shayy Banda APRN as PCP - General (Family Medicine)  Yani Wilcox PA-C as PCP - Claims Attributed  Jacboy Serrano DPM as Consulting Physician (Podiatry)        Smoking Status:  History   Smoking Status   • Never Smoker   Smokeless Tobacco   • Never Used       Alcohol Consumption:  History   Alcohol Use No       Depression Screen:   PHQ-2/PHQ-9 Depression Screening 11/8/2018   Little interest or pleasure in doing things 0   Feeling down, depressed, or hopeless 0   Trouble falling or staying asleep, or sleeping too much 0   Feeling tired or having little energy 0   Poor appetite or overeating 0   Feeling bad about yourself - or that you are a failure or have let yourself or your family down 0   Trouble concentrating on things, such as reading the newspaper or watching television 0   Moving or speaking so slowly that other people could have noticed. Or the opposite - being so fidgety or restless that you have been moving around a lot more than usual 0   Thoughts that you would be better off dead, or of hurting yourself in some way 0   Total Score 0   If you checked off any problems, how difficult have these problems made it for you to do your work, take care of things at home, or get along with other people? -       Health Habits and Functional and Cognitive Screening:  Functional & Cognitive Status 11/8/2018   Do you have difficulty preparing food and eating? No   Do you have difficulty bathing yourself, getting dressed or grooming yourself? No   Do you have difficulty using the toilet? No   Do you have difficulty moving around from place to place? No   Do you have trouble with steps or getting out of a bed or a chair? No   In the past year  have you fallen or experienced a near fall? No   Current Diet Well Balanced Diet   Dental Exam Up to date   Eye Exam Up to date   Exercise (times per week) 3 times per week   Do you need help using the phone?  No   Are you deaf or do you have serious difficulty hearing?  No   Do you need help with transportation? No   Do you need help shopping? No   Do you need help preparing meals?  No   Do you need help with housework?  No   Do you need help with laundry? No   Do you need help taking your medications? No   Do you need help managing money? No   Do you ever drive or ride in a car without wearing a seat belt? No   Have you felt unusual stress, anger or loneliness in the last month? No   Who do you live with? Alone   If you need help, do you have trouble finding someone available to you? No   Have you been bothered in the last four weeks by sexual problems? No   Do you have difficulty concentrating, remembering or making decisions? No           Does the patient have evidence of cognitive impairment? No    Asiprin use counseling: Start ASA 81 mg daily       Recent Lab Results:    Visual Acuity:  No exam data present    Age-appropriate Screening Schedule:  Refer to the list below for future screening recommendations based on patient's age, sex and/or medical conditions. Orders for these recommended tests are listed in the plan section. The patient has been provided with a written plan.    Health Maintenance   Topic Date Due   • ZOSTER VACCINE (2 of 2) 07/11/2017   • DIABETIC FOOT EXAM  05/16/2018   • LIPID PANEL  05/17/2018   • URINE MICROALBUMIN  05/17/2018   • INFLUENZA VACCINE  08/01/2018   • DIABETIC EYE EXAM  11/27/2018   • HEMOGLOBIN A1C  02/28/2019   • TDAP/TD VACCINES (2 - Td) 05/16/2027   • COLONOSCOPY  08/22/2027   • PNEUMOCOCCAL VACCINES (65+ LOW/MEDIUM RISK)  Excluded        Subjective   History of Present Illness    Jadiel Dutton is a 68 y.o. male who presents for an Annual Wellness Visit.    The  following portions of the patient's history were reviewed and updated as appropriate:   He  has a past medical history of Abdominal pain; Abnormal weight loss; Acute bronchitis; Anxiety state; Benign essential hypertension; Chronic sinusitis; Constipation; Cystitis; Degenerative joint disease involving multiple joints; Diabetic neuropathy (CMS/HCC); Diastolic dysfunction; Diverticular disease of colon; Dyspnea; Epigastric pain; Essential hypertension; Gastroesophageal reflux disease; Generalized anxiety disorder; Hyperlipidemia; Inflammatory bowel disease; Lumbar pain; Pleuritic pain; Radiculopathy; Type 2 diabetes mellitus (CMS/HCC); and Vesicular eczema of hands and feet.  He  does not have any pertinent problems on file.  He  has a past surgical history that includes Colonoscopy (06/11/2012); Injection of Medication (02/28/2014); Esophagogastroduodenoscopy (06/11/2012); Esophagogastroduodenoscopy; Injection of Medication (03/07/2014); Small bowel enteroscopy (08/27/2012); Back surgery; Esophagogastroduodenoscopy (N/A, 8/22/2017); Colonoscopy (N/A, 8/22/2017); Foot surgery; Carpal tunnel release (Bilateral); and Ulnar tunnel release.  His family history includes Heart attack in his father; Hypertension in his mother; No Known Problems in his brother, daughter, maternal aunt, maternal grandfather, maternal grandmother, maternal uncle, other, paternal aunt, paternal grandfather, paternal grandmother, paternal uncle, sister, and son.  He  reports that he has never smoked. He has never used smokeless tobacco. He reports that he does not drink alcohol or use drugs.  Current Outpatient Prescriptions   Medication Sig Dispense Refill   • albuterol (PROVENTIL HFA;VENTOLIN HFA) 108 (90 Base) MCG/ACT inhaler Inhale 2 puffs Every 4 (Four) Hours As Needed for Wheezing. 1 inhaler 2   • azelastine (ASTELIN) 0.1 % nasal spray 2 sprays into each nostril 2 (Two) Times a Day. Use in each nostril as directed 30 mL 11   • B-D UF III  MINI PEN NEEDLES 31G X 5 MM misc USE FOUR TIMES A DAY WITH INSULIN PENS 360 each 2   • DEXILANT 60 MG capsule TAKE 1 CAPSULE DAILY 90 capsule 2   • gabapentin (NEURONTIN) 300 MG capsule Take 1 capsule by mouth 4 (Four) Times a Day As Needed.     • HYDROcodone-acetaminophen (NORCO)  MG per tablet Take 1 tablet by mouth Every 6 (Six) Hours As Needed for Moderate Pain .     • Insulin NPH Isophane & Regular (HUMULIN 70/30 KWIKPEN) (70-30) 100 UNIT/ML suspension pen-injector INJECT 55 UNITS SUB Q BEFORE BREAKFAST, 65 UNITS BEFORE SUPPER, 30 MINUTES BEFORE BREAKFAST AND SUPPER     • lisinopril-hydrochlorothiazide (PRINZIDE,ZESTORETIC) 20-25 MG per tablet Take 1 tablet by mouth Daily.     • rosuvastatin (CRESTOR) 5 MG tablet Take 1 tablet by mouth Daily. 30 tablet 3   • Semaglutide (OZEMPIC) 0.25 or 0.5 MG/DOSE solution pen-injector AS DIRECTED 1 MG EVERY WEEK     • ondansetron ODT (ZOFRAN-ODT) 4 MG disintegrating tablet Take 1 tablet by mouth Every 6 (Six) Hours As Needed for Nausea or Vomiting. 12 tablet 0   • ONETOUCH VERIO test strip        No current facility-administered medications for this visit.      Current Outpatient Prescriptions on File Prior to Visit   Medication Sig   • albuterol (PROVENTIL HFA;VENTOLIN HFA) 108 (90 Base) MCG/ACT inhaler Inhale 2 puffs Every 4 (Four) Hours As Needed for Wheezing.   • azelastine (ASTELIN) 0.1 % nasal spray 2 sprays into each nostril 2 (Two) Times a Day. Use in each nostril as directed   • B-D UF III MINI PEN NEEDLES 31G X 5 MM misc USE FOUR TIMES A DAY WITH INSULIN PENS   • DEXILANT 60 MG capsule TAKE 1 CAPSULE DAILY   • gabapentin (NEURONTIN) 300 MG capsule Take 1 capsule by mouth 4 (Four) Times a Day As Needed.   • HYDROcodone-acetaminophen (NORCO)  MG per tablet Take 1 tablet by mouth Every 6 (Six) Hours As Needed for Moderate Pain .   • Insulin NPH Isophane & Regular (HUMULIN 70/30 KWIKPEN) (70-30) 100 UNIT/ML suspension pen-injector INJECT 55 UNITS SUB Q  BEFORE BREAKFAST, 65 UNITS BEFORE SUPPER, 30 MINUTES BEFORE BREAKFAST AND SUPPER   • lisinopril-hydrochlorothiazide (PRINZIDE,ZESTORETIC) 20-25 MG per tablet Take 1 tablet by mouth Daily.   • rosuvastatin (CRESTOR) 5 MG tablet Take 1 tablet by mouth Daily.   • Semaglutide (OZEMPIC) 0.25 or 0.5 MG/DOSE solution pen-injector AS DIRECTED 1 MG EVERY WEEK   • ondansetron ODT (ZOFRAN-ODT) 4 MG disintegrating tablet Take 1 tablet by mouth Every 6 (Six) Hours As Needed for Nausea or Vomiting.   • ONETOUCH VERIO test strip    • [DISCONTINUED] azithromycin (ZITHROMAX) 500 MG tablet Take 1 tablet by mouth Daily.   • [DISCONTINUED] insulin detemir (LEVEMIR) 100 UNIT/ML injection Inject 65 Units under the skin Daily. (Patient taking differently: Inject 60 Units under the skin Daily.)   • [DISCONTINUED] LEVEMIR FLEXTOUCH 100 UNIT/ML injection INJECT 65 UNITS SUB-Q DAILY   • [DISCONTINUED] sucralfate (CARAFATE) 1 GM/10ML suspension Take 10 mL by mouth 4 (Four) Times a Day With Meals & at Bedtime.   • [DISCONTINUED] VICTOZA 18 MG/3ML solution pen-injector injection Inject 1.8 mg under the skin Daily.     No current facility-administered medications on file prior to visit.      He is allergic to penicillins; bactrim [sulfamethoxazole-trimethoprim]; ceftin [cefuroxime axetil]; sulfa antibiotics; and ciprofloxacin..    Outpatient Medications Prior to Visit   Medication Sig Dispense Refill   • albuterol (PROVENTIL HFA;VENTOLIN HFA) 108 (90 Base) MCG/ACT inhaler Inhale 2 puffs Every 4 (Four) Hours As Needed for Wheezing. 1 inhaler 2   • azelastine (ASTELIN) 0.1 % nasal spray 2 sprays into each nostril 2 (Two) Times a Day. Use in each nostril as directed 30 mL 11   • B-D UF III MINI PEN NEEDLES 31G X 5 MM misc USE FOUR TIMES A DAY WITH INSULIN PENS 360 each 2   • DEXILANT 60 MG capsule TAKE 1 CAPSULE DAILY 90 capsule 2   • gabapentin (NEURONTIN) 300 MG capsule Take 1 capsule by mouth 4 (Four) Times a Day As Needed.     •  HYDROcodone-acetaminophen (NORCO)  MG per tablet Take 1 tablet by mouth Every 6 (Six) Hours As Needed for Moderate Pain .     • Insulin NPH Isophane & Regular (HUMULIN 70/30 KWIKPEN) (70-30) 100 UNIT/ML suspension pen-injector INJECT 55 UNITS SUB Q BEFORE BREAKFAST, 65 UNITS BEFORE SUPPER, 30 MINUTES BEFORE BREAKFAST AND SUPPER     • lisinopril-hydrochlorothiazide (PRINZIDE,ZESTORETIC) 20-25 MG per tablet Take 1 tablet by mouth Daily.     • rosuvastatin (CRESTOR) 5 MG tablet Take 1 tablet by mouth Daily. 30 tablet 3   • Semaglutide (OZEMPIC) 0.25 or 0.5 MG/DOSE solution pen-injector AS DIRECTED 1 MG EVERY WEEK     • ondansetron ODT (ZOFRAN-ODT) 4 MG disintegrating tablet Take 1 tablet by mouth Every 6 (Six) Hours As Needed for Nausea or Vomiting. 12 tablet 0   • ONETOUCH VERIO test strip      • azithromycin (ZITHROMAX) 500 MG tablet Take 1 tablet by mouth Daily. 5 tablet 0   • insulin detemir (LEVEMIR) 100 UNIT/ML injection Inject 65 Units under the skin Daily. (Patient taking differently: Inject 60 Units under the skin Daily.) 27 each 6   • LEVEMIR FLEXTOUCH 100 UNIT/ML injection INJECT 65 UNITS SUB-Q DAILY 15 mL 12   • sucralfate (CARAFATE) 1 GM/10ML suspension Take 10 mL by mouth 4 (Four) Times a Day With Meals & at Bedtime. 1260 mL 0   • VICTOZA 18 MG/3ML solution pen-injector injection Inject 1.8 mg under the skin Daily.       No facility-administered medications prior to visit.        Patient Active Problem List   Diagnosis   • Essential hypertension   • Familial hypercholesterolemia   • Type 2 diabetes mellitus with complication, with long-term current use of insulin (CMS/[facility])   • DDD (degenerative disc disease), thoracolumbar   • Gastroesophageal reflux disease   • Left lower quadrant pain   • Injury of kidney   • Cellulitis   • Spinal stenosis of cervical region   • Degeneration of thoracic or thoracolumbar intervertebral disc   • Abscess of foot   • Hypokalemia   • Other specified mononeuropathies  "  • Leukocytosis   • Neuropathic pain syndrome (non-herpetic)   • Systemic infection (CMS/HCC)   • Spinal stenosis of lumbar region   • Tachycardia   • Spondylolisthesis   • Drug-induced constipation   • Dysphagia   • Nausea   • Generalized abdominal cramping   • Chronic pansinusitis   • Anxiety state       Advance Care Planning:  has an advance directive - a copy HAS NOT been provided. Have asked the patient to send this to us to add to record.    Identification of Risk Factors:  Risk factors include: weight , unhealthy diet, increased fall risk, chronic pain, vision limitations and polypharmacy.    Review of Systems    Compared to one year ago, the patient feels his physical health is better.  Compared to one year ago, the patient feels his mental health is better.    Objective     Physical Exam    Vitals:    11/08/18 1053   BP: 132/78   Pulse: 102   Temp: 97 °F (36.1 °C)   SpO2: 99%   Weight: 103 kg (227 lb)   Height: 182.9 cm (72.01\")   PainSc:   4       Patient's Body mass index is 30.78 kg/m². BMI is above normal parameters. Recommendations include: exercise counseling and nutrition counseling.      Assessment/Plan   Patient Self-Management and Personalized Health Advice  The patient has been provided with information about: diet, exercise, weight management, fall prevention, designing advance directives and mental health concerns and preventive services including:   · Advance directive, Exercise counseling provided, Fall Risk assessment done, Fall Risk plan of care done, Influenza vaccine, Nutrition counseling provided.    Visit Diagnoses:    ICD-10-CM ICD-9-CM   1. Initial Medicare annual wellness visit Z00.00 V70.0   2. Seasonal allergic rhinitis, unspecified trigger J30.2 477.9   3. Familial hypercholesterolemia E78.01 272.0   4. Essential hypertension I10 401.9   5. Chronic pansinusitis J32.4 473.8   6. Gastroesophageal reflux disease without esophagitis K21.9 530.81   7. Type 2 diabetes mellitus with " complication, with long-term current use of insulin (CMS/[facility]) E11.8 250.90    Z79.4 V58.67   8. Neuropathic pain syndrome (non-herpetic) M79.2 729.2   9. Spondylolisthesis, unspecified spinal region M43.10 756.12   10. Spinal stenosis of lumbar region, unspecified whether neurogenic claudication present M48.061 724.02       No orders of the defined types were placed in this encounter.      Outpatient Encounter Prescriptions as of 11/8/2018   Medication Sig Dispense Refill   • albuterol (PROVENTIL HFA;VENTOLIN HFA) 108 (90 Base) MCG/ACT inhaler Inhale 2 puffs Every 4 (Four) Hours As Needed for Wheezing. 1 inhaler 2   • azelastine (ASTELIN) 0.1 % nasal spray 2 sprays into each nostril 2 (Two) Times a Day. Use in each nostril as directed 30 mL 11   • B-D UF III MINI PEN NEEDLES 31G X 5 MM misc USE FOUR TIMES A DAY WITH INSULIN PENS 360 each 2   • DEXILANT 60 MG capsule TAKE 1 CAPSULE DAILY 90 capsule 2   • gabapentin (NEURONTIN) 300 MG capsule Take 1 capsule by mouth 4 (Four) Times a Day As Needed.     • HYDROcodone-acetaminophen (NORCO)  MG per tablet Take 1 tablet by mouth Every 6 (Six) Hours As Needed for Moderate Pain .     • Insulin NPH Isophane & Regular (HUMULIN 70/30 KWIKPEN) (70-30) 100 UNIT/ML suspension pen-injector INJECT 55 UNITS SUB Q BEFORE BREAKFAST, 65 UNITS BEFORE SUPPER, 30 MINUTES BEFORE BREAKFAST AND SUPPER     • lisinopril-hydrochlorothiazide (PRINZIDE,ZESTORETIC) 20-25 MG per tablet Take 1 tablet by mouth Daily.     • rosuvastatin (CRESTOR) 5 MG tablet Take 1 tablet by mouth Daily. 30 tablet 3   • Semaglutide (OZEMPIC) 0.25 or 0.5 MG/DOSE solution pen-injector AS DIRECTED 1 MG EVERY WEEK     • ondansetron ODT (ZOFRAN-ODT) 4 MG disintegrating tablet Take 1 tablet by mouth Every 6 (Six) Hours As Needed for Nausea or Vomiting. 12 tablet 0   • ONETOUCH VERIO test strip      • [DISCONTINUED] azithromycin (ZITHROMAX) 500 MG tablet Take 1 tablet by mouth Daily. 5 tablet 0   • [DISCONTINUED]  insulin detemir (LEVEMIR) 100 UNIT/ML injection Inject 65 Units under the skin Daily. (Patient taking differently: Inject 60 Units under the skin Daily.) 27 each 6   • [DISCONTINUED] LEVEMIR FLEXTOUCH 100 UNIT/ML injection INJECT 65 UNITS SUB-Q DAILY 15 mL 12   • [DISCONTINUED] sucralfate (CARAFATE) 1 GM/10ML suspension Take 10 mL by mouth 4 (Four) Times a Day With Meals & at Bedtime. 1260 mL 0   • [DISCONTINUED] VICTOZA 18 MG/3ML solution pen-injector injection Inject 1.8 mg under the skin Daily.       No facility-administered encounter medications on file as of 11/8/2018.        Reviewed use of high risk medication in the elderly: yes  Reviewed for potential of harmful drug interactions in the elderly: yes    Follow Up:  Return in about 6 months (around 5/8/2019), or if symptoms worsen or fail to improve, for or sooner as needed, Next scheduled follow up.     An After Visit Summary and PPPS with all of these plans were given to the patient.

## 2018-11-08 NOTE — PROGRESS NOTES
Subjective   Jadiel Dutton is a 68 y.o. male. Patient here today with complaints of Establish Care; Medicare Wellness-Initial Visit; and Sinusitis  Patient here today to establish care, PCP was Dr. Jiménez, he sees Dr. Gottlieb for diabetes and states fingerstick blood sugar this morning was 100, on average in the last 7 days and was 124.  He does report history of chronic sinusitis and has had some blurred vision out of left recently, states eye exam up-to-date, foot exam up-to-date.  History of one to left foot which he has seen Dr. Stout, Dr. Marin, Dr. Serrano for and states his wound is finally improving.  He also has history of back surgery, Dr. Alvarado Gallardo 1 year ago and complains of some neuropathic pain, burning in lower extremities now.  Follows up with Dr. SANTANA Fritz on 11/20/17 with labs prior to this visit.  No complaints of chest pain or shortness of breath.    Vitals:    11/08/18 1053   BP: 132/78   Pulse: 102   Temp: 97 °F (36.1 °C)   SpO2: 99%     Past Medical History:   Diagnosis Date   • Abdominal pain    • Abnormal weight loss    • Acute bronchitis    • Anxiety state    • Benign essential hypertension    • Chronic sinusitis    • Constipation    • Cystitis    • Degenerative joint disease involving multiple joints    • Diabetic neuropathy (CMS/HCC)      bilateral hands      • Diastolic dysfunction    • Diverticular disease of colon    • Dyspnea    • Epigastric pain    • Essential hypertension    • Gastroesophageal reflux disease    • Generalized anxiety disorder    • Hyperlipidemia    • Inflammatory bowel disease     Possible Inflammatory Bowel Disease      • Lumbar pain     Pain radiating to lumbar region of back   n      • Pleuritic pain    • Radiculopathy     site unspecified      • Type 2 diabetes mellitus (CMS/HCC)    • Vesicular eczema of hands and feet      Sinusitis   This is a recurrent problem. The current episode started 1 to 4 weeks ago. The problem has been waxing and waning since  onset. There has been no fever. The pain is mild. Associated symptoms include congestion, coughing, headaches, a hoarse voice and sinus pressure. Past treatments include nothing. The treatment provided no relief.        The following portions of the patient's history were reviewed and updated as appropriate: allergies, current medications, past family history, past medical history, past social history, past surgical history and problem list.    Review of Systems   Constitutional: Negative.    HENT: Positive for congestion, hoarse voice and sinus pressure.    Eyes: Negative.    Respiratory: Positive for cough.    Cardiovascular: Negative.    Gastrointestinal: Negative.    Endocrine: Negative.    Genitourinary: Negative.    Musculoskeletal: Negative.    Skin: Negative.    Allergic/Immunologic: Negative.    Neurological: Positive for headaches.   Hematological: Negative.    Psychiatric/Behavioral: Negative.        Objective   Physical Exam   Constitutional: He is oriented to person, place, and time. He appears well-developed and well-nourished. No distress.   HENT:   Head: Normocephalic and atraumatic.   Right Ear: Hearing, external ear and ear canal normal. Tympanic membrane is bulging.   Left Ear: Hearing, external ear and ear canal normal. Tympanic membrane is bulging.   Nose: Right sinus exhibits no maxillary sinus tenderness and no frontal sinus tenderness. Left sinus exhibits maxillary sinus tenderness and frontal sinus tenderness.   Mouth/Throat: Uvula is midline and mucous membranes are normal. Posterior oropharyngeal erythema present. No tonsillar exudate.   Eyes: Pupils are equal, round, and reactive to light. EOM are normal. Right eye exhibits no discharge. Left eye exhibits no discharge. Right conjunctiva is injected. Left conjunctiva is injected.   Neck: Neck supple. Normal carotid pulses present. Carotid bruit is not present.   Cardiovascular: Normal rate, regular rhythm and normal heart sounds.  Exam  reveals no gallop and no friction rub.    No murmur heard.  Pulmonary/Chest: Effort normal and breath sounds normal. No respiratory distress. He has no wheezes. He has no rales.   Musculoskeletal: He exhibits no edema.   Neurological: He is alert and oriented to person, place, and time.   Skin: Skin is warm and dry. No rash noted. He is not diaphoretic. No erythema. No pallor.   Psychiatric: He has a normal mood and affect. His behavior is normal. His speech is rapid and/or pressured. He is not actively hallucinating. Cognition and memory are normal. He is attentive.   Nursing note and vitals reviewed.      Assessment/Plan   Jadiel was seen today for [facility] care, medicare wellness-initial visit and sinusitis.    Diagnoses and all orders for this visit:    Initial Medicare annual wellness visit    Seasonal allergic rhinitis, unspecified trigger    Familial hypercholesterolemia    Essential hypertension    Chronic pansinusitis    Gastroesophageal reflux disease without esophagitis    Type 2 diabetes mellitus with complication, with long-term current use of insulin (CMS/[facility])    Neuropathic pain syndrome (non-herpetic)    Spondylolisthesis, unspecified spinal region    Spinal stenosis of lumbar region, unspecified whether neurogenic claudication present    Other orders  -     azelastine (ASTELIN) 0.1 % nasal spray; 2 sprays into the nostril(s) as directed by provider Daily. Use in each nostril as directed  -     azithromycin (ZITHROMAX Z-RIVERA) 250 MG tablet; Take 2 tablets the first day, then 1 tablet daily for 4 days.    He is given refills on Astelin, given Z-Rivera today which he has had the past and worked well for him he states.  Follow-up with Dr. SANTANA Fritz for diabetes management, advised to continue wound care treatment as advised per podiatry.  If back pain and burning in legs persist advised to follow back up with neurosurgery.  Patient aware and in agreement to this plan.  Follow-up here in 6 months for recheck  or sooner if needed.  Health maintenance reviewed, Medicare wellness completed.  All questions and concerns are addressed with understanding noted.

## 2018-12-06 ENCOUNTER — OFFICE VISIT (OUTPATIENT)
Dept: PODIATRY | Facility: CLINIC | Age: 68
End: 2018-12-06

## 2018-12-06 VITALS — WEIGHT: 230.2 LBS | HEIGHT: 72 IN | BODY MASS INDEX: 31.18 KG/M2

## 2018-12-06 DIAGNOSIS — L97.522 SKIN ULCER OF TOE OF LEFT FOOT WITH FAT LAYER EXPOSED (HCC): ICD-10-CM

## 2018-12-06 DIAGNOSIS — Z89.422 HISTORY OF AMPUTATION OF LESSER TOE OF LEFT FOOT (HCC): ICD-10-CM

## 2018-12-06 DIAGNOSIS — M79.672 FOOT PAIN, LEFT: ICD-10-CM

## 2018-12-06 DIAGNOSIS — E11.42 DIABETIC POLYNEUROPATHY ASSOCIATED WITH TYPE 2 DIABETES MELLITUS (HCC): Primary | ICD-10-CM

## 2018-12-06 PROCEDURE — 11042 DBRDMT SUBQ TIS 1ST 20SQCM/<: CPT | Performed by: PODIATRIST

## 2018-12-06 PROCEDURE — 99213 OFFICE O/P EST LOW 20 MIN: CPT | Performed by: PODIATRIST

## 2018-12-06 NOTE — PROGRESS NOTES
Jadiel Dutton  1950  68 y.o. male   PCP- MRAY Higginbotham  11/8/18  BS: 123 per patient    Patient presents to clinic today with complaint of left foot pain and a wound on the midfoot bottom of his foot.     12/06/2018    Chief Complaint   Patient presents with   • Left Foot - Follow-up           History of Present Illness    Jadiel Dutton is a 68 y.o. male who presents for diabetic foot examination and and evaluation of left midfoot ulceration.  He does have a history of prior partial fifth ray amputation.  He was previously seen in the wound care center for this issue but states that the wound was not improving so he is no longer going there.  He states he may have a job opportunity soon and would like to get back to work.    Past Medical History:   Diagnosis Date   • Abdominal pain    • Abnormal weight loss    • Acute bronchitis    • Anxiety state    • Benign essential hypertension    • Chronic sinusitis    • Constipation    • Cystitis    • Degenerative joint disease involving multiple joints    • Diabetic neuropathy (CMS/HCC)      bilateral hands      • Diastolic dysfunction    • Diverticular disease of colon    • Dyspnea    • Epigastric pain    • Essential hypertension    • Foot ulcer (CMS/HCC)    • Gastroesophageal reflux disease    • Generalized anxiety disorder    • Hyperlipidemia    • Inflammatory bowel disease     Possible Inflammatory Bowel Disease      • Lumbar pain     Pain radiating to lumbar region of back   n      • Pleuritic pain    • Radiculopathy     site unspecified      • Type 2 diabetes mellitus (CMS/HCC)    • Vesicular eczema of hands and feet          Past Surgical History:   Procedure Laterality Date   • BACK SURGERY      LOWER BACK SURGERY   • CARPAL TUNNEL RELEASE Bilateral    • COLONOSCOPY  06/11/2012    Internal & external hemorrhoids found.   • ENDOSCOPY  06/11/2012    Slight stricture in the distal esophagus. Gastritis in stomach. Biopsy taken. Normal duodenum.   •  ENDOSCOPY     • ENTEROSCOPY SMALL BOWEL  08/27/2012    Gastritis in stomach. Normal jejunum. Biopsy taken. Normal duodenum   • FOOT SURGERY     • INJECTION OF MEDICATION  02/28/2014    Depo Medrol   • INJECTION OF MEDICATION  03/07/2014    Rocephin(2)   • ULNAR TUNNEL RELEASE           Family History   Problem Relation Age of Onset   • No Known Problems Other    • Hypertension Mother    • Heart attack Father    • No Known Problems Sister    • No Known Problems Brother    • No Known Problems Daughter    • No Known Problems Son    • No Known Problems Maternal Aunt    • No Known Problems Maternal Uncle    • No Known Problems Paternal Aunt    • No Known Problems Paternal Uncle    • No Known Problems Maternal Grandmother    • No Known Problems Maternal Grandfather    • No Known Problems Paternal Grandmother    • No Known Problems Paternal Grandfather          Social History     Socioeconomic History   • Marital status: Single     Spouse name: Not on file   • Number of children: Not on file   • Years of education: Not on file   • Highest education level: Not on file   Social Needs   • Financial resource strain: Not on file   • Food insecurity - worry: Not on file   • Food insecurity - inability: Not on file   • Transportation needs - medical: Not on file   • Transportation needs - non-medical: Not on file   Occupational History   • Not on file   Tobacco Use   • Smoking status: Never Smoker   • Smokeless tobacco: Never Used   Substance and Sexual Activity   • Alcohol use: No   • Drug use: No   • Sexual activity: Yes     Partners: Female   Other Topics Concern   • Not on file   Social History Narrative   • Not on file         Current Outpatient Medications   Medication Sig Dispense Refill   • albuterol (PROVENTIL HFA;VENTOLIN HFA) 108 (90 Base) MCG/ACT inhaler Inhale 2 puffs Every 4 (Four) Hours As Needed for Wheezing. 1 inhaler 2   • azelastine (ASTELIN) 0.1 % nasal spray 2 sprays into the nostril(s) as directed by  "provider Daily. Use in each nostril as directed 30 mL 11   • B-D UF III MINI PEN NEEDLES 31G X 5 MM misc USE FOUR TIMES A DAY WITH INSULIN PENS 360 each 2   • DEXILANT 60 MG capsule TAKE 1 CAPSULE DAILY 90 capsule 2   • gabapentin (NEURONTIN) 300 MG capsule Take 1 capsule by mouth 4 (Four) Times a Day As Needed.     • HYDROcodone-acetaminophen (NORCO)  MG per tablet Take 1 tablet by mouth Every 6 (Six) Hours As Needed for Moderate Pain .     • Insulin NPH Isophane & Regular (HUMULIN 70/30 KWIKPEN) (70-30) 100 UNIT/ML suspension pen-injector INJECT 55 UNITS SUB Q BEFORE BREAKFAST, 65 UNITS BEFORE SUPPER, 30 MINUTES BEFORE BREAKFAST AND SUPPER     • lisinopril-hydrochlorothiazide (PRINZIDE,ZESTORETIC) 20-25 MG per tablet Take 1 tablet by mouth Daily.     • ondansetron ODT (ZOFRAN-ODT) 4 MG disintegrating tablet Take 1 tablet by mouth Every 6 (Six) Hours As Needed for Nausea or Vomiting. 12 tablet 0   • ONETOUCH VERIO test strip      • rosuvastatin (CRESTOR) 5 MG tablet Take 1 tablet by mouth Daily. 30 tablet 3   • Semaglutide (OZEMPIC) 0.25 or 0.5 MG/DOSE solution pen-injector AS DIRECTED 1 MG EVERY WEEK       No current facility-administered medications for this visit.          OBJECTIVE    Ht 182.9 cm (72\")   Wt 104 kg (230 lb 3.2 oz)   BMI 31.22 kg/m²       Review of Systems   Constitutional: Negative.    HENT: Negative.    Eyes: Negative.    Respiratory: Negative.    Cardiovascular: Negative.    Gastrointestinal: Negative.    Endocrine: Negative.    Genitourinary: Negative.    Musculoskeletal: Positive for back pain.   Skin: Positive for wound.   Allergic/Immunologic: Negative.    Neurological: Negative.    Hematological: Negative.    Psychiatric/Behavioral: Negative.          Physical Exam   Constitutional: he appears well-developed and well-nourished.   HEENT: Normocephalic. Atraumatic  CV: No tenderness. RRR  Resp: Non-labored respiration. No wheezes.   Psychiatric: he has a normal mood and affect. " his   behavior is normal.      Lower Extremity Exam:  Vascular: DP/PT pulses palpable 1+.   Negative hair growth.   Minimal perimalleolar edema  Neuro: Protective sensation absent, b/l.  DTRs intact  Integument: Full thickness ulcer to plantar lateral forefoot measuring approximately 1.5 x 0.8 x 0.2 cm .  Granular base with surrounding hyperkeratosis.    Atrophic skin noted b/l  No masses  Musculoskeletal: LE muscle strength 5/5.   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      Left foot wound debridement:  Risks and benefits discussed.  Left forefoot ulcer debrided of surrounding hyperkeratosis and devitalized tissue with 15 blade to level of subq  Pressure hemostasis obtained  Abx ointment and dsd applied.          ASSESSMENT AND PLAN    Jadiel was seen today for follow-up.    Diagnoses and all orders for this visit:    Skin ulcer of toe of left foot with fat layer exposed (CMS/HCC)    Foot pain, left  -     XR Foot 3+ View Left    Diabetic polyneuropathy associated with type 2 diabetes mellitus (CMS/HCC)    History of amputation of lesser toe of left foot (CMS/HCC)      -Comprehensive DM foot exam performed. Pt educated on importance of tight glucose control and daily foot checks.   -Pt educated on standard wound care staples including offloading, dressing changes, and serial debridements.  -Wound debridement as above  -[facility] wound care supplies ordered consisting of Promogram and gauze ordered for home dressing changes  -Modification of custom insole for additional offloading performed.  Stressed importance of increased offloading of the wound site.  Patient seems resistant to moving out of his regular shoe gear.  -Discussed possible additional resection of bone and his prior amputation site and gastrocnemius recession to offload the area.  Patient would like to hold off  on this option at this time.  -Follow up as needed            This document has been electronically signed by Jacoby Serrano DPM on December 9, 2018 2:13 PM     EMR Dragon/Transcription disclaimer:   Much of this encounter note is an electronic transcription/translation of spoken language to printed text. The electronic translation of spoken language may permit erroneous, or at times, nonsensical words or phrases to be inadvertently transcribed; Although I have reviewed the note for such errors, some may still exist.    Jacoby Serrano DPM  12/9/2018  2:13 PM

## 2018-12-07 ENCOUNTER — TELEPHONE (OUTPATIENT)
Dept: PODIATRY | Facility: CLINIC | Age: 68
End: 2018-12-07

## 2018-12-07 NOTE — TELEPHONE ENCOUNTER
ADRIA JORGENSEN FROM [facility] [employer] CALLED.  NEEDS TO KNOW THE DRAINAGE AMOUNT OF PATIENT.    [phone]

## 2018-12-20 ENCOUNTER — OFFICE VISIT (OUTPATIENT)
Dept: PODIATRY | Facility: CLINIC | Age: 68
End: 2018-12-20

## 2018-12-20 VITALS — WEIGHT: 230 LBS | HEART RATE: 104 BPM | HEIGHT: 72 IN | OXYGEN SATURATION: 100 % | BODY MASS INDEX: 31.15 KG/M2

## 2018-12-20 DIAGNOSIS — L97.522 SKIN ULCER OF TOE OF LEFT FOOT WITH FAT LAYER EXPOSED (HCC): Primary | ICD-10-CM

## 2018-12-20 DIAGNOSIS — Z89.422 HISTORY OF AMPUTATION OF LESSER TOE OF LEFT FOOT (HCC): ICD-10-CM

## 2018-12-20 DIAGNOSIS — E11.42 DIABETIC POLYNEUROPATHY ASSOCIATED WITH TYPE 2 DIABETES MELLITUS (HCC): ICD-10-CM

## 2018-12-20 PROCEDURE — 11042 DBRDMT SUBQ TIS 1ST 20SQCM/<: CPT | Performed by: PODIATRIST

## 2018-12-20 NOTE — PROGRESS NOTES
Jadiel Dutton  1950  68 y.o. male   PCP- Shayy Banda , MARY  11/8/18  BS: 123 per patient    Patient presents today for wound check     12/20/2018  Chief Complaint   Patient presents with   • Left Foot - Follow-up, Wound Check           History of Present Illness    Jadiel Dutton is a 68 y.o. male who presents for diabetic foot examination and and evaluation of left midfoot ulceration.  He does have a history of prior partial fifth ray amputation.  He was previously seen in the wound care center for this issue but has stopped seeking care with them.  He states he may have a job opportunity soon and would like to get back to work. Feels his wound is significantly improved since last visit.    Past Medical History:   Diagnosis Date   • Abdominal pain    • Abnormal weight loss    • Acute bronchitis    • Anxiety state    • Benign essential hypertension    • Chronic sinusitis    • Constipation    • Cystitis    • Degenerative joint disease involving multiple joints    • Diabetic neuropathy (CMS/HCC)      bilateral hands      • Diastolic dysfunction    • Diverticular disease of colon    • Dyspnea    • Epigastric pain    • Essential hypertension    • Foot ulcer (CMS/HCC)    • Gastroesophageal reflux disease    • Generalized anxiety disorder    • Hyperlipidemia    • Inflammatory bowel disease     Possible Inflammatory Bowel Disease      • Lumbar pain     Pain radiating to lumbar region of back   n      • Pleuritic pain    • Radiculopathy     site unspecified      • Type 2 diabetes mellitus (CMS/HCC)    • Vesicular eczema of hands and feet          Past Surgical History:   Procedure Laterality Date   • BACK SURGERY      LOWER BACK SURGERY   • CARPAL TUNNEL RELEASE Bilateral    • COLONOSCOPY  06/11/2012    Internal & external hemorrhoids found.   • COLONOSCOPY N/A 8/22/2017    Procedure: COLONOSCOPY;  Surgeon: Cesar Hollis MD;  Location: [facility] ENDOSCOPY;  Service:    • ENDOSCOPY  06/11/2012    Slight  stricture in the distal esophagus. Gastritis in stomach. Biopsy taken. Normal duodenum.   • ENDOSCOPY     • ENDOSCOPY N/A 8/22/2017    Procedure: ESOPHAGOGASTRODUODENOSCOPY possible dilation ;  Surgeon: Cesar Hollis MD;  Location: [facility] ENDOSCOPY;  Service:    • ENTEROSCOPY SMALL BOWEL  08/27/2012    Gastritis in stomach. Normal jejunum. Biopsy taken. Normal duodenum   • FOOT SURGERY     • INJECTION OF MEDICATION  02/28/2014    Depo Medrol   • INJECTION OF MEDICATION  03/07/2014    Rocephin(2)   • ULNAR TUNNEL RELEASE           Family History   Problem Relation Age of Onset   • No Known Problems Other    • Hypertension Mother    • Heart attack Father    • No Known Problems Sister    • No Known Problems Brother    • No Known Problems Daughter    • No Known Problems Son    • No Known Problems Maternal Aunt    • No Known Problems Maternal Uncle    • No Known Problems Paternal Aunt    • No Known Problems Paternal Uncle    • No Known Problems Maternal Grandmother    • No Known Problems Maternal Grandfather    • No Known Problems Paternal Grandmother    • No Known Problems Paternal Grandfather          Social History     Socioeconomic History   • Marital status: Single     Spouse name: Not on file   • Number of children: Not on file   • Years of education: Not on file   • Highest education level: Not on file   Social Needs   • Financial resource strain: Not on file   • Food insecurity - worry: Not on file   • Food insecurity - inability: Not on file   • Transportation needs - medical: Not on file   • Transportation needs - non-medical: Not on file   Occupational History   • Not on file   Tobacco Use   • Smoking status: Never Smoker   • Smokeless tobacco: Never Used   Substance and Sexual Activity   • Alcohol use: No   • Drug use: No   • Sexual activity: Yes     Partners: Female   Other Topics Concern   • Not on file   Social History Narrative   • Not on file         Current Outpatient Medications   Medication Sig  "Dispense Refill   • albuterol (PROVENTIL HFA;VENTOLIN HFA) 108 (90 Base) MCG/ACT inhaler Inhale 2 puffs Every 4 (Four) Hours As Needed for Wheezing. 1 inhaler 2   • azelastine (ASTELIN) 0.1 % nasal spray 2 sprays into the nostril(s) as directed by provider Daily. Use in each nostril as directed 30 mL 11   • B-D UF III MINI PEN NEEDLES 31G X 5 MM misc USE FOUR TIMES A DAY WITH INSULIN PENS 360 each 2   • DEXILANT 60 MG capsule TAKE 1 CAPSULE DAILY 90 capsule 2   • gabapentin (NEURONTIN) 300 MG capsule Take 1 capsule by mouth 4 (Four) Times a Day As Needed.     • Insulin NPH Isophane & Regular (HUMULIN 70/30 KWIKPEN) (70-30) 100 UNIT/ML suspension pen-injector INJECT 55 UNITS SUB Q BEFORE BREAKFAST, 65 UNITS BEFORE SUPPER, 30 MINUTES BEFORE BREAKFAST AND SUPPER     • lisinopril-hydrochlorothiazide (PRINZIDE,ZESTORETIC) 20-25 MG per tablet Take 1 tablet by mouth Daily.     • ondansetron ODT (ZOFRAN-ODT) 4 MG disintegrating tablet Take 1 tablet by mouth Every 6 (Six) Hours As Needed for Nausea or Vomiting. 12 tablet 0   • ONETOUCH VERIO test strip      • rosuvastatin (CRESTOR) 5 MG tablet Take 1 tablet by mouth Daily. 30 tablet 3   • Semaglutide (OZEMPIC) 0.25 or 0.5 MG/DOSE solution pen-injector AS DIRECTED 1 MG EVERY WEEK     • azithromycin (ZITHROMAX) 500 MG tablet Take 1 tablet by mouth Daily. 5 tablet 0   • traMADol (ULTRAM) 50 MG tablet Take 50 mg by mouth Every 6 (Six) Hours As Needed for Moderate Pain .       Current Facility-Administered Medications   Medication Dose Route Frequency Provider Last Rate Last Dose   • triamcinolone acetonide (KENALOG-40) injection 80 mg  80 mg Intramuscular Once Shayy Banda APRN             OBJECTIVE    Pulse 104   Ht 182.9 cm (72.01\")   Wt 104 kg (230 lb)   SpO2 100%   BMI 31.19 kg/m²       Review of Systems   Constitutional: Negative.    HENT: Negative.    Eyes: Negative.    Respiratory: Negative.    Cardiovascular: Negative.    Gastrointestinal: Negative.    Endocrine: " Negative.    Genitourinary: Negative.    Musculoskeletal: Positive for back pain.   Skin: Positive for wound.   Allergic/Immunologic: Negative.    Neurological: Negative.    Hematological: Negative.    Psychiatric/Behavioral: Negative.          Physical Exam   Constitutional: he appears well-developed and well-nourished.   HEENT: Normocephalic. Atraumatic  CV: No tenderness. RRR  Resp: Non-labored respiration. No wheezes.   Psychiatric: he has a normal mood and affect. his   behavior is normal.      Lower Extremity Exam:  Vascular: DP/PT pulses palpable 1+.   Negative hair growth.   Minimal perimalleolar edema  Neuro: Protective sensation absent, b/l.  DTRs intact  Integument: Full thickness ulcer to plantar lateral forefoot measuring approximately 0.5 x 0.4 x 0.2 cm .  Granular base with significant surrounding hyperkeratosis.    Atrophic skin noted b/l  No masses  Musculoskeletal: LE muscle strength 5/5.   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      Left foot wound debridement:  Risks and benefits discussed.  Left forefoot ulcer debrided of surrounding hyperkeratosis and devitalized tissue with 15 blade to level of subq  Pressure hemostasis obtained  Abx ointment and dsd applied.          ASSESSMENT AND PLAN    Jadiel was seen today for follow-up and wound check.    Diagnoses and all orders for this visit:    Skin ulcer of toe of left foot with fat layer exposed (CMS/HCC)    History of amputation of lesser toe of left foot (CMS/HCC)    Diabetic polyneuropathy associated with type 2 diabetes mellitus (CMS/HCC)      -Comprehensive DM foot exam performed. Pt educated on importance of tight glucose control and daily foot checks.   -Pt educated on standard wound care staples including offloading, dressing changes, and serial debridements.  -Wound debridement as  above  -Continue Promogram for home dressing changes  -Modification of custom insole for additional offloading performed.  Stressed importance of increased offloading of the wound site.   -Again briefly discussed possible additional resection of bone and his prior amputation site and gastrocnemius recession to offload the area.  Patient would like to hold off on this option at this time.  -Follow up as needed            This document has been electronically signed by Jacoby Serrano DPM on December 30, 2018 9:55 PM     EMR Dragon/Transcription disclaimer:   Much of this encounter note is an electronic transcription/translation of spoken language to printed text. The electronic translation of spoken language may permit erroneous, or at times, nonsensical words or phrases to be inadvertently transcribed; Although I have reviewed the note for such errors, some may still exist.    Jacoby Serrano DPM  12/30/2018  9:55 PM

## 2018-12-26 ENCOUNTER — OFFICE VISIT (OUTPATIENT)
Dept: FAMILY MEDICINE CLINIC | Facility: CLINIC | Age: 68
End: 2018-12-26

## 2018-12-26 VITALS
OXYGEN SATURATION: 97 % | HEIGHT: 72 IN | HEART RATE: 107 BPM | WEIGHT: 229 LBS | DIASTOLIC BLOOD PRESSURE: 70 MMHG | TEMPERATURE: 97.4 F | SYSTOLIC BLOOD PRESSURE: 122 MMHG | BODY MASS INDEX: 31.02 KG/M2

## 2018-12-26 DIAGNOSIS — J01.00 ACUTE NON-RECURRENT MAXILLARY SINUSITIS: Primary | ICD-10-CM

## 2018-12-26 DIAGNOSIS — J01.10 ACUTE NON-RECURRENT FRONTAL SINUSITIS: ICD-10-CM

## 2018-12-26 DIAGNOSIS — R05.9 COUGH: ICD-10-CM

## 2018-12-26 PROCEDURE — 99213 OFFICE O/P EST LOW 20 MIN: CPT | Performed by: NURSE PRACTITIONER

## 2018-12-26 RX ORDER — AZITHROMYCIN 500 MG/1
500 TABLET, FILM COATED ORAL DAILY
Qty: 5 TABLET | Refills: 0 | Status: SHIPPED | OUTPATIENT
Start: 2018-12-26 | End: 2019-04-24

## 2018-12-26 RX ORDER — TRIAMCINOLONE ACETONIDE 40 MG/ML
80 INJECTION, SUSPENSION INTRA-ARTICULAR; INTRAMUSCULAR ONCE
Status: DISCONTINUED | OUTPATIENT
Start: 2018-12-26 | End: 2019-04-24

## 2018-12-26 RX ORDER — TRAMADOL HYDROCHLORIDE 50 MG/1
50 TABLET ORAL EVERY 6 HOURS PRN
COMMUNITY
End: 2020-02-12

## 2018-12-26 NOTE — PROGRESS NOTES
"Subjective   Jadiel Dutton is a 68 y.o. male. Patient here today with complaints of URI  pt here today with complaints of sinus pain and pressure, symptoms improved after treatment at urgent care but then \"came back\". Denies fever. States zithromax has helped him in the past with similar complaints. Seeing pain clinic, stopped norco and now taking ultram per pt report    Vitals:    12/26/18 1146   BP: 122/70   Pulse: 107   Temp: 97.4 °F (36.3 °C)   SpO2: 97%     Past Medical History:   Diagnosis Date   • Abdominal pain    • Abnormal weight loss    • Acute bronchitis    • Anxiety state    • Benign essential hypertension    • Chronic sinusitis    • Constipation    • Cystitis    • Degenerative joint disease involving multiple joints    • Diabetic neuropathy (CMS/HCC)      bilateral hands      • Diastolic dysfunction    • Diverticular disease of colon    • Dyspnea    • Epigastric pain    • Essential hypertension    • Foot ulcer (CMS/HCC)    • Gastroesophageal reflux disease    • Generalized anxiety disorder    • Hyperlipidemia    • Inflammatory bowel disease     Possible Inflammatory Bowel Disease      • Lumbar pain     Pain radiating to lumbar region of back   n      • Pleuritic pain    • Radiculopathy     site unspecified      • Type 2 diabetes mellitus (CMS/HCC)    • Vesicular eczema of hands and feet      Sinusitis   This is a new problem. The current episode started 1 to 4 weeks ago. The problem has been gradually worsening since onset. There has been no fever. The pain is mild. Associated symptoms include congestion, coughing, ear pain, headaches, a hoarse voice, sinus pressure and a sore throat. Pertinent negatives include no chills, diaphoresis, neck pain, shortness of breath, sneezing or swollen glands. Past treatments include antibiotics and spray decongestants. The treatment provided mild relief.   Cough   This is a new problem. The current episode started 1 to 4 weeks ago. The problem has been " unchanged. The problem occurs every few minutes. The cough is non-productive. Associated symptoms include ear congestion, ear pain, headaches, nasal congestion, postnasal drip and a sore throat. Pertinent negatives include no chills, fever or shortness of breath. He has tried a beta-agonist inhaler and rest for the symptoms. The treatment provided mild relief.        The following portions of the patient's history were reviewed and updated as appropriate: allergies, current medications, past family history, past medical history, past social history, past surgical history and problem list.    Review of Systems   Constitutional: Negative.  Negative for chills, diaphoresis and fever.   HENT: Positive for congestion, ear pain, hoarse voice, postnasal drip, sinus pressure and sore throat. Negative for sneezing.    Eyes: Negative.    Respiratory: Positive for cough. Negative for shortness of breath.    Cardiovascular: Negative.    Gastrointestinal: Negative.    Endocrine: Negative.    Genitourinary: Negative.    Musculoskeletal: Negative.  Negative for neck pain.   Skin: Negative.    Allergic/Immunologic: Negative.    Neurological: Positive for headaches.   Hematological: Negative.    Psychiatric/Behavioral: Negative.        Objective   Physical Exam   Constitutional: He is oriented to person, place, and time. He appears well-developed and well-nourished. No distress.   HENT:   Head: Normocephalic and atraumatic.   Right Ear: Hearing, external ear and ear canal normal. Tympanic membrane is bulging.   Left Ear: Hearing, external ear and ear canal normal. Tympanic membrane is bulging.   Nose: Right sinus exhibits maxillary sinus tenderness and frontal sinus tenderness. Left sinus exhibits maxillary sinus tenderness and frontal sinus tenderness.   Mouth/Throat: Uvula is midline and mucous membranes are normal. No tonsillar exudate.   Neck: Neck supple.   Cardiovascular: Normal rate, regular rhythm and normal heart sounds.  Exam reveals no gallop and no friction rub.   No murmur heard.  Pulmonary/Chest: Effort normal and breath sounds normal. No stridor. No respiratory distress. He has no wheezes. He has no rales.   Pulse ox on RA 97%   Lymphadenopathy:     He has cervical adenopathy.   Neurological: He is alert and oriented to person, place, and time.   Skin: Skin is warm and dry. No rash noted. He is not diaphoretic. No erythema. No pallor.   Psychiatric: He has a normal mood and affect. His behavior is normal.   Nursing note and vitals reviewed.      Assessment/Plan   Jadiel was seen today for uri.    Diagnoses and all orders for this visit:    Acute non-recurrent maxillary sinusitis  -     triamcinolone acetonide (KENALOG-40) injection 80 mg; Inject 2 mL into the appropriate muscle as directed by prescriber 1 (One) Time.    Acute non-recurrent frontal sinusitis  -     triamcinolone acetonide (KENALOG-40) injection 80 mg; Inject 2 mL into the appropriate muscle as directed by prescriber 1 (One) Time.    Cough  -     triamcinolone acetonide (KENALOG-40) injection 80 mg; Inject 2 mL into the appropriate muscle as directed by prescriber 1 (One) Time.    Other orders  -     azithromycin (ZITHROMAX) 500 MG tablet; Take 1 tablet by mouth Daily.    he is given kenalog 80mg inj IM in office today, zithromax as above and if symptoms persist/worsen he is asked to RTC for recheck. He has multiple antibiotic allergies. Pt aware and in agreement to this plan. All questions and concerns are addressed with understanding noted.

## 2019-01-03 ENCOUNTER — OFFICE VISIT (OUTPATIENT)
Dept: PODIATRY | Facility: CLINIC | Age: 69
End: 2019-01-03

## 2019-01-03 VITALS — WEIGHT: 229 LBS | HEIGHT: 72 IN | BODY MASS INDEX: 31.02 KG/M2 | HEART RATE: 97 BPM | OXYGEN SATURATION: 98 %

## 2019-01-03 DIAGNOSIS — Z89.422 HISTORY OF AMPUTATION OF LESSER TOE OF LEFT FOOT (HCC): ICD-10-CM

## 2019-01-03 DIAGNOSIS — L97.522 SKIN ULCER OF TOE OF LEFT FOOT WITH FAT LAYER EXPOSED (HCC): Primary | ICD-10-CM

## 2019-01-03 DIAGNOSIS — E11.42 DIABETIC POLYNEUROPATHY ASSOCIATED WITH TYPE 2 DIABETES MELLITUS (HCC): ICD-10-CM

## 2019-01-03 PROCEDURE — 11042 DBRDMT SUBQ TIS 1ST 20SQCM/<: CPT | Performed by: PODIATRIST

## 2019-01-03 NOTE — PROGRESS NOTES
Jadiel Dutton  1950  68 y.o. male   PCP- Shayy Banda , APRN  11/8/18  BS: 123 per patient    Patient presents today for wound check     01/03/2019    Chief Complaint   Patient presents with   • Left Foot - Wound Check           History of Present Illness    Jadiel Dutton is a 68 y.o. male who presents for diabetic foot examination and and evaluation of left midfoot ulceration.     Past Medical History:   Diagnosis Date   • Abdominal pain    • Abnormal weight loss    • Acute bronchitis    • Anxiety state    • Benign essential hypertension    • Chronic sinusitis    • Constipation    • Cystitis    • Degenerative joint disease involving multiple joints    • Diabetic neuropathy (CMS/HCC)      bilateral hands      • Diastolic dysfunction    • Diverticular disease of colon    • Dyspnea    • Epigastric pain    • Essential hypertension    • Foot ulcer (CMS/HCC)    • Gastroesophageal reflux disease    • Generalized anxiety disorder    • Hyperlipidemia    • Inflammatory bowel disease     Possible Inflammatory Bowel Disease      • Lumbar pain     Pain radiating to lumbar region of back   n      • Pleuritic pain    • Radiculopathy     site unspecified      • Type 2 diabetes mellitus (CMS/HCC)    • Vesicular eczema of hands and feet          Past Surgical History:   Procedure Laterality Date   • BACK SURGERY      LOWER BACK SURGERY   • CARPAL TUNNEL RELEASE Bilateral    • COLONOSCOPY  06/11/2012    Internal & external hemorrhoids found.   • COLONOSCOPY N/A 8/22/2017    Procedure: COLONOSCOPY;  Surgeon: Cesar Hollsi MD;  Location: [facility] ENDOSCOPY;  Service:    • ENDOSCOPY  06/11/2012    Slight stricture in the distal esophagus. Gastritis in stomach. Biopsy taken. Normal duodenum.   • ENDOSCOPY     • ENDOSCOPY N/A 8/22/2017    Procedure: ESOPHAGOGASTRODUODENOSCOPY possible dilation ;  Surgeon: Cesar Hollis MD;  Location: [facility] ENDOSCOPY;  Service:    • ENTEROSCOPY SMALL BOWEL  08/27/2012     Gastritis in stomach. Normal jejunum. Biopsy taken. Normal duodenum   • FOOT SURGERY     • INJECTION OF MEDICATION  02/28/2014    Depo Medrol   • INJECTION OF MEDICATION  03/07/2014    Rocephin(2)   • ULNAR TUNNEL RELEASE           Family History   Problem Relation Age of Onset   • No Known Problems Other    • Hypertension Mother    • Heart attack Father    • No Known Problems Sister    • No Known Problems Brother    • No Known Problems Daughter    • No Known Problems Son    • No Known Problems Maternal Aunt    • No Known Problems Maternal Uncle    • No Known Problems Paternal Aunt    • No Known Problems Paternal Uncle    • No Known Problems Maternal Grandmother    • No Known Problems Maternal Grandfather    • No Known Problems Paternal Grandmother    • No Known Problems Paternal Grandfather          Social History     Socioeconomic History   • Marital status: Single     Spouse name: Not on file   • Number of children: Not on file   • Years of education: Not on file   • Highest education level: Not on file   Social Needs   • Financial resource strain: Not on file   • Food insecurity - worry: Not on file   • Food insecurity - inability: Not on file   • Transportation needs - medical: Not on file   • Transportation needs - non-medical: Not on file   Occupational History   • Not on file   Tobacco Use   • Smoking status: Never Smoker   • Smokeless tobacco: Never Used   Substance and Sexual Activity   • Alcohol use: No   • Drug use: No   • Sexual activity: Yes     Partners: Female   Other Topics Concern   • Not on file   Social History Narrative   • Not on file         Current Outpatient Medications   Medication Sig Dispense Refill   • albuterol (PROVENTIL HFA;VENTOLIN HFA) 108 (90 Base) MCG/ACT inhaler Inhale 2 puffs Every 4 (Four) Hours As Needed for Wheezing. 1 inhaler 2   • azelastine (ASTELIN) 0.1 % nasal spray 2 sprays into the nostril(s) as directed by provider Daily. Use in each nostril as directed 30 mL 11   •  "azithromycin (ZITHROMAX) 500 MG tablet Take 1 tablet by mouth Daily. 5 tablet 0   • B-D UF III MINI PEN NEEDLES 31G X 5 MM misc USE FOUR TIMES A DAY WITH INSULIN PENS 360 each 2   • DEXILANT 60 MG capsule TAKE 1 CAPSULE DAILY 90 capsule 2   • gabapentin (NEURONTIN) 300 MG capsule Take 1 capsule by mouth 4 (Four) Times a Day As Needed.     • Insulin NPH Isophane & Regular (HUMULIN 70/30 KWIKPEN) (70-30) 100 UNIT/ML suspension pen-injector INJECT 55 UNITS SUB Q BEFORE BREAKFAST, 65 UNITS BEFORE SUPPER, 30 MINUTES BEFORE BREAKFAST AND SUPPER     • lisinopril-hydrochlorothiazide (PRINZIDE,ZESTORETIC) 20-25 MG per tablet Take 1 tablet by mouth Daily.     • ondansetron ODT (ZOFRAN-ODT) 4 MG disintegrating tablet Take 1 tablet by mouth Every 6 (Six) Hours As Needed for Nausea or Vomiting. 12 tablet 0   • ONETOUCH VERIO test strip      • rosuvastatin (CRESTOR) 5 MG tablet Take 1 tablet by mouth Daily. 30 tablet 3   • Semaglutide (OZEMPIC) 0.25 or 0.5 MG/DOSE solution pen-injector AS DIRECTED 1 MG EVERY WEEK     • traMADol (ULTRAM) 50 MG tablet Take 50 mg by mouth Every 6 (Six) Hours As Needed for Moderate Pain .       Current Facility-Administered Medications   Medication Dose Route Frequency Provider Last Rate Last Dose   • triamcinolone acetonide (KENALOG-40) injection 80 mg  80 mg Intramuscular Once Shayy Banda, MARY             OBJECTIVE    Pulse 97   Ht 182.9 cm (72.01\")   Wt 104 kg (229 lb)   SpO2 98%   BMI 31.05 kg/m²       Review of Systems   Constitutional: Negative.    HENT: Negative.    Eyes: Negative.    Respiratory: Negative.    Cardiovascular: Negative.    Gastrointestinal: Negative.    Endocrine: Negative.    Genitourinary: Negative.    Musculoskeletal: Positive for back pain.   Skin: Positive for wound.   Allergic/Immunologic: Negative.    Neurological: Negative.    Hematological: Negative.    Psychiatric/Behavioral: Negative.          Physical Exam   Constitutional: he appears well-developed and " well-nourished.   HEENT: Normocephalic. Atraumatic  CV: No tenderness. RRR  Resp: Non-labored respiration. No wheezes.   Psychiatric: he has a normal mood and affect. his   behavior is normal.      Lower Extremity Exam:  Vascular: DP/PT pulses palpable 1+.   Negative hair growth.   Minimal perimalleolar edema  Neuro: Protective sensation absent, b/l.  DTRs intact  Integument: Full thickness ulcer to plantar lateral forefoot measuring approximately 0.6 x 0.4 x 0.2 cm .  Granular base with significant surrounding hyperkeratosis.    Atrophic skin noted b/l  No masses  Musculoskeletal: LE muscle strength 5/5.   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      Left foot wound debridement:  Risks and benefits discussed.  Left forefoot ulcer debrided of surrounding hyperkeratosis and devitalized tissue with 15 blade to level of subq  Pressure hemostasis obtained  Abx ointment and dsd applied.          ASSESSMENT AND PLAN    Jadiel was seen today for wound check.    Diagnoses and all orders for this visit:    Skin ulcer of toe of left foot with fat layer exposed (CMS/HCC)    History of amputation of lesser toe of left foot (CMS/HCC)    Diabetic polyneuropathy associated with type 2 diabetes mellitus (CMS/HCC)      -Comprehensive DM foot exam performed. Pt educated on importance of tight glucose control and daily foot checks.   -Pt educated on standard wound care staples including offloading, dressing changes, and serial debridements.  -Wound debridement as above  -Continue Promogram for home dressing changes  -Modification of custom insole for additional offloading performed.  Stressed importance of increased offloading of the wound site.   -Again briefly discussed possible additional resection of bone and his prior amputation site and gastrocnemius recession to offload the area.   Patient would like to hold off on this option at this time.  -Follow up 2 weeks            This document has been electronically signed by Jacoby Serrano DPM on January 6, 2019 5:25 PM     EMR Dragon/Transcription disclaimer:   Much of this encounter note is an electronic transcription/translation of spoken language to printed text. The electronic translation of spoken language may permit erroneous, or at times, nonsensical words or phrases to be inadvertently transcribed; Although I have reviewed the note for such errors, some may still exist.    Jacoby Serrano DPM  1/6/2019  5:25 PM

## 2019-01-17 ENCOUNTER — OFFICE VISIT (OUTPATIENT)
Dept: PODIATRY | Facility: CLINIC | Age: 69
End: 2019-01-17

## 2019-01-17 VITALS — HEART RATE: 104 BPM | HEIGHT: 72 IN | OXYGEN SATURATION: 99 % | BODY MASS INDEX: 31.02 KG/M2 | WEIGHT: 229 LBS

## 2019-01-17 DIAGNOSIS — M79.672 FOOT PAIN, LEFT: ICD-10-CM

## 2019-01-17 DIAGNOSIS — Z89.422 HISTORY OF AMPUTATION OF LESSER TOE OF LEFT FOOT (HCC): ICD-10-CM

## 2019-01-17 DIAGNOSIS — E11.42 DIABETIC POLYNEUROPATHY ASSOCIATED WITH TYPE 2 DIABETES MELLITUS (HCC): ICD-10-CM

## 2019-01-17 DIAGNOSIS — L97.522 SKIN ULCER OF TOE OF LEFT FOOT WITH FAT LAYER EXPOSED (HCC): Primary | ICD-10-CM

## 2019-01-17 PROCEDURE — 11042 DBRDMT SUBQ TIS 1ST 20SQCM/<: CPT | Performed by: PODIATRIST

## 2019-01-17 NOTE — PROGRESS NOTES
Jadiel Dutton  1950  68 y.o. male   PCP- Shayy Banda , MARY  12/26/2018  BS: 85 per patient    Patient presents today for wound check       01/17/2019    Chief Complaint   Patient presents with   • Left Foot - Follow-up, Wound Check           History of Present Illness    Jadiel Dutton is a 68 y.o. male who presents for diabetic foot examination and and evaluation of left midfoot ulceration.  No new issues.    Past Medical History:   Diagnosis Date   • Abdominal pain    • Abnormal weight loss    • Acute bronchitis    • Anxiety state    • Benign essential hypertension    • Chronic sinusitis    • Constipation    • Cystitis    • Degenerative joint disease involving multiple joints    • Diabetic neuropathy (CMS/HCC)      bilateral hands      • Diastolic dysfunction    • Diverticular disease of colon    • Dyspnea    • Epigastric pain    • Essential hypertension    • Foot ulcer (CMS/HCC)    • Gastroesophageal reflux disease    • Generalized anxiety disorder    • Hyperlipidemia    • Inflammatory bowel disease     Possible Inflammatory Bowel Disease      • Lumbar pain     Pain radiating to lumbar region of back   n      • Pleuritic pain    • Radiculopathy     site unspecified      • Type 2 diabetes mellitus (CMS/HCC)    • Vesicular eczema of hands and feet          Past Surgical History:   Procedure Laterality Date   • BACK SURGERY      LOWER BACK SURGERY   • CARPAL TUNNEL RELEASE Bilateral    • COLONOSCOPY  06/11/2012    Internal & external hemorrhoids found.   • COLONOSCOPY N/A 8/22/2017    Procedure: COLONOSCOPY;  Surgeon: Cesar Hollis MD;  Location: [facility] ENDOSCOPY;  Service:    • ENDOSCOPY  06/11/2012    Slight stricture in the distal esophagus. Gastritis in stomach. Biopsy taken. Normal duodenum.   • ENDOSCOPY     • ENDOSCOPY N/A 8/22/2017    Procedure: ESOPHAGOGASTRODUODENOSCOPY possible dilation ;  Surgeon: Cesar Hollis MD;  Location: [facility] ENDOSCOPY;  Service:    • ENTEROSCOPY SMALL  BOWEL  08/27/2012    Gastritis in stomach. Normal jejunum. Biopsy taken. Normal duodenum   • FOOT SURGERY     • INJECTION OF MEDICATION  02/28/2014    Depo Medrol   • INJECTION OF MEDICATION  03/07/2014    Rocephin(2)   • ULNAR TUNNEL RELEASE           Family History   Problem Relation Age of Onset   • No Known Problems Other    • Hypertension Mother    • Heart attack Father    • No Known Problems Sister    • No Known Problems Brother    • No Known Problems Daughter    • No Known Problems Son    • No Known Problems Maternal Aunt    • No Known Problems Maternal Uncle    • No Known Problems Paternal Aunt    • No Known Problems Paternal Uncle    • No Known Problems Maternal Grandmother    • No Known Problems Maternal Grandfather    • No Known Problems Paternal Grandmother    • No Known Problems Paternal Grandfather          Social History     Socioeconomic History   • Marital status: Single     Spouse name: Not on file   • Number of children: Not on file   • Years of education: Not on file   • Highest education level: Not on file   Social Needs   • Financial resource strain: Not on file   • Food insecurity - worry: Not on file   • Food insecurity - inability: Not on file   • Transportation needs - medical: Not on file   • Transportation needs - non-medical: Not on file   Occupational History   • Not on file   Tobacco Use   • Smoking status: Never Smoker   • Smokeless tobacco: Never Used   Substance and Sexual Activity   • Alcohol use: No   • Drug use: No   • Sexual activity: Yes     Partners: Female   Other Topics Concern   • Not on file   Social History Narrative   • Not on file         Current Outpatient Medications   Medication Sig Dispense Refill   • azelastine (ASTELIN) 0.1 % nasal spray 2 sprays into the nostril(s) as directed by provider Daily. Use in each nostril as directed 30 mL 11   • DEXILANT 60 MG capsule TAKE 1 CAPSULE DAILY 90 capsule 2   • gabapentin (NEURONTIN) 300 MG capsule Take 1 capsule by mouth 4  "(Four) Times a Day As Needed.     • Insulin NPH Isophane & Regular (HUMULIN 70/30 KWIKPEN) (70-30) 100 UNIT/ML suspension pen-injector INJECT 55 UNITS SUB Q BEFORE BREAKFAST, 65 UNITS BEFORE SUPPER, 30 MINUTES BEFORE BREAKFAST AND SUPPER     • lisinopril-hydrochlorothiazide (PRINZIDE,ZESTORETIC) 20-25 MG per tablet Take 1 tablet by mouth Daily.     • rosuvastatin (CRESTOR) 5 MG tablet Take 1 tablet by mouth Daily. 30 tablet 3   • traMADol (ULTRAM) 50 MG tablet Take 50 mg by mouth Every 6 (Six) Hours As Needed for Moderate Pain .     • albuterol (PROVENTIL HFA;VENTOLIN HFA) 108 (90 Base) MCG/ACT inhaler Inhale 2 puffs Every 4 (Four) Hours As Needed for Wheezing. 1 inhaler 2   • azithromycin (ZITHROMAX) 500 MG tablet Take 1 tablet by mouth Daily. 5 tablet 0   • B-D UF III MINI PEN NEEDLES 31G X 5 MM misc USE FOUR TIMES A DAY WITH INSULIN PENS 360 each 2   • ondansetron ODT (ZOFRAN-ODT) 4 MG disintegrating tablet Take 1 tablet by mouth Every 6 (Six) Hours As Needed for Nausea or Vomiting. 12 tablet 0   • ONETOUCH VERIO test strip      • Semaglutide (OZEMPIC) 0.25 or 0.5 MG/DOSE solution pen-injector AS DIRECTED 1 MG EVERY WEEK       Current Facility-Administered Medications   Medication Dose Route Frequency Provider Last Rate Last Dose   • triamcinolone acetonide (KENALOG-40) injection 80 mg  80 mg Intramuscular Once Shayy Banda, MARY             OBJECTIVE    Pulse 104   Ht 182.9 cm (72.01\")   Wt 104 kg (229 lb)   SpO2 99%   BMI 31.05 kg/m²       Review of Systems   Constitutional: Negative.    HENT: Negative.    Eyes: Negative.    Respiratory: Negative.    Cardiovascular: Negative.    Gastrointestinal: Negative.    Endocrine: Negative.    Genitourinary: Negative.    Musculoskeletal: Positive for back pain.   Skin: Positive for wound.   Allergic/Immunologic: Negative.    Neurological: Negative.    Hematological: Negative.    Psychiatric/Behavioral: Negative.          Physical Exam   Constitutional: he appears " well-developed and well-nourished.   HEENT: Normocephalic. Atraumatic  CV: No tenderness. RRR  Resp: Non-labored respiration. No wheezes.   Psychiatric: he has a normal mood and affect. his   behavior is normal.      Lower Extremity Exam:  Vascular: DP/PT pulses palpable 1+.   Negative hair growth.   Minimal perimalleolar edema  Neuro: Protective sensation absent, b/l.  DTRs intact  Integument: Full thickness ulcer to plantar lateral forefoot measuring approximately 1.0 x 0.6 x 0.2 cm .  Granular base with significant surrounding hyperkeratosis.    Atrophic skin noted b/l  No masses  Musculoskeletal: LE muscle strength 5/5.   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      Left foot wound debridement:  Risks and benefits discussed.  Left forefoot ulcer debrided of surrounding hyperkeratosis and devitalized tissue with 15 blade to level of subq  Pressure hemostasis obtained  Abx ointment and dsd applied.          ASSESSMENT AND PLAN    Jadiel was seen today for follow-up and wound check.    Diagnoses and all orders for this visit:    Skin ulcer of toe of left foot with fat layer exposed (CMS/HCC)    History of amputation of lesser toe of left foot (CMS/HCC)    Diabetic polyneuropathy associated with type 2 diabetes mellitus (CMS/HCC)    Foot pain, left      -Comprehensive DM foot exam performed. Pt educated on importance of tight glucose control and daily foot checks.   -Pt educated on standard wound care staples including offloading, dressing changes, and serial debridements.  -Wound debridement as above  -Continue Promogram for home dressing changes  -Modification of custom insole for additional offloading performed.  Stressed importance of increased offloading of the wound site.   -Follow up 2 weeks            This document has been electronically signed by Jacoby ROBERT  POLA Serrano on January 21, 2019 11:18 AM     EMR Dragon/Transcription disclaimer:   Much of this encounter note is an electronic transcription/translation of spoken language to printed text. The electronic translation of spoken language may permit erroneous, or at times, nonsensical words or phrases to be inadvertently transcribed; Although I have reviewed the note for such errors, some may still exist.    Jacoby Serrano DPM  1/21/2019  11:18 AM

## 2019-02-01 ENCOUNTER — OFFICE VISIT (OUTPATIENT)
Dept: PODIATRY | Facility: CLINIC | Age: 69
End: 2019-02-01

## 2019-02-01 VITALS — BODY MASS INDEX: 31.02 KG/M2 | WEIGHT: 229 LBS | HEIGHT: 72 IN

## 2019-02-01 DIAGNOSIS — Z89.422 HISTORY OF AMPUTATION OF LESSER TOE OF LEFT FOOT (HCC): ICD-10-CM

## 2019-02-01 DIAGNOSIS — E11.42 DIABETIC POLYNEUROPATHY ASSOCIATED WITH TYPE 2 DIABETES MELLITUS (HCC): ICD-10-CM

## 2019-02-01 DIAGNOSIS — L97.522 SKIN ULCER OF TOE OF LEFT FOOT WITH FAT LAYER EXPOSED (HCC): Primary | ICD-10-CM

## 2019-02-01 PROCEDURE — 11042 DBRDMT SUBQ TIS 1ST 20SQCM/<: CPT | Performed by: PODIATRIST

## 2019-02-01 NOTE — PROGRESS NOTES
Jadiel Dutton  1950  68 y.o. male   PCP- Shayy Banda , MARY  12/26/2018  BS: 128 per patient    Patient presents today for wound check     02/01/2019      Chief Complaint   Patient presents with   • Left Foot - Follow-up, Wound Check         History of Present Illness    Jadiel Dutton is a 68 y.o. male who presents for diabetic foot examination and and evaluation of left midfoot ulceration.  No new issues.    Past Medical History:   Diagnosis Date   • Abdominal pain    • Abnormal weight loss    • Acute bronchitis    • Anxiety state    • Benign essential hypertension    • Chronic sinusitis    • Constipation    • Cystitis    • Degenerative joint disease involving multiple joints    • Diabetic neuropathy (CMS/HCC)      bilateral hands      • Diastolic dysfunction    • Diverticular disease of colon    • Dyspnea    • Epigastric pain    • Essential hypertension    • Foot ulcer (CMS/HCC)    • Gastroesophageal reflux disease    • Generalized anxiety disorder    • Hyperlipidemia    • Inflammatory bowel disease     Possible Inflammatory Bowel Disease      • Lumbar pain     Pain radiating to lumbar region of back   n      • Pleuritic pain    • Radiculopathy     site unspecified      • Type 2 diabetes mellitus (CMS/HCC)    • Vesicular eczema of hands and feet          Past Surgical History:   Procedure Laterality Date   • BACK SURGERY      LOWER BACK SURGERY   • CARPAL TUNNEL RELEASE Bilateral    • COLONOSCOPY  06/11/2012    Internal & external hemorrhoids found.   • COLONOSCOPY N/A 8/22/2017    Procedure: COLONOSCOPY;  Surgeon: Cesar Hollis MD;  Location: [facility] ENDOSCOPY;  Service:    • ENDOSCOPY  06/11/2012    Slight stricture in the distal esophagus. Gastritis in stomach. Biopsy taken. Normal duodenum.   • ENDOSCOPY     • ENDOSCOPY N/A 8/22/2017    Procedure: ESOPHAGOGASTRODUODENOSCOPY possible dilation ;  Surgeon: Cesar Hollis MD;  Location: [facility] ENDOSCOPY;  Service:    • ENTEROSCOPY SMALL  BOWEL  08/27/2012    Gastritis in stomach. Normal jejunum. Biopsy taken. Normal duodenum   • FOOT SURGERY     • INJECTION OF MEDICATION  02/28/2014    Depo Medrol   • INJECTION OF MEDICATION  03/07/2014    Rocephin(2)   • ULNAR TUNNEL RELEASE           Family History   Problem Relation Age of Onset   • No Known Problems Other    • Hypertension Mother    • Heart attack Father    • No Known Problems Sister    • No Known Problems Brother    • No Known Problems Daughter    • No Known Problems Son    • No Known Problems Maternal Aunt    • No Known Problems Maternal Uncle    • No Known Problems Paternal Aunt    • No Known Problems Paternal Uncle    • No Known Problems Maternal Grandmother    • No Known Problems Maternal Grandfather    • No Known Problems Paternal Grandmother    • No Known Problems Paternal Grandfather          Social History     Socioeconomic History   • Marital status: Single     Spouse name: Not on file   • Number of children: Not on file   • Years of education: Not on file   • Highest education level: Not on file   Social Needs   • Financial resource strain: Not on file   • Food insecurity - worry: Not on file   • Food insecurity - inability: Not on file   • Transportation needs - medical: Not on file   • Transportation needs - non-medical: Not on file   Occupational History   • Not on file   Tobacco Use   • Smoking status: Never Smoker   • Smokeless tobacco: Never Used   Substance and Sexual Activity   • Alcohol use: No   • Drug use: No   • Sexual activity: Yes     Partners: Female   Other Topics Concern   • Not on file   Social History Narrative   • Not on file         Current Outpatient Medications   Medication Sig Dispense Refill   • albuterol (PROVENTIL HFA;VENTOLIN HFA) 108 (90 Base) MCG/ACT inhaler Inhale 2 puffs Every 4 (Four) Hours As Needed for Wheezing. 1 inhaler 2   • azelastine (ASTELIN) 0.1 % nasal spray 2 sprays into the nostril(s) as directed by provider Daily. Use in each nostril as  "directed 30 mL 11   • azithromycin (ZITHROMAX) 500 MG tablet Take 1 tablet by mouth Daily. 5 tablet 0   • B-D UF III MINI PEN NEEDLES 31G X 5 MM misc USE FOUR TIMES A DAY WITH INSULIN PENS 360 each 2   • DEXILANT 60 MG capsule TAKE 1 CAPSULE DAILY 90 capsule 2   • gabapentin (NEURONTIN) 300 MG capsule Take 1 capsule by mouth 4 (Four) Times a Day As Needed.     • Insulin NPH Isophane & Regular (HUMULIN 70/30 KWIKPEN) (70-30) 100 UNIT/ML suspension pen-injector INJECT 55 UNITS SUB Q BEFORE BREAKFAST, 65 UNITS BEFORE SUPPER, 30 MINUTES BEFORE BREAKFAST AND SUPPER     • lisinopril-hydrochlorothiazide (PRINZIDE,ZESTORETIC) 20-25 MG per tablet Take 1 tablet by mouth Daily.     • ondansetron ODT (ZOFRAN-ODT) 4 MG disintegrating tablet Take 1 tablet by mouth Every 6 (Six) Hours As Needed for Nausea or Vomiting. 12 tablet 0   • ONETOUCH VERIO test strip      • rosuvastatin (CRESTOR) 5 MG tablet Take 1 tablet by mouth Daily. 30 tablet 3   • Semaglutide (OZEMPIC) 0.25 or 0.5 MG/DOSE solution pen-injector AS DIRECTED 1 MG EVERY WEEK     • traMADol (ULTRAM) 50 MG tablet Take 50 mg by mouth Every 6 (Six) Hours As Needed for Moderate Pain .       Current Facility-Administered Medications   Medication Dose Route Frequency Provider Last Rate Last Dose   • triamcinolone acetonide (KENALOG-40) injection 80 mg  80 mg Intramuscular Once Shayy Banda APRN             OBJECTIVE    Ht 182.9 cm (72.01\")   Wt 104 kg (229 lb)   BMI 31.05 kg/m²       Review of Systems   Constitutional: Negative.    HENT: Negative.    Eyes: Negative.    Respiratory: Negative.    Cardiovascular: Negative.    Gastrointestinal: Negative.    Endocrine: Negative.    Genitourinary: Negative.    Musculoskeletal: Positive for back pain.   Skin: Positive for wound.   Allergic/Immunologic: Negative.    Neurological: Negative.    Hematological: Negative.    Psychiatric/Behavioral: Negative.          Physical Exam   Constitutional: he appears well-developed and " well-nourished.   HEENT: Normocephalic. Atraumatic  CV: No tenderness. RRR  Resp: Non-labored respiration. No wheezes.   Psychiatric: he has a normal mood and affect. his   behavior is normal.      Lower Extremity Exam:  Vascular: DP/PT pulses palpable 1+.   Negative hair growth.   Minimal perimalleolar edema  Neuro: Protective sensation absent, b/l.  DTRs intact  Integument: Full thickness ulcer to plantar lateral forefoot measuring approximately 1.0 x 0.4 x 0.2 cm .  Granular base with significant surrounding hyperkeratosis.    Atrophic skin noted b/l  No masses  Musculoskeletal: LE muscle strength 5/5.   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      Left foot wound debridement:  Risks and benefits discussed.  Left forefoot ulcer debrided of surrounding hyperkeratosis and devitalized tissue with 15 blade to level of subq  Pressure hemostasis obtained  Abx ointment and dsd applied.          ASSESSMENT AND PLAN    Jadiel was seen today for follow-up and wound check.    Diagnoses and all orders for this visit:    Skin ulcer of toe of left foot with fat layer exposed (CMS/HCC)    History of amputation of lesser toe of left foot (CMS/HCC)    Diabetic polyneuropathy associated with type 2 diabetes mellitus (CMS/HCC)      -Comprehensive DM foot exam performed. Pt educated on importance of tight glucose control and daily foot checks.   -Wound debridement as above  -Continue Promogram for home dressing changes  -Modification of custom insole for additional offloading performed.  Stressed importance of increased offloading of the wound site.   -Follow up 2 weeks            This document has been electronically signed by Jacoby Serrano DPM on February 1, 2019 1:25 PM     EMR Dragon/Transcription disclaimer:   Much of this encounter note is an electronic transcription/translation  of spoken language to printed text. The electronic translation of spoken language may permit erroneous, or at times, nonsensical words or phrases to be inadvertently transcribed; Although I have reviewed the note for such errors, some may still exist.    Jacoby Serrano DPM  2/1/2019  1:25 PM

## 2019-02-22 ENCOUNTER — OFFICE VISIT (OUTPATIENT)
Dept: PODIATRY | Facility: CLINIC | Age: 69
End: 2019-02-22

## 2019-02-22 VITALS — WEIGHT: 229 LBS | OXYGEN SATURATION: 98 % | HEIGHT: 72 IN | BODY MASS INDEX: 31.02 KG/M2 | HEART RATE: 121 BPM

## 2019-02-22 DIAGNOSIS — Z89.422 HISTORY OF AMPUTATION OF LESSER TOE OF LEFT FOOT (HCC): ICD-10-CM

## 2019-02-22 DIAGNOSIS — E11.42 DIABETIC POLYNEUROPATHY ASSOCIATED WITH TYPE 2 DIABETES MELLITUS (HCC): ICD-10-CM

## 2019-02-22 DIAGNOSIS — L97.522 SKIN ULCER OF TOE OF LEFT FOOT WITH FAT LAYER EXPOSED (HCC): Primary | ICD-10-CM

## 2019-02-22 PROCEDURE — 11042 DBRDMT SUBQ TIS 1ST 20SQCM/<: CPT | Performed by: PODIATRIST

## 2019-02-22 NOTE — PROGRESS NOTES
Jadiel Dutton  1950  68 y.o. male   PCP- Shayy Banda , APRN  12/26/2018  BS: 117 per patient    Patient presents today for wound check      02/22/2019      Chief Complaint   Patient presents with   • Left Foot - Follow-up, Wound Check         History of Present Illness    Jadiel Dutton is a 68 y.o. male who presents for diabetic foot examination and and evaluation of left midfoot ulceration.  No new issues.    Past Medical History:   Diagnosis Date   • Abdominal pain    • Abnormal weight loss    • Acute bronchitis    • Anxiety state    • Benign essential hypertension    • Chronic sinusitis    • Constipation    • Cystitis    • Degenerative joint disease involving multiple joints    • Diabetic neuropathy (CMS/HCC)      bilateral hands      • Diastolic dysfunction    • Diverticular disease of colon    • Dyspnea    • Epigastric pain    • Essential hypertension    • Foot ulcer (CMS/HCC)    • Gastroesophageal reflux disease    • Generalized anxiety disorder    • Hyperlipidemia    • Inflammatory bowel disease     Possible Inflammatory Bowel Disease      • Lumbar pain     Pain radiating to lumbar region of back   n      • Pleuritic pain    • Radiculopathy     site unspecified      • Type 2 diabetes mellitus (CMS/HCC)    • Vesicular eczema of hands and feet          Past Surgical History:   Procedure Laterality Date   • BACK SURGERY      LOWER BACK SURGERY   • CARPAL TUNNEL RELEASE Bilateral    • COLONOSCOPY  06/11/2012    Internal & external hemorrhoids found.   • COLONOSCOPY N/A 8/22/2017    Procedure: COLONOSCOPY;  Surgeon: Cesar Hollis MD;  Location: [facility] ENDOSCOPY;  Service:    • ENDOSCOPY  06/11/2012    Slight stricture in the distal esophagus. Gastritis in stomach. Biopsy taken. Normal duodenum.   • ENDOSCOPY     • ENDOSCOPY N/A 8/22/2017    Procedure: ESOPHAGOGASTRODUODENOSCOPY possible dilation ;  Surgeon: Cesar Hollis MD;  Location: [facility] ENDOSCOPY;  Service:    • ENTEROSCOPY SMALL  BOWEL  08/27/2012    Gastritis in stomach. Normal jejunum. Biopsy taken. Normal duodenum   • FOOT SURGERY     • INJECTION OF MEDICATION  02/28/2014    Depo Medrol   • INJECTION OF MEDICATION  03/07/2014    Rocephin(2)   • ULNAR TUNNEL RELEASE           Family History   Problem Relation Age of Onset   • No Known Problems Other    • Hypertension Mother    • Heart attack Father    • No Known Problems Sister    • No Known Problems Brother    • No Known Problems Daughter    • No Known Problems Son    • No Known Problems Maternal Aunt    • No Known Problems Maternal Uncle    • No Known Problems Paternal Aunt    • No Known Problems Paternal Uncle    • No Known Problems Maternal Grandmother    • No Known Problems Maternal Grandfather    • No Known Problems Paternal Grandmother    • No Known Problems Paternal Grandfather          Social History     Socioeconomic History   • Marital status: Single     Spouse name: Not on file   • Number of children: Not on file   • Years of education: Not on file   • Highest education level: Not on file   Social Needs   • Financial resource strain: Not on file   • Food insecurity - worry: Not on file   • Food insecurity - inability: Not on file   • Transportation needs - medical: Not on file   • Transportation needs - non-medical: Not on file   Occupational History   • Not on file   Tobacco Use   • Smoking status: Never Smoker   • Smokeless tobacco: Never Used   Substance and Sexual Activity   • Alcohol use: No   • Drug use: No   • Sexual activity: Yes     Partners: Female   Other Topics Concern   • Not on file   Social History Narrative   • Not on file         Current Outpatient Medications   Medication Sig Dispense Refill   • albuterol (PROVENTIL HFA;VENTOLIN HFA) 108 (90 Base) MCG/ACT inhaler Inhale 2 puffs Every 4 (Four) Hours As Needed for Wheezing. 1 inhaler 2   • azelastine (ASTELIN) 0.1 % nasal spray 2 sprays into the nostril(s) as directed by provider Daily. Use in each nostril as  "directed 30 mL 11   • azithromycin (ZITHROMAX) 500 MG tablet Take 1 tablet by mouth Daily. 5 tablet 0   • B-D UF III MINI PEN NEEDLES 31G X 5 MM misc USE FOUR TIMES A DAY WITH INSULIN PENS 360 each 2   • DEXILANT 60 MG capsule TAKE 1 CAPSULE DAILY 90 capsule 2   • gabapentin (NEURONTIN) 300 MG capsule Take 1 capsule by mouth 4 (Four) Times a Day As Needed.     • Insulin NPH Isophane & Regular (HUMULIN 70/30 KWIKPEN) (70-30) 100 UNIT/ML suspension pen-injector INJECT 55 UNITS SUB Q BEFORE BREAKFAST, 65 UNITS BEFORE SUPPER, 30 MINUTES BEFORE BREAKFAST AND SUPPER     • lisinopril-hydrochlorothiazide (PRINZIDE,ZESTORETIC) 20-25 MG per tablet Take 1 tablet by mouth Daily.     • ondansetron ODT (ZOFRAN-ODT) 4 MG disintegrating tablet Take 1 tablet by mouth Every 6 (Six) Hours As Needed for Nausea or Vomiting. 12 tablet 0   • ONETOUCH VERIO test strip      • rosuvastatin (CRESTOR) 5 MG tablet Take 1 tablet by mouth Daily. 30 tablet 3   • Semaglutide (OZEMPIC) 0.25 or 0.5 MG/DOSE solution pen-injector AS DIRECTED 1 MG EVERY WEEK     • traMADol (ULTRAM) 50 MG tablet Take 50 mg by mouth Every 6 (Six) Hours As Needed for Moderate Pain .       Current Facility-Administered Medications   Medication Dose Route Frequency Provider Last Rate Last Dose   • triamcinolone acetonide (KENALOG-40) injection 80 mg  80 mg Intramuscular Once Shayy Banda, MARY             OBJECTIVE    Pulse (!) 121   Ht 182.9 cm (72.01\")   Wt 104 kg (229 lb)   SpO2 98%   BMI 31.05 kg/m²       Review of Systems   Constitutional: Negative.    HENT: Negative.    Eyes: Negative.    Respiratory: Negative.    Cardiovascular: Negative.    Gastrointestinal: Negative.    Endocrine: Negative.    Genitourinary: Negative.    Musculoskeletal: Positive for back pain.   Skin: Positive for wound.   Allergic/Immunologic: Negative.    Neurological: Negative.    Hematological: Negative.    Psychiatric/Behavioral: Negative.          Physical Exam   Constitutional: he " appears well-developed and well-nourished.   HEENT: Normocephalic. Atraumatic  CV: No tenderness. RRR  Resp: Non-labored respiration. No wheezes.   Psychiatric: he has a normal mood and affect. his   behavior is normal.      Lower Extremity Exam:  Vascular: DP/PT pulses palpable 1+.   Negative hair growth.   Minimal perimalleolar edema  Neuro: Protective sensation absent, b/l.  DTRs intact  Integument: Full thickness ulcer to plantar lateral forefoot measuring approximately 0.9 x 0.3 x 0.2 cm .  Granular base with significant surrounding hyperkeratosis.    Atrophic skin noted b/l  No masses  Musculoskeletal: LE muscle strength 5/5.   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      Left foot wound debridement:  Risks and benefits discussed.  Left forefoot ulcer debrided of surrounding hyperkeratosis and devitalized tissue with 15 blade to level of subq  Pressure hemostasis obtained  Abx ointment and dsd applied.          ASSESSMENT AND PLAN    Jadiel was seen today for follow-up and wound check.    Diagnoses and all orders for this visit:    Skin ulcer of toe of left foot with fat layer exposed (CMS/HCC)    History of amputation of lesser toe of left foot (CMS/HCC)    Diabetic polyneuropathy associated with type 2 diabetes mellitus (CMS/HCC)      -Comprehensive DM foot exam performed. Pt educated on importance of tight glucose control and daily foot checks.   -Wound debridement as above  -Continue Promogram for home dressing changes  -Modification of custom insole for additional offloading performed.  Stressed importance of increased offloading of the wound site.   -Follow up 2 weeks            This document has been electronically signed by Jacoby Serrano DPM on February 23, 2019 2:11 PM     EMR Dragon/Transcription disclaimer:   Much of this encounter note is an  electronic transcription/translation of spoken language to printed text. The electronic translation of spoken language may permit erroneous, or at times, nonsensical words or phrases to be inadvertently transcribed; Although I have reviewed the note for such errors, some may still exist.    Jacoby Serrano DPM  2/23/2019  2:11 PM

## 2019-03-08 ENCOUNTER — OFFICE VISIT (OUTPATIENT)
Dept: PODIATRY | Facility: CLINIC | Age: 69
End: 2019-03-08

## 2019-03-08 VITALS — WEIGHT: 214 LBS | HEIGHT: 72 IN | BODY MASS INDEX: 28.99 KG/M2 | HEART RATE: 114 BPM | OXYGEN SATURATION: 95 %

## 2019-03-08 DIAGNOSIS — L97.522 SKIN ULCER OF TOE OF LEFT FOOT WITH FAT LAYER EXPOSED (HCC): Primary | ICD-10-CM

## 2019-03-08 DIAGNOSIS — E11.8 TYPE 2 DIABETES MELLITUS WITH COMPLICATION, WITH LONG-TERM CURRENT USE OF INSULIN (HCC): ICD-10-CM

## 2019-03-08 DIAGNOSIS — Z89.422 HISTORY OF AMPUTATION OF LESSER TOE OF LEFT FOOT (HCC): ICD-10-CM

## 2019-03-08 DIAGNOSIS — Z79.4 TYPE 2 DIABETES MELLITUS WITH COMPLICATION, WITH LONG-TERM CURRENT USE OF INSULIN (HCC): ICD-10-CM

## 2019-03-08 DIAGNOSIS — M79.642 HAND PAIN, LEFT: ICD-10-CM

## 2019-03-08 PROCEDURE — 11042 DBRDMT SUBQ TIS 1ST 20SQCM/<: CPT | Performed by: PODIATRIST

## 2019-03-08 NOTE — PROGRESS NOTES
Jadiel Dutton  1950  68 y.o. male   PCP- Shayy Banda , APRN  12/26/2018  BS: 126 per patient    Patient presents today for wound check      03/08/2019    Chief Complaint   Patient presents with   • Left Foot - Follow-up, Wound Check         History of Present Illness    Jadiel Dutton is a 68 y.o. male who presents for diabetic foot examination and and evaluation of left midfoot ulceration.  Does note some chronic hand issues and is wondering if there is a specialist he could see.    Past Medical History:   Diagnosis Date   • Abdominal pain    • Abnormal weight loss    • Acute bronchitis    • Anxiety state    • Benign essential hypertension    • Chronic sinusitis    • Constipation    • Cystitis    • Degenerative joint disease involving multiple joints    • Diabetic neuropathy (CMS/HCC)      bilateral hands      • Diastolic dysfunction    • Diverticular disease of colon    • Dyspnea    • Epigastric pain    • Essential hypertension    • Foot ulcer (CMS/HCC)    • Gastroesophageal reflux disease    • Generalized anxiety disorder    • Hyperlipidemia    • Inflammatory bowel disease     Possible Inflammatory Bowel Disease      • Lumbar pain     Pain radiating to lumbar region of back   n      • Pleuritic pain    • Radiculopathy     site unspecified      • Type 2 diabetes mellitus (CMS/HCC)    • Vesicular eczema of hands and feet          Past Surgical History:   Procedure Laterality Date   • BACK SURGERY      LOWER BACK SURGERY   • CARPAL TUNNEL RELEASE Bilateral    • COLONOSCOPY  06/11/2012    Internal & external hemorrhoids found.   • COLONOSCOPY N/A 8/22/2017    Procedure: COLONOSCOPY;  Surgeon: Cesar Hollis MD;  Location: [facility] ENDOSCOPY;  Service:    • ENDOSCOPY  06/11/2012    Slight stricture in the distal esophagus. Gastritis in stomach. Biopsy taken. Normal duodenum.   • ENDOSCOPY     • ENDOSCOPY N/A 8/22/2017    Procedure: ESOPHAGOGASTRODUODENOSCOPY possible dilation ;  Surgeon: Cesar ROJAS  MD Telma;  Location: [facility] ENDOSCOPY;  Service:    • ENTEROSCOPY SMALL BOWEL  08/27/2012    Gastritis in stomach. Normal jejunum. Biopsy taken. Normal duodenum   • FOOT SURGERY     • INJECTION OF MEDICATION  02/28/2014    Depo Medrol   • INJECTION OF MEDICATION  03/07/2014    Rocephin(2)   • ULNAR TUNNEL RELEASE           Family History   Problem Relation Age of Onset   • No Known Problems Other    • Hypertension Mother    • Heart attack Father    • No Known Problems Sister    • No Known Problems Brother    • No Known Problems Daughter    • No Known Problems Son    • No Known Problems Maternal Aunt    • No Known Problems Maternal Uncle    • No Known Problems Paternal Aunt    • No Known Problems Paternal Uncle    • No Known Problems Maternal Grandmother    • No Known Problems Maternal Grandfather    • No Known Problems Paternal Grandmother    • No Known Problems Paternal Grandfather          Social History     Socioeconomic History   • Marital status: Single     Spouse name: Not on file   • Number of children: Not on file   • Years of education: Not on file   • Highest education level: Not on file   Social Needs   • Financial resource strain: Not on file   • Food insecurity - worry: Not on file   • Food insecurity - inability: Not on file   • Transportation needs - medical: Not on file   • Transportation needs - non-medical: Not on file   Occupational History   • Not on file   Tobacco Use   • Smoking status: Never Smoker   • Smokeless tobacco: Never Used   Substance and Sexual Activity   • Alcohol use: No   • Drug use: No   • Sexual activity: Yes     Partners: Female   Other Topics Concern   • Not on file   Social History Narrative   • Not on file         Current Outpatient Medications   Medication Sig Dispense Refill   • albuterol (PROVENTIL HFA;VENTOLIN HFA) 108 (90 Base) MCG/ACT inhaler Inhale 2 puffs Every 4 (Four) Hours As Needed for Wheezing. 1 inhaler 2   • azelastine (ASTELIN) 0.1 % nasal spray 2 sprays  "into the nostril(s) as directed by provider Daily. Use in each nostril as directed 30 mL 11   • B-D UF III MINI PEN NEEDLES 31G X 5 MM misc USE FOUR TIMES A DAY WITH INSULIN PENS 360 each 2   • DEXILANT 60 MG capsule TAKE 1 CAPSULE DAILY 90 capsule 2   • gabapentin (NEURONTIN) 300 MG capsule Take 1 capsule by mouth 4 (Four) Times a Day As Needed.     • Insulin NPH Isophane & Regular (HUMULIN 70/30 KWIKPEN) (70-30) 100 UNIT/ML suspension pen-injector INJECT 55 UNITS SUB Q BEFORE BREAKFAST, 65 UNITS BEFORE SUPPER, 30 MINUTES BEFORE BREAKFAST AND SUPPER     • lisinopril-hydrochlorothiazide (PRINZIDE,ZESTORETIC) 20-25 MG per tablet Take 1 tablet by mouth Daily.     • ONETOUCH VERIO test strip      • rosuvastatin (CRESTOR) 5 MG tablet Take 1 tablet by mouth Daily. 30 tablet 3   • Semaglutide (OZEMPIC) 0.25 or 0.5 MG/DOSE solution pen-injector AS DIRECTED 1 MG EVERY WEEK     • azithromycin (ZITHROMAX) 500 MG tablet Take 1 tablet by mouth Daily. 5 tablet 0   • ondansetron ODT (ZOFRAN-ODT) 4 MG disintegrating tablet Take 1 tablet by mouth Every 6 (Six) Hours As Needed for Nausea or Vomiting. 12 tablet 0   • traMADol (ULTRAM) 50 MG tablet Take 50 mg by mouth Every 6 (Six) Hours As Needed for Moderate Pain .       Current Facility-Administered Medications   Medication Dose Route Frequency Provider Last Rate Last Dose   • triamcinolone acetonide (KENALOG-40) injection 80 mg  80 mg Intramuscular Once Shayy Banda APRN             OBJECTIVE    Pulse 114   Ht 182.9 cm (72.01\")   Wt 97.1 kg (214 lb)   SpO2 95%   BMI 29.02 kg/m²       Review of Systems   Constitutional: Negative.    HENT: Negative.    Eyes: Negative.    Respiratory: Negative.    Cardiovascular: Negative.    Gastrointestinal: Negative.    Endocrine: Negative.    Genitourinary: Negative.    Musculoskeletal: Positive for back pain.   Skin: Positive for wound.   Allergic/Immunologic: Negative.    Neurological: Negative.    Hematological: Negative.  "   Psychiatric/Behavioral: Negative.          Physical Exam   Constitutional: he appears well-developed and well-nourished.   HEENT: Normocephalic. Atraumatic  CV: No tenderness. RRR  Resp: Non-labored respiration. No wheezes.   Psychiatric: he has a normal mood and affect. his   behavior is normal.      Lower Extremity Exam:  Vascular: DP/PT pulses palpable 1+.   Negative hair growth.   Minimal perimalleolar edema  Neuro: Protective sensation absent, b/l.  DTRs intact  Integument: Full thickness ulcer to plantar lateral forefoot measuring approximately 0.7 x 0.3 x 0.2 cm .  Granular base with significant surrounding hyperkeratosis.    Atrophic skin noted b/l  No masses  Musculoskeletal: LE muscle strength 5/5.   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      Left foot wound debridement:  Risks and benefits discussed.  Left forefoot ulcer debrided of surrounding hyperkeratosis and devitalized tissue with 15 blade to level of subq  Pressure hemostasis obtained  Abx ointment and dsd applied.          ASSESSMENT AND PLAN    Jadiel was seen today for follow-up and wound check.    Diagnoses and all orders for this visit:    Skin ulcer of toe of left foot with fat layer exposed (CMS/HCC)    Hand pain, left  -     Ambulatory Referral to Orthopedic Surgery    History of amputation of lesser toe of left foot (CMS/HCC)      -Comprehensive DM foot exam performed. Pt educated on importance of tight glucose control and daily foot checks.   -Wound debridement as above  -Continue Promogram for home dressing changes  -Modification of custom insole for additional offloading performed.  Stressed importance of increased offloading of the wound site.   -Refer to ortho for further workup of hand pain  -Follow up 2 weeks            This document has been electronically signed by Jacoby Serrano,  POLA on March 13, 2019 8:21 PM     EMR Dragon/Transcription disclaimer:   Much of this encounter note is an electronic transcription/translation of spoken language to printed text. The electronic translation of spoken language may permit erroneous, or at times, nonsensical words or phrases to be inadvertently transcribed; Although I have reviewed the note for such errors, some may still exist.    Jacoby Serrano DPM  3/13/2019  8:21 PM

## 2019-03-13 DIAGNOSIS — M79.642 LEFT HAND PAIN: Primary | ICD-10-CM

## 2019-03-15 ENCOUNTER — OFFICE VISIT (OUTPATIENT)
Dept: ORTHOPEDIC SURGERY | Facility: CLINIC | Age: 69
End: 2019-03-15

## 2019-03-15 VITALS — BODY MASS INDEX: 30.07 KG/M2 | WEIGHT: 222 LBS | HEIGHT: 72 IN

## 2019-03-15 DIAGNOSIS — G56.03 BILATERAL CARPAL TUNNEL SYNDROME: ICD-10-CM

## 2019-03-15 DIAGNOSIS — G56.23 ULNAR NEUROPATHY OF BOTH UPPER EXTREMITIES: ICD-10-CM

## 2019-03-15 DIAGNOSIS — M79.641 RIGHT HAND PAIN: Primary | ICD-10-CM

## 2019-03-15 DIAGNOSIS — M79.641 BILATERAL HAND PAIN: ICD-10-CM

## 2019-03-15 DIAGNOSIS — M72.0 DUPUYTREN'S CONTRACTURE OF BOTH HANDS: ICD-10-CM

## 2019-03-15 DIAGNOSIS — M79.642 BILATERAL HAND PAIN: ICD-10-CM

## 2019-03-15 PROCEDURE — 99203 OFFICE O/P NEW LOW 30 MIN: CPT | Performed by: ORTHOPAEDIC SURGERY

## 2019-03-15 NOTE — PROGRESS NOTES
Jadiel Dutton is a 68 y.o. male   Primary provider:  Shayy Banda APRN       Chief Complaint   Patient presents with   • Left Hand - Pain   • Right Hand - Pain       HISTORY OF PRESENT ILLNESS: Patient is here today for bilateral hand pain. Patient was sent to [facility] upon arrival. Patient states that his pain is 8/10.  He has diabetes was under good control.  He is noted progressive loss of function of both of his hands over the past 2 years.  About 3 years ago he had problems and had nerve both ulnar and median nerve released on the left side and 2 years ago on the right side.  He tells me he never got better but the left side had a little better than the right side.  His diabetes is under good control hemoglobin A1c he tells me is in 5.9.  He is losing function for every day activities unable to write with his right hand button buttons and things of this nature has significant loss of function.  He also has Dupuytren's contracture.  The left side he has had a band resected.    History of Present Illness     CONCURRENT MEDICAL HISTORY:    Past Medical History:   Diagnosis Date   • Abdominal pain    • Abnormal weight loss    • Acute bronchitis    • Anxiety state    • Benign essential hypertension    • Chronic sinusitis    • Constipation    • Cystitis    • Degenerative joint disease involving multiple joints    • Diabetic neuropathy (CMS/HCC)      bilateral hands      • Diastolic dysfunction    • Diverticular disease of colon    • Dyspnea    • Epigastric pain    • Essential hypertension    • Foot ulcer (CMS/HCC)    • Gastroesophageal reflux disease    • Generalized anxiety disorder    • Hyperlipidemia    • Inflammatory bowel disease     Possible Inflammatory Bowel Disease      • Lumbar pain     Pain radiating to lumbar region of back   n      • Pleuritic pain    • Radiculopathy     site unspecified      • Type 2 diabetes mellitus (CMS/HCC)    • Vesicular eczema of hands and feet        Allergies   Allergen  Reactions   • Penicillins Shortness Of Breath   • Bactrim [Sulfamethoxazole-Trimethoprim] GI Intolerance   • Ceftin [Cefuroxime Axetil] GI Intolerance   • Sulfa Antibiotics GI Intolerance   • Ciprofloxacin Itching and Rash         Current Outpatient Medications:   •  albuterol (PROVENTIL HFA;VENTOLIN HFA) 108 (90 Base) MCG/ACT inhaler, Inhale 2 puffs Every 4 (Four) Hours As Needed for Wheezing., Disp: 1 inhaler, Rfl: 2  •  azelastine (ASTELIN) 0.1 % nasal spray, 2 sprays into the nostril(s) as directed by provider Daily. Use in each nostril as directed, Disp: 30 mL, Rfl: 11  •  azithromycin (ZITHROMAX) 500 MG tablet, Take 1 tablet by mouth Daily., Disp: 5 tablet, Rfl: 0  •  B-D UF III MINI PEN NEEDLES 31G X 5 MM misc, USE FOUR TIMES A DAY WITH INSULIN PENS, Disp: 360 each, Rfl: 2  •  DEXILANT 60 MG capsule, TAKE 1 CAPSULE DAILY, Disp: 90 capsule, Rfl: 2  •  gabapentin (NEURONTIN) 300 MG capsule, Take 1 capsule by mouth 4 (Four) Times a Day As Needed., Disp: , Rfl:   •  Insulin NPH Isophane & Regular (HUMULIN 70/30 KWIKPEN) (70-30) 100 UNIT/ML suspension pen-injector, INJECT 55 UNITS SUB Q BEFORE BREAKFAST, 65 UNITS BEFORE SUPPER, 30 MINUTES BEFORE BREAKFAST AND SUPPER, Disp: , Rfl:   •  lisinopril-hydrochlorothiazide (PRINZIDE,ZESTORETIC) 20-25 MG per tablet, Take 1 tablet by mouth Daily., Disp: , Rfl:   •  ondansetron ODT (ZOFRAN-ODT) 4 MG disintegrating tablet, Take 1 tablet by mouth Every 6 (Six) Hours As Needed for Nausea or Vomiting., Disp: 12 tablet, Rfl: 0  •  ONETOUCH VERIO test strip, , Disp: , Rfl:   •  rosuvastatin (CRESTOR) 5 MG tablet, Take 1 tablet by mouth Daily., Disp: 30 tablet, Rfl: 3  •  Semaglutide (OZEMPIC) 0.25 or 0.5 MG/DOSE solution pen-injector, AS DIRECTED 1 MG EVERY WEEK, Disp: , Rfl:   •  traMADol (ULTRAM) 50 MG tablet, Take 50 mg by mouth Every 6 (Six) Hours As Needed for Moderate Pain ., Disp: , Rfl:     Current Facility-Administered Medications:   •  triamcinolone acetonide  (KENALOG-40) injection 80 mg, 80 mg, Intramuscular, Once, Shayy Banda APRN    Past Surgical History:   Procedure Laterality Date   • BACK SURGERY      LOWER BACK SURGERY   • CARPAL TUNNEL RELEASE Bilateral    • COLONOSCOPY  06/11/2012    Internal & external hemorrhoids found.   • COLONOSCOPY N/A 8/22/2017    Procedure: COLONOSCOPY;  Surgeon: Cesar Hollis MD;  Location: [facility] ENDOSCOPY;  Service:    • ENDOSCOPY  06/11/2012    Slight stricture in the distal esophagus. Gastritis in stomach. Biopsy taken. Normal duodenum.   • ENDOSCOPY     • ENDOSCOPY N/A 8/22/2017    Procedure: ESOPHAGOGASTRODUODENOSCOPY possible dilation ;  Surgeon: Cesar Hollis MD;  Location: [facility] ENDOSCOPY;  Service:    • ENTEROSCOPY SMALL BOWEL  08/27/2012    Gastritis in stomach. Normal jejunum. Biopsy taken. Normal duodenum   • FOOT SURGERY     • INJECTION OF MEDICATION  02/28/2014    Depo Medrol   • INJECTION OF MEDICATION  03/07/2014    Rocephin(2)   • ULNAR TUNNEL RELEASE         Family History   Problem Relation Age of Onset   • No Known Problems Other    • Hypertension Mother    • Heart attack Father    • No Known Problems Sister    • No Known Problems Brother    • No Known Problems Daughter    • No Known Problems Son    • No Known Problems Maternal Aunt    • No Known Problems Maternal Uncle    • No Known Problems Paternal Aunt    • No Known Problems Paternal Uncle    • No Known Problems Maternal Grandmother    • No Known Problems Maternal Grandfather    • No Known Problems Paternal Grandmother    • No Known Problems Paternal Grandfather         Social History     Socioeconomic History   • Marital status: Single     Spouse name: Not on file   • Number of children: Not on file   • Years of education: Not on file   • Highest education level: Not on file   Social Needs   • Financial resource strain: Not on file   • Food insecurity - worry: Not on file   • Food insecurity - inability: Not on file   • Transportation needs -  "medical: Not on file   • Transportation needs - non-medical: Not on file   Occupational History   • Not on file   Tobacco Use   • Smoking status: Never Smoker   • Smokeless tobacco: Never Used   Substance and Sexual Activity   • Alcohol use: No   • Drug use: No   • Sexual activity: Yes     Partners: Female   Other Topics Concern   • Not on file   Social History Narrative   • Not on file        Review of Systems   Constitutional: Negative for chills and fever.   HENT: Negative.  Negative for facial swelling.    Eyes: Negative for photophobia.   Respiratory: Negative for apnea and shortness of breath.    Cardiovascular: Negative for chest pain and leg swelling.   Gastrointestinal: Negative for abdominal pain, nausea and vomiting.   Genitourinary: Negative.  Negative for dysuria.   Musculoskeletal: Positive for arthralgias, back pain and joint swelling.        Bilateral hand pain     Skin: Negative for color change and rash.   Allergic/Immunologic: Negative.    Neurological: Positive for weakness and numbness. Negative for seizures and syncope.   Hematological: Bruises/bleeds easily.   Psychiatric/Behavioral: Negative for behavioral problems and dysphoric mood.       PHYSICAL EXAMINATION:       Ht 182.9 cm (72\")   Wt 101 kg (222 lb)   BMI 30.11 kg/m²     Physical Exam   Constitutional: He is oriented to person, place, and time. He appears well-developed.   HENT:   Head: Normocephalic and atraumatic.   Eyes: EOM are normal. Pupils are equal, round, and reactive to light.   Neck: Neck supple. No tracheal deviation present.   Pulmonary/Chest: Effort normal.   Musculoskeletal: He exhibits tenderness and deformity. He exhibits no edema.   Neurological: He is alert and oriented to person, place, and time. A sensory deficit is present.   Skin: Skin is warm and dry. No erythema.   Psychiatric: He has a normal mood and affect.       GAIT:     [x]  Normal  []  Antalgic    Assistive device: []  None  []  Walker     []  " Crutches  []  Cane     []  Wheelchair  []  Stretcher    Ortho Exam  Marked atrophy of the first dorsal interosseous of both hands as well as the hyperthenar muscles.  Well-healed scars over the cubital tunnel.  I do palpate what appears to be the ulnar nerve in the tunnel on both sides.  The left side has the appearance that it had a transposition but I am not certain.  He has Dupuytren's band in the third ray with minimal contracture on the left and the fourth ray with some 20-30 degrees contracture on the right side.  His skin is very dry in both hands.  Thenar muscles appear not as affected.    No results found.  Three-view x-ray each hand is negative with the exception of some calcified vessels      ASSESSMENT:    Diagnoses and all orders for this visit:    Right hand pain  -     Cancel: XR Finger 2+ View Right  -     XR hand 3+ vw right  -     Ambulatory Referral to Neurology    Bilateral hand pain  -     Ambulatory Referral to Neurology    Ulnar neuropathy of both upper extremities    Bilateral carpal tunnel syndrome    Dupuytren's contracture of both hands          PLAN I think at this point he needs neurologic neurology consult and repeat testing of both upper extremities.  He does have results of previous testing at home and I think this would be important for me to see as well as the neurologist.  I am going to refer him to Dr. Vu and see him back after this consultation to see what Dr. Vu may have to offer.  I think the Dupuytren's contracture is bothersome but it is a separate issue.  I have told him that certainly recurrent surgery on both of these things is not very fruitful in general and I wonder if he has some underlying nerve problem as well i.e. diabetic neuropathy which I suspect is a portion of the issue.  The intrinsic wasting is significant    No Follow-up on file.        This document has been electronically signed by Christiano Woodson MD on March 15, 2019 2:50 PM

## 2019-03-29 ENCOUNTER — OFFICE VISIT (OUTPATIENT)
Dept: PODIATRY | Facility: CLINIC | Age: 69
End: 2019-03-29

## 2019-03-29 VITALS — HEART RATE: 104 BPM | WEIGHT: 222 LBS | BODY MASS INDEX: 30.07 KG/M2 | HEIGHT: 72 IN | OXYGEN SATURATION: 98 %

## 2019-03-29 DIAGNOSIS — Z79.4 TYPE 2 DIABETES MELLITUS WITH COMPLICATION, WITH LONG-TERM CURRENT USE OF INSULIN (HCC): ICD-10-CM

## 2019-03-29 DIAGNOSIS — E11.42 DIABETIC POLYNEUROPATHY ASSOCIATED WITH TYPE 2 DIABETES MELLITUS (HCC): ICD-10-CM

## 2019-03-29 DIAGNOSIS — L97.522 SKIN ULCER OF TOE OF LEFT FOOT WITH FAT LAYER EXPOSED (HCC): Primary | ICD-10-CM

## 2019-03-29 DIAGNOSIS — E11.8 TYPE 2 DIABETES MELLITUS WITH COMPLICATION, WITH LONG-TERM CURRENT USE OF INSULIN (HCC): ICD-10-CM

## 2019-03-29 PROCEDURE — 11042 DBRDMT SUBQ TIS 1ST 20SQCM/<: CPT | Performed by: PODIATRIST

## 2019-04-12 ENCOUNTER — OFFICE VISIT (OUTPATIENT)
Dept: PODIATRY | Facility: CLINIC | Age: 69
End: 2019-04-12

## 2019-04-12 VITALS — WEIGHT: 222 LBS | BODY MASS INDEX: 30.07 KG/M2 | HEIGHT: 72 IN

## 2019-04-12 DIAGNOSIS — L97.522 SKIN ULCER OF TOE OF LEFT FOOT WITH FAT LAYER EXPOSED (HCC): Primary | ICD-10-CM

## 2019-04-12 DIAGNOSIS — E11.8 TYPE 2 DIABETES MELLITUS WITH COMPLICATION, WITH LONG-TERM CURRENT USE OF INSULIN (HCC): ICD-10-CM

## 2019-04-12 DIAGNOSIS — Z79.4 TYPE 2 DIABETES MELLITUS WITH COMPLICATION, WITH LONG-TERM CURRENT USE OF INSULIN (HCC): ICD-10-CM

## 2019-04-12 DIAGNOSIS — E11.42 DIABETIC POLYNEUROPATHY ASSOCIATED WITH TYPE 2 DIABETES MELLITUS (HCC): ICD-10-CM

## 2019-04-12 PROCEDURE — 11042 DBRDMT SUBQ TIS 1ST 20SQCM/<: CPT | Performed by: PODIATRIST

## 2019-04-12 NOTE — PROGRESS NOTES
Jadiel Dutton  1950  68 y.o. male   PCP- Shayy Banda , APRN  12/26/2018  BS: 122 per patient    Patient presents today for wound check    04/12/2019      Chief Complaint   Patient presents with   • Left Foot - Follow-up, Wound Check         History of Present Illness    Jadiel Dutton is a 68 y.o. male who presents for diabetic foot examination and and evaluation of left midfoot ulceration.   Reports no new issues today.    Past Medical History:   Diagnosis Date   • Abdominal pain    • Abnormal weight loss    • Acute bronchitis    • Anxiety state    • Benign essential hypertension    • Chronic sinusitis    • Constipation    • Cystitis    • Degenerative joint disease involving multiple joints    • Diabetic neuropathy (CMS/HCC)      bilateral hands      • Diastolic dysfunction    • Diverticular disease of colon    • Dyspnea    • Epigastric pain    • Essential hypertension    • Foot ulcer (CMS/HCC)    • Gastroesophageal reflux disease    • Generalized anxiety disorder    • Hyperlipidemia    • Inflammatory bowel disease     Possible Inflammatory Bowel Disease      • Lumbar pain     Pain radiating to lumbar region of back   n      • Pleuritic pain    • Radiculopathy     site unspecified      • Type 2 diabetes mellitus (CMS/HCC)    • Vesicular eczema of hands and feet          Past Surgical History:   Procedure Laterality Date   • BACK SURGERY      LOWER BACK SURGERY   • CARPAL TUNNEL RELEASE Bilateral    • COLONOSCOPY  06/11/2012    Internal & external hemorrhoids found.   • COLONOSCOPY N/A 8/22/2017    Procedure: COLONOSCOPY;  Surgeon: Cesar Hollis MD;  Location: [facility] ENDOSCOPY;  Service:    • ENDOSCOPY  06/11/2012    Slight stricture in the distal esophagus. Gastritis in stomach. Biopsy taken. Normal duodenum.   • ENDOSCOPY     • ENDOSCOPY N/A 8/22/2017    Procedure: ESOPHAGOGASTRODUODENOSCOPY possible dilation ;  Surgeon: Cesar Hollis MD;  Location: [facility] ENDOSCOPY;  Service:    •  ENTEROSCOPY SMALL BOWEL  08/27/2012    Gastritis in stomach. Normal jejunum. Biopsy taken. Normal duodenum   • FOOT SURGERY     • INJECTION OF MEDICATION  02/28/2014    Depo Medrol   • INJECTION OF MEDICATION  03/07/2014    Rocephin(2)   • ULNAR TUNNEL RELEASE           Family History   Problem Relation Age of Onset   • No Known Problems Other    • Hypertension Mother    • Heart attack Father    • No Known Problems Sister    • No Known Problems Brother    • No Known Problems Daughter    • No Known Problems Son    • No Known Problems Maternal Aunt    • No Known Problems Maternal Uncle    • No Known Problems Paternal Aunt    • No Known Problems Paternal Uncle    • No Known Problems Maternal Grandmother    • No Known Problems Maternal Grandfather    • No Known Problems Paternal Grandmother    • No Known Problems Paternal Grandfather          Social History     Socioeconomic History   • Marital status: Single     Spouse name: Not on file   • Number of children: Not on file   • Years of education: Not on file   • Highest education level: Not on file   Tobacco Use   • Smoking status: Never Smoker   • Smokeless tobacco: Never Used   Substance and Sexual Activity   • Alcohol use: No   • Drug use: No   • Sexual activity: Yes     Partners: Female         Current Outpatient Medications   Medication Sig Dispense Refill   • albuterol (PROVENTIL HFA;VENTOLIN HFA) 108 (90 Base) MCG/ACT inhaler Inhale 2 puffs Every 4 (Four) Hours As Needed for Wheezing. 1 inhaler 2   • azelastine (ASTELIN) 0.1 % nasal spray 2 sprays into the nostril(s) as directed by provider Daily. Use in each nostril as directed 30 mL 11   • azithromycin (ZITHROMAX) 500 MG tablet Take 1 tablet by mouth Daily. 5 tablet 0   • B-D UF III MINI PEN NEEDLES 31G X 5 MM misc USE FOUR TIMES A DAY WITH INSULIN PENS 360 each 2   • DEXILANT 60 MG capsule TAKE 1 CAPSULE DAILY 90 capsule 2   • gabapentin (NEURONTIN) 300 MG capsule Take 1 capsule by mouth 4 (Four) Times a Day  "As Needed.     • Insulin NPH Isophane & Regular (HUMULIN 70/30 KWIKPEN) (70-30) 100 UNIT/ML suspension pen-injector INJECT 55 UNITS SUB Q BEFORE BREAKFAST, 65 UNITS BEFORE SUPPER, 30 MINUTES BEFORE BREAKFAST AND SUPPER     • lisinopril-hydrochlorothiazide (PRINZIDE,ZESTORETIC) 20-25 MG per tablet Take 1 tablet by mouth Daily.     • ondansetron ODT (ZOFRAN-ODT) 4 MG disintegrating tablet Take 1 tablet by mouth Every 6 (Six) Hours As Needed for Nausea or Vomiting. 12 tablet 0   • ONETOUCH VERIO test strip      • rosuvastatin (CRESTOR) 5 MG tablet Take 1 tablet by mouth Daily. 30 tablet 3   • Semaglutide (OZEMPIC) 0.25 or 0.5 MG/DOSE solution pen-injector AS DIRECTED 1 MG EVERY WEEK     • traMADol (ULTRAM) 50 MG tablet Take 50 mg by mouth Every 6 (Six) Hours As Needed for Moderate Pain .       Current Facility-Administered Medications   Medication Dose Route Frequency Provider Last Rate Last Dose   • triamcinolone acetonide (KENALOG-40) injection 80 mg  80 mg Intramuscular Once Shayy Banda APRN             OBJECTIVE    Ht 182.9 cm (72.01\")   Wt 101 kg (222 lb)   BMI 30.10 kg/m²       Review of Systems   Constitutional: Negative.    HENT: Negative.    Eyes: Negative.    Respiratory: Negative.    Cardiovascular: Negative.    Gastrointestinal: Negative.    Endocrine: Negative.    Genitourinary: Negative.    Musculoskeletal: Positive for back pain.   Skin: Positive for wound.   Allergic/Immunologic: Negative.    Neurological: Negative.    Hematological: Negative.    Psychiatric/Behavioral: Negative.          Physical Exam   Constitutional: he appears well-developed and well-nourished.   HEENT: Normocephalic. Atraumatic  CV: No tenderness. RRR  Resp: Non-labored respiration. No wheezes.   Psychiatric: he has a normal mood and affect. his   behavior is normal.      Lower Extremity Exam:  Vascular: DP/PT pulses palpable 1+.   Negative hair growth.   Minimal perimalleolar edema  Neuro: Protective sensation absent, " b/l.  DTRs intact  Integument: Full thickness ulcer to plantar lateral forefoot measuring approximately 0.8 x 0.2 x 0.2 cm .  Granular base with significant surrounding hyperkeratosis.    Atrophic skin noted b/l  No masses  Musculoskeletal: LE muscle strength 5/5.   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      Left foot wound debridement:  Risks and benefits discussed.  Left forefoot ulcer debrided of surrounding hyperkeratosis and devitalized tissue with 15 blade to level of subq  Pressure hemostasis obtained  Abx ointment and dsd applied.          ASSESSMENT AND PLAN    Jadiel was seen today for follow-up and wound check.    Diagnoses and all orders for this visit:    Skin ulcer of toe of left foot with fat layer exposed (CMS/HCC)    Type 2 diabetes mellitus with complication, with long-term current use of insulin (CMS/HCC)    Diabetic polyneuropathy associated with type 2 diabetes mellitus (CMS/HCC)      -Comprehensive DM foot exam performed. Pt educated on importance of tight glucose control and daily foot checks.   -Wound debridement as above  -Continue Promogram for home dressing changes  -Modification of custom insole for additional offloading performed.  Stressed importance of increased offloading of the wound site.   -Follow up 2 weeks            This document has been electronically signed by Jacoby Serrano DPM on April 14, 2019 4:40 PM     EMR Dragon/Transcription disclaimer:   Much of this encounter note is an electronic transcription/translation of spoken language to printed text. The electronic translation of spoken language may permit erroneous, or at times, nonsensical words or phrases to be inadvertently transcribed; Although I have reviewed the note for such errors, some may still exist.    Jacoby Serrano DPM  4/14/2019  4:40 PM

## 2019-04-24 ENCOUNTER — LAB (OUTPATIENT)
Dept: LAB | Facility: OTHER | Age: 69
End: 2019-04-24

## 2019-04-24 ENCOUNTER — OFFICE VISIT (OUTPATIENT)
Dept: FAMILY MEDICINE CLINIC | Facility: CLINIC | Age: 69
End: 2019-04-24

## 2019-04-24 VITALS
DIASTOLIC BLOOD PRESSURE: 70 MMHG | BODY MASS INDEX: 30.26 KG/M2 | SYSTOLIC BLOOD PRESSURE: 118 MMHG | HEART RATE: 68 BPM | TEMPERATURE: 97.5 F | WEIGHT: 223.4 LBS | HEIGHT: 72 IN

## 2019-04-24 DIAGNOSIS — R10.9 ABDOMINAL CRAMPING: ICD-10-CM

## 2019-04-24 DIAGNOSIS — R19.7 DIARRHEA, UNSPECIFIED TYPE: ICD-10-CM

## 2019-04-24 DIAGNOSIS — E66.9 CLASS 1 OBESITY WITH SERIOUS COMORBIDITY AND BODY MASS INDEX (BMI) OF 30.0 TO 30.9 IN ADULT, UNSPECIFIED OBESITY TYPE: Chronic | ICD-10-CM

## 2019-04-24 DIAGNOSIS — K52.9 GASTROENTERITIS: Primary | ICD-10-CM

## 2019-04-24 LAB
ALBUMIN SERPL-MCNC: 3.7 G/DL (ref 3.5–5)
ALBUMIN/GLOB SERPL: 1.2 G/DL (ref 1.1–1.8)
ALP SERPL-CCNC: 144 U/L (ref 38–126)
ALT SERPL W P-5'-P-CCNC: 17 U/L
ANION GAP SERPL CALCULATED.3IONS-SCNC: 7 MMOL/L (ref 5–15)
AST SERPL-CCNC: 23 U/L (ref 17–59)
BACTERIA UR QL AUTO: ABNORMAL /HPF
BASOPHILS # BLD AUTO: 0.02 10*3/MM3 (ref 0–0.2)
BASOPHILS NFR BLD AUTO: 0.2 % (ref 0–1.5)
BILIRUB SERPL-MCNC: 0.8 MG/DL (ref 0.2–1.3)
BILIRUB UR QL STRIP: NEGATIVE
BUN BLD-MCNC: 9 MG/DL (ref 7–23)
BUN/CREAT SERPL: 6.4 (ref 7–25)
CALCIUM SPEC-SCNC: 10 MG/DL (ref 8.4–10.2)
CHLORIDE SERPL-SCNC: 93 MMOL/L (ref 101–112)
CLARITY UR: CLEAR
CO2 SERPL-SCNC: 35 MMOL/L (ref 22–30)
COLOR UR: YELLOW
CREAT BLD-MCNC: 1.4 MG/DL (ref 0.7–1.3)
DEPRECATED RDW RBC AUTO: 49.7 FL (ref 37–54)
EOSINOPHIL # BLD AUTO: 0.39 10*3/MM3 (ref 0–0.4)
EOSINOPHIL NFR BLD AUTO: 3.4 % (ref 0.3–6.2)
ERYTHROCYTE [DISTWIDTH] IN BLOOD BY AUTOMATED COUNT: 15.9 % (ref 12.3–15.4)
GFR SERPL CREATININE-BSD FRML MDRD: 51 ML/MIN/1.73 (ref 49–113)
GLOBULIN UR ELPH-MCNC: 3.2 GM/DL (ref 2.3–3.5)
GLUCOSE BLD-MCNC: 136 MG/DL (ref 70–99)
GLUCOSE UR STRIP-MCNC: NEGATIVE MG/DL
HCT VFR BLD AUTO: 42.6 % (ref 37.5–51)
HGB BLD-MCNC: 13.9 G/DL (ref 13–17.7)
HGB UR QL STRIP.AUTO: NEGATIVE
HYALINE CASTS UR QL AUTO: ABNORMAL /LPF
KETONES UR QL STRIP: ABNORMAL
LEUKOCYTE ESTERASE UR QL STRIP.AUTO: NEGATIVE
LYMPHOCYTES # BLD AUTO: 1.85 10*3/MM3 (ref 0.7–3.1)
LYMPHOCYTES NFR BLD AUTO: 16 % (ref 19.6–45.3)
MCH RBC QN AUTO: 28.3 PG (ref 26.6–33)
MCHC RBC AUTO-ENTMCNC: 32.6 G/DL (ref 31.5–35.7)
MCV RBC AUTO: 86.6 FL (ref 79–97)
MONOCYTES # BLD AUTO: 0.73 10*3/MM3 (ref 0.1–0.9)
MONOCYTES NFR BLD AUTO: 6.3 % (ref 5–12)
MUCOUS THREADS URNS QL MICRO: ABNORMAL /HPF
NEUTROPHILS # BLD AUTO: 8.55 10*3/MM3 (ref 1.7–7)
NEUTROPHILS NFR BLD AUTO: 74.1 % (ref 42.7–76)
NITRITE UR QL STRIP: NEGATIVE
PH UR STRIP.AUTO: 8 [PH] (ref 5.5–8)
PLATELET # BLD AUTO: 303 10*3/MM3 (ref 140–450)
PMV BLD AUTO: 9.8 FL (ref 6–12)
POTASSIUM BLD-SCNC: 3.7 MMOL/L (ref 3.4–5)
PROT SERPL-MCNC: 6.9 G/DL (ref 6.3–8.6)
PROT UR QL STRIP: ABNORMAL
RBC # BLD AUTO: 4.92 10*6/MM3 (ref 4.14–5.8)
RBC # UR: ABNORMAL /HPF
REF LAB TEST METHOD: ABNORMAL
SODIUM BLD-SCNC: 135 MMOL/L (ref 137–145)
SP GR UR STRIP: 1.01 (ref 1–1.03)
SQUAMOUS #/AREA URNS HPF: ABNORMAL /HPF
UROBILINOGEN UR QL STRIP: ABNORMAL
WBC NRBC COR # BLD: 11.54 10*3/MM3 (ref 3.4–10.8)
WBC UR QL AUTO: ABNORMAL /HPF

## 2019-04-24 PROCEDURE — 81001 URINALYSIS AUTO W/SCOPE: CPT | Performed by: NURSE PRACTITIONER

## 2019-04-24 PROCEDURE — 36415 COLL VENOUS BLD VENIPUNCTURE: CPT | Performed by: NURSE PRACTITIONER

## 2019-04-24 PROCEDURE — 99214 OFFICE O/P EST MOD 30 MIN: CPT | Performed by: NURSE PRACTITIONER

## 2019-04-24 PROCEDURE — 96372 THER/PROPH/DIAG INJ SC/IM: CPT | Performed by: NURSE PRACTITIONER

## 2019-04-24 PROCEDURE — 85025 COMPLETE CBC W/AUTO DIFF WBC: CPT | Performed by: NURSE PRACTITIONER

## 2019-04-24 PROCEDURE — 80053 COMPREHEN METABOLIC PANEL: CPT | Performed by: NURSE PRACTITIONER

## 2019-04-24 RX ORDER — ONDANSETRON 2 MG/ML
4 INJECTION INTRAMUSCULAR; INTRAVENOUS ONCE
Status: DISCONTINUED | OUTPATIENT
Start: 2019-04-24 | End: 2019-04-24

## 2019-04-24 RX ORDER — ONDANSETRON 2 MG/ML
4 INJECTION INTRAMUSCULAR; INTRAVENOUS ONCE
Status: COMPLETED | OUTPATIENT
Start: 2019-04-24 | End: 2019-04-24

## 2019-04-24 RX ORDER — ONDANSETRON HYDROCHLORIDE 8 MG/1
8 TABLET, FILM COATED ORAL EVERY 8 HOURS PRN
Qty: 20 TABLET | Refills: 0 | Status: SHIPPED | OUTPATIENT
Start: 2019-04-24 | End: 2019-05-14

## 2019-04-24 RX ADMIN — ONDANSETRON 4 MG: 2 INJECTION INTRAMUSCULAR; INTRAVENOUS at 14:23

## 2019-04-25 NOTE — PROGRESS NOTES
Subjective   Jadiel Dutton is a 68 y.o. male. Patient here today with complaints of Sinusitis and Nasal Congestion  Patient here today with complaints of cough, head and chest congestion with drainage a few days ago, started Zyrtec, symptoms improved but did not resolve, then yesterday around 4 PM he started having epigastric burning, nausea, vomiting diarrhea he has had diarrhea x4 overnight and vomiting x2 this morning.  He reports adequate intake and output of fluids.  Denies blood in either bowel or emesis.  Denies fever.    Vitals:    04/24/19 1312   BP: 118/70   Pulse: 68   Temp: 97.5 °F (36.4 °C)     Past Medical History:   Diagnosis Date   • Abdominal pain    • Abnormal weight loss    • Acute bronchitis    • Anxiety state    • Benign essential hypertension    • Chronic sinusitis    • Constipation    • Cystitis    • Degenerative joint disease involving multiple joints    • Diabetic neuropathy (CMS/HCC)      bilateral hands      • Diastolic dysfunction    • Diverticular disease of colon    • Dyspnea    • Epigastric pain    • Essential hypertension    • Foot ulcer (CMS/HCC)    • Gastroesophageal reflux disease    • Generalized anxiety disorder    • Hyperlipidemia    • Inflammatory bowel disease     Possible Inflammatory Bowel Disease      • Lumbar pain     Pain radiating to lumbar region of back   n      • Pleuritic pain    • Radiculopathy     site unspecified      • Type 2 diabetes mellitus (CMS/HCC)    • Vesicular eczema of hands and feet      Diarrhea    This is a new problem. The current episode started yesterday. The problem occurs 2 to 4 times per day. The problem has been gradually improving. The stool consistency is described as watery. Associated symptoms include abdominal pain (abd cramping), coughing, a URI and vomiting. Pertinent negatives include no arthralgias, fever, headaches, increased  flatus, myalgias, sweats or weight loss. Nothing aggravates the symptoms. There are no known risk  factors. He has tried increased fluids and change of diet for the symptoms. The treatment provided mild relief.   Vomiting    This is a new problem. The current episode started yesterday. The problem occurs less than 2 times per day. The problem has been gradually improving. There has been no fever. Associated symptoms include abdominal pain (abd cramping), coughing, diarrhea and URI. Pertinent negatives include no arthralgias, dizziness, fever, headaches, myalgias, sweats or weight loss. He has tried bed rest, diet change and increased fluids for the symptoms. The treatment provided mild relief.        The following portions of the patient's history were reviewed and updated as appropriate: allergies, current medications, past family history, past medical history, past social history, past surgical history and problem list.    Review of Systems   Constitutional: Negative.  Negative for fever and weight loss.   HENT: Negative.    Eyes: Negative.    Respiratory: Positive for cough.    Cardiovascular: Negative.    Gastrointestinal: Positive for abdominal pain (abd cramping), diarrhea and vomiting. Negative for flatus.   Endocrine: Negative.    Genitourinary: Negative.    Musculoskeletal: Negative.  Negative for arthralgias and myalgias.   Skin: Negative.    Allergic/Immunologic: Negative.    Neurological: Negative.  Negative for dizziness and headaches.   Hematological: Negative.    Psychiatric/Behavioral: Negative.        Objective   Physical Exam   Constitutional: He is oriented to person, place, and time. He appears well-developed and well-nourished. No distress.   HENT:   Head: Normocephalic and atraumatic.   Mouth/Throat: Oropharynx is clear and moist.   Cardiovascular: Normal rate, regular rhythm and normal heart sounds. Exam reveals no gallop and no friction rub.   No murmur heard.  Pulmonary/Chest: Effort normal and breath sounds normal. No stridor. No respiratory distress. He has no wheezes. He has no rales.    Abdominal: Soft. Bowel sounds are normal. He exhibits no distension and no mass. There is no tenderness. There is no rebound and no guarding.   Neurological: He is alert and oriented to person, place, and time.   Skin: Skin is warm and dry. No rash noted. He is not diaphoretic. No erythema. No pallor.   Psychiatric: He has a normal mood and affect.   Nursing note and vitals reviewed.      Assessment/Plan   Jadiel was seen today for sinusitis and nasal congestion.    Diagnoses and all orders for this visit:    Gastroenteritis    Abdominal cramping  -     CBC & Differential; Future  -     Comprehensive metabolic panel; Future  -     XR Abdomen Flat & Upright  -     Urinalysis With Culture If Indicated - Urine, Clean Catch  -     Urinalysis, Microscopic Only - Urine, Clean Catch; Future  -     Urinalysis, Microscopic Only - Urine, Clean Catch  -     Discontinue: ondansetron (ZOFRAN) injection 4 mg  -     ondansetron (ZOFRAN) injection 4 mg    Diarrhea, unspecified type  -     CBC & Differential; Future  -     Comprehensive metabolic panel; Future  -     XR Abdomen Flat & Upright  -     Urinalysis With Culture If Indicated - Urine, Clean Catch  -     Urinalysis, Microscopic Only - Urine, Clean Catch; Future  -     Urinalysis, Microscopic Only - Urine, Clean Catch  -     Discontinue: ondansetron (ZOFRAN) injection 4 mg  -     ondansetron (ZOFRAN) injection 4 mg    Class 1 obesity with serious comorbidity and body mass index (BMI) of 30.0 to 30.9 in adult, unspecified obesity type    Other orders  -     ondansetron (ZOFRAN) 8 MG tablet; Take 1 tablet by mouth Every 8 (Eight) Hours As Needed for Nausea or Vomiting.    He is given Zofran in office today injection and tolerated well, I will also give him a prescription for Zofran p.o. to use at home as needed, I will obtain CBC, CMP, flat and upright abdominal x-ray and urinalysis due to symptomatology.  He will be informed of results via phone.  If his symptoms should  persist or worsen he will certainly return here for follow-up otherwise I will see him back as scheduled for chronic conditions.  He is aware and is in agreement to this plan.  All questions and concerns are addressed with understanding noted. He is advised to use BRAT diet and monitor his I&O.

## 2019-04-25 NOTE — PATIENT INSTRUCTIONS

## 2019-05-06 ENCOUNTER — OFFICE VISIT (OUTPATIENT)
Dept: FAMILY MEDICINE CLINIC | Facility: CLINIC | Age: 69
End: 2019-05-06

## 2019-05-06 VITALS
DIASTOLIC BLOOD PRESSURE: 70 MMHG | WEIGHT: 225 LBS | SYSTOLIC BLOOD PRESSURE: 122 MMHG | HEART RATE: 92 BPM | BODY MASS INDEX: 30.48 KG/M2 | TEMPERATURE: 97.3 F | HEIGHT: 72 IN

## 2019-05-06 DIAGNOSIS — J01.10 ACUTE NON-RECURRENT FRONTAL SINUSITIS: Primary | ICD-10-CM

## 2019-05-06 DIAGNOSIS — R05.9 COUGH: ICD-10-CM

## 2019-05-06 PROCEDURE — 99213 OFFICE O/P EST LOW 20 MIN: CPT | Performed by: NURSE PRACTITIONER

## 2019-05-06 PROCEDURE — 96372 THER/PROPH/DIAG INJ SC/IM: CPT | Performed by: NURSE PRACTITIONER

## 2019-05-06 RX ORDER — TRIAMCINOLONE ACETONIDE 40 MG/ML
80 INJECTION, SUSPENSION INTRA-ARTICULAR; INTRAMUSCULAR ONCE
Status: COMPLETED | OUTPATIENT
Start: 2019-05-06 | End: 2019-05-06

## 2019-05-06 RX ORDER — AZITHROMYCIN 250 MG/1
TABLET, FILM COATED ORAL
Qty: 6 TABLET | Refills: 0 | Status: SHIPPED | OUTPATIENT
Start: 2019-05-06 | End: 2019-05-14

## 2019-05-06 RX ORDER — BROMPHENIRAMINE MALEATE, PSEUDOEPHEDRINE HYDROCHLORIDE, AND DEXTROMETHORPHAN HYDROBROMIDE 2; 30; 10 MG/5ML; MG/5ML; MG/5ML
5 SYRUP ORAL 4 TIMES DAILY PRN
Qty: 118 ML | Refills: 0 | Status: SHIPPED | OUTPATIENT
Start: 2019-05-06 | End: 2019-08-06

## 2019-05-06 RX ADMIN — TRIAMCINOLONE ACETONIDE 80 MG: 40 INJECTION, SUSPENSION INTRA-ARTICULAR; INTRAMUSCULAR at 14:15

## 2019-05-06 NOTE — PROGRESS NOTES
Subjective   Jadiel Dutton is a 68 y.o. male. Patient here today with complaints of URI (x 2 weeks, OTC meds not helping)  Patient here today with complaints of cough, nasal congestion, wheezing, inability to sleep secondary to cough, reports postnasal drainage and shortness of breath at times, questionable fever, cough producing yellowish sputum as is nasal drainage.  Symptoms started a few days ago.    Vitals:    05/06/19 1345   BP: 122/70   Pulse: 92   Temp: 97.3 °F (36.3 °C)     Past Medical History:   Diagnosis Date   • Abdominal pain    • Abnormal weight loss    • Acute bronchitis    • Anxiety state    • Benign essential hypertension    • Chronic sinusitis    • Constipation    • Cystitis    • Degenerative joint disease involving multiple joints    • Diabetic neuropathy (CMS/HCC)      bilateral hands      • Diastolic dysfunction    • Diverticular disease of colon    • Dyspnea    • Epigastric pain    • Essential hypertension    • Foot ulcer (CMS/HCC)    • Gastroesophageal reflux disease    • Generalized anxiety disorder    • Hyperlipidemia    • Inflammatory bowel disease     Possible Inflammatory Bowel Disease      • Lumbar pain     Pain radiating to lumbar region of back   n      • Pleuritic pain    • Radiculopathy     site unspecified      • Type 2 diabetes mellitus (CMS/HCC)    • Vesicular eczema of hands and feet      Sinusitis   This is a new problem. The current episode started in the past 7 days. The problem is unchanged. There has been no fever. The pain is mild. Associated symptoms include congestion, coughing, headaches, a hoarse voice, sinus pressure and a sore throat. Past treatments include nothing. The treatment provided no relief.        The following portions of the patient's history were reviewed and updated as appropriate: allergies, current medications, past family history, past medical history, past social history, past surgical history and problem list.    Review of Systems    Constitutional: Negative.    HENT: Positive for congestion, hoarse voice, sinus pressure and sore throat.    Eyes: Negative.    Respiratory: Positive for cough.    Cardiovascular: Negative.    Gastrointestinal: Negative.    Endocrine: Negative.    Genitourinary: Negative.    Musculoskeletal: Negative.    Skin: Negative.    Allergic/Immunologic: Negative.    Neurological: Positive for headaches.   Hematological: Negative.    Psychiatric/Behavioral: Negative.        Objective   Physical Exam   Constitutional: He is oriented to person, place, and time. He appears well-developed and well-nourished. No distress.   HENT:   Head: Normocephalic and atraumatic.   Right Ear: Hearing, external ear and ear canal normal. Tympanic membrane is bulging.   Left Ear: Hearing, external ear and ear canal normal. Tympanic membrane is bulging.   Nose: Right sinus exhibits maxillary sinus tenderness and frontal sinus tenderness. Left sinus exhibits maxillary sinus tenderness and frontal sinus tenderness.   Mouth/Throat: Uvula is midline and mucous membranes are normal. Posterior oropharyngeal erythema present. No tonsillar exudate.   Neck: Neck supple.   Cardiovascular: Normal rate, regular rhythm and normal heart sounds. Exam reveals no gallop and no friction rub.   No murmur heard.  Pulmonary/Chest: Effort normal and breath sounds normal. No stridor. No respiratory distress. He has no wheezes. He has no rales.   Pulse ox on RA 98%   Lymphadenopathy:     He has no cervical adenopathy.   Neurological: He is alert and oriented to person, place, and time.   Skin: Skin is warm and dry. No rash noted. He is not diaphoretic. No erythema. No pallor.   Psychiatric: He has a normal mood and affect. His behavior is normal.   Nursing note and vitals reviewed.      Assessment/Plan   Jadiel was seen today for uri.    Diagnoses and all orders for this visit:    Acute non-recurrent frontal sinusitis  -     triamcinolone acetonide (KENALOG-40)  injection 80 mg    Cough  -     triamcinolone acetonide (KENALOG-40) injection 80 mg  -     XR Chest 2 View    Other orders  -     brompheniramine-pseudoephedrine-DM 30-2-10 MG/5ML syrup; Take 5 mL by mouth 4 (Four) Times a Day As Needed for Congestion, Cough or Allergies.  -     Discontinue: azithromycin (ZITHROMAX Z-RIVERA) 250 MG tablet; Take 2 tablets the first day, then 1 tablet daily for 4 days.    He is given Bromfed and Kenalog injection IM in office today, tolerated well, I will prescribe Z-Rivera for him and if his symptoms should persist or worsen he will return to clinic for follow-up.  Otherwise, I will see him back as scheduled for chronic conditions.  He is aware and is in agreement to this plan.  All questions and concerns are addressed with understanding noted.

## 2019-05-14 ENCOUNTER — OFFICE VISIT (OUTPATIENT)
Dept: PODIATRY | Facility: CLINIC | Age: 69
End: 2019-05-14

## 2019-05-14 VITALS — WEIGHT: 219 LBS | BODY MASS INDEX: 29.66 KG/M2 | HEIGHT: 72 IN | OXYGEN SATURATION: 98 % | HEART RATE: 106 BPM

## 2019-05-14 DIAGNOSIS — E11.42 DIABETIC POLYNEUROPATHY ASSOCIATED WITH TYPE 2 DIABETES MELLITUS (HCC): ICD-10-CM

## 2019-05-14 DIAGNOSIS — Z79.4 TYPE 2 DIABETES MELLITUS WITH COMPLICATION, WITH LONG-TERM CURRENT USE OF INSULIN (HCC): ICD-10-CM

## 2019-05-14 DIAGNOSIS — L97.522 SKIN ULCER OF TOE OF LEFT FOOT WITH FAT LAYER EXPOSED (HCC): Primary | ICD-10-CM

## 2019-05-14 DIAGNOSIS — E11.8 TYPE 2 DIABETES MELLITUS WITH COMPLICATION, WITH LONG-TERM CURRENT USE OF INSULIN (HCC): ICD-10-CM

## 2019-05-14 PROCEDURE — 11042 DBRDMT SUBQ TIS 1ST 20SQCM/<: CPT | Performed by: PODIATRIST

## 2019-05-14 RX ORDER — CLINDAMYCIN HYDROCHLORIDE 300 MG/1
300 CAPSULE ORAL 3 TIMES DAILY
Qty: 21 CAPSULE | Refills: 0 | Status: SHIPPED | OUTPATIENT
Start: 2019-05-14 | End: 2019-08-06

## 2019-05-14 NOTE — PROGRESS NOTES
Jadiel Dutton  1950  68 y.o. male   PCP- Shayy Banda , MARY  05/06/2019  BS: 122 per patient    Patient presents today for wound check    05/14/2019      Chief Complaint   Patient presents with   • Left Foot - Wound Check         History of Present Illness    Jadiel Dutton is a 68 y.o. male who presents for diabetic foot examination and and evaluation of left midfoot ulceration.   Have not seen in almost a month.  Patient states that there was a short period of time since last visit.  Noticed increasing burning and odor to the area however that seems to have improved.    Past Medical History:   Diagnosis Date   • Abdominal pain    • Abnormal weight loss    • Acute bronchitis    • Anxiety state    • Benign essential hypertension    • Chronic sinusitis    • Constipation    • Cystitis    • Degenerative joint disease involving multiple joints    • Diabetic neuropathy (CMS/HCC)      bilateral hands      • Diastolic dysfunction    • Diverticular disease of colon    • Dyspnea    • Epigastric pain    • Essential hypertension    • Foot ulcer (CMS/HCC)    • Gastroesophageal reflux disease    • Generalized anxiety disorder    • Hyperlipidemia    • Inflammatory bowel disease     Possible Inflammatory Bowel Disease      • Lumbar pain     Pain radiating to lumbar region of back   n      • Pleuritic pain    • Radiculopathy     site unspecified      • Type 2 diabetes mellitus (CMS/HCC)    • Vesicular eczema of hands and feet          Past Surgical History:   Procedure Laterality Date   • BACK SURGERY      LOWER BACK SURGERY   • CARPAL TUNNEL RELEASE Bilateral    • COLONOSCOPY  06/11/2012    Internal & external hemorrhoids found.   • COLONOSCOPY N/A 8/22/2017    Procedure: COLONOSCOPY;  Surgeon: Cesar Hollis MD;  Location: [facility] ENDOSCOPY;  Service:    • ENDOSCOPY  06/11/2012    Slight stricture in the distal esophagus. Gastritis in stomach. Biopsy taken. Normal duodenum.   • ENDOSCOPY     • ENDOSCOPY N/A  8/22/2017    Procedure: ESOPHAGOGASTRODUODENOSCOPY possible dilation ;  Surgeon: Cesar Hollis MD;  Location: [facility] ENDOSCOPY;  Service:    • ENTEROSCOPY SMALL BOWEL  08/27/2012    Gastritis in stomach. Normal jejunum. Biopsy taken. Normal duodenum   • FOOT SURGERY     • INJECTION OF MEDICATION  02/28/2014    Depo Medrol   • INJECTION OF MEDICATION  03/07/2014    Rocephin(2)   • ULNAR TUNNEL RELEASE           Family History   Problem Relation Age of Onset   • No Known Problems Other    • Hypertension Mother    • Heart attack Father    • No Known Problems Sister    • No Known Problems Brother    • No Known Problems Daughter    • No Known Problems Son    • No Known Problems Maternal Aunt    • No Known Problems Maternal Uncle    • No Known Problems Paternal Aunt    • No Known Problems Paternal Uncle    • No Known Problems Maternal Grandmother    • No Known Problems Maternal Grandfather    • No Known Problems Paternal Grandmother    • No Known Problems Paternal Grandfather          Social History     Socioeconomic History   • Marital status: Single     Spouse name: Not on file   • Number of children: Not on file   • Years of education: Not on file   • Highest education level: Not on file   Tobacco Use   • Smoking status: Never Smoker   • Smokeless tobacco: Never Used   Substance and Sexual Activity   • Alcohol use: No   • Drug use: No   • Sexual activity: Yes     Partners: Female         Current Outpatient Medications   Medication Sig Dispense Refill   • albuterol (PROVENTIL HFA;VENTOLIN HFA) 108 (90 Base) MCG/ACT inhaler Inhale 2 puffs Every 4 (Four) Hours As Needed for Wheezing. 1 inhaler 2   • azelastine (ASTELIN) 0.1 % nasal spray 2 sprays into the nostril(s) as directed by provider Daily. Use in each nostril as directed 30 mL 11   • B-D UF III MINI PEN NEEDLES 31G X 5 MM misc USE FOUR TIMES A DAY WITH INSULIN PENS 360 each 2   • brompheniramine-pseudoephedrine-DM 30-2-10 MG/5ML syrup Take 5 mL by mouth 4  "(Four) Times a Day As Needed for Congestion, Cough or Allergies. 118 mL 0   • DEXILANT 60 MG capsule TAKE 1 CAPSULE DAILY 90 capsule 2   • gabapentin (NEURONTIN) 300 MG capsule Take 1 capsule by mouth 4 (Four) Times a Day As Needed.     • Insulin NPH Isophane & Regular (HUMULIN 70/30 KWIKPEN) (70-30) 100 UNIT/ML suspension pen-injector INJECT 55 UNITS SUB Q BEFORE BREAKFAST, 65 UNITS BEFORE SUPPER, 30 MINUTES BEFORE BREAKFAST AND SUPPER     • lisinopril-hydrochlorothiazide (PRINZIDE,ZESTORETIC) 20-25 MG per tablet Take 1 tablet by mouth Daily.     • ONETOUCH VERIO test strip      • rosuvastatin (CRESTOR) 5 MG tablet Take 1 tablet by mouth Daily. 30 tablet 3   • Semaglutide (OZEMPIC) 0.25 or 0.5 MG/DOSE solution pen-injector AS DIRECTED 1 MG EVERY WEEK     • traMADol (ULTRAM) 50 MG tablet Take 50 mg by mouth Every 6 (Six) Hours As Needed for Moderate Pain .     • clindamycin (CLEOCIN) 300 MG capsule Take 1 capsule by mouth 3 (Three) Times a Day. 21 capsule 0     No current facility-administered medications for this visit.          OBJECTIVE    Pulse 106   Ht 182.9 cm (72\")   Wt 99.3 kg (219 lb)   SpO2 98%   BMI 29.70 kg/m²       Review of Systems   Constitutional: Negative.    HENT: Negative.    Eyes: Negative.    Respiratory: Negative.    Cardiovascular: Negative.    Gastrointestinal: Negative.    Endocrine: Negative.    Genitourinary: Negative.    Musculoskeletal: Positive for back pain.   Skin: Positive for wound.   Allergic/Immunologic: Negative.    Neurological: Negative.    Hematological: Negative.    Psychiatric/Behavioral: Negative.          Physical Exam   Constitutional: he appears well-developed and well-nourished.   HEENT: Normocephalic. Atraumatic  CV: No tenderness. RRR  Resp: Non-labored respiration. No wheezes.   Psychiatric: he has a normal mood and affect. his   behavior is normal.      Lower Extremity Exam:  Vascular: DP/PT pulses palpable 1+.   Negative hair growth.   Minimal perimalleolar " edema  Neuro: Protective sensation absent, b/l.  DTRs intact  Integument: Full thickness ulcer to plantar lateral forefoot measuring approximately 1.0 x 1.0 x 0.2 cm .  Granular base with significant surrounding hyperkeratosis.    Atrophic skin noted b/l  No masses  Musculoskeletal: LE muscle strength 5/5.   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      Left foot wound debridement:  Risks and benefits discussed.  Left forefoot ulcer debrided of surrounding hyperkeratosis and devitalized tissue with 15 blade to level of subq  Pressure hemostasis obtained  Abx ointment and dsd applied.          ASSESSMENT AND PLAN    Jadiel was seen today for wound check.    Diagnoses and all orders for this visit:    Skin ulcer of toe of left foot with fat layer exposed (CMS/HCC)    Type 2 diabetes mellitus with complication, with long-term current use of insulin (CMS/HCC)    Diabetic polyneuropathy associated with type 2 diabetes mellitus (CMS/HCC)    Other orders  -     clindamycin (CLEOCIN) 300 MG capsule; Take 1 capsule by mouth 3 (Three) Times a Day.      -Comprehensive DM foot exam performed. Pt educated on importance of tight glucose control and daily foot checks.   -Wound debridement as above  -Continue Promogram for home dressing changes  -Modification of custom insole for additional offloading performed.  Stressed importance of increased offloading of the wound site.   -Again had a brief discussion regarding possible surgical excision of heterotopic bone.  Patient would like to continue monitoring for now.  -Follow up 2 weeks            This document has been electronically signed by Jacoby Serrano DPM on May 15, 2019 8:22 PM     EMR Dragon/Transcription disclaimer:   Much of this encounter note is an electronic transcription/translation of spoken language to printed text. The  electronic translation of spoken language may permit erroneous, or at times, nonsensical words or phrases to be inadvertently transcribed; Although I have reviewed the note for such errors, some may still exist.    Jacoby Serrano DPM  5/15/2019  8:22 PM

## 2019-05-16 NOTE — PATIENT INSTRUCTIONS

## 2019-05-31 ENCOUNTER — OFFICE VISIT (OUTPATIENT)
Dept: PODIATRY | Facility: CLINIC | Age: 69
End: 2019-05-31

## 2019-05-31 VITALS — OXYGEN SATURATION: 97 % | HEART RATE: 113 BPM | BODY MASS INDEX: 30.37 KG/M2 | HEIGHT: 72 IN | WEIGHT: 224.2 LBS

## 2019-05-31 DIAGNOSIS — E11.8 TYPE 2 DIABETES MELLITUS WITH COMPLICATION, WITH LONG-TERM CURRENT USE OF INSULIN (HCC): ICD-10-CM

## 2019-05-31 DIAGNOSIS — L97.522 SKIN ULCER OF TOE OF LEFT FOOT WITH FAT LAYER EXPOSED (HCC): Primary | ICD-10-CM

## 2019-05-31 DIAGNOSIS — Z79.4 TYPE 2 DIABETES MELLITUS WITH COMPLICATION, WITH LONG-TERM CURRENT USE OF INSULIN (HCC): ICD-10-CM

## 2019-05-31 DIAGNOSIS — E11.42 DIABETIC POLYNEUROPATHY ASSOCIATED WITH TYPE 2 DIABETES MELLITUS (HCC): ICD-10-CM

## 2019-05-31 PROCEDURE — 11042 DBRDMT SUBQ TIS 1ST 20SQCM/<: CPT | Performed by: PODIATRIST

## 2019-06-21 ENCOUNTER — OFFICE VISIT (OUTPATIENT)
Dept: PODIATRY | Facility: CLINIC | Age: 69
End: 2019-06-21

## 2019-06-21 VITALS — BODY MASS INDEX: 30.34 KG/M2 | WEIGHT: 224 LBS | OXYGEN SATURATION: 98 % | HEART RATE: 107 BPM | HEIGHT: 72 IN

## 2019-06-21 DIAGNOSIS — E11.8 TYPE 2 DIABETES MELLITUS WITH COMPLICATION, WITH LONG-TERM CURRENT USE OF INSULIN (HCC): ICD-10-CM

## 2019-06-21 DIAGNOSIS — Z79.4 TYPE 2 DIABETES MELLITUS WITH COMPLICATION, WITH LONG-TERM CURRENT USE OF INSULIN (HCC): ICD-10-CM

## 2019-06-21 DIAGNOSIS — L97.522 SKIN ULCER OF TOE OF LEFT FOOT WITH FAT LAYER EXPOSED (HCC): Primary | ICD-10-CM

## 2019-06-21 PROCEDURE — 11042 DBRDMT SUBQ TIS 1ST 20SQCM/<: CPT | Performed by: PODIATRIST

## 2019-06-21 NOTE — PROGRESS NOTES
Jadiel Dutton  1950  68 y.o. male   PCP- Shayy Banda , APRN  05/06/2019  BS:122 per patient    Patient presents today for wound check    06/21/2019    Chief Complaint   Patient presents with   • Left Foot - Wound Check         History of Present Illness     Jadiel Dutton is a 68 y.o. male who presents for diabetic foot examination and and evaluation of left midfoot ulceration.  Feels it has improved since last visit.     Past Medical History:   Diagnosis Date   • Abdominal pain    • Abnormal weight loss    • Acute bronchitis    • Anxiety state    • Benign essential hypertension    • Chronic sinusitis    • Constipation    • Cystitis    • Degenerative joint disease involving multiple joints    • Diabetic neuropathy (CMS/HCC)      bilateral hands      • Diastolic dysfunction    • Diverticular disease of colon    • Dyspnea    • Epigastric pain    • Essential hypertension    • Foot ulcer (CMS/HCC)    • Gastroesophageal reflux disease    • Generalized anxiety disorder    • Hyperlipidemia    • Inflammatory bowel disease     Possible Inflammatory Bowel Disease      • Lumbar pain     Pain radiating to lumbar region of back   n      • Pleuritic pain    • Radiculopathy     site unspecified      • Type 2 diabetes mellitus (CMS/HCC)    • Vesicular eczema of hands and feet          Past Surgical History:   Procedure Laterality Date   • BACK SURGERY      LOWER BACK SURGERY   • CARPAL TUNNEL RELEASE Bilateral    • COLONOSCOPY  06/11/2012    Internal & external hemorrhoids found.   • COLONOSCOPY N/A 8/22/2017    Procedure: COLONOSCOPY;  Surgeon: Cesar Hollis MD;  Location: [facility] ENDOSCOPY;  Service:    • ENDOSCOPY  06/11/2012    Slight stricture in the distal esophagus. Gastritis in stomach. Biopsy taken. Normal duodenum.   • ENDOSCOPY     • ENDOSCOPY N/A 8/22/2017    Procedure: ESOPHAGOGASTRODUODENOSCOPY possible dilation ;  Surgeon: Cesar Hollis MD;  Location: [facility] ENDOSCOPY;  Service:    •  ENTEROSCOPY SMALL BOWEL  08/27/2012    Gastritis in stomach. Normal jejunum. Biopsy taken. Normal duodenum   • FOOT SURGERY     • INJECTION OF MEDICATION  02/28/2014    Depo Medrol   • INJECTION OF MEDICATION  03/07/2014    Rocephin(2)   • ULNAR TUNNEL RELEASE           Family History   Problem Relation Age of Onset   • No Known Problems Other    • Hypertension Mother    • Heart attack Father    • No Known Problems Sister    • No Known Problems Brother    • No Known Problems Daughter    • No Known Problems Son    • No Known Problems Maternal Aunt    • No Known Problems Maternal Uncle    • No Known Problems Paternal Aunt    • No Known Problems Paternal Uncle    • No Known Problems Maternal Grandmother    • No Known Problems Maternal Grandfather    • No Known Problems Paternal Grandmother    • No Known Problems Paternal Grandfather          Social History     Socioeconomic History   • Marital status: Single     Spouse name: Not on file   • Number of children: Not on file   • Years of education: Not on file   • Highest education level: Not on file   Tobacco Use   • Smoking status: Never Smoker   • Smokeless tobacco: Never Used   Substance and Sexual Activity   • Alcohol use: No   • Drug use: No   • Sexual activity: Yes     Partners: Female         Current Outpatient Medications   Medication Sig Dispense Refill   • albuterol (PROVENTIL HFA;VENTOLIN HFA) 108 (90 Base) MCG/ACT inhaler Inhale 2 puffs Every 4 (Four) Hours As Needed for Wheezing. 1 inhaler 2   • azelastine (ASTELIN) 0.1 % nasal spray 2 sprays into the nostril(s) as directed by provider Daily. Use in each nostril as directed 30 mL 11   • B-D UF III MINI PEN NEEDLES 31G X 5 MM misc USE FOUR TIMES A DAY WITH INSULIN PENS 360 each 2   • brompheniramine-pseudoephedrine-DM 30-2-10 MG/5ML syrup Take 5 mL by mouth 4 (Four) Times a Day As Needed for Congestion, Cough or Allergies. 118 mL 0   • clindamycin (CLEOCIN) 300 MG capsule Take 1 capsule by mouth 3 (Three)  "Times a Day. 21 capsule 0   • DEXILANT 60 MG capsule TAKE 1 CAPSULE DAILY 90 capsule 2   • gabapentin (NEURONTIN) 300 MG capsule Take 1 capsule by mouth 4 (Four) Times a Day As Needed.     • Insulin NPH Isophane & Regular (HUMULIN 70/30 KWIKPEN) (70-30) 100 UNIT/ML suspension pen-injector INJECT 55 UNITS SUB Q BEFORE BREAKFAST, 65 UNITS BEFORE SUPPER, 30 MINUTES BEFORE BREAKFAST AND SUPPER     • lisinopril-hydrochlorothiazide (PRINZIDE,ZESTORETIC) 20-25 MG per tablet Take 1 tablet by mouth Daily.     • ONETOUCH VERIO test strip      • rosuvastatin (CRESTOR) 5 MG tablet Take 1 tablet by mouth Daily. 30 tablet 3   • Semaglutide (OZEMPIC) 0.25 or 0.5 MG/DOSE solution pen-injector AS DIRECTED 1 MG EVERY WEEK     • traMADol (ULTRAM) 50 MG tablet Take 50 mg by mouth Every 6 (Six) Hours As Needed for Moderate Pain .       No current facility-administered medications for this visit.          OBJECTIVE    Pulse 107   Ht 182.9 cm (72\")   Wt 102 kg (224 lb)   SpO2 98%   BMI 30.38 kg/m²       Review of Systems   Constitutional: Negative.    HENT: Negative.    Eyes: Negative.    Respiratory: Negative.    Cardiovascular: Negative.    Gastrointestinal: Negative.    Endocrine: Negative.    Genitourinary: Negative.    Musculoskeletal: Positive for back pain.   Skin: Positive for wound.   Allergic/Immunologic: Negative.    Neurological: Negative.    Hematological: Negative.    Psychiatric/Behavioral: Negative.          Physical Exam   Constitutional: he appears well-developed and well-nourished.   HEENT: Normocephalic. Atraumatic  CV: No tenderness. RRR  Resp: Non-labored respiration. No wheezes.   Psychiatric: he has a normal mood and affect. his   behavior is normal.      Lower Extremity Exam:  Vascular: DP/PT pulses palpable 1+.   Negative hair growth.   Minimal perimalleolar edema  Neuro: Protective sensation absent, b/l.  DTRs intact  Integument: Full thickness ulcer to plantar lateral forefoot measuring approximately 0.7 x " 0.3 x 0.2 cm .  Granular base with significant surrounding hyperkeratosis.    Atrophic skin noted b/l  No masses  Musculoskeletal: LE muscle strength 5/5.   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      Left foot wound debridement:  Risks and benefits discussed.  Left forefoot ulcer debrided of surrounding hyperkeratosis and devitalized tissue with 15 blade to level of subq  Pressure hemostasis obtained  Abx ointment and dsd applied.          ASSESSMENT AND PLAN    Jadiel was seen today for wound check.    Diagnoses and all orders for this visit:    Skin ulcer of toe of left foot with fat layer exposed (CMS/HCC)    Type 2 diabetes mellitus with complication, with long-term current use of insulin (CMS/HCC)      -Comprehensive DM foot exam performed. Pt educated on importance of tight glucose control and daily foot checks.   -Wound debridement as above  -Continue Promogram for home dressing changes  -Modification of custom insole for additional offloading performed.  Stressed importance of increased offloading of the wound site.   -Again had a brief discussion regarding possible surgical excision of heterotopic bone.  Patient would like to continue monitoring for now.  -Follow up 2 weeks            This document has been electronically signed by Jacoby Serrano DPM on June 24, 2019 9:21 AM     EMR Dragon/Transcription disclaimer:   Much of this encounter note is an electronic transcription/translation of spoken language to printed text. The electronic translation of spoken language may permit erroneous, or at times, nonsensical words or phrases to be inadvertently transcribed; Although I have reviewed the note for such errors, some may still exist.    Jacoby Serrano DPM  6/24/2019  9:21 AM

## 2019-07-12 ENCOUNTER — OFFICE VISIT (OUTPATIENT)
Dept: PODIATRY | Facility: CLINIC | Age: 69
End: 2019-07-12

## 2019-07-12 VITALS — WEIGHT: 224 LBS | HEART RATE: 112 BPM | HEIGHT: 72 IN | BODY MASS INDEX: 30.34 KG/M2 | OXYGEN SATURATION: 98 %

## 2019-07-12 DIAGNOSIS — E11.42 DIABETIC POLYNEUROPATHY ASSOCIATED WITH TYPE 2 DIABETES MELLITUS (HCC): ICD-10-CM

## 2019-07-12 DIAGNOSIS — L97.522 SKIN ULCER OF TOE OF LEFT FOOT WITH FAT LAYER EXPOSED (HCC): Primary | ICD-10-CM

## 2019-07-12 DIAGNOSIS — Z79.4 TYPE 2 DIABETES MELLITUS WITH COMPLICATION, WITH LONG-TERM CURRENT USE OF INSULIN (HCC): ICD-10-CM

## 2019-07-12 DIAGNOSIS — E11.8 TYPE 2 DIABETES MELLITUS WITH COMPLICATION, WITH LONG-TERM CURRENT USE OF INSULIN (HCC): ICD-10-CM

## 2019-07-12 PROCEDURE — 11042 DBRDMT SUBQ TIS 1ST 20SQCM/<: CPT | Performed by: PODIATRIST

## 2019-07-12 NOTE — PROGRESS NOTES
Jadiel Dutton  1950  68 y.o. male   PCP- Shayy Banda , APRN  05/06/2019  BS:115 per patient    Patient presents today for wound check    07/12/2019    Chief Complaint   Patient presents with   • Left Foot - Wound Check         History of Present Illness     Jadiel Dutton is a 68 y.o. male who presents for diabetic foot examination and and evaluation of left midfoot ulceration.      Past Medical History:   Diagnosis Date   • Abdominal pain    • Abnormal weight loss    • Acute bronchitis    • Anxiety state    • Benign essential hypertension    • Chronic sinusitis    • Constipation    • Cystitis    • Degenerative joint disease involving multiple joints    • Diabetic neuropathy (CMS/HCC)      bilateral hands      • Diastolic dysfunction    • Diverticular disease of colon    • Dyspnea    • Epigastric pain    • Essential hypertension    • Foot ulcer (CMS/HCC)    • Gastroesophageal reflux disease    • Generalized anxiety disorder    • Hyperlipidemia    • Inflammatory bowel disease     Possible Inflammatory Bowel Disease      • Lumbar pain     Pain radiating to lumbar region of back   n      • Pleuritic pain    • Radiculopathy     site unspecified      • Type 2 diabetes mellitus (CMS/HCC)    • Vesicular eczema of hands and feet          Past Surgical History:   Procedure Laterality Date   • BACK SURGERY      LOWER BACK SURGERY   • CARPAL TUNNEL RELEASE Bilateral    • COLONOSCOPY  06/11/2012    Internal & external hemorrhoids found.   • COLONOSCOPY N/A 8/22/2017    Procedure: COLONOSCOPY;  Surgeon: Cesar Hollis MD;  Location: [facility] ENDOSCOPY;  Service:    • ENDOSCOPY  06/11/2012    Slight stricture in the distal esophagus. Gastritis in stomach. Biopsy taken. Normal duodenum.   • ENDOSCOPY     • ENDOSCOPY N/A 8/22/2017    Procedure: ESOPHAGOGASTRODUODENOSCOPY possible dilation ;  Surgeon: Cesar Hollis MD;  Location: [facility] ENDOSCOPY;  Service:    • ENTEROSCOPY SMALL BOWEL  08/27/2012     Gastritis in stomach. Normal jejunum. Biopsy taken. Normal duodenum   • FOOT SURGERY     • INJECTION OF MEDICATION  02/28/2014    Depo Medrol   • INJECTION OF MEDICATION  03/07/2014    Rocephin(2)   • ULNAR TUNNEL RELEASE           Family History   Problem Relation Age of Onset   • No Known Problems Other    • Hypertension Mother    • Heart attack Father    • No Known Problems Sister    • No Known Problems Brother    • No Known Problems Daughter    • No Known Problems Son    • No Known Problems Maternal Aunt    • No Known Problems Maternal Uncle    • No Known Problems Paternal Aunt    • No Known Problems Paternal Uncle    • No Known Problems Maternal Grandmother    • No Known Problems Maternal Grandfather    • No Known Problems Paternal Grandmother    • No Known Problems Paternal Grandfather          Social History     Socioeconomic History   • Marital status: Single     Spouse name: Not on file   • Number of children: Not on file   • Years of education: Not on file   • Highest education level: Not on file   Tobacco Use   • Smoking status: Never Smoker   • Smokeless tobacco: Never Used   Substance and Sexual Activity   • Alcohol use: No   • Drug use: No   • Sexual activity: Yes     Partners: Female         Current Outpatient Medications   Medication Sig Dispense Refill   • albuterol (PROVENTIL HFA;VENTOLIN HFA) 108 (90 Base) MCG/ACT inhaler Inhale 2 puffs Every 4 (Four) Hours As Needed for Wheezing. 1 inhaler 2   • azelastine (ASTELIN) 0.1 % nasal spray 2 sprays into the nostril(s) as directed by provider Daily. Use in each nostril as directed 30 mL 11   • B-D UF III MINI PEN NEEDLES 31G X 5 MM misc USE FOUR TIMES A DAY WITH INSULIN PENS 360 each 2   • brompheniramine-pseudoephedrine-DM 30-2-10 MG/5ML syrup Take 5 mL by mouth 4 (Four) Times a Day As Needed for Congestion, Cough or Allergies. 118 mL 0   • clindamycin (CLEOCIN) 300 MG capsule Take 1 capsule by mouth 3 (Three) Times a Day. 21 capsule 0   • DEXILANT 60  "MG capsule TAKE 1 CAPSULE DAILY 90 capsule 2   • gabapentin (NEURONTIN) 300 MG capsule Take 1 capsule by mouth 4 (Four) Times a Day As Needed.     • Insulin NPH Isophane & Regular (HUMULIN 70/30 KWIKPEN) (70-30) 100 UNIT/ML suspension pen-injector INJECT 55 UNITS SUB Q BEFORE BREAKFAST, 65 UNITS BEFORE SUPPER, 30 MINUTES BEFORE BREAKFAST AND SUPPER     • lisinopril-hydrochlorothiazide (PRINZIDE,ZESTORETIC) 20-25 MG per tablet Take 1 tablet by mouth Daily.     • ONETOUCH VERIO test strip      • rosuvastatin (CRESTOR) 5 MG tablet Take 1 tablet by mouth Daily. 30 tablet 3   • Semaglutide (OZEMPIC) 0.25 or 0.5 MG/DOSE solution pen-injector AS DIRECTED 1 MG EVERY WEEK     • traMADol (ULTRAM) 50 MG tablet Take 50 mg by mouth Every 6 (Six) Hours As Needed for Moderate Pain .       No current facility-administered medications for this visit.          OBJECTIVE    Pulse 112   Ht 182.9 cm (72\")   Wt 102 kg (224 lb)   SpO2 98%   BMI 30.38 kg/m²       Review of Systems   Constitutional: Negative.    HENT: Negative.    Eyes: Negative.    Respiratory: Negative.    Cardiovascular: Negative.    Gastrointestinal: Negative.    Endocrine: Negative.    Genitourinary: Negative.    Musculoskeletal: Positive for back pain.   Skin: Positive for wound.   Allergic/Immunologic: Negative.    Neurological: Negative.    Hematological: Negative.    Psychiatric/Behavioral: Negative.          Physical Exam   Constitutional: he appears well-developed and well-nourished.   HEENT: Normocephalic. Atraumatic  CV: No tenderness. RRR  Resp: Non-labored respiration. No wheezes.   Psychiatric: he has a normal mood and affect. his   behavior is normal.      Lower Extremity Exam:  Vascular: DP/PT pulses palpable 1+.   Negative hair growth.   Minimal perimalleolar edema  Neuro: Protective sensation absent, b/l.  DTRs intact  Integument: Full thickness ulcer to plantar lateral forefoot measuring approximately 1.0 x 0.3 x 0.2 cm .  Granular base with " significant surrounding hyperkeratosis.    Atrophic skin noted b/l  No masses  Musculoskeletal: LE muscle strength 5/5.   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      Left foot wound debridement:  Risks and benefits discussed.  Left forefoot ulcer debrided of surrounding hyperkeratosis and devitalized tissue with 15 blade to level of subq  Pressure hemostasis obtained  Medihoney ointment and dsd applied.          ASSESSMENT AND PLAN    Jadiel was seen today for wound check.    Diagnoses and all orders for this visit:    Skin ulcer of toe of left foot with fat layer exposed (CMS/HCC)    Type 2 diabetes mellitus with complication, with long-term current use of insulin (CMS/HCC)    Diabetic polyneuropathy associated with type 2 diabetes mellitus (CMS/HCC)      -Comprehensive DM foot exam performed. Pt educated on importance of tight glucose control and daily foot checks.   -Wound debridement as above  -Continue Medihoney for home dressing changes  -Modification of custom insole for additional offloading performed.  Stressed importance of increased offloading of the wound site.   -Again had a brief discussion regarding possible surgical excision of heterotopic bone.  Patient would like to continue monitoring for now.  -Follow up 2 weeks            This document has been electronically signed by Jacoby Serrano DPM on July 16, 2019 8:08 AM     EMR Dragon/Transcription disclaimer:   Much of this encounter note is an electronic transcription/translation of spoken language to printed text. The electronic translation of spoken language may permit erroneous, or at times, nonsensical words or phrases to be inadvertently transcribed; Although I have reviewed the note for such errors, some may still exist.    Jacoby Serrano DPM  7/16/2019  8:08 AM

## 2019-07-23 ENCOUNTER — OFFICE VISIT (OUTPATIENT)
Dept: PODIATRY | Facility: CLINIC | Age: 69
End: 2019-07-23

## 2019-07-23 VITALS — WEIGHT: 224 LBS | OXYGEN SATURATION: 99 % | HEIGHT: 72 IN | BODY MASS INDEX: 30.34 KG/M2 | HEART RATE: 111 BPM

## 2019-07-23 DIAGNOSIS — E11.42 DIABETIC POLYNEUROPATHY ASSOCIATED WITH TYPE 2 DIABETES MELLITUS (HCC): ICD-10-CM

## 2019-07-23 DIAGNOSIS — L97.522 SKIN ULCER OF TOE OF LEFT FOOT WITH FAT LAYER EXPOSED (HCC): Primary | ICD-10-CM

## 2019-07-23 DIAGNOSIS — E11.8 TYPE 2 DIABETES MELLITUS WITH COMPLICATION, WITH LONG-TERM CURRENT USE OF INSULIN (HCC): ICD-10-CM

## 2019-07-23 DIAGNOSIS — Z79.4 TYPE 2 DIABETES MELLITUS WITH COMPLICATION, WITH LONG-TERM CURRENT USE OF INSULIN (HCC): ICD-10-CM

## 2019-07-23 PROCEDURE — 11042 DBRDMT SUBQ TIS 1ST 20SQCM/<: CPT | Performed by: PODIATRIST

## 2019-07-23 NOTE — PROGRESS NOTES
Jadiel Dutton  1950  68 y.o. male   PCP- Shayy Banda , MARY  05/06/2019  BS:105 per patient    Patient presents today for wound check    07/23/2019      Chief Complaint   Patient presents with   • Left Foot - Follow-up, Wound Check         History of Present Illness     Jadiel Dutton is a 68 y.o. male who presents for diabetic foot examination and and evaluation of left midfoot ulceration.      Past Medical History:   Diagnosis Date   • Abdominal pain    • Abnormal weight loss    • Acute bronchitis    • Anxiety state    • Benign essential hypertension    • Chronic sinusitis    • Constipation    • Cystitis    • Degenerative joint disease involving multiple joints    • Diabetic neuropathy (CMS/HCC)      bilateral hands      • Diastolic dysfunction    • Diverticular disease of colon    • Dyspnea    • Epigastric pain    • Essential hypertension    • Foot ulcer (CMS/HCC)    • Gastroesophageal reflux disease    • Generalized anxiety disorder    • Hyperlipidemia    • Inflammatory bowel disease     Possible Inflammatory Bowel Disease      • Lumbar pain     Pain radiating to lumbar region of back   n      • Pleuritic pain    • Radiculopathy     site unspecified      • Type 2 diabetes mellitus (CMS/HCC)    • Vesicular eczema of hands and feet          Past Surgical History:   Procedure Laterality Date   • BACK SURGERY      LOWER BACK SURGERY   • CARPAL TUNNEL RELEASE Bilateral    • COLONOSCOPY  06/11/2012    Internal & external hemorrhoids found.   • COLONOSCOPY N/A 8/22/2017    Procedure: COLONOSCOPY;  Surgeon: Cesar Hollis MD;  Location: [facility] ENDOSCOPY;  Service:    • ENDOSCOPY  06/11/2012    Slight stricture in the distal esophagus. Gastritis in stomach. Biopsy taken. Normal duodenum.   • ENDOSCOPY     • ENDOSCOPY N/A 8/22/2017    Procedure: ESOPHAGOGASTRODUODENOSCOPY possible dilation ;  Surgeon: Cesar Hollis MD;  Location: [facility] ENDOSCOPY;  Service:    • ENTEROSCOPY SMALL BOWEL   08/27/2012    Gastritis in stomach. Normal jejunum. Biopsy taken. Normal duodenum   • FOOT SURGERY     • INJECTION OF MEDICATION  02/28/2014    Depo Medrol   • INJECTION OF MEDICATION  03/07/2014    Rocephin(2)   • ULNAR TUNNEL RELEASE           Family History   Problem Relation Age of Onset   • No Known Problems Other    • Hypertension Mother    • Heart attack Father    • No Known Problems Sister    • No Known Problems Brother    • No Known Problems Daughter    • No Known Problems Son    • No Known Problems Maternal Aunt    • No Known Problems Maternal Uncle    • No Known Problems Paternal Aunt    • No Known Problems Paternal Uncle    • No Known Problems Maternal Grandmother    • No Known Problems Maternal Grandfather    • No Known Problems Paternal Grandmother    • No Known Problems Paternal Grandfather          Social History     Socioeconomic History   • Marital status: Single     Spouse name: Not on file   • Number of children: Not on file   • Years of education: Not on file   • Highest education level: Not on file   Tobacco Use   • Smoking status: Never Smoker   • Smokeless tobacco: Never Used   Substance and Sexual Activity   • Alcohol use: No   • Drug use: No   • Sexual activity: Yes     Partners: Female         Current Outpatient Medications   Medication Sig Dispense Refill   • albuterol (PROVENTIL HFA;VENTOLIN HFA) 108 (90 Base) MCG/ACT inhaler Inhale 2 puffs Every 4 (Four) Hours As Needed for Wheezing. 1 inhaler 2   • azelastine (ASTELIN) 0.1 % nasal spray 2 sprays into the nostril(s) as directed by provider Daily. Use in each nostril as directed 30 mL 11   • B-D UF III MINI PEN NEEDLES 31G X 5 MM misc USE FOUR TIMES A DAY WITH INSULIN PENS 360 each 2   • brompheniramine-pseudoephedrine-DM 30-2-10 MG/5ML syrup Take 5 mL by mouth 4 (Four) Times a Day As Needed for Congestion, Cough or Allergies. 118 mL 0   • clindamycin (CLEOCIN) 300 MG capsule Take 1 capsule by mouth 3 (Three) Times a Day. 21 capsule 0  "  • DEXILANT 60 MG capsule TAKE 1 CAPSULE DAILY 90 capsule 2   • gabapentin (NEURONTIN) 300 MG capsule Take 1 capsule by mouth 4 (Four) Times a Day As Needed.     • Insulin NPH Isophane & Regular (HUMULIN 70/30 KWIKPEN) (70-30) 100 UNIT/ML suspension pen-injector INJECT 55 UNITS SUB Q BEFORE BREAKFAST, 65 UNITS BEFORE SUPPER, 30 MINUTES BEFORE BREAKFAST AND SUPPER     • lisinopril-hydrochlorothiazide (PRINZIDE,ZESTORETIC) 20-25 MG per tablet Take 1 tablet by mouth Daily.     • ONETOUCH VERIO test strip      • rosuvastatin (CRESTOR) 5 MG tablet Take 1 tablet by mouth Daily. 30 tablet 3   • Semaglutide (OZEMPIC) 0.25 or 0.5 MG/DOSE solution pen-injector AS DIRECTED 1 MG EVERY WEEK     • traMADol (ULTRAM) 50 MG tablet Take 50 mg by mouth Every 6 (Six) Hours As Needed for Moderate Pain .       No current facility-administered medications for this visit.          OBJECTIVE    Pulse 111   Ht 182.9 cm (72\")   Wt 102 kg (224 lb)   SpO2 99%   BMI 30.38 kg/m²       Review of Systems   Constitutional: Negative.    HENT: Negative.    Eyes: Negative.    Respiratory: Negative.    Cardiovascular: Negative.    Gastrointestinal: Negative.    Endocrine: Negative.    Genitourinary: Negative.    Musculoskeletal: Positive for back pain.   Skin: Positive for wound.   Allergic/Immunologic: Negative.    Neurological: Negative.    Hematological: Negative.    Psychiatric/Behavioral: Negative.          Physical Exam   Constitutional: he appears well-developed and well-nourished.   HEENT: Normocephalic. Atraumatic  CV: No tenderness. RRR  Resp: Non-labored respiration. No wheezes.   Psychiatric: he has a normal mood and affect. his   behavior is normal.      Lower Extremity Exam:  Vascular: DP/PT pulses palpable 1+.   Negative hair growth.   Minimal perimalleolar edema  Neuro: Protective sensation absent, b/l.  DTRs intact  Integument: Full thickness ulcer to plantar lateral forefoot measuring approximately 0.7 x 0.4 x 0.2 cm .  Granular " base with significant surrounding hyperkeratosis.    Atrophic skin noted b/l  No masses  Musculoskeletal: LE muscle strength 5/5.   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      Left foot wound debridement:  Risks and benefits discussed.  Left forefoot ulcer debrided of surrounding hyperkeratosis and devitalized tissue with 15 blade to level of subq  Pressure hemostasis obtained  Medihoney ointment and dsd applied.          ASSESSMENT AND PLAN    Jadiel was seen today for follow-up and wound check.    Diagnoses and all orders for this visit:    Skin ulcer of toe of left foot with fat layer exposed (CMS/HCC)    Type 2 diabetes mellitus with complication, with long-term current use of insulin (CMS/HCC)    Diabetic polyneuropathy associated with type 2 diabetes mellitus (CMS/HCC)      -Comprehensive DM foot exam performed. Pt educated on importance of tight glucose control and daily foot checks.   -Wound debridement as above  -Continue Medihoney for home dressing changes  -Modification of custom insole for additional offloading performed.  Stressed importance of increased offloading of the wound site.   -Again had a brief discussion regarding possible surgical excision of heterotopic bone.  Patient would like to continue monitoring for now.  -Follow up 2 weeks            This document has been electronically signed by Jacoby Serrano DPM on July 24, 2019 4:26 PM     EMR Dragon/Transcription disclaimer:   Much of this encounter note is an electronic transcription/translation of spoken language to printed text. The electronic translation of spoken language may permit erroneous, or at times, nonsensical words or phrases to be inadvertently transcribed; Although I have reviewed the note for such errors, some may still exist.    Jacoby Serrano DPM  7/24/2019  4:26  PM

## 2019-08-06 ENCOUNTER — OFFICE VISIT (OUTPATIENT)
Dept: FAMILY MEDICINE CLINIC | Facility: CLINIC | Age: 69
End: 2019-08-06

## 2019-08-06 VITALS
DIASTOLIC BLOOD PRESSURE: 90 MMHG | SYSTOLIC BLOOD PRESSURE: 150 MMHG | BODY MASS INDEX: 30.94 KG/M2 | HEART RATE: 101 BPM | WEIGHT: 228.4 LBS | HEIGHT: 72 IN | TEMPERATURE: 98 F

## 2019-08-06 DIAGNOSIS — J01.00 ACUTE NON-RECURRENT MAXILLARY SINUSITIS: Primary | ICD-10-CM

## 2019-08-06 PROCEDURE — 96372 THER/PROPH/DIAG INJ SC/IM: CPT | Performed by: NURSE PRACTITIONER

## 2019-08-06 PROCEDURE — 99213 OFFICE O/P EST LOW 20 MIN: CPT | Performed by: NURSE PRACTITIONER

## 2019-08-06 RX ORDER — CEPHALEXIN 500 MG/1
500 CAPSULE ORAL 2 TIMES DAILY
Qty: 20 CAPSULE | Refills: 0 | Status: SHIPPED | OUTPATIENT
Start: 2019-08-06 | End: 2019-08-16

## 2019-08-06 RX ORDER — TRIAMCINOLONE ACETONIDE 40 MG/ML
80 INJECTION, SUSPENSION INTRA-ARTICULAR; INTRAMUSCULAR ONCE
Status: COMPLETED | OUTPATIENT
Start: 2019-08-06 | End: 2019-08-06

## 2019-08-06 RX ADMIN — TRIAMCINOLONE ACETONIDE 80 MG: 40 INJECTION, SUSPENSION INTRA-ARTICULAR; INTRAMUSCULAR at 13:47

## 2019-08-06 NOTE — PROGRESS NOTES
Subjective   Jadiel Dutton is a 68 y.o. male. Patient here today with complaints of Sinusitis  pt here today with complaints of cough, nasal congestion. R ear pain, drainage. Used inhaler and flonase earlier, did have keflex from previous prescription and took last night, having R max sinus pressure, had chills last night     Vitals:    08/06/19 1310   BP: 150/90   Pulse: 101   Temp: 98 °F (36.7 °C)     Past Medical History:   Diagnosis Date   • Abdominal pain    • Abnormal weight loss    • Acute bronchitis    • Anxiety state    • Benign essential hypertension    • Chronic sinusitis    • Constipation    • Cystitis    • Degenerative joint disease involving multiple joints    • Diabetic neuropathy (CMS/HCC)      bilateral hands      • Diastolic dysfunction    • Diverticular disease of colon    • Dyspnea    • Epigastric pain    • Essential hypertension    • Foot ulcer (CMS/HCC)    • Gastroesophageal reflux disease    • Generalized anxiety disorder    • Hyperlipidemia    • Inflammatory bowel disease     Possible Inflammatory Bowel Disease      • Lumbar pain     Pain radiating to lumbar region of back   n      • Pleuritic pain    • Radiculopathy     site unspecified      • Type 2 diabetes mellitus (CMS/HCC)    • Vesicular eczema of hands and feet      Sinusitis   This is a new problem. The current episode started in the past 7 days. The problem is unchanged. There has been no fever. The pain is moderate. Associated symptoms include congestion, coughing, ear pain, headaches, a hoarse voice, sinus pressure, sneezing and a sore throat. Pertinent negatives include no shortness of breath or swollen glands. Past treatments include antibiotics (flonase and  inhaler). The treatment provided mild relief.        The following portions of the patient's history were reviewed and updated as appropriate: allergies, current medications, past family history, past medical history, past social history, past surgical history and  problem list.    Review of Systems   Constitutional: Negative.    HENT: Positive for congestion, ear pain, hoarse voice, sinus pressure, sneezing and sore throat.    Eyes: Negative.    Respiratory: Positive for cough. Negative for shortness of breath.    Cardiovascular: Negative.    Gastrointestinal: Negative.    Endocrine: Negative.    Genitourinary: Negative.    Musculoskeletal: Negative.    Skin: Negative.    Allergic/Immunologic: Negative.    Neurological: Positive for headaches.   Hematological: Negative.    Psychiatric/Behavioral: Negative.        Objective   Physical Exam   Constitutional: He is oriented to person, place, and time. He appears well-developed and well-nourished. No distress.   HENT:   Head: Normocephalic and atraumatic.   Right Ear: Hearing, external ear and ear canal normal. There is tenderness. A middle ear effusion is present.   Left Ear: Hearing, external ear and ear canal normal. A middle ear effusion is present.   Nose: Mucosal edema and rhinorrhea present. Right sinus exhibits maxillary sinus tenderness and frontal sinus tenderness. Left sinus exhibits no maxillary sinus tenderness and no frontal sinus tenderness.   Mouth/Throat: Uvula is midline and mucous membranes are normal. Posterior oropharyngeal erythema (with cobblestoning appearance noted, mild erythema as well) present. No tonsillar exudate.   Neck: Neck supple.   Cardiovascular: Normal rate, regular rhythm and normal heart sounds. Exam reveals no friction rub.   No murmur heard.  Pulmonary/Chest: Effort normal and breath sounds normal. No stridor. No respiratory distress. He has no wheezes. He has no rales.   Lymphadenopathy:     He has no cervical adenopathy.   Neurological: He is alert and oriented to person, place, and time.   Skin: Skin is warm and dry. No rash noted. He is not diaphoretic. No erythema. No pallor.   Psychiatric: He has a normal mood and affect.   Nursing note and vitals reviewed.      Assessment/Plan    Jadiel was seen today for sinusitis.    Diagnoses and all orders for this visit:    Acute non-recurrent maxillary sinusitis  -     triamcinolone acetonide (KENALOG-40) injection 80 mg    Other orders  -     cephalexin (KEFLEX) 500 MG capsule; Take 1 capsule by mouth 2 (Two) Times a Day for 10 days.    he is given kenalog inj IM in office today as above, meet well  He is given keflex as above, if symptoms should persist or worsen he is to RTC for recheck  Continue with flonase and inhalers as he has been using previously  All questions and concerns are addressed with understanding noted.

## 2019-08-06 NOTE — PATIENT INSTRUCTIONS

## 2019-08-26 ENCOUNTER — OFFICE VISIT (OUTPATIENT)
Dept: PODIATRY | Facility: CLINIC | Age: 69
End: 2019-08-26

## 2019-08-26 VITALS — HEART RATE: 98 BPM | WEIGHT: 228 LBS | BODY MASS INDEX: 30.88 KG/M2 | OXYGEN SATURATION: 99 % | HEIGHT: 72 IN

## 2019-08-26 DIAGNOSIS — L97.522 SKIN ULCER OF TOE OF LEFT FOOT WITH FAT LAYER EXPOSED (HCC): Primary | ICD-10-CM

## 2019-08-26 DIAGNOSIS — E11.8 TYPE 2 DIABETES MELLITUS WITH COMPLICATION, WITH LONG-TERM CURRENT USE OF INSULIN (HCC): ICD-10-CM

## 2019-08-26 DIAGNOSIS — Z79.4 TYPE 2 DIABETES MELLITUS WITH COMPLICATION, WITH LONG-TERM CURRENT USE OF INSULIN (HCC): ICD-10-CM

## 2019-08-26 DIAGNOSIS — E11.42 DIABETIC POLYNEUROPATHY ASSOCIATED WITH TYPE 2 DIABETES MELLITUS (HCC): ICD-10-CM

## 2019-08-26 PROCEDURE — 11042 DBRDMT SUBQ TIS 1ST 20SQCM/<: CPT | Performed by: PODIATRIST

## 2019-08-26 NOTE — PROGRESS NOTES
Jadiel Dutton  1950  68 y.o. male   PCP- Shayy Banda , MARY  08/16/19  BS:111 per patient    Patient presents today for skin ulcer on left foot states he is not having any pain.    08/26/2019        Chief Complaint   Patient presents with   • Left Foot - Skin Ulcer         History of Present Illness     Jadiel Dutton is a 68 y.o. male who presents for diabetic foot examination and evaluation of left midfoot ulceration.   Has returned to work since last visit.  Feels wound has worsened mildly.  Denies associated redness or drainage.    Past Medical History:   Diagnosis Date   • Abdominal pain    • Abnormal weight loss    • Acute bronchitis    • Anxiety state    • Benign essential hypertension    • Chronic sinusitis    • Constipation    • Cystitis    • Degenerative joint disease involving multiple joints    • Diabetic neuropathy (CMS/HCC)      bilateral hands      • Diastolic dysfunction    • Diverticular disease of colon    • Dyspnea    • Epigastric pain    • Essential hypertension    • Foot ulcer (CMS/HCC)    • Gastroesophageal reflux disease    • Generalized anxiety disorder    • Hyperlipidemia    • Inflammatory bowel disease     Possible Inflammatory Bowel Disease      • Lumbar pain     Pain radiating to lumbar region of back   n      • Pleuritic pain    • Radiculopathy     site unspecified      • Type 2 diabetes mellitus (CMS/HCC)    • Vesicular eczema of hands and feet          Past Surgical History:   Procedure Laterality Date   • BACK SURGERY      LOWER BACK SURGERY   • CARPAL TUNNEL RELEASE Bilateral    • COLONOSCOPY  06/11/2012    Internal & external hemorrhoids found.   • COLONOSCOPY N/A 8/22/2017    Procedure: COLONOSCOPY;  Surgeon: Cesar Hollis MD;  Location: [facility] ENDOSCOPY;  Service:    • ENDOSCOPY  06/11/2012    Slight stricture in the distal esophagus. Gastritis in stomach. Biopsy taken. Normal duodenum.   • ENDOSCOPY     • ENDOSCOPY N/A 8/22/2017    Procedure:  ESOPHAGOGASTRODUODENOSCOPY possible dilation ;  Surgeon: Cesar Hollis MD;  Location: [facility] ENDOSCOPY;  Service:    • ENTEROSCOPY SMALL BOWEL  08/27/2012    Gastritis in stomach. Normal jejunum. Biopsy taken. Normal duodenum   • FOOT SURGERY     • INJECTION OF MEDICATION  02/28/2014    Depo Medrol   • INJECTION OF MEDICATION  03/07/2014    Rocephin(2)   • ULNAR TUNNEL RELEASE           Family History   Problem Relation Age of Onset   • No Known Problems Other    • Hypertension Mother    • Heart attack Father    • No Known Problems Sister    • No Known Problems Brother    • No Known Problems Daughter    • No Known Problems Son    • No Known Problems Maternal Aunt    • No Known Problems Maternal Uncle    • No Known Problems Paternal Aunt    • No Known Problems Paternal Uncle    • No Known Problems Maternal Grandmother    • No Known Problems Maternal Grandfather    • No Known Problems Paternal Grandmother    • No Known Problems Paternal Grandfather          Social History     Socioeconomic History   • Marital status: Single     Spouse name: Not on file   • Number of children: Not on file   • Years of education: Not on file   • Highest education level: Not on file   Tobacco Use   • Smoking status: Never Smoker   • Smokeless tobacco: Never Used   Substance and Sexual Activity   • Alcohol use: No   • Drug use: No   • Sexual activity: Yes     Partners: Female         Current Outpatient Medications   Medication Sig Dispense Refill   • albuterol (PROVENTIL HFA;VENTOLIN HFA) 108 (90 Base) MCG/ACT inhaler Inhale 2 puffs Every 4 (Four) Hours As Needed for Wheezing. 1 inhaler 2   • azelastine (ASTELIN) 0.1 % nasal spray 2 sprays into the nostril(s) as directed by provider Daily. Use in each nostril as directed 30 mL 11   • B-D UF III MINI PEN NEEDLES 31G X 5 MM misc USE FOUR TIMES A DAY WITH INSULIN PENS 360 each 2   • DEXILANT 60 MG capsule TAKE 1 CAPSULE DAILY 90 capsule 2   • gabapentin (NEURONTIN) 300 MG capsule Take  "1 capsule by mouth 4 (Four) Times a Day As Needed.     • Insulin NPH Isophane & Regular (HUMULIN 70/30 KWIKPEN) (70-30) 100 UNIT/ML suspension pen-injector INJECT 55 UNITS SUB Q BEFORE BREAKFAST, 65 UNITS BEFORE SUPPER, 30 MINUTES BEFORE BREAKFAST AND SUPPER     • lisinopril-hydrochlorothiazide (PRINZIDE,ZESTORETIC) 20-25 MG per tablet Take 1 tablet by mouth Daily.     • ONETOUCH VERIO test strip      • rosuvastatin (CRESTOR) 5 MG tablet Take 1 tablet by mouth Daily. 30 tablet 3   • Semaglutide (OZEMPIC) 0.25 or 0.5 MG/DOSE solution pen-injector AS DIRECTED 1 MG EVERY WEEK     • traMADol (ULTRAM) 50 MG tablet Take 50 mg by mouth Every 6 (Six) Hours As Needed for Moderate Pain .       No current facility-administered medications for this visit.          OBJECTIVE    Pulse 98   Ht 182.9 cm (72\")   Wt 103 kg (228 lb)   SpO2 99%   BMI 30.92 kg/m²       Review of Systems   Constitutional: Negative.    HENT: Negative.    Eyes: Negative.    Respiratory: Negative.    Cardiovascular: Negative.    Gastrointestinal: Negative.    Endocrine: Negative.    Genitourinary: Negative.    Musculoskeletal: Positive for back pain.   Skin: Positive for wound.   Allergic/Immunologic: Negative.    Neurological: Negative.    Hematological: Negative.    Psychiatric/Behavioral: Negative.          Physical Exam   Constitutional: he appears well-developed and well-nourished.   HEENT: Normocephalic. Atraumatic  CV: No tenderness. RRR  Resp: Non-labored respiration. No wheezes.   Psychiatric: he has a normal mood and affect. his   behavior is normal.      Lower Extremity Exam:  Vascular: DP/PT pulses palpable 1+.   Negative hair growth.   Minimal perimalleolar edema  Neuro: Protective sensation absent, b/l.  DTRs intact  Integument: Full thickness ulcer to plantar lateral forefoot measuring approximately 1.2 x 0.3 x 0.2 cm .  Granular base with significant surrounding hyperkeratosis.    Atrophic skin noted b/l  No masses  Musculoskeletal: LE " muscle strength 5/5.   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      Left foot wound debridement:  Risks and benefits discussed.  Left forefoot ulcer debrided of surrounding hyperkeratosis and devitalized tissue with 15 blade to level of subq  Pressure hemostasis obtained  Medihoney ointment and dsd applied.          ASSESSMENT AND PLAN    Jadiel was seen today for skin ulcer.    Diagnoses and all orders for this visit:    Skin ulcer of toe of left foot with fat layer exposed (CMS/HCC)    Type 2 diabetes mellitus with complication, with long-term current use of insulin (CMS/[facility])    Diabetic polyneuropathy associated with type 2 diabetes mellitus (CMS/HCC)      -Comprehensive DM foot exam performed. Pt educated on importance of tight glucose control and daily foot checks.   -Wound debridement as above  -Continue Medihoney for home dressing changes  -Modification of custom insole for additional offloading performed.  Stressed importance of increased offloading of the wound site.   -Again had a brief discussion regarding possible surgical excision of heterotopic bone.  Patient would like to continue monitoring for now.  -Follow up 2 weeks            This document has been electronically signed by Jacoby Serrano DPM on August 28, 2019 12:47 PM     EMR Dragon/Transcription disclaimer:   Much of this encounter note is an electronic transcription/translation of spoken language to printed text. The electronic translation of spoken language may permit erroneous, or at times, nonsensical words or phrases to be inadvertently transcribed; Although I have reviewed the note for such errors, some may still exist.    Jacoby Serrano DPM  8/28/2019  12:47 PM

## 2019-09-12 ENCOUNTER — OFFICE VISIT (OUTPATIENT)
Dept: PODIATRY | Facility: CLINIC | Age: 69
End: 2019-09-12

## 2019-09-12 VITALS — HEIGHT: 72 IN | HEART RATE: 117 BPM | WEIGHT: 228 LBS | OXYGEN SATURATION: 98 % | BODY MASS INDEX: 30.88 KG/M2

## 2019-09-12 DIAGNOSIS — E11.8 TYPE 2 DIABETES MELLITUS WITH COMPLICATION, WITH LONG-TERM CURRENT USE OF INSULIN (HCC): ICD-10-CM

## 2019-09-12 DIAGNOSIS — L97.522 SKIN ULCER OF TOE OF LEFT FOOT WITH FAT LAYER EXPOSED (HCC): Primary | ICD-10-CM

## 2019-09-12 DIAGNOSIS — Z79.4 TYPE 2 DIABETES MELLITUS WITH COMPLICATION, WITH LONG-TERM CURRENT USE OF INSULIN (HCC): ICD-10-CM

## 2019-09-12 PROCEDURE — 11042 DBRDMT SUBQ TIS 1ST 20SQCM/<: CPT | Performed by: PODIATRIST

## 2019-09-12 NOTE — PROGRESS NOTES
Jadiel Dutton  1950  68 y.o. male   PCP- Shayy Banda , MARY  08/16/19  BS:110 per patient    Patient presents today for skin ulcer on left foot.    09/12/2019      Chief Complaint   Patient presents with   • Left Foot - Follow-up, Wound Check         History of Present Illness     Jadiel Dutton is a 68 y.o. male who presents for diabetic foot examination and evaluation of left midfoot ulceration.   Has returned to work since last visit.  Feels wound has worsened mildly.  Denies associated redness or drainage.    Past Medical History:   Diagnosis Date   • Abdominal pain    • Abnormal weight loss    • Acute bronchitis    • Anxiety state    • Benign essential hypertension    • Chronic sinusitis    • Constipation    • Cystitis    • Degenerative joint disease involving multiple joints    • Diabetic neuropathy (CMS/HCC)      bilateral hands      • Diastolic dysfunction    • Diverticular disease of colon    • Dyspnea    • Epigastric pain    • Essential hypertension    • Foot ulcer (CMS/HCC)    • Gastroesophageal reflux disease    • Generalized anxiety disorder    • Hyperlipidemia    • Inflammatory bowel disease     Possible Inflammatory Bowel Disease      • Lumbar pain     Pain radiating to lumbar region of back   n      • Pleuritic pain    • Radiculopathy     site unspecified      • Type 2 diabetes mellitus (CMS/HCC)    • Vesicular eczema of hands and feet          Past Surgical History:   Procedure Laterality Date   • BACK SURGERY      LOWER BACK SURGERY   • CARPAL TUNNEL RELEASE Bilateral    • COLONOSCOPY  06/11/2012    Internal & external hemorrhoids found.   • COLONOSCOPY N/A 8/22/2017    Procedure: COLONOSCOPY;  Surgeon: Cesar Hollis MD;  Location: [facility] ENDOSCOPY;  Service:    • ENDOSCOPY  06/11/2012    Slight stricture in the distal esophagus. Gastritis in stomach. Biopsy taken. Normal duodenum.   • ENDOSCOPY     • ENDOSCOPY N/A 8/22/2017    Procedure: ESOPHAGOGASTRODUODENOSCOPY possible  dilation ;  Surgeon: Cesar Hollis MD;  Location: [facility] ENDOSCOPY;  Service:    • ENTEROSCOPY SMALL BOWEL  08/27/2012    Gastritis in stomach. Normal jejunum. Biopsy taken. Normal duodenum   • FOOT SURGERY     • INJECTION OF MEDICATION  02/28/2014    Depo Medrol   • INJECTION OF MEDICATION  03/07/2014    Rocephin(2)   • ULNAR TUNNEL RELEASE           Family History   Problem Relation Age of Onset   • No Known Problems Other    • Hypertension Mother    • Heart attack Father    • No Known Problems Sister    • No Known Problems Brother    • No Known Problems Daughter    • No Known Problems Son    • No Known Problems Maternal Aunt    • No Known Problems Maternal Uncle    • No Known Problems Paternal Aunt    • No Known Problems Paternal Uncle    • No Known Problems Maternal Grandmother    • No Known Problems Maternal Grandfather    • No Known Problems Paternal Grandmother    • No Known Problems Paternal Grandfather          Social History     Socioeconomic History   • Marital status: Single     Spouse name: Not on file   • Number of children: Not on file   • Years of education: Not on file   • Highest education level: Not on file   Tobacco Use   • Smoking status: Never Smoker   • Smokeless tobacco: Never Used   Substance and Sexual Activity   • Alcohol use: No   • Drug use: No   • Sexual activity: Yes     Partners: Female         Current Outpatient Medications   Medication Sig Dispense Refill   • albuterol (PROVENTIL HFA;VENTOLIN HFA) 108 (90 Base) MCG/ACT inhaler Inhale 2 puffs Every 4 (Four) Hours As Needed for Wheezing. 1 inhaler 2   • azelastine (ASTELIN) 0.1 % nasal spray 2 sprays into the nostril(s) as directed by provider Daily. Use in each nostril as directed 30 mL 11   • B-D UF III MINI PEN NEEDLES 31G X 5 MM misc USE FOUR TIMES A DAY WITH INSULIN PENS 360 each 2   • DEXILANT 60 MG capsule TAKE 1 CAPSULE DAILY 90 capsule 2   • gabapentin (NEURONTIN) 300 MG capsule Take 1 capsule by mouth 4 (Four) Times a  "Day As Needed.     • Insulin NPH Isophane & Regular (HUMULIN 70/30 KWIKPEN) (70-30) 100 UNIT/ML suspension pen-injector INJECT 55 UNITS SUB Q BEFORE BREAKFAST, 65 UNITS BEFORE SUPPER, 30 MINUTES BEFORE BREAKFAST AND SUPPER     • lisinopril-hydrochlorothiazide (PRINZIDE,ZESTORETIC) 20-25 MG per tablet Take 1 tablet by mouth Daily.     • ONETOUCH VERIO test strip      • rosuvastatin (CRESTOR) 5 MG tablet Take 1 tablet by mouth Daily. 30 tablet 3   • Semaglutide (OZEMPIC) 0.25 or 0.5 MG/DOSE solution pen-injector AS DIRECTED 1 MG EVERY WEEK     • traMADol (ULTRAM) 50 MG tablet Take 50 mg by mouth Every 6 (Six) Hours As Needed for Moderate Pain .       No current facility-administered medications for this visit.          OBJECTIVE    Pulse 117   Ht 182.9 cm (72\")   Wt 103 kg (228 lb)   SpO2 98%   BMI 30.92 kg/m²       Review of Systems   Constitutional: Negative.    HENT: Negative.    Eyes: Negative.    Respiratory: Negative.    Cardiovascular: Negative.    Gastrointestinal: Negative.    Endocrine: Negative.    Genitourinary: Negative.    Musculoskeletal: Positive for back pain.   Skin: Positive for wound.   Allergic/Immunologic: Negative.    Neurological: Negative.    Hematological: Negative.    Psychiatric/Behavioral: Negative.          Physical Exam   Constitutional: he appears well-developed and well-nourished.   HEENT: Normocephalic. Atraumatic  CV: No tenderness. RRR  Resp: Non-labored respiration. No wheezes.   Psychiatric: he has a normal mood and affect. his   behavior is normal.      Lower Extremity Exam:  Vascular: DP/PT pulses palpable 1+.   Negative hair growth.   Minimal perimalleolar edema  Neuro: Protective sensation absent, b/l.  DTRs intact  Integument: Full thickness ulcer to plantar lateral forefoot measuring approximately 0.9 x 0.3 x 0.2 cm .  Granular base with significant surrounding hyperkeratosis.    Atrophic skin noted b/l  No masses  Musculoskeletal: LE muscle strength 5/5.   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      Left foot wound debridement:  Risks and benefits discussed.  Left forefoot ulcer debrided of surrounding hyperkeratosis and devitalized tissue with 15 blade to level of subq  Pressure hemostasis obtained  Medihoney ointment and dsd applied.          ASSESSMENT AND PLAN    Jadiel was seen today for follow-up and wound check.    Diagnoses and all orders for this visit:    Skin ulcer of toe of left foot with fat layer exposed (CMS/HCC)    Type 2 diabetes mellitus with complication, with long-term current use of insulin (CMS/[facility])      -Comprehensive DM foot exam performed. Pt educated on importance of tight glucose control and daily foot checks.   -Wound debridement as above  -Continue Medihoney for home dressing changes  -Modification of custom insole for additional offloading performed.  Stressed importance of increased offloading of the wound site.   -Again had a brief discussion regarding possible surgical excision of heterotopic bone.  Patient would like to continue monitoring for now.  -Follow up 2 weeks            This document has been electronically signed by Jacoby Serrano DPM on September 12, 2019 4:14 PM     EMR Dragon/Transcription disclaimer:   Much of this encounter note is an electronic transcription/translation of spoken language to printed text. The electronic translation of spoken language may permit erroneous, or at times, nonsensical words or phrases to be inadvertently transcribed; Although I have reviewed the note for such errors, some may still exist.    Jacoby Serrano DPM  9/12/2019  4:14 PM

## 2019-09-26 ENCOUNTER — OFFICE VISIT (OUTPATIENT)
Dept: PODIATRY | Facility: CLINIC | Age: 69
End: 2019-09-26

## 2019-09-26 VITALS — HEART RATE: 118 BPM | BODY MASS INDEX: 30.88 KG/M2 | HEIGHT: 72 IN | OXYGEN SATURATION: 98 % | WEIGHT: 228 LBS

## 2019-09-26 DIAGNOSIS — E11.8 TYPE 2 DIABETES MELLITUS WITH COMPLICATION, WITH LONG-TERM CURRENT USE OF INSULIN (HCC): ICD-10-CM

## 2019-09-26 DIAGNOSIS — E11.42 DIABETIC POLYNEUROPATHY ASSOCIATED WITH TYPE 2 DIABETES MELLITUS (HCC): ICD-10-CM

## 2019-09-26 DIAGNOSIS — Z79.4 TYPE 2 DIABETES MELLITUS WITH COMPLICATION, WITH LONG-TERM CURRENT USE OF INSULIN (HCC): ICD-10-CM

## 2019-09-26 DIAGNOSIS — L97.522 SKIN ULCER OF TOE OF LEFT FOOT WITH FAT LAYER EXPOSED (HCC): Primary | ICD-10-CM

## 2019-09-26 PROCEDURE — 11042 DBRDMT SUBQ TIS 1ST 20SQCM/<: CPT | Performed by: PODIATRIST

## 2019-09-26 NOTE — PROGRESS NOTES
Jadiel Dutton  1950  69 y.o. male   PCP- Shayy Banda , MARY  08/06/19  BS:99 per patient    Patient presents today for skin ulcer on left foot.    09/26/2019    Chief Complaint   Patient presents with   • Left Foot - Follow-up         History of Present Illness     Jadiel Dutton is a 69 y.o. male who presents for diabetic foot examination and evaluation of left midfoot ulceration.   Has returned to work since last visit.  Feels wound has worsened mildly.  Denies associated redness or drainage.    Past Medical History:   Diagnosis Date   • Abdominal pain    • Abnormal weight loss    • Acute bronchitis    • Anxiety state    • Benign essential hypertension    • Chronic sinusitis    • Constipation    • Cystitis    • Degenerative joint disease involving multiple joints    • Diabetic neuropathy (CMS/HCC)      bilateral hands      • Diastolic dysfunction    • Diverticular disease of colon    • Dyspnea    • Epigastric pain    • Essential hypertension    • Foot ulcer (CMS/HCC)    • Gastroesophageal reflux disease    • Generalized anxiety disorder    • Hyperlipidemia    • Inflammatory bowel disease     Possible Inflammatory Bowel Disease      • Lumbar pain     Pain radiating to lumbar region of back   n      • Pleuritic pain    • Radiculopathy     site unspecified      • Type 2 diabetes mellitus (CMS/HCC)    • Vesicular eczema of hands and feet          Past Surgical History:   Procedure Laterality Date   • BACK SURGERY      LOWER BACK SURGERY   • CARPAL TUNNEL RELEASE Bilateral    • COLONOSCOPY  06/11/2012    Internal & external hemorrhoids found.   • COLONOSCOPY N/A 8/22/2017    Procedure: COLONOSCOPY;  Surgeon: Cesar Hollis MD;  Location: [facility] ENDOSCOPY;  Service:    • ENDOSCOPY  06/11/2012    Slight stricture in the distal esophagus. Gastritis in stomach. Biopsy taken. Normal duodenum.   • ENDOSCOPY     • ENDOSCOPY N/A 8/22/2017    Procedure: ESOPHAGOGASTRODUODENOSCOPY possible dilation ;   Surgeon: Cesar Hollis MD;  Location: [facility] ENDOSCOPY;  Service:    • ENTEROSCOPY SMALL BOWEL  08/27/2012    Gastritis in stomach. Normal jejunum. Biopsy taken. Normal duodenum   • FOOT SURGERY     • INJECTION OF MEDICATION  02/28/2014    Depo Medrol   • INJECTION OF MEDICATION  03/07/2014    Rocephin(2)   • ULNAR TUNNEL RELEASE           Family History   Problem Relation Age of Onset   • No Known Problems Other    • Hypertension Mother    • Heart attack Father    • No Known Problems Sister    • No Known Problems Brother    • No Known Problems Daughter    • No Known Problems Son    • No Known Problems Maternal Aunt    • No Known Problems Maternal Uncle    • No Known Problems Paternal Aunt    • No Known Problems Paternal Uncle    • No Known Problems Maternal Grandmother    • No Known Problems Maternal Grandfather    • No Known Problems Paternal Grandmother    • No Known Problems Paternal Grandfather          Social History     Socioeconomic History   • Marital status: Single     Spouse name: Not on file   • Number of children: Not on file   • Years of education: Not on file   • Highest education level: Not on file   Tobacco Use   • Smoking status: Never Smoker   • Smokeless tobacco: Never Used   Substance and Sexual Activity   • Alcohol use: No   • Drug use: No   • Sexual activity: Yes     Partners: Female         Current Outpatient Medications   Medication Sig Dispense Refill   • albuterol (PROVENTIL HFA;VENTOLIN HFA) 108 (90 Base) MCG/ACT inhaler Inhale 2 puffs Every 4 (Four) Hours As Needed for Wheezing. 1 inhaler 2   • azelastine (ASTELIN) 0.1 % nasal spray 2 sprays into the nostril(s) as directed by provider Daily. Use in each nostril as directed 30 mL 11   • B-D UF III MINI PEN NEEDLES 31G X 5 MM misc USE FOUR TIMES A DAY WITH INSULIN PENS 360 each 2   • DEXILANT 60 MG capsule TAKE 1 CAPSULE DAILY 90 capsule 2   • gabapentin (NEURONTIN) 300 MG capsule Take 1 capsule by mouth 4 (Four) Times a Day As  "Needed.     • Insulin NPH Isophane & Regular (HUMULIN 70/30 KWIKPEN) (70-30) 100 UNIT/ML suspension pen-injector INJECT 55 UNITS SUB Q BEFORE BREAKFAST, 65 UNITS BEFORE SUPPER, 30 MINUTES BEFORE BREAKFAST AND SUPPER     • lisinopril-hydrochlorothiazide (PRINZIDE,ZESTORETIC) 20-25 MG per tablet Take 1 tablet by mouth Daily.     • ONETOUCH VERIO test strip      • rosuvastatin (CRESTOR) 5 MG tablet Take 1 tablet by mouth Daily. 30 tablet 3   • Semaglutide (OZEMPIC) 0.25 or 0.5 MG/DOSE solution pen-injector AS DIRECTED 1 MG EVERY WEEK     • traMADol (ULTRAM) 50 MG tablet Take 50 mg by mouth Every 6 (Six) Hours As Needed for Moderate Pain .       No current facility-administered medications for this visit.          OBJECTIVE    Pulse 118   Ht 182.9 cm (72\")   Wt 103 kg (228 lb)   SpO2 98%   BMI 30.92 kg/m²       Review of Systems   Constitutional: Negative.    HENT: Negative.    Eyes: Negative.    Respiratory: Negative.    Cardiovascular: Negative.    Gastrointestinal: Negative.    Endocrine: Negative.    Genitourinary: Negative.    Musculoskeletal: Positive for back pain.   Skin: Positive for wound.   Allergic/Immunologic: Negative.    Neurological: Negative.    Hematological: Negative.    Psychiatric/Behavioral: Negative.          Physical Exam   Constitutional: he appears well-developed and well-nourished.   HEENT: Normocephalic. Atraumatic  CV: No tenderness. RRR  Resp: Non-labored respiration. No wheezes.   Psychiatric: he has a normal mood and affect. his   behavior is normal.      Lower Extremity Exam:  Vascular: DP/PT pulses palpable 1+.   Negative hair growth.   Minimal perimalleolar edema  Neuro: Protective sensation absent, b/l.  DTRs intact  Integument: Full thickness ulcer to plantar lateral forefoot measuring approximately 0.9 x 0.2 x 0.2 cm .  Granular base with significant surrounding hyperkeratosis.    Atrophic skin noted b/l  No masses  Musculoskeletal: LE muscle strength 5/5.   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      Left foot wound debridement:  Risks and benefits discussed.  Left forefoot ulcer debrided of surrounding hyperkeratosis and devitalized tissue with 15 blade to level of subq  Pressure hemostasis obtained  Medihoney ointment and dsd applied.          ASSESSMENT AND PLAN    Jadiel was seen today for follow-up.    Diagnoses and all orders for this visit:    Skin ulcer of toe of left foot with fat layer exposed (CMS/HCC)    Type 2 diabetes mellitus with complication, with long-term current use of insulin (CMS/HCC)    Diabetic polyneuropathy associated with type 2 diabetes mellitus (CMS/HCC)      -Comprehensive DM foot exam performed. Pt educated on importance of tight glucose control and daily foot checks.   -Wound debridement as above  -Continue Medihoney for home dressing changes  -Modification of custom insole for additional offloading performed.  Stressed importance of increased offloading of the wound site.   -Again had a brief discussion regarding possible surgical excision of heterotopic bone.  Patient would like to continue monitoring for now.  -Follow up 2 weeks            This document has been electronically signed by Jacoby Serrano DPM on September 26, 2019 1:53 PM     EMR Dragon/Transcription disclaimer:   Much of this encounter note is an electronic transcription/translation of spoken language to printed text. The electronic translation of spoken language may permit erroneous, or at times, nonsensical words or phrases to be inadvertently transcribed; Although I have reviewed the note for such errors, some may still exist.    Jacoby Serrano DPM  9/26/2019  1:53 PM

## 2019-10-17 ENCOUNTER — OFFICE VISIT (OUTPATIENT)
Dept: PODIATRY | Facility: CLINIC | Age: 69
End: 2019-10-17

## 2019-10-17 VITALS — OXYGEN SATURATION: 99 % | HEART RATE: 102 BPM | HEIGHT: 72 IN | WEIGHT: 228 LBS | BODY MASS INDEX: 30.88 KG/M2

## 2019-10-17 DIAGNOSIS — E11.42 DIABETIC POLYNEUROPATHY ASSOCIATED WITH TYPE 2 DIABETES MELLITUS (HCC): ICD-10-CM

## 2019-10-17 DIAGNOSIS — Z79.4 TYPE 2 DIABETES MELLITUS WITH COMPLICATION, WITH LONG-TERM CURRENT USE OF INSULIN (HCC): ICD-10-CM

## 2019-10-17 DIAGNOSIS — L97.522 SKIN ULCER OF TOE OF LEFT FOOT WITH FAT LAYER EXPOSED (HCC): Primary | ICD-10-CM

## 2019-10-17 DIAGNOSIS — E11.8 TYPE 2 DIABETES MELLITUS WITH COMPLICATION, WITH LONG-TERM CURRENT USE OF INSULIN (HCC): ICD-10-CM

## 2019-10-17 PROCEDURE — 11042 DBRDMT SUBQ TIS 1ST 20SQCM/<: CPT | Performed by: PODIATRIST

## 2019-10-17 NOTE — PROGRESS NOTES
Jadiel Dutton  1950  69 y.o. male   PCP- Shayy Banda , MARY  08/06/19  A1C 4.8  Patient presents today for skin ulcer on left foot.    10/17/2019      Chief Complaint   Patient presents with   • Left Foot - Follow-up, Skin Ulcer         History of Present Illness     Jadiel Dutton is a 69 y.o. male who presents for diabetic foot examination and evaluation of left midfoot ulceration.   Has returned to work since last visit.  Feels wound has worsened mildly.  Denies associated redness or drainage.    Past Medical History:   Diagnosis Date   • Abdominal pain    • Abnormal weight loss    • Acute bronchitis    • Anxiety state    • Benign essential hypertension    • Chronic sinusitis    • Constipation    • Cystitis    • Degenerative joint disease involving multiple joints    • Diabetic neuropathy (CMS/HCC)      bilateral hands      • Diastolic dysfunction    • Diverticular disease of colon    • Dyspnea    • Epigastric pain    • Essential hypertension    • Foot ulcer (CMS/HCC)    • Gastroesophageal reflux disease    • Generalized anxiety disorder    • Hyperlipidemia    • Inflammatory bowel disease     Possible Inflammatory Bowel Disease      • Lumbar pain     Pain radiating to lumbar region of back   n      • Pleuritic pain    • Radiculopathy     site unspecified      • Type 2 diabetes mellitus (CMS/HCC)    • Vesicular eczema of hands and feet          Past Surgical History:   Procedure Laterality Date   • BACK SURGERY      LOWER BACK SURGERY   • CARPAL TUNNEL RELEASE Bilateral    • COLONOSCOPY  06/11/2012    Internal & external hemorrhoids found.   • COLONOSCOPY N/A 8/22/2017    Procedure: COLONOSCOPY;  Surgeon: Cesar Hollis MD;  Location: [facility] ENDOSCOPY;  Service:    • ENDOSCOPY  06/11/2012    Slight stricture in the distal esophagus. Gastritis in stomach. Biopsy taken. Normal duodenum.   • ENDOSCOPY     • ENDOSCOPY N/A 8/22/2017    Procedure: ESOPHAGOGASTRODUODENOSCOPY possible dilation ;   Surgeon: Cesar Hollis MD;  Location: [facility] ENDOSCOPY;  Service:    • ENTEROSCOPY SMALL BOWEL  08/27/2012    Gastritis in stomach. Normal jejunum. Biopsy taken. Normal duodenum   • FOOT SURGERY     • INJECTION OF MEDICATION  02/28/2014    Depo Medrol   • INJECTION OF MEDICATION  03/07/2014    Rocephin(2)   • ULNAR TUNNEL RELEASE           Family History   Problem Relation Age of Onset   • No Known Problems Other    • Hypertension Mother    • Heart attack Father    • No Known Problems Sister    • No Known Problems Brother    • No Known Problems Daughter    • No Known Problems Son    • No Known Problems Maternal Aunt    • No Known Problems Maternal Uncle    • No Known Problems Paternal Aunt    • No Known Problems Paternal Uncle    • No Known Problems Maternal Grandmother    • No Known Problems Maternal Grandfather    • No Known Problems Paternal Grandmother    • No Known Problems Paternal Grandfather          Social History     Socioeconomic History   • Marital status: Single     Spouse name: Not on file   • Number of children: Not on file   • Years of education: Not on file   • Highest education level: Not on file   Tobacco Use   • Smoking status: Never Smoker   • Smokeless tobacco: Never Used   Substance and Sexual Activity   • Alcohol use: No   • Drug use: No   • Sexual activity: Yes     Partners: Female         Current Outpatient Medications   Medication Sig Dispense Refill   • albuterol (PROVENTIL HFA;VENTOLIN HFA) 108 (90 Base) MCG/ACT inhaler Inhale 2 puffs Every 4 (Four) Hours As Needed for Wheezing. 1 inhaler 2   • azelastine (ASTELIN) 0.1 % nasal spray 2 sprays into the nostril(s) as directed by provider Daily. Use in each nostril as directed 30 mL 11   • B-D UF III MINI PEN NEEDLES 31G X 5 MM misc USE FOUR TIMES A DAY WITH INSULIN PENS 360 each 2   • DEXILANT 60 MG capsule TAKE 1 CAPSULE DAILY 90 capsule 2   • gabapentin (NEURONTIN) 300 MG capsule Take 1 capsule by mouth 4 (Four) Times a Day As  "Needed.     • lisinopril-hydrochlorothiazide (PRINZIDE,ZESTORETIC) 20-25 MG per tablet Take 1 tablet by mouth Daily.     • ONETOUCH VERIO test strip      • rosuvastatin (CRESTOR) 5 MG tablet Take 1 tablet by mouth Daily. 30 tablet 3   • Semaglutide (OZEMPIC) 0.25 or 0.5 MG/DOSE solution pen-injector AS DIRECTED 1 MG EVERY WEEK     • traMADol (ULTRAM) 50 MG tablet Take 50 mg by mouth Every 6 (Six) Hours As Needed for Moderate Pain .     • Insulin NPH Isophane & Regular (HUMULIN 70/30 KWIKPEN) (70-30) 100 UNIT/ML suspension pen-injector INJECT 55 UNITS SUB Q BEFORE BREAKFAST, 65 UNITS BEFORE SUPPER, 30 MINUTES BEFORE BREAKFAST AND SUPPER       No current facility-administered medications for this visit.          OBJECTIVE    Pulse 102   Ht 182.9 cm (72\")   Wt 103 kg (228 lb)   SpO2 99%   BMI 30.92 kg/m²       Review of Systems   Constitutional: Negative.    HENT: Negative.    Eyes: Negative.    Respiratory: Negative.    Cardiovascular: Negative.    Gastrointestinal: Negative.    Endocrine: Negative.    Genitourinary: Negative.    Musculoskeletal: Positive for back pain.   Skin: Positive for wound.   Allergic/Immunologic: Negative.    Neurological: Negative.    Hematological: Negative.    Psychiatric/Behavioral: Negative.          Physical Exam   Constitutional: he appears well-developed and well-nourished.   HEENT: Normocephalic. Atraumatic  CV: No tenderness. RRR  Resp: Non-labored respiration. No wheezes.   Psychiatric: he has a normal mood and affect. his   behavior is normal.      Lower Extremity Exam:  Vascular: DP/PT pulses palpable 1+.   Negative hair growth.   Minimal perimalleolar edema  Neuro: Protective sensation absent, b/l.  DTRs intact  Integument: Full thickness ulcer to plantar lateral forefoot measuring approximately 1.1 x 0.3 x 0.2 cm .  Granular base with significant surrounding hyperkeratosis.    Atrophic skin noted b/l  No masses  Musculoskeletal: LE muscle strength 5/5.   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      Left foot wound debridement:  Risks and benefits discussed.  Left forefoot ulcer debrided of surrounding hyperkeratosis and devitalized tissue with 15 blade to level of subq  Pressure hemostasis obtained  Medihoney ointment and dsd applied.          ASSESSMENT AND PLAN    Jadiel was seen today for follow-up and skin ulcer.    Diagnoses and all orders for this visit:    Skin ulcer of toe of left foot with fat layer exposed (CMS/HCC)    Type 2 diabetes mellitus with complication, with long-term current use of insulin (CMS/HCC)    Diabetic polyneuropathy associated with type 2 diabetes mellitus (CMS/HCC)      -Comprehensive DM foot exam performed. Pt educated on importance of tight glucose control and daily foot checks.   -Wound debridement as above  -Continue Medihoney for home dressing changes  -Modification of custom insole for additional offloading performed.  Stressed importance of increased offloading of the wound site.   -Again had a brief discussion regarding possible surgical excision of heterotopic bone.  Patient would like to continue monitoring for now.  -Follow up 2 weeks            This document has been electronically signed by Jacoby Serrano DPM on October 17, 2019 6:39 PM     EMR Dragon/Transcription disclaimer:   Much of this encounter note is an electronic transcription/translation of spoken language to printed text. The electronic translation of spoken language may permit erroneous, or at times, nonsensical words or phrases to be inadvertently transcribed; Although I have reviewed the note for such errors, some may still exist.    Jacoby Serrano DPM  10/17/2019  6:39 PM

## 2019-10-21 ENCOUNTER — OFFICE VISIT (OUTPATIENT)
Dept: FAMILY MEDICINE CLINIC | Facility: CLINIC | Age: 69
End: 2019-10-21

## 2019-10-21 VITALS
HEART RATE: 82 BPM | BODY MASS INDEX: 30.61 KG/M2 | HEIGHT: 72 IN | WEIGHT: 226 LBS | OXYGEN SATURATION: 98 % | SYSTOLIC BLOOD PRESSURE: 132 MMHG | DIASTOLIC BLOOD PRESSURE: 80 MMHG | TEMPERATURE: 98 F

## 2019-10-21 DIAGNOSIS — R42 DIZZINESS: ICD-10-CM

## 2019-10-21 DIAGNOSIS — H92.02 OTALGIA, LEFT: ICD-10-CM

## 2019-10-21 DIAGNOSIS — J01.00 ACUTE NON-RECURRENT MAXILLARY SINUSITIS: Primary | ICD-10-CM

## 2019-10-21 PROCEDURE — 99213 OFFICE O/P EST LOW 20 MIN: CPT | Performed by: NURSE PRACTITIONER

## 2019-10-21 PROCEDURE — 96372 THER/PROPH/DIAG INJ SC/IM: CPT | Performed by: NURSE PRACTITIONER

## 2019-10-21 RX ORDER — AZITHROMYCIN 250 MG/1
TABLET, FILM COATED ORAL
Qty: 6 TABLET | Refills: 0 | Status: SHIPPED | OUTPATIENT
Start: 2019-10-21 | End: 2019-11-04

## 2019-10-21 RX ORDER — LORATADINE 10 MG/1
10 TABLET ORAL DAILY
Qty: 30 TABLET | Refills: 0 | Status: SHIPPED | OUTPATIENT
Start: 2019-10-21 | End: 2019-12-17

## 2019-10-21 RX ORDER — TRIAMCINOLONE ACETONIDE 40 MG/ML
80 INJECTION, SUSPENSION INTRA-ARTICULAR; INTRAMUSCULAR ONCE
Status: COMPLETED | OUTPATIENT
Start: 2019-10-21 | End: 2019-10-21

## 2019-10-21 RX ORDER — FLUTICASONE PROPIONATE 50 MCG
2 SPRAY, SUSPENSION (ML) NASAL DAILY
Qty: 1 BOTTLE | Refills: 0 | Status: SHIPPED | OUTPATIENT
Start: 2019-10-21 | End: 2020-01-09

## 2019-10-21 RX ADMIN — TRIAMCINOLONE ACETONIDE 80 MG: 40 INJECTION, SUSPENSION INTRA-ARTICULAR; INTRAMUSCULAR at 11:44

## 2019-10-21 NOTE — PROGRESS NOTES
"Subjective   Jadiel Dutton is a 69 y.o. male. Patient here today with complaints of Allergies; Dizziness; and Earache  Patient here today with complaints of left ear pain, dizziness, left eye watering, sinus congestion mainly on left cheek and left forehead, nasal congestion sore throat and slight cough.  Symptoms started 3 days ago, he has used Tylenol but his symptoms are progressively worsening.  Denies fever.    Vitals:    10/21/19 1058   BP: 132/80   Pulse: 82   Temp: 98 °F (36.7 °C)   SpO2: 98%   Weight: 103 kg (226 lb)   Height: 182.9 cm (72\")     Body mass index is 30.65 kg/m².  Past Medical History:   Diagnosis Date   • Abdominal pain    • Abnormal weight loss    • Acute bronchitis    • Anxiety state    • Benign essential hypertension    • Chronic sinusitis    • Constipation    • Cystitis    • Degenerative joint disease involving multiple joints    • Diabetic neuropathy (CMS/HCC)      bilateral hands      • Diastolic dysfunction    • Diverticular disease of colon    • Dyspnea    • Epigastric pain    • Essential hypertension    • Foot ulcer (CMS/HCC)    • Gastroesophageal reflux disease    • Generalized anxiety disorder    • Hyperlipidemia    • Inflammatory bowel disease     Possible Inflammatory Bowel Disease      • Lumbar pain     Pain radiating to lumbar region of back   n      • Pleuritic pain    • Radiculopathy     site unspecified      • Type 2 diabetes mellitus (CMS/HCC)    • Vesicular eczema of hands and feet      Allergies   This is a new problem. The current episode started in the past 7 days. The problem occurs constantly. The problem has been gradually worsening. Associated symptoms include congestion, coughing, headaches and a sore throat. Pertinent negatives include no fever. Nothing aggravates the symptoms. He has tried nothing for the symptoms. The treatment provided no relief.   Earache    There is pain in the left ear. This is a new problem. The current episode started in the past 7 " days. The problem occurs constantly. The problem has been unchanged. There has been no fever. The pain is mild. Associated symptoms include coughing, headaches and a sore throat. He has tried acetaminophen for the symptoms. The treatment provided no relief.   Sinusitis   This is a new problem. The current episode started in the past 7 days. The problem has been gradually worsening since onset. There has been no fever. The pain is mild. Associated symptoms include congestion, coughing, ear pain, headaches, a hoarse voice, sinus pressure and a sore throat. Past treatments include acetaminophen. The treatment provided no relief.        The following portions of the patient's history were reviewed and updated as appropriate: allergies, current medications, past family history, past medical history, past social history, past surgical history and problem list.    Review of Systems   Constitutional: Negative.  Negative for fever.   HENT: Positive for congestion, ear pain, hoarse voice, sinus pressure and sore throat.    Eyes: Negative.    Respiratory: Positive for cough.    Cardiovascular: Negative.    Gastrointestinal: Negative.    Endocrine: Negative.    Genitourinary: Negative.    Musculoskeletal: Negative.    Skin: Negative.    Allergic/Immunologic: Negative.    Neurological: Positive for headaches.   Hematological: Negative.    Psychiatric/Behavioral: Negative.        Objective   Physical Exam   Constitutional: He is oriented to person, place, and time. He appears well-developed and well-nourished. No distress.   HENT:   Head: Normocephalic and atraumatic.   Right Ear: Hearing, external ear and ear canal normal. Tympanic membrane is bulging.   Left Ear: Hearing, external ear and ear canal normal. Tympanic membrane is bulging.   Nose: Left sinus exhibits maxillary sinus tenderness and frontal sinus tenderness.   Mouth/Throat: Uvula is midline and mucous membranes are normal. Posterior oropharyngeal erythema present. No  tonsillar exudate.   Neck: Neck supple.   Cardiovascular: Normal rate, regular rhythm and normal heart sounds. Exam reveals no gallop and no friction rub.   No murmur heard.  Pulmonary/Chest: Effort normal and breath sounds normal. No stridor. No respiratory distress. He has no wheezes. He has no rales.   Lymphadenopathy:     He has no cervical adenopathy.   Neurological: He is alert and oriented to person, place, and time.   Skin: Skin is warm and dry. No rash noted. He is not diaphoretic. No erythema. No pallor.   Psychiatric: He has a normal mood and affect. His behavior is normal.   Nursing note and vitals reviewed.      Assessment/Plan   Jadiel was seen today for allergies, dizziness and earache.    Diagnoses and all orders for this visit:    Acute non-recurrent maxillary sinusitis  -     triamcinolone acetonide (KENALOG-40) injection 80 mg    Dizziness    Otalgia, left    Other orders  -     loratadine (CLARITIN) 10 MG tablet; Take 1 tablet by mouth Daily.  -     fluticasone (FLONASE) 50 MCG/ACT nasal spray; 2 sprays into the nostril(s) as directed by provider Daily.  -     azithromycin (ZITHROMAX Z-RIVERA) 250 MG tablet; Take 2 tablets the first day, then 1 tablet daily for 4 days.    Patient with multiple allergies but states he has used Zithromax in the past with good efficacy and no problems, Z-Rivera given to him as above as well as Claritin and Flonase as above.  He will be given Kenalog 80 mg injection IM in office today as above, tolerated well.  If symptoms should persist or worsen he will return to clinic for follow-up otherwise I will see him back as scheduled for chronic conditions.  He is aware and is in agreement to this plan.  All questions and concerns are addressed with understanding noted.

## 2019-10-21 NOTE — PATIENT INSTRUCTIONS

## 2019-11-04 ENCOUNTER — OFFICE VISIT (OUTPATIENT)
Dept: FAMILY MEDICINE CLINIC | Facility: CLINIC | Age: 69
End: 2019-11-04

## 2019-11-04 ENCOUNTER — LAB (OUTPATIENT)
Dept: LAB | Facility: OTHER | Age: 69
End: 2019-11-04

## 2019-11-04 VITALS
DIASTOLIC BLOOD PRESSURE: 70 MMHG | HEART RATE: 97 BPM | TEMPERATURE: 98.7 F | BODY MASS INDEX: 30.4 KG/M2 | WEIGHT: 224.4 LBS | HEIGHT: 72 IN | SYSTOLIC BLOOD PRESSURE: 120 MMHG

## 2019-11-04 DIAGNOSIS — Z00.00 MEDICARE ANNUAL WELLNESS VISIT, SUBSEQUENT: Primary | ICD-10-CM

## 2019-11-04 DIAGNOSIS — N20.0 KIDNEY STONE: ICD-10-CM

## 2019-11-04 DIAGNOSIS — N30.00 ACUTE CYSTITIS WITHOUT HEMATURIA: ICD-10-CM

## 2019-11-04 DIAGNOSIS — R82.998 DARK URINE: ICD-10-CM

## 2019-11-04 LAB
ALBUMIN SERPL-MCNC: 3.5 G/DL (ref 3.5–5)
ALBUMIN/GLOB SERPL: 1.2 G/DL (ref 1.1–1.8)
ALP SERPL-CCNC: 90 U/L (ref 38–126)
ALT SERPL W P-5'-P-CCNC: 15 U/L
ANION GAP SERPL CALCULATED.3IONS-SCNC: 8 MMOL/L (ref 5–15)
AST SERPL-CCNC: 22 U/L (ref 17–59)
BASOPHILS # BLD AUTO: 0.04 10*3/MM3 (ref 0–0.2)
BASOPHILS NFR BLD AUTO: 0.2 % (ref 0–1.5)
BILIRUB SERPL-MCNC: 0.6 MG/DL (ref 0.2–1.3)
BILIRUB UR QL STRIP: NEGATIVE
BUN BLD-MCNC: 17 MG/DL (ref 7–23)
BUN/CREAT SERPL: 13.7 (ref 7–25)
CALCIUM SPEC-SCNC: 8.9 MG/DL (ref 8.4–10.2)
CHLORIDE SERPL-SCNC: 97 MMOL/L (ref 101–112)
CLARITY UR: CLEAR
CO2 SERPL-SCNC: 31 MMOL/L (ref 22–30)
COLOR UR: YELLOW
CREAT BLD-MCNC: 1.24 MG/DL (ref 0.7–1.3)
DEPRECATED RDW RBC AUTO: 49 FL (ref 37–54)
EOSINOPHIL # BLD AUTO: 0.24 10*3/MM3 (ref 0–0.4)
EOSINOPHIL NFR BLD AUTO: 1.5 % (ref 0.3–6.2)
ERYTHROCYTE [DISTWIDTH] IN BLOOD BY AUTOMATED COUNT: 15.6 % (ref 12.3–15.4)
GFR SERPL CREATININE-BSD FRML MDRD: 58 ML/MIN/1.73 (ref 49–113)
GLOBULIN UR ELPH-MCNC: 2.9 GM/DL (ref 2.3–3.5)
GLUCOSE BLD-MCNC: 109 MG/DL (ref 70–99)
GLUCOSE UR STRIP-MCNC: NEGATIVE MG/DL
HCT VFR BLD AUTO: 38.6 % (ref 37.5–51)
HGB BLD-MCNC: 12.5 G/DL (ref 13–17.7)
HGB UR QL STRIP.AUTO: NEGATIVE
KETONES UR QL STRIP: NEGATIVE
LEUKOCYTE ESTERASE UR QL STRIP.AUTO: NEGATIVE
LYMPHOCYTES # BLD AUTO: 1.79 10*3/MM3 (ref 0.7–3.1)
LYMPHOCYTES NFR BLD AUTO: 11.1 % (ref 19.6–45.3)
MCH RBC QN AUTO: 28.1 PG (ref 26.6–33)
MCHC RBC AUTO-ENTMCNC: 32.4 G/DL (ref 31.5–35.7)
MCV RBC AUTO: 86.7 FL (ref 79–97)
MONOCYTES # BLD AUTO: 1.07 10*3/MM3 (ref 0.1–0.9)
MONOCYTES NFR BLD AUTO: 6.7 % (ref 5–12)
NEUTROPHILS # BLD AUTO: 12.94 10*3/MM3 (ref 1.7–7)
NEUTROPHILS NFR BLD AUTO: 80.5 % (ref 42.7–76)
NITRITE UR QL STRIP: NEGATIVE
PH UR STRIP.AUTO: 7 [PH] (ref 5.5–8)
PLATELET # BLD AUTO: 291 10*3/MM3 (ref 140–450)
PMV BLD AUTO: 10.2 FL (ref 6–12)
POTASSIUM BLD-SCNC: 3.9 MMOL/L (ref 3.4–5)
PROT SERPL-MCNC: 6.4 G/DL (ref 6.3–8.6)
PROT UR QL STRIP: NEGATIVE
RBC # BLD AUTO: 4.45 10*6/MM3 (ref 4.14–5.8)
SODIUM BLD-SCNC: 136 MMOL/L (ref 137–145)
SP GR UR STRIP: 1.01 (ref 1–1.03)
UROBILINOGEN UR QL STRIP: NORMAL
WBC NRBC COR # BLD: 16.08 10*3/MM3 (ref 3.4–10.8)

## 2019-11-04 PROCEDURE — 85025 COMPLETE CBC W/AUTO DIFF WBC: CPT | Performed by: NURSE PRACTITIONER

## 2019-11-04 PROCEDURE — 81003 URINALYSIS AUTO W/O SCOPE: CPT | Performed by: NURSE PRACTITIONER

## 2019-11-04 PROCEDURE — 96372 THER/PROPH/DIAG INJ SC/IM: CPT | Performed by: NURSE PRACTITIONER

## 2019-11-04 PROCEDURE — 36415 COLL VENOUS BLD VENIPUNCTURE: CPT | Performed by: NURSE PRACTITIONER

## 2019-11-04 PROCEDURE — 80053 COMPREHEN METABOLIC PANEL: CPT | Performed by: NURSE PRACTITIONER

## 2019-11-04 PROCEDURE — G0439 PPPS, SUBSEQ VISIT: HCPCS | Performed by: NURSE PRACTITIONER

## 2019-11-04 PROCEDURE — 99213 OFFICE O/P EST LOW 20 MIN: CPT | Performed by: NURSE PRACTITIONER

## 2019-11-04 RX ORDER — NITROFURANTOIN 25; 75 MG/1; MG/1
100 CAPSULE ORAL 2 TIMES DAILY
Qty: 14 CAPSULE | Refills: 0 | Status: SHIPPED | OUTPATIENT
Start: 2019-11-04 | End: 2019-11-11

## 2019-11-04 RX ORDER — KETOROLAC TROMETHAMINE 30 MG/ML
30 INJECTION, SOLUTION INTRAMUSCULAR; INTRAVENOUS ONCE
Status: COMPLETED | OUTPATIENT
Start: 2019-11-04 | End: 2019-11-04

## 2019-11-04 RX ADMIN — KETOROLAC TROMETHAMINE 30 MG: 30 INJECTION, SOLUTION INTRAMUSCULAR; INTRAVENOUS at 14:44

## 2019-11-04 NOTE — PROGRESS NOTES
The ABCs of the Annual Wellness Visit  Subsequent Medicare Wellness Visit    Chief Complaint   Patient presents with   • Flank Pain   • Medicare Wellness-subsequent       Subjective   History of Present Illness:  Jadiel Dutton is a 69 y.o. male who presents for a Subsequent Medicare Wellness Visit.    HEALTH RISK ASSESSMENT    Recent Hospitalizations:  No hospitalization(s) within the last year.    Current Medical Providers:  Patient Care Team:  Shayy Banda APRN as PCP - General (Family Medicine)  Da Gottlieb MD PhD as PCP - [facility] Attributed  Jacoby Serrano DPM as Consulting Physician (Podiatry)    Smoking Status:  Social History     Tobacco Use   Smoking Status Never Smoker   Smokeless Tobacco Never Used       Alcohol Consumption:  Social History     Substance and Sexual Activity   Alcohol Use No       Depression Screen:   PHQ-2/PHQ-9 Depression Screening 5/6/2019   Little interest or pleasure in doing things 0   Feeling down, depressed, or hopeless 0   Trouble falling or staying asleep, or sleeping too much -   Feeling tired or having little energy -   Poor appetite or overeating -   Feeling bad about yourself - or that you are a failure or have let yourself or your family down -   Trouble concentrating on things, such as reading the newspaper or watching television -   Moving or speaking so slowly that other people could have noticed. Or the opposite - being so fidgety or restless that you have been moving around a lot more than usual -   Thoughts that you would be better off dead, or of hurting yourself in some way -   Total Score 0   If you checked off any problems, how difficult have these problems made it for you to do your work, take care of things at home, or get along with other people? -       Fall Risk Screen:  STEADI Fall Risk Assessment has not been completed.    Health Habits and Functional and Cognitive Screening:  Functional & Cognitive Status 11/4/2019   Do you have  difficulty preparing food and eating? No   Do you have difficulty bathing yourself, getting dressed or grooming yourself? No   Do you have difficulty using the toilet? No   Do you have difficulty moving around from place to place? No   Do you have trouble with steps or getting out of a bed or a chair? No   Current Diet Well Balanced Diet   Dental Exam Up to date   Eye Exam Up to date   Exercise (times per week) 3 times per week   Current Exercise Activities Include Walking   Do you need help using the phone?  No   Are you deaf or do you have serious difficulty hearing?  No   Do you need help with transportation? No   Do you need help shopping? No   Do you need help preparing meals?  No   Do you need help with housework?  No   Do you need help with laundry? No   Do you need help taking your medications? No   Do you need help managing money? No   Do you ever drive or ride in a car without wearing a seat belt? No   Have you felt unusual stress, anger or loneliness in the last month? No   Who do you live with? Alone   If you need help, do you have trouble finding someone available to you? No   Have you been bothered in the last four weeks by sexual problems? No   Do you have difficulty concentrating, remembering or making decisions? No         Does the patient have evidence of cognitive impairment? No    Asprin use counseling:Contraindicated from taking ASA    Age-appropriate Screening Schedule:  Refer to the list below for future screening recommendations based on patient's age, sex and/or medical conditions. Orders for these recommended tests are listed in the plan section. The patient has been provided with a written plan.    Health Maintenance   Topic Date Due   • ZOSTER VACCINE (2 of 2) 07/11/2017   • DIABETIC FOOT EXAM  05/16/2018   • LIPID PANEL  05/17/2018   • URINE MICROALBUMIN  05/17/2018   • HEMOGLOBIN A1C  02/28/2019   • INFLUENZA VACCINE  08/01/2019   • DIABETIC EYE EXAM  07/08/2020   • COLONOSCOPY   08/22/2027   • TDAP/TD VACCINES (3 - Td) 11/05/2029   • PNEUMOCOCCAL VACCINES (65+ LOW/MEDIUM RISK)  Discontinued          The following portions of the patient's history were reviewed and updated as appropriate: allergies, current medications, past family history, past medical history, past social history, past surgical history and problem list.    Outpatient Medications Prior to Visit   Medication Sig Dispense Refill   • albuterol (PROVENTIL HFA;VENTOLIN HFA) 108 (90 Base) MCG/ACT inhaler Inhale 2 puffs Every 4 (Four) Hours As Needed for Wheezing. 1 inhaler 2   • B-D UF III MINI PEN NEEDLES 31G X 5 MM misc USE FOUR TIMES A DAY WITH INSULIN PENS 360 each 2   • DEXILANT 60 MG capsule TAKE 1 CAPSULE DAILY 90 capsule 2   • fluticasone (FLONASE) 50 MCG/ACT nasal spray 2 sprays into the nostril(s) as directed by provider Daily. 1 bottle 0   • gabapentin (NEURONTIN) 300 MG capsule Take 1 capsule by mouth 4 (Four) Times a Day As Needed.     • Insulin NPH Isophane & Regular (HUMULIN 70/30 KWIKPEN) (70-30) 100 UNIT/ML suspension pen-injector INJECT 55 UNITS SUB Q BEFORE BREAKFAST, 65 UNITS BEFORE SUPPER, 30 MINUTES BEFORE BREAKFAST AND SUPPER     • lisinopril-hydrochlorothiazide (PRINZIDE,ZESTORETIC) 20-25 MG per tablet Take 1 tablet by mouth Daily.     • loratadine (CLARITIN) 10 MG tablet Take 1 tablet by mouth Daily. 30 tablet 0   • ONETOUCH VERIO test strip      • rosuvastatin (CRESTOR) 5 MG tablet Take 1 tablet by mouth Daily. 30 tablet 3   • Semaglutide (OZEMPIC) 0.25 or 0.5 MG/DOSE solution pen-injector AS DIRECTED 1 MG EVERY WEEK     • traMADol (ULTRAM) 50 MG tablet Take 50 mg by mouth Every 6 (Six) Hours As Needed for Moderate Pain .     • azelastine (ASTELIN) 0.1 % nasal spray 2 sprays into the nostril(s) as directed by provider Daily. Use in each nostril as directed 30 mL 11   • azithromycin (ZITHROMAX Z-RIVERA) 250 MG tablet Take 2 tablets the first day, then 1 tablet daily for 4 days. 6 tablet 0     No  "facility-administered medications prior to visit.        Patient Active Problem List   Diagnosis   • Essential hypertension   • Familial hypercholesterolemia   • Type 2 diabetes mellitus with complication, with long-term current use of insulin (CMS/HCC)   • DDD (degenerative disc disease), thoracolumbar   • Gastroesophageal reflux disease   • Left lower quadrant pain   • Injury of kidney   • Cellulitis   • Spinal stenosis of cervical region   • Degeneration of thoracic or thoracolumbar intervertebral disc   • Abscess of foot   • Hypokalemia   • Other specified mononeuropathies   • Leukocytosis   • Neuropathic pain syndrome (non-herpetic)   • Systemic infection (CMS/HCC)   • Spinal stenosis of lumbar region   • Tachycardia   • Spondylolisthesis   • Drug-induced constipation   • Dysphagia   • Nausea   • Generalized abdominal cramping   • Chronic pansinusitis   • Anxiety state   • Bilateral hand pain   • Ulnar neuropathy of both upper extremities   • Dupuytren's contracture of both hands   • Bilateral carpal tunnel syndrome   • Skin ulcer of toe of left foot with fat layer exposed (CMS/HCC)   • Diabetic polyneuropathy associated with type 2 diabetes mellitus (CMS/HCC)       Advanced Care Planning:  Patient has an advance directive - a copy has not been provided. Have asked the patient to send this to us to add to record    Review of Systems    Compared to one year ago, the patient feels his physical health is better.  Compared to one year ago, the patient feels his mental health is better.    Reviewed chart for potential of high risk medication in the elderly: yes  Reviewed chart for potential of harmful drug interactions in the elderly:yes    Objective         Vitals:    11/04/19 1316   BP: 120/70   Pulse: 97   Temp: 98.7 °F (37.1 °C)   Weight: 102 kg (224 lb 6.4 oz)   Height: 182.9 cm (72\")       Body mass index is 30.43 kg/m².  Discussed the patient's BMI with him. The BMI is above average; BMI management plan is " completed.    Physical Exam          Assessment/Plan   Medicare Risks and Personalized Health Plan  CMS Preventative Services Quick Reference  Advance Directive Discussion  Cardiovascular risk  Depression/Dysphoria  Fall Risk  Immunizations Discussed/Encouraged (specific immunizations; Influenza, Pneumococcal 23, Prevnar and Shingrix )  Obesity/Overweight   Polypharmacy    The above risks/problems have been discussed with the patient.  Pertinent information has been shared with the patient in the After Visit Summary.  Follow up plans and orders are seen below in the Assessment/Plan Section.    Diagnoses and all orders for this visit:    1. Medicare annual wellness visit, subsequent (Primary)    2. Kidney stone  -     XR Abdomen KUB; Future  -     Urinalysis With Culture If Indicated -; Future  -     CBC & Differential; Future  -     Comprehensive metabolic panel; Future  -     ketorolac (TORADOL) injection 30 mg    3. Acute cystitis without hematuria    4. Dark urine    Other orders  -     nitrofurantoin, macrocrystal-monohydrate, (MACROBID) 100 MG capsule; Take 1 capsule by mouth 2 (Two) Times a Day for 7 days.  Dispense: 14 capsule; Refill: 0      Follow Up:  Return if symptoms worsen or fail to improve, for or sooner as needed, Next scheduled follow up.     An After Visit Summary and PPPS were given to the patient.

## 2019-11-04 NOTE — PATIENT INSTRUCTIONS

## 2019-11-04 NOTE — PROGRESS NOTES
"Subjective   Jadiel Dutton is a 69 y.o. male. Patient here today with complaints of Flank Pain and Medicare Wellness-subsequent  pt here today with complaints of sweats, nausea, dark urine and pain in L side and hip yesterday, reports urine more clear today. He has been pushing fluids. States he could not lay on his L side yesterday. He noticed his urine was dark/red and he had pain 7/10 on pain scale but again improved this am. He also reports increased frequency with urination and dysuria. Denies fever. He has taken ultram which helps somewhtat. Also due for medicare wellness, subsequent today.     Vitals:    11/04/19 1316   BP: 120/70   Pulse: 97   Temp: 98.7 °F (37.1 °C)   Weight: 102 kg (224 lb 6.4 oz)   Height: 182.9 cm (72\")     Body mass index is 30.43 kg/m².  Past Medical History:   Diagnosis Date   • Abdominal pain    • Abnormal weight loss    • Acute bronchitis    • Anxiety state    • Benign essential hypertension    • Chronic sinusitis    • Constipation    • Cystitis    • Degenerative joint disease involving multiple joints    • Diabetic neuropathy (CMS/HCC)      bilateral hands      • Diastolic dysfunction    • Diverticular disease of colon    • Dyspnea    • Epigastric pain    • Essential hypertension    • Foot ulcer (CMS/HCC)    • Gastroesophageal reflux disease    • Generalized anxiety disorder    • Hyperlipidemia    • Inflammatory bowel disease     Possible Inflammatory Bowel Disease      • Lumbar pain     Pain radiating to lumbar region of back   n      • Pleuritic pain    • Radiculopathy     site unspecified      • Type 2 diabetes mellitus (CMS/HCC)    • Vesicular eczema of hands and feet      Urinary Tract Infection    This is a new problem. The current episode started in the past 7 days. The problem occurs intermittently. The problem has been gradually improving. The pain is moderate. There has been no fever. Associated symptoms include chills, flank pain, frequency, hesitancy, nausea, " sweats and urgency. Pertinent negatives include no vomiting. He has tried increased fluids for the symptoms. The treatment provided mild relief.        The following portions of the patient's history were reviewed and updated as appropriate: allergies, current medications, past family history, past medical history, past social history, past surgical history and problem list.    Review of Systems   Constitutional: Positive for chills.   HENT: Negative.    Eyes: Negative.    Respiratory: Negative.    Cardiovascular: Negative.    Gastrointestinal: Positive for nausea. Negative for vomiting.   Endocrine: Negative.    Genitourinary: Positive for difficulty urinating, dysuria, flank pain, frequency, hesitancy and urgency.   Skin: Negative.    Allergic/Immunologic: Negative.    Neurological: Negative.    Hematological: Negative.    Psychiatric/Behavioral: Negative.        Objective   Physical Exam   Constitutional: He is oriented to person, place, and time. He appears well-developed and well-nourished. No distress.   HENT:   Head: Normocephalic and atraumatic.   Mouth/Throat: Oropharynx is clear and moist.   Cardiovascular: Normal rate, regular rhythm and normal heart sounds. Exam reveals no gallop and no friction rub.   No murmur heard.  Pulmonary/Chest: Effort normal and breath sounds normal. No stridor. No respiratory distress. He has no wheezes. He has no rales.   Abdominal: Soft. Bowel sounds are normal. He exhibits no distension and no mass. There is tenderness. There is no guarding and no CVA tenderness.   Neurological: He is alert and oriented to person, place, and time.   Skin: Skin is warm and dry. No rash noted. He is not diaphoretic. No erythema. No pallor.   Psychiatric: He has a normal mood and affect. His behavior is normal.   Nursing note and vitals reviewed.      Assessment/Plan   Jadiel was seen today for flank pain and medicare wellness-subsequent.    Diagnoses and all orders for this visit:    Medicare  annual wellness visit, subsequent    Kidney stone  -     XR Abdomen KUB; Future  -     Urinalysis With Culture If Indicated -; Future  -     CBC & Differential; Future  -     Comprehensive metabolic panel; Future  -     ketorolac (TORADOL) injection 30 mg    Acute cystitis without hematuria    Dark urine    Other orders  -     nitrofurantoin, macrocrystal-monohydrate, (MACROBID) 100 MG capsule; Take 1 capsule by mouth 2 (Two) Times a Day for 7 days.    will obtain above tests, labs, ua and KUB  He is given toradol inj IM in office today after which he states he does feel better, pain somewhat relieved  He is treated with macrobid as above  advised to push clear fluids   If symptoms persist or worsen he is to go to ER, urgent care or return here for recheck  He is aware and is in agreement to this plan  He is informed of test results prior to leaving the clinic today  All questions and concerns are addressed with understanding noted.

## 2019-11-05 ENCOUNTER — PRIOR AUTHORIZATION (OUTPATIENT)
Dept: FAMILY MEDICINE CLINIC | Facility: CLINIC | Age: 69
End: 2019-11-05

## 2019-11-07 ENCOUNTER — OFFICE VISIT (OUTPATIENT)
Dept: PODIATRY | Facility: CLINIC | Age: 69
End: 2019-11-07

## 2019-11-07 VITALS — OXYGEN SATURATION: 100 % | WEIGHT: 224 LBS | BODY MASS INDEX: 30.34 KG/M2 | HEIGHT: 72 IN | HEART RATE: 108 BPM

## 2019-11-07 DIAGNOSIS — Z79.4 TYPE 2 DIABETES MELLITUS WITH COMPLICATION, WITH LONG-TERM CURRENT USE OF INSULIN (HCC): ICD-10-CM

## 2019-11-07 DIAGNOSIS — E11.8 TYPE 2 DIABETES MELLITUS WITH COMPLICATION, WITH LONG-TERM CURRENT USE OF INSULIN (HCC): ICD-10-CM

## 2019-11-07 DIAGNOSIS — E11.42 DIABETIC POLYNEUROPATHY ASSOCIATED WITH TYPE 2 DIABETES MELLITUS (HCC): ICD-10-CM

## 2019-11-07 DIAGNOSIS — L97.522 SKIN ULCER OF TOE OF LEFT FOOT WITH FAT LAYER EXPOSED (HCC): Primary | ICD-10-CM

## 2019-11-07 PROCEDURE — 11042 DBRDMT SUBQ TIS 1ST 20SQCM/<: CPT | Performed by: PODIATRIST

## 2019-11-07 PROCEDURE — 99024 POSTOP FOLLOW-UP VISIT: CPT | Performed by: PODIATRIST

## 2019-11-07 NOTE — PROGRESS NOTES
Jadiel Dutton  1950  69 y.o. male   PCP- Shayy Banda , MARY  10/21/19  BS : 103 PER PATIENT      Patient presents today for skin ulcer on left foot.    11/07/2019    Chief Complaint   Patient presents with   • Left Foot - Ingrown Toenail         History of Present Illness     Jadiel Dutton is a 69 y.o. male who presents for diabetic foot examination and evaluation of left midfoot ulceration.      Past Medical History:   Diagnosis Date   • Abdominal pain    • Abnormal weight loss    • Acute bronchitis    • Anxiety state    • Benign essential hypertension    • Chronic sinusitis    • Constipation    • Cystitis    • Degenerative joint disease involving multiple joints    • Diabetic neuropathy (CMS/HCC)      bilateral hands      • Diastolic dysfunction    • Diverticular disease of colon    • Dyspnea    • Epigastric pain    • Essential hypertension    • Foot ulcer (CMS/HCC)    • Gastroesophageal reflux disease    • Generalized anxiety disorder    • Hyperlipidemia    • Inflammatory bowel disease     Possible Inflammatory Bowel Disease      • Lumbar pain     Pain radiating to lumbar region of back   n      • Pleuritic pain    • Radiculopathy     site unspecified      • Type 2 diabetes mellitus (CMS/HCC)    • Vesicular eczema of hands and feet          Past Surgical History:   Procedure Laterality Date   • BACK SURGERY      LOWER BACK SURGERY   • CARPAL TUNNEL RELEASE Bilateral    • COLONOSCOPY  06/11/2012    Internal & external hemorrhoids found.   • COLONOSCOPY N/A 8/22/2017    Procedure: COLONOSCOPY;  Surgeon: Cesar Hollis MD;  Location: [facility] ENDOSCOPY;  Service:    • ENDOSCOPY  06/11/2012    Slight stricture in the distal esophagus. Gastritis in stomach. Biopsy taken. Normal duodenum.   • ENDOSCOPY     • ENDOSCOPY N/A 8/22/2017    Procedure: ESOPHAGOGASTRODUODENOSCOPY possible dilation ;  Surgeon: Cesar Hollis MD;  Location: [facility] ENDOSCOPY;  Service:    • ENTEROSCOPY SMALL BOWEL   08/27/2012    Gastritis in stomach. Normal jejunum. Biopsy taken. Normal duodenum   • FOOT SURGERY     • INJECTION OF MEDICATION  02/28/2014    Depo Medrol   • INJECTION OF MEDICATION  03/07/2014    Rocephin(2)   • ULNAR TUNNEL RELEASE           Family History   Problem Relation Age of Onset   • No Known Problems Other    • Hypertension Mother    • Heart attack Father    • No Known Problems Sister    • No Known Problems Brother    • No Known Problems Daughter    • No Known Problems Son    • No Known Problems Maternal Aunt    • No Known Problems Maternal Uncle    • No Known Problems Paternal Aunt    • No Known Problems Paternal Uncle    • No Known Problems Maternal Grandmother    • No Known Problems Maternal Grandfather    • No Known Problems Paternal Grandmother    • No Known Problems Paternal Grandfather          Social History     Socioeconomic History   • Marital status: Single     Spouse name: Not on file   • Number of children: Not on file   • Years of education: Not on file   • Highest education level: Not on file   Tobacco Use   • Smoking status: Never Smoker   • Smokeless tobacco: Never Used   Substance and Sexual Activity   • Alcohol use: No   • Drug use: No   • Sexual activity: Yes     Partners: Female         Current Outpatient Medications   Medication Sig Dispense Refill   • albuterol (PROVENTIL HFA;VENTOLIN HFA) 108 (90 Base) MCG/ACT inhaler Inhale 2 puffs Every 4 (Four) Hours As Needed for Wheezing. 1 inhaler 2   • B-D UF III MINI PEN NEEDLES 31G X 5 MM misc USE FOUR TIMES A DAY WITH INSULIN PENS 360 each 2   • DEXILANT 60 MG capsule TAKE 1 CAPSULE DAILY 90 capsule 2   • fluticasone (FLONASE) 50 MCG/ACT nasal spray 2 sprays into the nostril(s) as directed by provider Daily. 1 bottle 0   • gabapentin (NEURONTIN) 300 MG capsule Take 1 capsule by mouth 4 (Four) Times a Day As Needed.     • Insulin NPH Isophane & Regular (HUMULIN 70/30 KWIKPEN) (70-30) 100 UNIT/ML suspension pen-injector INJECT 55 UNITS  "SUB Q BEFORE BREAKFAST, 65 UNITS BEFORE SUPPER, 30 MINUTES BEFORE BREAKFAST AND SUPPER     • lisinopril-hydrochlorothiazide (PRINZIDE,ZESTORETIC) 20-25 MG per tablet Take 1 tablet by mouth Daily.     • loratadine (CLARITIN) 10 MG tablet Take 1 tablet by mouth Daily. 30 tablet 0   • nitrofurantoin, macrocrystal-monohydrate, (MACROBID) 100 MG capsule Take 1 capsule by mouth 2 (Two) Times a Day for 7 days. 14 capsule 0   • ONETOUCH VERIO test strip      • rosuvastatin (CRESTOR) 5 MG tablet Take 1 tablet by mouth Daily. 30 tablet 3   • Semaglutide (OZEMPIC) 0.25 or 0.5 MG/DOSE solution pen-injector AS DIRECTED 1 MG EVERY WEEK     • traMADol (ULTRAM) 50 MG tablet Take 50 mg by mouth Every 6 (Six) Hours As Needed for Moderate Pain .       No current facility-administered medications for this visit.          OBJECTIVE    Pulse 108   Ht 182.9 cm (72\")   Wt 102 kg (224 lb)   SpO2 100%   BMI 30.38 kg/m²       Review of Systems   Constitutional: Negative.    HENT: Negative.    Eyes: Negative.    Respiratory: Negative.    Cardiovascular: Negative.    Gastrointestinal: Negative.    Endocrine: Negative.    Genitourinary: Negative.    Musculoskeletal: Positive for back pain.   Skin: Positive for wound.   Allergic/Immunologic: Negative.    Neurological: Negative.    Hematological: Negative.    Psychiatric/Behavioral: Negative.          Physical Exam   Constitutional: he appears well-developed and well-nourished.   HEENT: Normocephalic. Atraumatic  CV: No tenderness. RRR  Resp: Non-labored respiration. No wheezes.   Psychiatric: he has a normal mood and affect. his   behavior is normal.      Lower Extremity Exam:  Vascular: DP/PT pulses palpable 1+.   Negative hair growth.   Minimal perimalleolar edema  Neuro: Protective sensation absent, b/l.  DTRs intact  Integument: Full thickness ulcer to plantar lateral forefoot measuring approximately 1.0 x 0.3 x 0.2 cm .  Granular base with significant surrounding hyperkeratosis.  "   Atrophic skin noted b/l  No masses  Musculoskeletal: LE muscle strength 5/5.   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      Left foot wound debridement:  Risks and benefits discussed.  Left forefoot ulcer debrided of surrounding hyperkeratosis and devitalized tissue with 15 blade to level of subq  Pressure hemostasis obtained  Medihoney ointment and dsd applied.          ASSESSMENT AND PLAN    Jadiel was seen today for ingrown toenail.    Diagnoses and all orders for this visit:    Skin ulcer of toe of left foot with fat layer exposed (CMS/HCC)    Type 2 diabetes mellitus with complication, with long-term current use of insulin (CMS/[facility])    Diabetic polyneuropathy associated with type 2 diabetes mellitus (CMS/HCC)      -Comprehensive DM foot exam performed. Pt educated on importance of tight glucose control and daily foot checks.   -Wound debridement as above  -Continue Medihoney for home dressing changes  -Modification of custom insole for additional offloading performed.  Stressed importance of increased offloading of the wound site.   -Again had a brief discussion regarding possible surgical excision of heterotopic bone.  Patient would like to continue monitoring for now.  -Follow up 2 weeks            This document has been electronically signed by Jacoby Serrano DPM on November 7, 2019 11:50 AM     EMR Dragon/Transcription disclaimer:   Much of this encounter note is an electronic transcription/translation of spoken language to printed text. The electronic translation of spoken language may permit erroneous, or at times, nonsensical words or phrases to be inadvertently transcribed; Although I have reviewed the note for such errors, some may still exist.    Jacoby Serrano DPM  11/7/2019  11:50 AM

## 2019-11-15 ENCOUNTER — OFFICE VISIT (OUTPATIENT)
Dept: FAMILY MEDICINE CLINIC | Facility: CLINIC | Age: 69
End: 2019-11-15

## 2019-11-15 VITALS
SYSTOLIC BLOOD PRESSURE: 118 MMHG | HEART RATE: 102 BPM | TEMPERATURE: 97.8 F | BODY MASS INDEX: 30.21 KG/M2 | HEIGHT: 72 IN | WEIGHT: 223.06 LBS | DIASTOLIC BLOOD PRESSURE: 70 MMHG
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
## 2019-11-15 DIAGNOSIS — M25.512 ACUTE PAIN OF LEFT SHOULDER: ICD-10-CM

## 2019-11-15 PROCEDURE — 99214 OFFICE O/P EST MOD 30 MIN: CPT | Performed by: NURSE PRACTITIONER

## 2019-11-15 PROCEDURE — 96372 THER/PROPH/DIAG INJ SC/IM: CPT | Performed by: NURSE PRACTITIONER

## 2019-11-15 RX ORDER — METHYLPREDNISOLONE ACETATE 80 MG/ML
80 INJECTION, SUSPENSION INTRA-ARTICULAR; INTRALESIONAL; INTRAMUSCULAR; SOFT TISSUE ONCE
Status: COMPLETED | OUTPATIENT
Start: 2019-11-15 | End: 2019-11-15

## 2019-11-15 RX ORDER — HYDROCODONE BITARTRATE AND ACETAMINOPHEN 5; 325 MG/1; MG/1
1 TABLET ORAL EVERY 6 HOURS PRN
Qty: 14 TABLET | Refills: 0 | Status: SHIPPED | OUTPATIENT
Start: 2019-11-15 | End: 2019-12-03

## 2019-11-15 RX ADMIN — METHYLPREDNISOLONE ACETATE 80 MG: 80 INJECTION, SUSPENSION INTRA-ARTICULAR; INTRALESIONAL; INTRAMUSCULAR; SOFT TISSUE at 11:14

## 2019-11-18 ENCOUNTER — OFFICE VISIT (OUTPATIENT)
Dept: FAMILY MEDICINE CLINIC | Facility: CLINIC | Age: 69
End: 2019-11-18

## 2019-11-18 VITALS
SYSTOLIC BLOOD PRESSURE: 128 MMHG | HEART RATE: 108 BPM | DIASTOLIC BLOOD PRESSURE: 78 MMHG | BODY MASS INDEX: 30.1 KG/M2 | HEIGHT: 72 IN | WEIGHT: 222.2 LBS | OXYGEN SATURATION: 98 % | TEMPERATURE: 97.3 F

## 2019-11-18 DIAGNOSIS — R30.0 DYSURIA: Primary | ICD-10-CM

## 2019-11-18 DIAGNOSIS — R52 BODY ACHES: ICD-10-CM

## 2019-11-18 DIAGNOSIS — J01.00 ACUTE NON-RECURRENT MAXILLARY SINUSITIS: Primary | ICD-10-CM

## 2019-11-18 DIAGNOSIS — R05.9 COUGH: ICD-10-CM

## 2019-11-18 LAB
FLUAV AG NPH QL: NEGATIVE
FLUBV AG NPH QL IA: NEGATIVE

## 2019-11-18 PROCEDURE — 87804 INFLUENZA ASSAY W/OPTIC: CPT | Performed by: NURSE PRACTITIONER

## 2019-11-18 PROCEDURE — 99213 OFFICE O/P EST LOW 20 MIN: CPT | Performed by: NURSE PRACTITIONER

## 2019-11-18 RX ORDER — DOXYCYCLINE HYCLATE 100 MG/1
100 CAPSULE ORAL 2 TIMES DAILY
Qty: 14 CAPSULE | Refills: 0 | Status: SHIPPED | OUTPATIENT
Start: 2019-11-18 | End: 2019-11-25

## 2019-11-18 NOTE — PATIENT INSTRUCTIONS

## 2019-11-18 NOTE — PROGRESS NOTES
"Subjective   Jadiel Dutton is a 69 y.o. male. Patient here today with complaints of Sinusitis  pt here today with complaints of cough, sinus pain and pressure, congestion. Symptoms worsening. Denies fever. Did start on macrobid Friday for UTI symptoms but stopped after the above symptoms started on Friday night. Has also used zyrtec, albuterol inhaler without symptom relief. Cough producing yellowish sputum according to pt. received steroid inj a few days ago.     Vitals:    11/18/19 1058   BP: 128/78   Pulse: 108   Temp: 97.3 °F (36.3 °C)   SpO2: 98%   Weight: 101 kg (222 lb 3.2 oz)   Height: 182.9 cm (72\")   PainSc: 0-No pain     Body mass index is 30.14 kg/m².  Past Medical History:   Diagnosis Date   • Abdominal pain    • Abnormal weight loss    • Acute bronchitis    • Anxiety state    • Benign essential hypertension    • Chronic sinusitis    • Constipation    • Cystitis    • Degenerative joint disease involving multiple joints    • Diabetic neuropathy (CMS/HCC)      bilateral hands      • Diastolic dysfunction    • Diverticular disease of colon    • Dyspnea    • Epigastric pain    • Essential hypertension    • Foot ulcer (CMS/HCC)    • Gastroesophageal reflux disease    • Generalized anxiety disorder    • Hyperlipidemia    • Inflammatory bowel disease     Possible Inflammatory Bowel Disease      • Lumbar pain     Pain radiating to lumbar region of back   n      • Pleuritic pain    • Radiculopathy     site unspecified      • Type 2 diabetes mellitus (CMS/HCC)    • Vesicular eczema of hands and feet      Sinusitis   This is a new problem. The current episode started in the past 7 days. The problem has been rapidly worsening since onset. There has been no fever. The pain is moderate. Associated symptoms include congestion, coughing, headaches, a hoarse voice, sinus pressure and a sore throat. Past treatments include antibiotics. The treatment provided no relief.   Cough   This is a new problem. The current " episode started in the past 7 days. The problem has been gradually worsening. The problem occurs every few minutes. The cough is productive of sputum and productive of purulent sputum. Associated symptoms include headaches, nasal congestion, postnasal drip and a sore throat. Pertinent negatives include no fever. He has tried rest and a beta-agonist inhaler for the symptoms. The treatment provided mild relief.        The following portions of the patient's history were reviewed and updated as appropriate: allergies, current medications, past family history, past medical history, past social history, past surgical history and problem list.    Review of Systems   Constitutional: Negative.  Negative for fever.   HENT: Positive for congestion, hoarse voice, postnasal drip, sinus pressure and sore throat.    Eyes: Negative.    Respiratory: Positive for cough.    Cardiovascular: Negative.    Gastrointestinal: Negative.    Endocrine: Negative.    Genitourinary: Negative.    Musculoskeletal: Negative.    Skin: Negative.    Allergic/Immunologic: Negative.    Neurological: Positive for headaches.   Hematological: Negative.    Psychiatric/Behavioral: Negative.        Objective   Physical Exam   Constitutional: He is oriented to person, place, and time. He appears well-developed and well-nourished. No distress.   HENT:   Head: Normocephalic and atraumatic.   Right Ear: Hearing, tympanic membrane, external ear and ear canal normal.   Left Ear: Hearing, tympanic membrane, external ear and ear canal normal.   Nose: Mucosal edema and rhinorrhea present. Right sinus exhibits maxillary sinus tenderness. Left sinus exhibits maxillary sinus tenderness.   Mouth/Throat: Uvula is midline and mucous membranes are normal. No tonsillar exudate.   Neck: Neck supple.   Cardiovascular: Normal rate, regular rhythm and normal heart sounds. Exam reveals no gallop and no friction rub.   No murmur heard.  Pulmonary/Chest: Effort normal and breath  sounds normal. No stridor. No respiratory distress. He has no wheezes.   Lymphadenopathy:     He has no cervical adenopathy.   Neurological: He is alert and oriented to person, place, and time.   Skin: Skin is warm and dry. No rash noted. He is not diaphoretic. No erythema. No pallor.   Psychiatric: He has a normal mood and affect. His behavior is normal.   Nursing note and vitals reviewed.      Assessment/Plan   Jadiel was seen today for sinusitis.    Diagnoses and all orders for this visit:    Acute non-recurrent maxillary sinusitis    Cough  -     Influenza Antigen, Rapid - Swab, Nasopharynx    Body aches    Other orders  -     doxycycline (VIBRAMYCIN) 100 MG capsule; Take 1 capsule by mouth 2 (Two) Times a Day for 7 days.    will obtain flu swab as above, negative, pt will be informed of results, will be treated with doxycycline as above and continue with albuterol inhaler prn cough/wheeze  If symptoms persist or worsen he is asked to RTC for recheck  Steroid not given today since just received a steroid inj at last visit a few days ago.  He is aware and is in agreement to this plan  All questions and concerns are addressed with understanding noted.

## 2019-11-25 ENCOUNTER — TELEPHONE (OUTPATIENT)
Dept: PODIATRY | Facility: CLINIC | Age: 69
End: 2019-11-25

## 2019-11-25 NOTE — TELEPHONE ENCOUNTER
Zach:    Patient called in regards to toe ulcer of left foot you last seen him 11/07/2019 and he is suppose to follow up in two weeks his next appointment is scheduled 12/02/2019. He states that it got worse overnight he noticed it on Friday that it has some drainage and pus coming out. He was wanting to be worked in today [employer] office because he lives in [place], I did schedule him an appointment for tomorrow in [place].  Please advise patient.

## 2019-11-26 ENCOUNTER — OFFICE VISIT (OUTPATIENT)
Dept: PODIATRY | Facility: CLINIC | Age: 69
End: 2019-11-26

## 2019-11-26 VITALS — OXYGEN SATURATION: 98 % | HEART RATE: 108 BPM | HEIGHT: 72 IN | BODY MASS INDEX: 30.07 KG/M2 | WEIGHT: 222 LBS

## 2019-11-26 DIAGNOSIS — L97.522 SKIN ULCER OF TOE OF LEFT FOOT WITH FAT LAYER EXPOSED (HCC): ICD-10-CM

## 2019-11-26 DIAGNOSIS — M89.8X7 EXOSTOSIS OF BONE OF FOOT: Primary | ICD-10-CM

## 2019-11-26 DIAGNOSIS — E11.42 DIABETIC POLYNEUROPATHY ASSOCIATED WITH TYPE 2 DIABETES MELLITUS (HCC): ICD-10-CM

## 2019-11-26 DIAGNOSIS — M24.573 EQUINUS CONTRACTURE OF ANKLE: ICD-10-CM

## 2019-11-26 DIAGNOSIS — M20.42 HAMMER TOE OF LEFT FOOT: ICD-10-CM

## 2019-11-26 PROCEDURE — 11042 DBRDMT SUBQ TIS 1ST 20SQCM/<: CPT | Performed by: PODIATRIST

## 2019-11-26 PROCEDURE — 99214 OFFICE O/P EST MOD 30 MIN: CPT | Performed by: PODIATRIST

## 2019-11-27 PROBLEM — M24.573 EQUINUS CONTRACTURE OF ANKLE: Status: ACTIVE | Noted: 2019-11-27

## 2019-11-27 PROBLEM — M89.8X7 EXOSTOSIS OF BONE OF FOOT: Status: ACTIVE | Noted: 2019-11-27

## 2019-11-27 PROBLEM — M20.42 HAMMER TOE OF LEFT FOOT: Status: ACTIVE | Noted: 2019-11-27

## 2019-11-30 NOTE — PROGRESS NOTES
"Subjective   Jadiel Dutton is a 69 y.o. male. Patient here today with complaints of Shoulder Pain (left)  Patient here today with complaints of left shoulder pain after he fell when he tripped over some bricks at home on 11/5/2019.  States pain slightly better now than it was but still rates it as 8/10 on pain scale.    Vitals:    11/15/19 1028   BP: 118/70   Pulse: 102   Temp: 97.8 °F (36.6 °C)   Weight: 101 kg (223 lb 1 oz)   Height: 182.9 cm (72\")   PainSc:   8   PainLoc: Shoulder     Body mass index is 30.25 kg/m².  Past Medical History:   Diagnosis Date   • Abdominal pain    • Abnormal weight loss    • Acute bronchitis    • Anxiety state    • Benign essential hypertension    • Chronic sinusitis    • Constipation    • Cystitis    • Degenerative joint disease involving multiple joints    • Diabetic neuropathy (CMS/HCC)      bilateral hands      • Diastolic dysfunction    • Diverticular disease of colon    • Dyspnea    • Epigastric pain    • Essential hypertension    • Foot ulcer (CMS/HCC)    • Gastroesophageal reflux disease    • Generalized anxiety disorder    • Hyperlipidemia    • Inflammatory bowel disease     Possible Inflammatory Bowel Disease      • Lumbar pain     Pain radiating to lumbar region of back   n      • Pleuritic pain    • Radiculopathy     site unspecified      • Type 2 diabetes mellitus (CMS/HCC)    • Vesicular eczema of hands and feet      Shoulder Injury    The incident occurred at home. The left shoulder is affected. The incident occurred more than 1 week ago. The injury mechanism was a fall and a direct blow. The quality of the pain is described as aching. The pain does not radiate. The pain is at a severity of 8/10. Associated symptoms include muscle weakness. Pertinent negatives include no chest pain, numbness or tingling. The symptoms are aggravated by overhead lifting, movement and palpation. He has tried rest, NSAIDs and ice for the symptoms. The treatment provided no relief.    "     The following portions of the patient's history were reviewed and updated as appropriate: allergies, current medications, past family history, past medical history, past social history, past surgical history and problem list.    Review of Systems   Constitutional: Negative.    HENT: Negative.    Eyes: Negative.    Respiratory: Negative.    Cardiovascular: Negative.  Negative for chest pain.   Gastrointestinal: Negative.    Endocrine: Negative.    Genitourinary: Negative.    Musculoskeletal: Positive for arthralgias.   Skin: Negative.    Allergic/Immunologic: Negative.    Neurological: Negative.  Negative for tingling and numbness.   Hematological: Negative.    Psychiatric/Behavioral: Negative.        Objective   Physical Exam   Constitutional: He is oriented to person, place, and time. He appears well-developed and well-nourished. No distress.   HENT:   Head: Normocephalic and atraumatic.   Cardiovascular: Normal rate, regular rhythm and normal heart sounds. Exam reveals no gallop and no friction rub.   No murmur heard.  Pulmonary/Chest: Effort normal and breath sounds normal. No stridor. No respiratory distress. He has no wheezes. He has no rales.   Musculoskeletal: He exhibits tenderness.        Left shoulder: He exhibits decreased range of motion, tenderness, bony tenderness, pain and decreased strength. He exhibits normal pulse.   He has limited range of motion to left upper extremity with tenderness to palpation of anterior posterior and lateral shoulder, radial pulse palpable   Neurological: He is alert and oriented to person, place, and time.   Skin: Skin is warm and dry. He is not diaphoretic.   Psychiatric: He has a normal mood and affect. His behavior is normal.   Nursing note and vitals reviewed.      Assessment/Plan   Jadiel was seen today for shoulder pain.    Diagnoses and all orders for this visit:    Fall, subsequent encounter  -     methylPREDNISolone acetate (DEPO-medrol) injection 80  mg    Acute pain of left shoulder  -     methylPREDNISolone acetate (DEPO-medrol) injection 80 mg    Other orders  -     HYDROcodone-acetaminophen (NORCO) 5-325 MG per tablet; Take 1 tablet by mouth Every 6 (Six) Hours As Needed for Moderate Pain .    Jamaal is obtained and reviewed   He is given Depo 80 mg injection IM in office today, tolerated well   Advised to return here if symptoms should persist or worsen   Offered but declined x-ray but will return or call back to have this ordered if pain does persist   We will continue with ice/heat to area   He is prescribed Norco as needed pain as above   We will follow-up here if symptoms again persist or worsen or as scheduled otherwise for chronic conditions   He is aware and is in agreement to this plan   all questions and concerns are addressed with understanding noted.   JAMAAL query complete. Treatment plan to include limited course of prescribed controlled substance. Risks including addiction, benefits, and alternatives presented to patient.

## 2019-11-30 NOTE — PATIENT INSTRUCTIONS

## 2019-12-02 RX ORDER — CLINDAMYCIN PHOSPHATE 900 MG/50ML
900 INJECTION INTRAVENOUS ONCE
Status: CANCELLED | OUTPATIENT
Start: 2019-12-18 | End: 2019-12-02

## 2019-12-03 ENCOUNTER — OFFICE VISIT (OUTPATIENT)
Dept: FAMILY MEDICINE CLINIC | Facility: CLINIC | Age: 69
End: 2019-12-03

## 2019-12-03 VITALS
SYSTOLIC BLOOD PRESSURE: 122 MMHG | BODY MASS INDEX: 30.5 KG/M2 | HEART RATE: 88 BPM | HEIGHT: 72 IN | WEIGHT: 225.2 LBS | TEMPERATURE: 97.2 F | DIASTOLIC BLOOD PRESSURE: 80 MMHG
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
## 2019-12-03 DIAGNOSIS — M25.512 ACUTE PAIN OF LEFT SHOULDER: ICD-10-CM

## 2019-12-03 DIAGNOSIS — M79.602 LEFT ARM PAIN: ICD-10-CM

## 2019-12-03 PROCEDURE — 99213 OFFICE O/P EST LOW 20 MIN: CPT | Performed by: NURSE PRACTITIONER

## 2019-12-04 DIAGNOSIS — S42.002A CLOSED DISPLACED FRACTURE OF LEFT CLAVICLE, UNSPECIFIED PART OF CLAVICLE, INITIAL ENCOUNTER: Primary | ICD-10-CM

## 2019-12-04 NOTE — PROGRESS NOTES
"Subjective   Jadiel Dutton is a 69 y.o. male. Patient here today with complaints of Shoulder Pain  Patient here today with complaints still of left shoulder and elbow pain, states he did fall a few weeks ago, pain persisting, initial x-rays were negative in ER.  Patient states he does have muscle relaxer to use at home as needed and has been using pain pills when needed which give him some pain relief.  Rates pain as 6/10 on pain scale.    Vitals:    12/03/19 1434   BP: 122/80   Pulse: 88   Temp: 97.2 °F (36.2 °C)   Weight: 102 kg (225 lb 3.2 oz)   Height: 182.9 cm (72\")   PainSc:   6   PainLoc: Arm     Body mass index is 30.54 kg/m².  Past Medical History:   Diagnosis Date   • Abdominal pain    • Abnormal weight loss    • Acute bronchitis    • Anxiety state    • Benign essential hypertension    • Chronic sinusitis    • Constipation    • Cystitis    • Degenerative joint disease involving multiple joints    • Diabetic neuropathy (CMS/HCC)      bilateral hands      • Diastolic dysfunction    • Diverticular disease of colon    • Dyspnea    • Epigastric pain    • Essential hypertension    • Foot ulcer (CMS/HCC)    • Gastroesophageal reflux disease    • Generalized anxiety disorder    • Hyperlipidemia    • Inflammatory bowel disease     Possible Inflammatory Bowel Disease      • Lumbar pain     Pain radiating to lumbar region of back   n      • Pleuritic pain    • Radiculopathy     site unspecified      • Type 2 diabetes mellitus (CMS/HCC)    • Vesicular eczema of hands and feet      Shoulder Injury    The incident occurred in the yard and at home. The left shoulder is affected. The incident occurred more than 1 week ago. The injury mechanism was a fall. The quality of the pain is described as aching. The pain does not radiate. The pain is at a severity of 6/10. The pain is moderate. Pertinent negatives include no chest pain, numbness or tingling. The symptoms are aggravated by palpation, overhead lifting and " movement. He has tried ice, non-weight bearing, rest, NSAIDs and acetaminophen for the symptoms. The treatment provided mild relief.        The following portions of the patient's history were reviewed and updated as appropriate: allergies, current medications, past family history, past medical history, past social history, past surgical history and problem list.    Review of Systems   Constitutional: Negative.    HENT: Negative.    Eyes: Negative.    Respiratory: Negative.    Cardiovascular: Negative.  Negative for chest pain.   Gastrointestinal: Negative.    Endocrine: Negative.    Genitourinary: Negative.    Musculoskeletal: Positive for arthralgias.   Skin: Negative.    Allergic/Immunologic: Negative.    Neurological: Negative.  Negative for tingling and numbness.   Hematological: Negative.    Psychiatric/Behavioral: Negative.        Objective   Physical Exam   Constitutional: He is oriented to person, place, and time. He appears well-developed and well-nourished. No distress.   HENT:   Head: Normocephalic and atraumatic.   Cardiovascular: Normal rate, regular rhythm and normal heart sounds. Exam reveals no gallop and no friction rub.   No murmur heard.  Pulmonary/Chest: Effort normal and breath sounds normal. No stridor. No respiratory distress. He has no wheezes. He has no rales.   Musculoskeletal: He exhibits tenderness.        Left shoulder: He exhibits decreased range of motion, tenderness, bony tenderness, pain and decreased strength.        Left elbow: He exhibits normal range of motion, no swelling, no effusion, no deformity and no laceration. No tenderness found. No radial head, no medial epicondyle, no lateral epicondyle and no olecranon process tenderness noted.   Patient tender to palpation of lateral anterior and posterior left shoulder, he does have slightly positive empty cup test, complains of pain with abduction greater than 90 degrees no limited range of motion to elbow   Neurological: He is  alert and oriented to person, place, and time.   Skin: Skin is warm and dry. No rash noted. He is not diaphoretic. No erythema. No pallor.   Psychiatric: He has a normal mood and affect. His behavior is normal.   Nursing note and vitals reviewed.      Assessment/Plan   Jadiel was seen today for shoulder pain.    Diagnoses and all orders for this visit:    Fall, subsequent encounter  -     XR Humerus Left  -     XR Shoulder 2+ View Left    Acute pain of left shoulder  -     XR Shoulder 2+ View Left    Left arm pain  -     XR Humerus Left    Will repeat x-rays of left shoulder and will obtain left humerus x-ray, ER x-ray results were also reviewed today with patient which were initially completed approximately 1 month ago.  He can continue with pain medicine as prescribed previously and depending on test results and pain level will strongly consider orthopedic referral as indicated.  Patient aware and in agreement to this plan.  All questions and concerns are addressed with understanding noted.

## 2019-12-04 NOTE — PATIENT INSTRUCTIONS

## 2019-12-09 ENCOUNTER — OFFICE VISIT (OUTPATIENT)
Dept: PODIATRY | Facility: CLINIC | Age: 69
End: 2019-12-09

## 2019-12-09 VITALS — HEIGHT: 72 IN | OXYGEN SATURATION: 99 % | WEIGHT: 225 LBS | BODY MASS INDEX: 30.48 KG/M2 | HEART RATE: 114 BPM

## 2019-12-09 DIAGNOSIS — M24.573 EQUINUS CONTRACTURE OF ANKLE: ICD-10-CM

## 2019-12-09 DIAGNOSIS — L97.522 SKIN ULCER OF TOE OF LEFT FOOT WITH FAT LAYER EXPOSED (HCC): Primary | ICD-10-CM

## 2019-12-09 DIAGNOSIS — M20.42 HAMMER TOE OF LEFT FOOT: ICD-10-CM

## 2019-12-09 DIAGNOSIS — E11.42 DIABETIC POLYNEUROPATHY ASSOCIATED WITH TYPE 2 DIABETES MELLITUS (HCC): ICD-10-CM

## 2019-12-09 DIAGNOSIS — M89.8X1 PAIN OF LEFT CLAVICLE: Primary | ICD-10-CM

## 2019-12-09 PROCEDURE — 11042 DBRDMT SUBQ TIS 1ST 20SQCM/<: CPT | Performed by: PODIATRIST

## 2019-12-09 NOTE — PROGRESS NOTES
Jadiel Dutton  1950  69 y.o. male   PCP- Shayy Banda , MARY  10/21/19  BS : 120 PER PATIENT      Patient presents today for skin ulcer on left foot.    12/09/2019    Chief Complaint   Patient presents with   • Left Foot - Wound Check         History of Present Illness     Jadiel Dutton is a 69 y.o. male who presents for diabetic foot examination and evaluation of left midfoot ulceration.  Patient is scheduled for left foot exostectomy, wound closure and hammertoe correction next week.  He feels the wound site is mildly improved today.    Past Medical History:   Diagnosis Date   • Abdominal pain    • Abnormal weight loss    • Acute bronchitis    • Anxiety state    • Benign essential hypertension    • Chronic sinusitis    • Constipation    • Cystitis    • Degenerative joint disease involving multiple joints    • Diabetic neuropathy (CMS/HCC)      bilateral hands      • Diastolic dysfunction    • Diverticular disease of colon    • Dyspnea    • Epigastric pain    • Essential hypertension    • Foot ulcer (CMS/HCC)    • Gastroesophageal reflux disease    • Generalized anxiety disorder    • Hyperlipidemia    • Inflammatory bowel disease     Possible Inflammatory Bowel Disease      • Lumbar pain     Pain radiating to lumbar region of back   n      • Pleuritic pain    • Radiculopathy     site unspecified      • Type 2 diabetes mellitus (CMS/HCC)    • Vesicular eczema of hands and feet          Past Surgical History:   Procedure Laterality Date   • BACK SURGERY      LOWER BACK SURGERY   • CARPAL TUNNEL RELEASE Bilateral    • COLONOSCOPY  06/11/2012    Internal & external hemorrhoids found.   • COLONOSCOPY N/A 8/22/2017    Procedure: COLONOSCOPY;  Surgeon: Cesar Hollis MD;  Location: [facility] ENDOSCOPY;  Service:    • ENDOSCOPY  06/11/2012    Slight stricture in the distal esophagus. Gastritis in stomach. Biopsy taken. Normal duodenum.   • ENDOSCOPY     • ENDOSCOPY N/A 8/22/2017    Procedure:  ESOPHAGOGASTRODUODENOSCOPY possible dilation ;  Surgeon: Cesar Hollis MD;  Location: [facility] ENDOSCOPY;  Service:    • ENTEROSCOPY SMALL BOWEL  08/27/2012    Gastritis in stomach. Normal jejunum. Biopsy taken. Normal duodenum   • FOOT SURGERY     • INJECTION OF MEDICATION  02/28/2014    Depo Medrol   • INJECTION OF MEDICATION  03/07/2014    Rocephin(2)   • ULNAR TUNNEL RELEASE           Family History   Problem Relation Age of Onset   • No Known Problems Other    • Hypertension Mother    • Heart attack Father    • No Known Problems Sister    • No Known Problems Brother    • No Known Problems Daughter    • No Known Problems Son    • No Known Problems Maternal Aunt    • No Known Problems Maternal Uncle    • No Known Problems Paternal Aunt    • No Known Problems Paternal Uncle    • No Known Problems Maternal Grandmother    • No Known Problems Maternal Grandfather    • No Known Problems Paternal Grandmother    • No Known Problems Paternal Grandfather          Social History     Socioeconomic History   • Marital status: Single     Spouse name: Not on file   • Number of children: Not on file   • Years of education: Not on file   • Highest education level: Not on file   Tobacco Use   • Smoking status: Never Smoker   • Smokeless tobacco: Never Used   Substance and Sexual Activity   • Alcohol use: No   • Drug use: No   • Sexual activity: Yes     Partners: Female         Current Outpatient Medications   Medication Sig Dispense Refill   • albuterol (PROVENTIL HFA;VENTOLIN HFA) 108 (90 Base) MCG/ACT inhaler Inhale 2 puffs Every 4 (Four) Hours As Needed for Wheezing. 1 inhaler 2   • B-D UF III MINI PEN NEEDLES 31G X 5 MM misc USE FOUR TIMES A DAY WITH INSULIN PENS 360 each 2   • DEXILANT 60 MG capsule TAKE 1 CAPSULE DAILY 90 capsule 2   • fluticasone (FLONASE) 50 MCG/ACT nasal spray 2 sprays into the nostril(s) as directed by provider Daily. 1 bottle 0   • gabapentin (NEURONTIN) 300 MG capsule Take 1 capsule by mouth 4  "(Four) Times a Day As Needed.     • Insulin NPH Isophane & Regular (HUMULIN 70/30 KWIKPEN) (70-30) 100 UNIT/ML suspension pen-injector INJECT 55 UNITS SUB Q BEFORE BREAKFAST, 65 UNITS BEFORE SUPPER, 30 MINUTES BEFORE BREAKFAST AND SUPPER     • lisinopril-hydrochlorothiazide (PRINZIDE,ZESTORETIC) 20-25 MG per tablet Take 1 tablet by mouth Daily.     • loratadine (CLARITIN) 10 MG tablet Take 1 tablet by mouth Daily. 30 tablet 0   • ONETOUCH VERIO test strip      • rosuvastatin (CRESTOR) 5 MG tablet Take 1 tablet by mouth Daily. 30 tablet 3   • Semaglutide (OZEMPIC) 0.25 or 0.5 MG/DOSE solution pen-injector AS DIRECTED 1 MG EVERY WEEK     • traMADol (ULTRAM) 50 MG tablet Take 50 mg by mouth Every 6 (Six) Hours As Needed for Moderate Pain .       No current facility-administered medications for this visit.          OBJECTIVE    Pulse 114   Ht 182.9 cm (72\")   Wt 102 kg (225 lb)   SpO2 99%   BMI 30.52 kg/m²       Review of Systems   Constitutional: Negative.    HENT: Negative.    Eyes: Negative.    Respiratory: Negative.    Cardiovascular: Negative.    Gastrointestinal: Negative.    Endocrine: Negative.    Genitourinary: Negative.    Musculoskeletal: Positive for back pain.   Skin: Positive for wound.   Allergic/Immunologic: Negative.    Neurological: Negative.    Hematological: Negative.    Psychiatric/Behavioral: Negative.          Physical Exam   Constitutional: he appears well-developed and well-nourished.   HEENT: Normocephalic. Atraumatic  CV: No tenderness. RRR  Resp: Non-labored respiration. No wheezes.   Psychiatric: he has a normal mood and affect. his   behavior is normal.      Lower Extremity Exam:  Vascular: DP/PT pulses palpable 1+.   Negative hair growth.   Minimal perimalleolar edema  Neuro: Protective sensation absent, b/l.  DTRs intact  Integument: Full thickness ulcer to plantar lateral forefoot measuring approximately 1.2 x 0.3 x 0.2 cm .  Granular base with significant surrounding hyperkeratosis. "    Atrophic skin noted b/l  No masses  Musculoskeletal: LE muscle strength 5/5.   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      Left foot wound debridement:  Risks and benefits discussed.  Left forefoot ulcer debrided of surrounding hyperkeratosis and devitalized tissue with 15 blade to level of subq  Pressure hemostasis obtained  Medihoney ointment and dsd applied.          ASSESSMENT AND PLAN    Jadiel was seen today for wound check.    Diagnoses and all orders for this visit:    Skin ulcer of toe of left foot with fat layer exposed (CMS/HCC)    Hammer toe of left foot    Equinus contracture of ankle    Diabetic polyneuropathy associated with type 2 diabetes mellitus (CMS/HCC)      -Comprehensive DM foot exam performed. Pt educated on importance of tight glucose control and daily foot checks.   -Wound debridement as above  -Continue Medihoney for home dressing changes  -Discussed surgical excision of heterotopic bone of fifth metatarsal, as well as 4th and possible 3rd hammertoe correction and gastrocnemius recession to prevent future ulcerations.    Discussed all risks, benefits and potential comp occasions including but not limited to delayed healing, risk of infection, recurrent ulceration need for additional surgery, DVT/PE.    -Plan for 12/18/2019  -Follow up 2 weeks            This document has been electronically signed by Jacoby Serrano DPM on December 9, 2019 12:05 PM     EMR Dragon/Transcription disclaimer:   Much of this encounter note is an electronic transcription/translation of spoken language to printed text. The electronic translation of spoken language may permit erroneous, or at times, nonsensical words or phrases to be inadvertently transcribed; Although I have reviewed the note for such errors, some may still exist.    Jacoby Serrano,  POLA  12/9/2019  12:05 PM

## 2019-12-09 NOTE — H&P (VIEW-ONLY)
Jadiel Dutton  1950  69 y.o. male   PCP- Shayy Banda , MARY  10/21/19  BS : 120 PER PATIENT      Patient presents today for skin ulcer on left foot.    12/09/2019    Chief Complaint   Patient presents with   • Left Foot - Wound Check         History of Present Illness     Jadiel Dutton is a 69 y.o. male who presents for diabetic foot examination and evaluation of left midfoot ulceration.  Patient is scheduled for left foot exostectomy, wound closure and hammertoe correction next week.  He feels the wound site is mildly improved today.    Past Medical History:   Diagnosis Date   • Abdominal pain    • Abnormal weight loss    • Acute bronchitis    • Anxiety state    • Benign essential hypertension    • Chronic sinusitis    • Constipation    • Cystitis    • Degenerative joint disease involving multiple joints    • Diabetic neuropathy (CMS/HCC)      bilateral hands      • Diastolic dysfunction    • Diverticular disease of colon    • Dyspnea    • Epigastric pain    • Essential hypertension    • Foot ulcer (CMS/HCC)    • Gastroesophageal reflux disease    • Generalized anxiety disorder    • Hyperlipidemia    • Inflammatory bowel disease     Possible Inflammatory Bowel Disease      • Lumbar pain     Pain radiating to lumbar region of back   n      • Pleuritic pain    • Radiculopathy     site unspecified      • Type 2 diabetes mellitus (CMS/HCC)    • Vesicular eczema of hands and feet          Past Surgical History:   Procedure Laterality Date   • BACK SURGERY      LOWER BACK SURGERY   • CARPAL TUNNEL RELEASE Bilateral    • COLONOSCOPY  06/11/2012    Internal & external hemorrhoids found.   • COLONOSCOPY N/A 8/22/2017    Procedure: COLONOSCOPY;  Surgeon: Cesar Hollis MD;  Location: [facility] ENDOSCOPY;  Service:    • ENDOSCOPY  06/11/2012    Slight stricture in the distal esophagus. Gastritis in stomach. Biopsy taken. Normal duodenum.   • ENDOSCOPY     • ENDOSCOPY N/A 8/22/2017    Procedure:  ESOPHAGOGASTRODUODENOSCOPY possible dilation ;  Surgeon: Cesar Hollis MD;  Location: [facility] ENDOSCOPY;  Service:    • ENTEROSCOPY SMALL BOWEL  08/27/2012    Gastritis in stomach. Normal jejunum. Biopsy taken. Normal duodenum   • FOOT SURGERY     • INJECTION OF MEDICATION  02/28/2014    Depo Medrol   • INJECTION OF MEDICATION  03/07/2014    Rocephin(2)   • ULNAR TUNNEL RELEASE           Family History   Problem Relation Age of Onset   • No Known Problems Other    • Hypertension Mother    • Heart attack Father    • No Known Problems Sister    • No Known Problems Brother    • No Known Problems Daughter    • No Known Problems Son    • No Known Problems Maternal Aunt    • No Known Problems Maternal Uncle    • No Known Problems Paternal Aunt    • No Known Problems Paternal Uncle    • No Known Problems Maternal Grandmother    • No Known Problems Maternal Grandfather    • No Known Problems Paternal Grandmother    • No Known Problems Paternal Grandfather          Social History     Socioeconomic History   • Marital status: Single     Spouse name: Not on file   • Number of children: Not on file   • Years of education: Not on file   • Highest education level: Not on file   Tobacco Use   • Smoking status: Never Smoker   • Smokeless tobacco: Never Used   Substance and Sexual Activity   • Alcohol use: No   • Drug use: No   • Sexual activity: Yes     Partners: Female         Current Outpatient Medications   Medication Sig Dispense Refill   • albuterol (PROVENTIL HFA;VENTOLIN HFA) 108 (90 Base) MCG/ACT inhaler Inhale 2 puffs Every 4 (Four) Hours As Needed for Wheezing. 1 inhaler 2   • B-D UF III MINI PEN NEEDLES 31G X 5 MM misc USE FOUR TIMES A DAY WITH INSULIN PENS 360 each 2   • DEXILANT 60 MG capsule TAKE 1 CAPSULE DAILY 90 capsule 2   • fluticasone (FLONASE) 50 MCG/ACT nasal spray 2 sprays into the nostril(s) as directed by provider Daily. 1 bottle 0   • gabapentin (NEURONTIN) 300 MG capsule Take 1 capsule by mouth 4  "(Four) Times a Day As Needed.     • Insulin NPH Isophane & Regular (HUMULIN 70/30 KWIKPEN) (70-30) 100 UNIT/ML suspension pen-injector INJECT 55 UNITS SUB Q BEFORE BREAKFAST, 65 UNITS BEFORE SUPPER, 30 MINUTES BEFORE BREAKFAST AND SUPPER     • lisinopril-hydrochlorothiazide (PRINZIDE,ZESTORETIC) 20-25 MG per tablet Take 1 tablet by mouth Daily.     • loratadine (CLARITIN) 10 MG tablet Take 1 tablet by mouth Daily. 30 tablet 0   • ONETOUCH VERIO test strip      • rosuvastatin (CRESTOR) 5 MG tablet Take 1 tablet by mouth Daily. 30 tablet 3   • Semaglutide (OZEMPIC) 0.25 or 0.5 MG/DOSE solution pen-injector AS DIRECTED 1 MG EVERY WEEK     • traMADol (ULTRAM) 50 MG tablet Take 50 mg by mouth Every 6 (Six) Hours As Needed for Moderate Pain .       No current facility-administered medications for this visit.          OBJECTIVE    Pulse 114   Ht 182.9 cm (72\")   Wt 102 kg (225 lb)   SpO2 99%   BMI 30.52 kg/m²       Review of Systems   Constitutional: Negative.    HENT: Negative.    Eyes: Negative.    Respiratory: Negative.    Cardiovascular: Negative.    Gastrointestinal: Negative.    Endocrine: Negative.    Genitourinary: Negative.    Musculoskeletal: Positive for back pain.   Skin: Positive for wound.   Allergic/Immunologic: Negative.    Neurological: Negative.    Hematological: Negative.    Psychiatric/Behavioral: Negative.          Physical Exam   Constitutional: he appears well-developed and well-nourished.   HEENT: Normocephalic. Atraumatic  CV: No tenderness. RRR  Resp: Non-labored respiration. No wheezes.   Psychiatric: he has a normal mood and affect. his   behavior is normal.      Lower Extremity Exam:  Vascular: DP/PT pulses palpable 1+.   Negative hair growth.   Minimal perimalleolar edema  Neuro: Protective sensation absent, b/l.  DTRs intact  Integument: Full thickness ulcer to plantar lateral forefoot measuring approximately 1.2 x 0.3 x 0.2 cm .  Granular base with significant surrounding hyperkeratosis. "    Atrophic skin noted b/l  No masses  Musculoskeletal: LE muscle strength 5/5.   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      Left foot wound debridement:  Risks and benefits discussed.  Left forefoot ulcer debrided of surrounding hyperkeratosis and devitalized tissue with 15 blade to level of subq  Pressure hemostasis obtained  Medihoney ointment and dsd applied.          ASSESSMENT AND PLAN    Jadiel was seen today for wound check.    Diagnoses and all orders for this visit:    Skin ulcer of toe of left foot with fat layer exposed (CMS/HCC)    Hammer toe of left foot    Equinus contracture of ankle    Diabetic polyneuropathy associated with type 2 diabetes mellitus (CMS/HCC)      -Comprehensive DM foot exam performed. Pt educated on importance of tight glucose control and daily foot checks.   -Wound debridement as above  -Continue Medihoney for home dressing changes  -Discussed surgical excision of heterotopic bone of fifth metatarsal, as well as 4th and possible 3rd hammertoe correction and gastrocnemius recession to prevent future ulcerations.    Discussed all risks, benefits and potential comp occasions including but not limited to delayed healing, risk of infection, recurrent ulceration need for additional surgery, DVT/PE.    -Plan for 12/18/2019  -Follow up 2 weeks            This document has been electronically signed by Jacoby Serrano DPM on December 9, 2019 12:05 PM     EMR Dragon/Transcription disclaimer:   Much of this encounter note is an electronic transcription/translation of spoken language to printed text. The electronic translation of spoken language may permit erroneous, or at times, nonsensical words or phrases to be inadvertently transcribed; Although I have reviewed the note for such errors, some may still exist.    Jacoby Serrano,  POLA  12/9/2019  12:05 PM

## 2019-12-10 ENCOUNTER — OFFICE VISIT (OUTPATIENT)
Dept: ORTHOPEDIC SURGERY | Facility: CLINIC | Age: 69
End: 2019-12-10

## 2019-12-10 VITALS — BODY MASS INDEX: 30.61 KG/M2 | WEIGHT: 226 LBS | HEIGHT: 72 IN

## 2019-12-10 DIAGNOSIS — M89.8X1 PAIN OF LEFT CLAVICLE: ICD-10-CM

## 2019-12-10 DIAGNOSIS — Z79.4 TYPE 2 DIABETES MELLITUS WITH COMPLICATION, WITH LONG-TERM CURRENT USE OF INSULIN (HCC): ICD-10-CM

## 2019-12-10 DIAGNOSIS — I10 ESSENTIAL HYPERTENSION: ICD-10-CM

## 2019-12-10 DIAGNOSIS — S43.52XA ACROMIOCLAVICULAR SPRAIN, LEFT, INITIAL ENCOUNTER: Primary | ICD-10-CM

## 2019-12-10 DIAGNOSIS — E11.8 TYPE 2 DIABETES MELLITUS WITH COMPLICATION, WITH LONG-TERM CURRENT USE OF INSULIN (HCC): ICD-10-CM

## 2019-12-10 PROCEDURE — 99214 OFFICE O/P EST MOD 30 MIN: CPT | Performed by: ORTHOPAEDIC SURGERY

## 2019-12-10 NOTE — PROGRESS NOTES
Jadiel Dutton is a 69 y.o. male   Primary provider:  Shayy Banda APRN       Chief Complaint   Patient presents with   • Left Clavicle - Pain       HISTORY OF PRESENT ILLNESS:  New patient left clavicle, patient had XRAYS done today, patient states pain score is at a 7 today,   Was injured at home tripping on a brick sidewalk.  Skidded on right hand and hurt left shoulder.  Continues to have pain in left shoulder.  Had slowly improved but has started to worsen again.  Having pain at night, difficulty sleeping  Mild intermittent pain in the anterior aspect of shoulder.  Pain is worse with activity  No numbness or tingling    Pain   This is a new problem. The current episode started more than 1 month ago. The problem occurs constantly. Pertinent negatives include no chills or fever. Associated symptoms comments: Stabbing aching . The symptoms are aggravated by walking (sitting driving ). He has tried NSAIDs and rest for the symptoms. The treatment provided no relief.        CONCURRENT MEDICAL HISTORY:    Past Medical History:   Diagnosis Date   • Abdominal pain    • Abnormal weight loss    • Acute bronchitis    • Anxiety state    • Benign essential hypertension    • Chronic sinusitis    • Constipation    • Cystitis    • Degenerative joint disease involving multiple joints    • Diabetic neuropathy (CMS/HCC)      bilateral hands      • Diastolic dysfunction    • Diverticular disease of colon    • Dyspnea    • Epigastric pain    • Essential hypertension    • Foot ulcer (CMS/HCC)    • Gastroesophageal reflux disease    • Generalized anxiety disorder    • Hyperlipidemia    • Inflammatory bowel disease     Possible Inflammatory Bowel Disease      • Lumbar pain     Pain radiating to lumbar region of back   n      • Pleuritic pain    • Radiculopathy     site unspecified      • Type 2 diabetes mellitus (CMS/HCC)    • Vesicular eczema of hands and feet        Allergies   Allergen Reactions   • Penicillins Shortness Of  Breath   • Bactrim [Sulfamethoxazole-Trimethoprim] GI Intolerance   • Ceftin [Cefuroxime Axetil] GI Intolerance   • Sulfa Antibiotics GI Intolerance   • Ciprofloxacin Itching and Rash         Current Outpatient Medications:   •  albuterol (PROVENTIL HFA;VENTOLIN HFA) 108 (90 Base) MCG/ACT inhaler, Inhale 2 puffs Every 4 (Four) Hours As Needed for Wheezing., Disp: 1 inhaler, Rfl: 2  •  DEXILANT 60 MG capsule, TAKE 1 CAPSULE DAILY, Disp: 90 capsule, Rfl: 2  •  fluticasone (FLONASE) 50 MCG/ACT nasal spray, 2 sprays into the nostril(s) as directed by provider Daily., Disp: 1 bottle, Rfl: 0  •  gabapentin (NEURONTIN) 300 MG capsule, Take 1 capsule by mouth 4 (Four) Times a Day As Needed., Disp: , Rfl:   •  lisinopril-hydrochlorothiazide (PRINZIDE,ZESTORETIC) 20-25 MG per tablet, Take 1 tablet by mouth Daily., Disp: , Rfl:   •  loratadine (CLARITIN) 10 MG tablet, Take 1 tablet by mouth Daily., Disp: 30 tablet, Rfl: 0  •  rosuvastatin (CRESTOR) 5 MG tablet, Take 1 tablet by mouth Daily., Disp: 30 tablet, Rfl: 3  •  traMADol (ULTRAM) 50 MG tablet, Take 50 mg by mouth Every 6 (Six) Hours As Needed for Moderate Pain ., Disp: , Rfl:   •  B-D UF III MINI PEN NEEDLES 31G X 5 MM misc, USE FOUR TIMES A DAY WITH INSULIN PENS, Disp: 360 each, Rfl: 2  •  diazePAM (VALIUM) 10 MG tablet, One tablet 30 min prior to having mri., Disp: 1 tablet, Rfl: 0  •  Insulin NPH Isophane & Regular (HUMULIN 70/30 KWIKPEN) (70-30) 100 UNIT/ML suspension pen-injector, INJECT 55 UNITS SUB Q BEFORE BREAKFAST, 65 UNITS BEFORE SUPPER, 30 MINUTES BEFORE BREAKFAST AND SUPPER, Disp: , Rfl:   •  ONETOUCH VERIO test strip, , Disp: , Rfl:   •  Semaglutide (OZEMPIC) 0.25 or 0.5 MG/DOSE solution pen-injector, AS DIRECTED 1 MG EVERY WEEK, Disp: , Rfl:     Past Surgical History:   Procedure Laterality Date   • BACK SURGERY      LOWER BACK SURGERY   • CARPAL TUNNEL RELEASE Bilateral    • COLONOSCOPY  06/11/2012    Internal & external hemorrhoids found.   •  COLONOSCOPY N/A 8/22/2017    Procedure: COLONOSCOPY;  Surgeon: Cesar Hollis MD;  Location: [facility] ENDOSCOPY;  Service:    • ENDOSCOPY  06/11/2012    Slight stricture in the distal esophagus. Gastritis in stomach. Biopsy taken. Normal duodenum.   • ENDOSCOPY     • ENDOSCOPY N/A 8/22/2017    Procedure: ESOPHAGOGASTRODUODENOSCOPY possible dilation ;  Surgeon: Cesar Hollis MD;  Location: [facility] ENDOSCOPY;  Service:    • ENTEROSCOPY SMALL BOWEL  08/27/2012    Gastritis in stomach. Normal jejunum. Biopsy taken. Normal duodenum   • FOOT SURGERY     • INJECTION OF MEDICATION  02/28/2014    Depo Medrol   • INJECTION OF MEDICATION  03/07/2014    Rocephin(2)   • ULNAR TUNNEL RELEASE         Family History   Problem Relation Age of Onset   • No Known Problems Other    • Hypertension Mother    • Heart attack Father    • No Known Problems Sister    • No Known Problems Brother    • No Known Problems Daughter    • No Known Problems Son    • No Known Problems Maternal Aunt    • No Known Problems Maternal Uncle    • No Known Problems Paternal Aunt    • No Known Problems Paternal Uncle    • No Known Problems Maternal Grandmother    • No Known Problems Maternal Grandfather    • No Known Problems Paternal Grandmother    • No Known Problems Paternal Grandfather         Social History     Socioeconomic History   • Marital status: Single     Spouse name: Not on file   • Number of children: Not on file   • Years of education: Not on file   • Highest education level: Not on file   Tobacco Use   • Smoking status: Never Smoker   • Smokeless tobacco: Never Used   Substance and Sexual Activity   • Alcohol use: No   • Drug use: No   • Sexual activity: Yes     Partners: Female        Review of Systems   Constitutional: Negative for chills and fever.   Respiratory: Negative.    Cardiovascular: Negative.    Gastrointestinal: Negative.    Musculoskeletal:        Left shoulder pain   All other systems reviewed and are  "negative.      PHYSICAL EXAMINATION:       Ht 182.9 cm (72\")   Wt 103 kg (226 lb)   BMI 30.65 kg/m²     Physical Exam   Constitutional: He is oriented to person, place, and time. He appears well-developed and well-nourished.   Neurological: He is alert and oriented to person, place, and time.   Psychiatric: He has a normal mood and affect. His behavior is normal. Judgment and thought content normal.       GAIT:     [x]  Normal  []  Antalgic    Assistive device: [x]  None  []  Walker     []  Crutches  []  Cane     []  Wheelchair  []  Stretcher    Right Shoulder Exam     Tenderness   The patient is experiencing no tenderness.    Range of Motion   The patient has normal right shoulder ROM.    Muscle Strength   The patient has normal right shoulder strength.    Tests   Rosales test: negative    Other   Erythema: absent  Sensation: normal  Pulse: present      Left Shoulder Exam     Tenderness   The patient is experiencing tenderness in the acromioclavicular joint and acromion.    Range of Motion   Active abduction: 150   Forward flexion: 130     Muscle Strength   Abduction: 4/5   Supraspinatus: 4/5     Tests   Rosales test: positive  Impingement: positive    Other   Erythema: absent  Sensation: normal  Pulse: present               Xr Clavicle Left    Result Date: 12/11/2019  Narrative: Two view left clavicle HISTORY: Trauma. Pain. Sprain of the left acromioclavicular joint. AP views in various angulation obtained. COMPARISON: Left shoulder 12/3/2019. FINDINGS: There remains some inferior displacement of the lateral aspect of the left clavicle with respect to the acromion process. No fracture. No other osseous or articular abnormality.     Impression: CONCLUSION: There remains some inferior displacement of the lateral aspect of the left clavicle with respect to the acromion process. [id number] Electronically signed by:  Nick Granger MD  12/11/2019 8:49 PM CST Workstation: [id number]    Xr Shoulder 2+ View Left    Result " Date: 12/4/2019  Narrative: Procedure:  Left shoulder    Indication:  Trauma, pain   . Technique:  Three views   . Prior relevant exam:  None. There is inferior positioning of the distal clavicle with respect to the acromion by approximately 1 cm. This suggests unusual acromioclavicular joint injury. There is no evidence of any underlying fracture. Normal glenohumeral joint.     Impression: CONCLUSION: Inferior displacement of the distal clavicle with respect to the acromion suggesting atypical, somewhat unusual acromioclavicular  joint injury. Left shoulder is otherwise unremarkable. Electronically signed by:  Aaron Lin MD  12/4/2019 11:53 AM CST Workstation: [id number]    Xr Humerus Left    Result Date: 12/4/2019  Narrative: PROCEDURE: XR HUMERUS LEFT VIEWS: 3 INDICATION: Pain COMPARISON: None FINDINGS:   - fracture: None   - alignment: Within normal limits   - misc: Probable phleboliths in soft tissue     Impression: No acute osseous abnormality identified. Note:  if pain or symptoms persist beyond reasonable expectations and follow-up imaging is anticipated,  cross sectional imaging  (CT and/or MRI) is suggested, as is deemed clinically appropriate. Electronically signed by:  Luma Diana MD  12/4/2019 12:03 PM CST Workstation: [id number]    Xr Foot 3+ View Left    Result Date: 11/29/2019  Narrative: Exam: Three-view radiographs left foot Comparison Studies: 12/6/2018 Indication: Chronic left midfoot ulceration Findings: Normal osseous density.  No acute fractures, erosions or subluxations.  Soft tissues are unremarkable.  Redemonstration of heterotopic bone formation to the distal stump of left fifth metatarsal.  No significant interval change.  Chronic digital contractures. Result: As above.  No acute osseous pathology.         ASSESSMENT:    Diagnoses and all orders for this visit:    Acromioclavicular sprain, left, initial encounter  -     CT shoulder left wo contrast; Future    Pain of left clavicle  -     CT  shoulder left wo contrast; Future    Essential hypertension    Type 2 diabetes mellitus with complication, with long-term current use of insulin (CMS/[facility])          PLAN     Need ct scan left shoulder to assess the position of the AC joint and whether a AC dislocation is present.  Assess for any treatment needs to be administered or whether cautious observation can be employed.  Pending results of CT scan, we may need to proceed with MRI or Physical therapy    Return for recheck for CT results.    Troy Barillas MD

## 2019-12-11 ENCOUNTER — HOSPITAL ENCOUNTER (OUTPATIENT)
Dept: CT IMAGING | Facility: HOSPITAL | Age: 69
Discharge: HOME | End: 2019-12-11
Admitting: ORTHOPAEDIC SURGERY

## 2019-12-11 DIAGNOSIS — M89.8X1 PAIN OF LEFT CLAVICLE: ICD-10-CM

## 2019-12-11 DIAGNOSIS — S43.52XA ACROMIOCLAVICULAR SPRAIN, LEFT, INITIAL ENCOUNTER: ICD-10-CM

## 2019-12-11 PROCEDURE — 73200 CT UPPER EXTREMITY W/O DYE: CPT

## 2019-12-12 ENCOUNTER — APPOINTMENT (OUTPATIENT)
Dept: PREADMISSION TESTING | Facility: HOSPITAL | Age: 69
End: 2019-12-12

## 2019-12-12 DIAGNOSIS — S43.52XA ACROMIOCLAVICULAR SPRAIN, LEFT, INITIAL ENCOUNTER: ICD-10-CM

## 2019-12-12 DIAGNOSIS — M89.8X1 PAIN OF LEFT CLAVICLE: Primary | ICD-10-CM

## 2019-12-13 DIAGNOSIS — S43.52XA ACROMIOCLAVICULAR SPRAIN, LEFT, INITIAL ENCOUNTER: Primary | ICD-10-CM

## 2019-12-13 RX ORDER — DIAZEPAM 10 MG/1
TABLET ORAL
Qty: 1 TABLET | Refills: 0 | OUTPATIENT
Start: 2019-12-13 | End: 2019-12-17

## 2019-12-16 ENCOUNTER — APPOINTMENT (OUTPATIENT)
Dept: PREADMISSION TESTING | Facility: HOSPITAL | Age: 69
End: 2019-12-16

## 2019-12-17 ENCOUNTER — ANESTHESIA EVENT (OUTPATIENT)
Dept: PERIOP | Facility: HOSPITAL | Age: 69
End: 2019-12-17

## 2019-12-17 ENCOUNTER — APPOINTMENT (OUTPATIENT)
Dept: PREADMISSION TESTING | Facility: HOSPITAL | Age: 69
End: 2019-12-17

## 2019-12-17 VITALS
BODY MASS INDEX: 30.48 KG/M2 | SYSTOLIC BLOOD PRESSURE: 140 MMHG | DIASTOLIC BLOOD PRESSURE: 88 MMHG | HEART RATE: 100 BPM | OXYGEN SATURATION: 100 % | RESPIRATION RATE: 18 BRPM | WEIGHT: 225 LBS | HEIGHT: 72 IN

## 2019-12-17 DIAGNOSIS — S43.52XA ACROMIOCLAVICULAR SPRAIN, LEFT, INITIAL ENCOUNTER: ICD-10-CM

## 2019-12-17 DIAGNOSIS — M89.8X1 PAIN OF LEFT CLAVICLE: ICD-10-CM

## 2019-12-17 LAB
ANION GAP SERPL CALCULATED.3IONS-SCNC: 13 MMOL/L (ref 5–15)
BUN BLD-MCNC: 12 MG/DL (ref 8–23)
BUN/CREAT SERPL: 10 (ref 7–25)
CALCIUM SPEC-SCNC: 9.5 MG/DL (ref 8.6–10.5)
CHLORIDE SERPL-SCNC: 93 MMOL/L (ref 98–107)
CO2 SERPL-SCNC: 32 MMOL/L (ref 22–29)
CREAT BLD-MCNC: 1.2 MG/DL (ref 0.76–1.27)
GFR SERPL CREATININE-BSD FRML MDRD: 60 ML/MIN/1.73
GLUCOSE BLD-MCNC: 160 MG/DL (ref 65–99)
POTASSIUM BLD-SCNC: 3.9 MMOL/L (ref 3.5–5.2)
SODIUM BLD-SCNC: 138 MMOL/L (ref 136–145)

## 2019-12-17 PROCEDURE — 80048 BASIC METABOLIC PNL TOTAL CA: CPT | Performed by: ANESTHESIOLOGY

## 2019-12-17 PROCEDURE — 36415 COLL VENOUS BLD VENIPUNCTURE: CPT

## 2019-12-17 PROCEDURE — 93005 ELECTROCARDIOGRAM TRACING: CPT

## 2019-12-17 PROCEDURE — 93010 ELECTROCARDIOGRAM REPORT: CPT | Performed by: INTERNAL MEDICINE

## 2019-12-17 RX ORDER — ROSUVASTATIN CALCIUM 20 MG/1
20 TABLET, COATED ORAL NIGHTLY
COMMUNITY
End: 2020-02-19 | Stop reason: SDUPTHER

## 2019-12-17 RX ORDER — DEXLANSOPRAZOLE 60 MG/1
60 CAPSULE, DELAYED RELEASE ORAL DAILY PRN
COMMUNITY

## 2019-12-17 RX ORDER — SODIUM CHLORIDE 9 MG/ML
1000 INJECTION, SOLUTION INTRAVENOUS CONTINUOUS
Status: CANCELLED | OUTPATIENT
Start: 2019-12-18

## 2019-12-17 NOTE — DISCHARGE INSTRUCTIONS
[facility]  Pre-op Information and Guidelines    You will be called after 2 p.m. the day before your surgery (Friday for Monday surgery) and notified of your time for arrival and approximate surgery time.  If you have not received a call by 4P.M., please contact Same Day Surgery at [phone] of if outside [facility] call [phone].    Please Follow these Important Safety Guidelines:    • The morning of your procedure, take only the medications listed below with   A sip of water:_____________________________________________       ______________________________________________    • DO NOT eat or drink anything after 12:00 midnight the night before surgery  Specific instructions concerning drinking clear liquids will be discussed during  the pre-surgery instruction call the day before your surgery.    • If you take a blood thinner (ex. Plavix, Coumadin, aspirin), ask your doctor when to stop it before surgery  STOP DATE: _________________    • Only 2 visitors are allowed in patient rooms at a time  Your visitors will be asked to wait in the lobby until the admission process is complete with the exception of a parent with a child and patients in need of special assistance.    • YOU CANNOT DRIVE YOURSELF HOME  You must be accompanied by someone who will be responsible for driving you home after surgery and for your care at home.    • DO NOT chew gum, use breath mints, hard candy, or smoke the day of surgery  • DO NOT drink alcohol for at least 24 hours before your surgery  • DO NOT wear any jewelry and remove all body piercing before coming to the hospital  • DO NOT wear make-up to the hospital  • If you are having surgery on an extremity (arm/leg/foot) remove nail polish/artificial nails on the surgical side  • Clothing, glasses, contacts, dentures, and hairpieces must be removed before surgery  • Bathe the night before or the morning of your surgery and do not use powders/lotions on  skin.

## 2019-12-18 ENCOUNTER — APPOINTMENT (OUTPATIENT)
Dept: GENERAL RADIOLOGY | Facility: HOSPITAL | Age: 69
End: 2019-12-18

## 2019-12-18 ENCOUNTER — HOSPITAL ENCOUNTER (OUTPATIENT)
Facility: HOSPITAL | Age: 69
Setting detail: HOSPITAL OUTPATIENT SURGERY
Discharge: HOME | End: 2019-12-18
Attending: PODIATRIST | Admitting: PODIATRIST

## 2019-12-18 ENCOUNTER — ANESTHESIA (OUTPATIENT)
Dept: PERIOP | Facility: HOSPITAL | Age: 69
End: 2019-12-18

## 2019-12-18 VITALS
SYSTOLIC BLOOD PRESSURE: 135 MMHG | OXYGEN SATURATION: 94 % | WEIGHT: 223.55 LBS | HEIGHT: 72 IN | HEART RATE: 88 BPM | BODY MASS INDEX: 30.28 KG/M2 | DIASTOLIC BLOOD PRESSURE: 83 MMHG | RESPIRATION RATE: 20 BRPM | TEMPERATURE: 97.9 F

## 2019-12-18 DIAGNOSIS — M24.573 EQUINUS CONTRACTURE OF ANKLE: ICD-10-CM

## 2019-12-18 DIAGNOSIS — M20.42 HAMMER TOE OF LEFT FOOT: ICD-10-CM

## 2019-12-18 DIAGNOSIS — M89.8X7 EXOSTOSIS OF BONE OF FOOT: ICD-10-CM

## 2019-12-18 LAB
GLUCOSE BLDC GLUCOMTR-MCNC: 135 MG/DL (ref 70–130)
GLUCOSE BLDC GLUCOMTR-MCNC: 137 MG/DL (ref 70–130)

## 2019-12-18 PROCEDURE — 27687 REVISION OF CALF TENDON: CPT | Performed by: PODIATRIST

## 2019-12-18 PROCEDURE — 28285 REPAIR OF HAMMERTOE: CPT | Performed by: PODIATRIST

## 2019-12-18 PROCEDURE — 25010000002 PHENYLEPHRINE PER 1 ML: Performed by: NURSE ANESTHETIST, CERTIFIED REGISTERED

## 2019-12-18 PROCEDURE — 25010000002 SUCCINYLCHOLINE PER 20 MG: Performed by: NURSE ANESTHETIST, CERTIFIED REGISTERED

## 2019-12-18 PROCEDURE — 25010000002 PROPOFOL 10 MG/ML EMULSION: Performed by: NURSE ANESTHETIST, CERTIFIED REGISTERED

## 2019-12-18 PROCEDURE — 25010000002 ONDANSETRON PER 1 MG: Performed by: NURSE ANESTHETIST, CERTIFIED REGISTERED

## 2019-12-18 PROCEDURE — 76000 FLUOROSCOPY <1 HR PHYS/QHP: CPT

## 2019-12-18 PROCEDURE — 88305 TISSUE EXAM BY PATHOLOGIST: CPT | Performed by: PATHOLOGY

## 2019-12-18 PROCEDURE — 25010000002 HYDROMORPHONE PER 4 MG: Performed by: NURSE ANESTHETIST, CERTIFIED REGISTERED

## 2019-12-18 PROCEDURE — 28140 REMOVAL OF METATARSAL: CPT | Performed by: PODIATRIST

## 2019-12-18 PROCEDURE — 88311 DECALCIFY TISSUE: CPT | Performed by: PODIATRIST

## 2019-12-18 PROCEDURE — 88311 DECALCIFY TISSUE: CPT | Performed by: PATHOLOGY

## 2019-12-18 PROCEDURE — 25010000002 MIDAZOLAM PER 1 MG: Performed by: NURSE ANESTHETIST, CERTIFIED REGISTERED

## 2019-12-18 PROCEDURE — 25010000002 DEXAMETHASONE PER 1 MG: Performed by: NURSE ANESTHETIST, CERTIFIED REGISTERED

## 2019-12-18 PROCEDURE — C1776 JOINT DEVICE (IMPLANTABLE): HCPCS | Performed by: PODIATRIST

## 2019-12-18 PROCEDURE — 88305 TISSUE EXAM BY PATHOLOGIST: CPT | Performed by: PODIATRIST

## 2019-12-18 PROCEDURE — 87205 SMEAR GRAM STAIN: CPT | Performed by: PODIATRIST

## 2019-12-18 PROCEDURE — 82962 GLUCOSE BLOOD TEST: CPT

## 2019-12-18 PROCEDURE — 87070 CULTURE OTHR SPECIMN AEROBIC: CPT | Performed by: PODIATRIST

## 2019-12-18 PROCEDURE — 25010000002 FENTANYL CITRATE (PF) 100 MCG/2ML SOLUTION: Performed by: NURSE ANESTHETIST, CERTIFIED REGISTERED

## 2019-12-18 DEVICE — INTRAMEDULLARY ARTHRODESIS IMPLANT
Type: IMPLANTABLE DEVICE | Site: TOES | Status: FUNCTIONAL
Brand: SMART TOE

## 2019-12-18 RX ORDER — FLUMAZENIL 0.1 MG/ML
0.2 INJECTION INTRAVENOUS AS NEEDED
Status: DISCONTINUED | OUTPATIENT
Start: 2019-12-18 | End: 2019-12-18 | Stop reason: HOSPADM

## 2019-12-18 RX ORDER — DIPHENHYDRAMINE HYDROCHLORIDE 50 MG/ML
12.5 INJECTION INTRAMUSCULAR; INTRAVENOUS
Status: DISCONTINUED | OUTPATIENT
Start: 2019-12-18 | End: 2019-12-18 | Stop reason: HOSPADM

## 2019-12-18 RX ORDER — PROMETHAZINE HYDROCHLORIDE 25 MG/1
25 TABLET ORAL ONCE AS NEEDED
Status: DISCONTINUED | OUTPATIENT
Start: 2019-12-18 | End: 2019-12-18 | Stop reason: HOSPADM

## 2019-12-18 RX ORDER — LABETALOL HYDROCHLORIDE 5 MG/ML
5 INJECTION, SOLUTION INTRAVENOUS
Status: DISCONTINUED | OUTPATIENT
Start: 2019-12-18 | End: 2019-12-18 | Stop reason: HOSPADM

## 2019-12-18 RX ORDER — DEXAMETHASONE SODIUM PHOSPHATE 4 MG/ML
INJECTION, SOLUTION INTRA-ARTICULAR; INTRALESIONAL; INTRAMUSCULAR; INTRAVENOUS; SOFT TISSUE AS NEEDED
Status: DISCONTINUED | OUTPATIENT
Start: 2019-12-18 | End: 2019-12-18 | Stop reason: SURG

## 2019-12-18 RX ORDER — CLINDAMYCIN PHOSPHATE 900 MG/50ML
900 INJECTION INTRAVENOUS ONCE
Status: COMPLETED | OUTPATIENT
Start: 2019-12-18 | End: 2019-12-18

## 2019-12-18 RX ORDER — MIDAZOLAM HYDROCHLORIDE 1 MG/ML
INJECTION INTRAMUSCULAR; INTRAVENOUS AS NEEDED
Status: DISCONTINUED | OUTPATIENT
Start: 2019-12-18 | End: 2019-12-18 | Stop reason: SURG

## 2019-12-18 RX ORDER — EPHEDRINE SULFATE 50 MG/ML
5 INJECTION, SOLUTION INTRAVENOUS ONCE AS NEEDED
Status: DISCONTINUED | OUTPATIENT
Start: 2019-12-18 | End: 2019-12-18 | Stop reason: HOSPADM

## 2019-12-18 RX ORDER — ACETAMINOPHEN 650 MG/1
650 SUPPOSITORY RECTAL ONCE AS NEEDED
Status: DISCONTINUED | OUTPATIENT
Start: 2019-12-18 | End: 2019-12-18 | Stop reason: HOSPADM

## 2019-12-18 RX ORDER — SODIUM CHLORIDE 9 MG/ML
1000 INJECTION, SOLUTION INTRAVENOUS CONTINUOUS
Status: DISCONTINUED | OUTPATIENT
Start: 2019-12-18 | End: 2019-12-18 | Stop reason: HOSPADM

## 2019-12-18 RX ORDER — SUCCINYLCHOLINE CHLORIDE 20 MG/ML
INJECTION INTRAMUSCULAR; INTRAVENOUS AS NEEDED
Status: DISCONTINUED | OUTPATIENT
Start: 2019-12-18 | End: 2019-12-18 | Stop reason: SURG

## 2019-12-18 RX ORDER — ONDANSETRON 2 MG/ML
INJECTION INTRAMUSCULAR; INTRAVENOUS AS NEEDED
Status: DISCONTINUED | OUTPATIENT
Start: 2019-12-18 | End: 2019-12-18 | Stop reason: SURG

## 2019-12-18 RX ORDER — HYDROMORPHONE HCL 110MG/55ML
PATIENT CONTROLLED ANALGESIA SYRINGE INTRAVENOUS AS NEEDED
Status: DISCONTINUED | OUTPATIENT
Start: 2019-12-18 | End: 2019-12-18 | Stop reason: SURG

## 2019-12-18 RX ORDER — PROMETHAZINE HYDROCHLORIDE 25 MG/ML
12.5 INJECTION, SOLUTION INTRAMUSCULAR; INTRAVENOUS ONCE AS NEEDED
Status: DISCONTINUED | OUTPATIENT
Start: 2019-12-18 | End: 2019-12-18 | Stop reason: HOSPADM

## 2019-12-18 RX ORDER — HYDROCODONE BITARTRATE AND ACETAMINOPHEN 7.5; 325 MG/1; MG/1
1 TABLET ORAL EVERY 6 HOURS PRN
Qty: 40 TABLET | Refills: 0 | Status: SHIPPED | OUTPATIENT
Start: 2019-12-18 | End: 2020-01-09

## 2019-12-18 RX ORDER — FENTANYL CITRATE 50 UG/ML
INJECTION, SOLUTION INTRAMUSCULAR; INTRAVENOUS AS NEEDED
Status: DISCONTINUED | OUTPATIENT
Start: 2019-12-18 | End: 2019-12-18 | Stop reason: SURG

## 2019-12-18 RX ORDER — CETIRIZINE HYDROCHLORIDE 10 MG/1
10 TABLET ORAL DAILY PRN
COMMUNITY
End: 2020-05-22

## 2019-12-18 RX ORDER — LIDOCAINE HYDROCHLORIDE 20 MG/ML
INJECTION, SOLUTION INFILTRATION; PERINEURAL AS NEEDED
Status: DISCONTINUED | OUTPATIENT
Start: 2019-12-18 | End: 2019-12-18 | Stop reason: SURG

## 2019-12-18 RX ORDER — NALOXONE HCL 0.4 MG/ML
0.4 VIAL (ML) INJECTION AS NEEDED
Status: DISCONTINUED | OUTPATIENT
Start: 2019-12-18 | End: 2019-12-18 | Stop reason: HOSPADM

## 2019-12-18 RX ORDER — PROMETHAZINE HYDROCHLORIDE 25 MG/1
25 SUPPOSITORY RECTAL ONCE AS NEEDED
Status: DISCONTINUED | OUTPATIENT
Start: 2019-12-18 | End: 2019-12-18 | Stop reason: HOSPADM

## 2019-12-18 RX ORDER — ONDANSETRON 2 MG/ML
4 INJECTION INTRAMUSCULAR; INTRAVENOUS ONCE AS NEEDED
Status: DISCONTINUED | OUTPATIENT
Start: 2019-12-18 | End: 2019-12-18 | Stop reason: HOSPADM

## 2019-12-18 RX ORDER — CLINDAMYCIN HYDROCHLORIDE 300 MG/1
300 CAPSULE ORAL 3 TIMES DAILY
Qty: 30 CAPSULE | Refills: 0 | Status: SHIPPED | OUTPATIENT
Start: 2019-12-18 | End: 2019-12-30

## 2019-12-18 RX ORDER — PROPOFOL 10 MG/ML
VIAL (ML) INTRAVENOUS AS NEEDED
Status: DISCONTINUED | OUTPATIENT
Start: 2019-12-18 | End: 2019-12-18 | Stop reason: SURG

## 2019-12-18 RX ORDER — BUPIVACAINE HYDROCHLORIDE 5 MG/ML
INJECTION, SOLUTION EPIDURAL; INTRACAUDAL AS NEEDED
Status: DISCONTINUED | OUTPATIENT
Start: 2019-12-18 | End: 2019-12-18 | Stop reason: HOSPADM

## 2019-12-18 RX ORDER — ACETAMINOPHEN 325 MG/1
650 TABLET ORAL ONCE AS NEEDED
Status: DISCONTINUED | OUTPATIENT
Start: 2019-12-18 | End: 2019-12-18 | Stop reason: HOSPADM

## 2019-12-18 RX ADMIN — PROPOFOL 150 MG: 10 INJECTION, EMULSION INTRAVENOUS at 11:04

## 2019-12-18 RX ADMIN — CLINDAMYCIN IN 5 PERCENT DEXTROSE 900 MG: 18 INJECTION, SOLUTION INTRAVENOUS at 11:11

## 2019-12-18 RX ADMIN — FENTANYL CITRATE 100 MCG: 50 INJECTION, SOLUTION INTRAMUSCULAR; INTRAVENOUS at 11:04

## 2019-12-18 RX ADMIN — SODIUM CHLORIDE: 900 INJECTION, SOLUTION INTRAVENOUS at 11:59

## 2019-12-18 RX ADMIN — PHENYLEPHRINE HYDROCHLORIDE 200 MCG: 10 INJECTION INTRAVENOUS at 12:33

## 2019-12-18 RX ADMIN — SODIUM CHLORIDE 1000 ML: 900 INJECTION, SOLUTION INTRAVENOUS at 08:03

## 2019-12-18 RX ADMIN — MIDAZOLAM HYDROCHLORIDE 2 MG: 2 INJECTION, SOLUTION INTRAMUSCULAR; INTRAVENOUS at 10:58

## 2019-12-18 RX ADMIN — PHENYLEPHRINE HYDROCHLORIDE 100 MCG: 10 INJECTION INTRAVENOUS at 12:19

## 2019-12-18 RX ADMIN — PHENYLEPHRINE HYDROCHLORIDE 100 MCG: 10 INJECTION INTRAVENOUS at 12:04

## 2019-12-18 RX ADMIN — ONDANSETRON 4 MG: 2 INJECTION INTRAMUSCULAR; INTRAVENOUS at 11:44

## 2019-12-18 RX ADMIN — SUCCINYLCHOLINE CHLORIDE 160 MG: 20 INJECTION, SOLUTION INTRAMUSCULAR; INTRAVENOUS at 11:04

## 2019-12-18 RX ADMIN — DEXAMETHASONE SODIUM PHOSPHATE 4 MG: 4 INJECTION, SOLUTION INTRAMUSCULAR; INTRAVENOUS at 11:44

## 2019-12-18 RX ADMIN — LIDOCAINE HYDROCHLORIDE 100 MG: 20 INJECTION, SOLUTION INFILTRATION; PERINEURAL at 11:04

## 2019-12-18 RX ADMIN — HYDROMORPHONE HYDROCHLORIDE 0.5 MG: 2 INJECTION INTRAMUSCULAR; INTRAVENOUS; SUBCUTANEOUS at 11:48

## 2019-12-18 RX ADMIN — HYDROMORPHONE HYDROCHLORIDE 0.5 MG: 2 INJECTION INTRAMUSCULAR; INTRAVENOUS; SUBCUTANEOUS at 12:40

## 2019-12-18 NOTE — ANESTHESIA PREPROCEDURE EVALUATION
Anesthesia Evaluation     NPO Solid Status: > 8 hours             Airway   Mallampati: III  Dental      Pulmonary - normal exam   (+) asthma,  (-) sleep apnea    ROS comment: Negative patient screen for REINA    Cardiovascular - normal exam    (+) hypertension, hyperlipidemia,       Neuro/Psych  (+) psychiatric history Anxiety,     GI/Hepatic/Renal/Endo    (+) obesity,  GERD poorly controlled,  diabetes mellitus type 2 using insulin,     Musculoskeletal     Abdominal    Substance History      OB/GYN          Other                        Anesthesia Plan    ASA 3     general       Anesthetic plan, all risks, benefits, and alternatives have been provided, discussed and informed consent has been obtained with: patient.

## 2019-12-18 NOTE — ANESTHESIA PROCEDURE NOTES
Airway  Urgency: elective    Date/Time: 12/18/2019 11:06 AM    General Information and Staff    Patient location during procedure: OR  CRNA: Brooke Barba CRNA    Indications and Patient Condition    Preoxygenated: yes      Final Airway Details  Final airway type: endotracheal airway      Successful airway: ETT  Cuffed: yes   Successful intubation technique: direct laryngoscopy  Facilitating devices/methods: intubating stylet  Endotracheal tube insertion site: oral  Blade: Marleen  Blade size: 3  ETT size (mm): 7.5  Cormack-Lehane Classification: grade IIa - partial view of glottis  Placement verified by: chest auscultation and capnometry   Measured from: lips  ETT/EBT  to lips (cm): 22  Number of attempts at approach: 1  Assessment: lips, teeth, and gum same as pre-op and atraumatic intubation

## 2019-12-18 NOTE — INTERVAL H&P NOTE
Allergies   Allergen Reactions   • Penicillins Shortness Of Breath   • Bactrim [Sulfamethoxazole-Trimethoprim] GI Intolerance   • Ceftin [Cefuroxime Axetil] GI Intolerance   • Sulfa Antibiotics GI Intolerance   • Ciprofloxacin Itching and Rash       H&P reviewed. The patient was examined and there are no changes to the H&P.   Proceed as planned.          This document has been electronically signed by Jacoby Serrano DPM on December 18, 2019 10:35 AM

## 2019-12-18 NOTE — BRIEF OP NOTE
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]       Post-Op Diagnosis Codes:     * Hammer toe of left foot [M20.42]     * Equinus contracture of ankle [M24.573]     * Exostosis of bone of foot [M89.8X7]    Procedure/CPT® Codes:      Procedure(s):  FIFTH METATARSAL EXOSTECTOMY, THIRD AND FOURTH HAMMER TOE CORRECTION, GASTROCNEMIUS RECESSION    Surgeon(s):  Jacoby Serrano DPM    Anesthesia: Choice    Staff:   Circulator: Nataly Bergeron RN; Valentine Bell RN  Radiology Technologist: Dale Lopez  Scrub Person: Clinton Cardenas Samantha Kaye  Assistant: Silvana Ryan MA    Estimated Blood Loss: minimal    Urine Voided: * No values recorded between 12/18/2019 10:58 AM and 12/18/2019 12:41 PM *    Specimens:                Specimens     ID Source Type Tests Collected By Collected At Frozen?      1 Foot, Left Wound · WOUND CULTURE   Jacoby Serrano DPM 12/18/19 1136      Description: c and s left foot    This specimen was not marked as sent.    A Foot, Left Bone · TISSUE PATHOLOGY EXAM   Jacoby Serrano DPM 12/18/19 1235      Description: fifth metatarsal left foot    This specimen was not marked as sent.                Drains: * No LDAs found *    Findings: Consistent with diagnosis    Complications: None          This document has been electronically signed by Jacoby Serrano DPM on December 18, 2019 12:51 PM     Jacoby Serrano DPM     Date: 12/18/2019  Time: 12:51 PM

## 2019-12-18 NOTE — OP NOTE
SURGEON:  Jacoby Serrano DPM     ASSISTANT:  Silvana Ryan MA     PREOPERATIVE DIAGNOSES:   1.  Left foot full-thickness ulceration measuring 1.5 x 0.4 cm.    2.  Equinus contracture of left ankle.   3.  Hammertoe deformities of toes 3 and 4, left foot.     4.  Heterotopic bone formation, distal left 5th metatarsal stump, at previous local amputation site.       POSTOPERATIVE DIAGNOSES:    1.  Left foot full-thickness ulceration measuring 1.5 x 0.4 cm.    2.  Equinus contracture of left ankle.   3.  Hammertoe deformities of toes 3 and 4, left foot.     4.  Heterotopic bone formation, distal left 5th metatarsal stump, at previous local amputation site.    PROCEDURES PERFORMED:    1.  Excision and closure of full-thickness ulceration, left foot.    2.  Hammertoe correction, 3 and 4, with proximal interphalangeal joint arthrodesis, left foot.   3.  Excision of heterotopic bone formation, left 5th metatarsal.      4.  Gastrocnemius resection, left ankle.       ANESTHESIA:  General.     HEMOSTASIS:  Left thigh pneumatic tourniquet inflated to 300 mmHg for approximately 45 minutes.      ESTIMATED BLOOD LOSS:  Less than 10 mL.      MATERIALS:  BlueLithium SmartToe implant x2.      INJECTABLES:  25 mL of 0.5% Marcaine plain.      SPECIMENS:   1.  Left foot culture.    2.  Left distal 5th metatarsal.      COMPLICATIONS:  None.     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  Failed to heal with regular offloading and also notes worsening  contractures of his remaining digits.  He presents today for surgical correction.  All risks, benefits, and potential complications were described in detail.  No guarantees were given or implied at any time.  He has been NPO since midnight.  Informed consent had been obtained and located in the chart.  Then, 900 mg of clindamycin were given as preoperative  antibiotics.      DESCRIPTION OF PROCEDURE:  Under mild sedation, the patient was brought into the operating room and placed on the operating room table in the supine position.  Following induction of general anesthesia, the left foot and ankle were prepped and draped in usual aseptic fashion.  Attention at this time was then directed to the left 3rd and 4th digits.  The left 4th digit was noted to have a dorsolateral hyperkeratosis that the patient had been bandaging.  Following initial debridement of hyperkeratosis, there was a small amount of drainage noted which was cultured intraoperatively.  Two converging semi-elliptical incisions were then carried out around this area and all of this skin was excised and passed off the surgical field.  The exposed long extensor tendon was then tenotomized at the proximal interphalangeal joint, and the head of the proximal phalanx and base of the middle phalanx were exposed.  A small bone saw was then utilized to resect the cartilaginous surfaces of both sides of the joint and remaining joint preparation was performed with the instrumentation from the [employer] SmartToe set.  A size 16, 10° angled SmartToe implant was then inserted and the deformity was reduced, and the arthrodesis site was compressed.  We did perform the same procedure on the 3rd digit with the exception of the excision of the skin wedge.  This was performed through a straight linear incision.  Intraoperative fluoroscopy confirmed appropriate placement of the implants and reduction of the deformity at the proximal interphalangeal joint level.  There was some flexion contracture noted still at the distal interphalangeal joint; therefore, a percutaneous tenotomy and capsulotomy were performed at this level utilizing a Sherwood blade.  The incisions were then irrigated and closed in a layered fashion.  The long extensor tendon was repaired utilizing 4-0 Vicryl.               Attention was then drawn to the plantar  lateral foot where the above-mentioned, full-thickness ulceration was noted near the area of the 5th metatarsal base.  Two converging semi-elliptical incisions were utilized to excise this ulceration and tissue full-thickness down to the level of the distal 5th metatarsal.  Intraoperative fluoroscopy was then utilized to confirm appropriate resection of the area of heterotopic bone and the 5th metatarsal was resected pretty much in line with the 4th metatarsal base.  This osseous fragment was sent for pathological evaluation.  The incision was then irrigated with copious amounts of sterile saline and closed in a layered fashion.      Finally, patient was noted to have fairly significant gastroc soleus equinus.  Therefore, to further offload his lateral foot, the decision to perform a gastrocnemius resection was made.  An approximately 3 cm linear longitudinal incision was made to the medial calf just distal to the medial gastrocnemius muscle belly.  Blunt dissection was carried through the subcutaneous layer.  The deep fascia was carefully incised exposing the gastrocnemius aponeurosis.  The gastrocnemius aponeurosis was then carefully transected medial to lateral utilizing Metzenbaum scissors.  Dorsiflexor force was then placed on the forefoot; thereby lengthening the aponeurosis.                    The incision was then irrigated.  The deep fascia was closed with 3-0 Vicryl and the skin was closed in a layered fashion.  A dry sterile dressing was then applied.  The pneumatic tourniquet was deflated in the recovery room and he was found to have immediate hyperemic response noted of digits 1 through 5 on the left foot.  The patient was taken from the operating room to the recovery room with vital signs stable and neurovascular status intact.  He will be able to ambulate in a tall CAM boot.  He will follow up early next week for an incision check.

## 2019-12-18 NOTE — ANESTHESIA POSTPROCEDURE EVALUATION
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          Post Anesthesia Care and Evaluation    Patient location during evaluation: PACU  Patient participation: complete - patient participated  Level of consciousness: sleepy but conscious  Pain management: adequate  Airway patency: patent  Anesthetic complications: No anesthetic complications  PONV Status: none  Cardiovascular status: acceptable  Respiratory status: acceptable and nasal cannula  Hydration status: acceptable

## 2019-12-21 LAB
BACTERIA SPEC AEROBE CULT: NORMAL
GRAM STN SPEC: NORMAL

## 2019-12-23 ENCOUNTER — OFFICE VISIT (OUTPATIENT)
Dept: PODIATRY | Facility: CLINIC | Age: 69
End: 2019-12-23

## 2019-12-23 ENCOUNTER — APPOINTMENT (OUTPATIENT)
Dept: LAB | Facility: HOSPITAL | Age: 69
End: 2019-12-23

## 2019-12-23 VITALS — HEART RATE: 118 BPM | WEIGHT: 223 LBS | BODY MASS INDEX: 30.2 KG/M2 | OXYGEN SATURATION: 99 % | HEIGHT: 72 IN

## 2019-12-23 DIAGNOSIS — L97.522 SKIN ULCER OF TOE OF LEFT FOOT WITH FAT LAYER EXPOSED (HCC): ICD-10-CM

## 2019-12-23 DIAGNOSIS — L03.119 CELLULITIS OF FOOT: Primary | ICD-10-CM

## 2019-12-23 DIAGNOSIS — E11.42 DIABETIC POLYNEUROPATHY ASSOCIATED WITH TYPE 2 DIABETES MELLITUS (HCC): ICD-10-CM

## 2019-12-23 LAB
LAB AP CASE REPORT: NORMAL
PATH REPORT.FINAL DX SPEC: NORMAL
PATH REPORT.GROSS SPEC: NORMAL

## 2019-12-23 PROCEDURE — 87070 CULTURE OTHR SPECIMN AEROBIC: CPT | Performed by: PODIATRIST

## 2019-12-23 PROCEDURE — 87205 SMEAR GRAM STAIN: CPT | Performed by: PODIATRIST

## 2019-12-23 PROCEDURE — 99024 POSTOP FOLLOW-UP VISIT: CPT | Performed by: PODIATRIST

## 2019-12-23 PROCEDURE — 87186 SC STD MICRODIL/AGAR DIL: CPT | Performed by: PODIATRIST

## 2019-12-23 RX ORDER — DOXYCYCLINE HYCLATE 100 MG/1
100 CAPSULE ORAL 2 TIMES DAILY
Qty: 20 CAPSULE | Refills: 0 | Status: SHIPPED | OUTPATIENT
Start: 2019-12-23 | End: 2020-01-09

## 2019-12-23 NOTE — PROGRESS NOTES
Jadiel Dutton  1950  69 y.o. male   PCP- MARY Higginbotham  10/21/19  BS : 126 PER PATIENT      Patient presents today for post op appointment of left foot   12/23/2019      Chief Complaint   Patient presents with   • Left Foot - Post-op         History of Present Illness     Jadiel Dutton is a 69 y.o. male who presents for follow-up of left third and fourth hammertoe correction, partial excision of fifth metatarsal and gastrocnemius recession for treatment of chronic wound formation.  Date of surgery 12/18/2019.  States he has been feeling a little lethargic and noticed some increased swelling to his left leg over the past couple of days.  He denies any nausea, vomiting fever or chills.    Past Medical History:   Diagnosis Date   • Abdominal pain    • Abnormal weight loss    • Acute bronchitis    • Anxiety state    • Benign essential hypertension    • Chronic sinusitis    • Constipation    • Cystitis    • Degenerative joint disease involving multiple joints    • Diabetic neuropathy (CMS/HCC)      bilateral hands      • Diastolic dysfunction    • Diverticular disease of colon    • Dyspnea    • Epigastric pain    • Essential hypertension    • Foot ulcer (CMS/HCC)    • Gastroesophageal reflux disease    • Generalized anxiety disorder    • Hyperlipidemia    • Inflammatory bowel disease     Possible Inflammatory Bowel Disease      • Lumbar pain     Pain radiating to lumbar region of back   n      • Pleuritic pain    • Radiculopathy     site unspecified      • Type 2 diabetes mellitus (CMS/HCC)    • Vesicular eczema of hands and feet          Past Surgical History:   Procedure Laterality Date   • BACK SURGERY      LOWER BACK SURGERY   • CARPAL TUNNEL RELEASE Bilateral    • COLONOSCOPY  06/11/2012    Internal & external hemorrhoids found.   • COLONOSCOPY N/A 8/22/2017    Procedure: COLONOSCOPY;  Surgeon: Cesar Hollis MD;  Location: [facility] ENDOSCOPY;  Service:    • ENDOSCOPY  06/11/2012    Slight  stricture in the distal esophagus. Gastritis in stomach. Biopsy taken. Normal duodenum.   • ENDOSCOPY     • ENDOSCOPY N/A 8/22/2017    Procedure: ESOPHAGOGASTRODUODENOSCOPY possible dilation ;  Surgeon: Cesar Hollis MD;  Location: [facility] ENDOSCOPY;  Service:    • ENTEROSCOPY SMALL BOWEL  08/27/2012    Gastritis in stomach. Normal jejunum. Biopsy taken. Normal duodenum   • FOOT SURGERY     • INJECTION OF MEDICATION  02/28/2014    Depo Medrol   • INJECTION OF MEDICATION  03/07/2014    Rocephin(2)   • ULNAR TUNNEL RELEASE           Family History   Problem Relation Age of Onset   • No Known Problems Other    • Hypertension Mother    • Heart attack Father    • No Known Problems Sister    • No Known Problems Brother    • No Known Problems Daughter    • No Known Problems Son    • No Known Problems Maternal Aunt    • No Known Problems Maternal Uncle    • No Known Problems Paternal Aunt    • No Known Problems Paternal Uncle    • No Known Problems Maternal Grandmother    • No Known Problems Maternal Grandfather    • No Known Problems Paternal Grandmother    • No Known Problems Paternal Grandfather          Social History     Socioeconomic History   • Marital status: Single     Spouse name: Not on file   • Number of children: Not on file   • Years of education: Not on file   • Highest education level: Not on file   Tobacco Use   • Smoking status: Never Smoker   • Smokeless tobacco: Never Used   Substance and Sexual Activity   • Alcohol use: No   • Drug use: No   • Sexual activity: Defer         Current Outpatient Medications   Medication Sig Dispense Refill   • albuterol (PROVENTIL HFA;VENTOLIN HFA) 108 (90 Base) MCG/ACT inhaler Inhale 2 puffs Every 4 (Four) Hours As Needed for Wheezing. 1 inhaler 2   • B-D UF III MINI PEN NEEDLES 31G X 5 MM misc USE FOUR TIMES A DAY WITH INSULIN PENS 360 each 2   • cetirizine (zyrTEC) 10 MG tablet Take 10 mg by mouth Daily As Needed for Allergies.     • clindamycin (CLEOCIN) 300 MG  "capsule Take 1 capsule by mouth 3 (Three) Times a Day. 30 capsule 0   • dexlansoprazole (DEXILANT) 60 MG capsule Take 60 mg by mouth Daily.     • fluticasone (FLONASE) 50 MCG/ACT nasal spray 2 sprays into the nostril(s) as directed by provider Daily. 1 bottle 0   • gabapentin (NEURONTIN) 300 MG capsule Take 1 capsule by mouth 4 (Four) Times a Day As Needed.     • HYDROcodone-acetaminophen (NORCO) 7.5-325 MG per tablet Take 1 tablet by mouth Every 6 (Six) Hours As Needed for Moderate Pain  or Severe Pain . 40 tablet 0   • insulin NPH-insulin regular (humuLIN 70/30,novoLIN 70/30) (70-30) 100 UNIT/ML injection Inject  under the skin into the appropriate area as directed 2 (Two) Times a Day With Meals. 50 in the morning and 60 at night     • lisinopril-hydrochlorothiazide (PRINZIDE,ZESTORETIC) 20-25 MG per tablet Take 1 tablet by mouth Daily.     • ONETOUCH VERIO test strip      • rosuvastatin (CRESTOR) 20 MG tablet Take 20 mg by mouth Every Night.     • Semaglutide (OZEMPIC) 0.25 or 0.5 MG/DOSE solution pen-injector Inject 1 mg under the skin into the appropriate area as directed 1 (One) Time Per Week.     • traMADol (ULTRAM) 50 MG tablet Take 50 mg by mouth Every 6 (Six) Hours As Needed for Moderate Pain .     • doxycycline (VIBRAMYCIN) 100 MG capsule Take 1 capsule by mouth 2 (Two) Times a Day. 20 capsule 0     No current facility-administered medications for this visit.          OBJECTIVE    Pulse 118   Ht 182.9 cm (72\")   Wt 101 kg (223 lb)   SpO2 99%   BMI 30.24 kg/m²       Review of Systems   Constitutional: Negative.    HENT: Negative.    Eyes: Negative.    Respiratory: Negative.    Cardiovascular: Negative.    Gastrointestinal: Negative.    Endocrine: Negative.    Genitourinary: Negative.    Musculoskeletal: Positive for back pain.   Skin: Positive for wound.   Allergic/Immunologic: Negative.    Neurological: Negative.    Hematological: Negative.    Psychiatric/Behavioral: Negative.          Physical Exam "   Constitutional: he appears well-developed and well-nourished.   HEENT: Normocephalic. Atraumatic  CV: No tenderness. RRR  Resp: Non-labored respiration. No wheezes.   Psychiatric: he has a normal mood and affect. his   behavior is normal.      Lower Extremity Exam:  Pulses palpable  Follow-up left foot and lower leg incisions coapted with sutures in place.  No active drainage from incision sites.  Moderate erythema to dorsal lateral left foot with fluctuant bulla to this area.  Left third and fourth digits rectus  Negative Antoni        ASSESSMENT AND PLAN    Jadiel was seen today for post-op.    Diagnoses and all orders for this visit:    Cellulitis of foot  -     Wound Culture - Surgical Site, Foot, Left; Future  -     Wound Culture - Surgical Site, Foot, Left    Skin ulcer of toe of left foot with fat layer exposed (CMS/HCC)    Diabetic polyneuropathy associated with type 2 diabetes mellitus (CMS/HCC)    Other orders  -     doxycycline (VIBRAMYCIN) 100 MG capsule; Take 1 capsule by mouth 2 (Two) Times a Day.      -Upon inspection of fluctuant area to dorsal lateral left foot patient did have a moderate size hematoma which was questionably infected.  The fluctuant area was opened, hematoma drained and irrigated with sterile saline.  Deep wound cultures were taken.  -Patient had been taking clindamycin since Friday.  Will add doxycycline for increased potential MRSA coverage  -Dressing change to be left intact until follow-up visit next week.  Advised patient if he continues to feel lethargic or his symptoms worsen do not hesitate to call the office and go to the emergency department  -Continue cam boot for ambulation  -Follow up 1 week            This document has been electronically signed by Jacoby Serrano DPM on December 24, 2019 7:44 AM     EMR Dragon/Transcription disclaimer:   Much of this encounter note is an electronic transcription/translation of spoken language to printed text. The electronic  translation of spoken language may permit erroneous, or at times, nonsensical words or phrases to be inadvertently transcribed; Although I have reviewed the note for such errors, some may still exist.    Jacoby Serrano DPM  12/24/2019  7:44 AM

## 2019-12-25 LAB
BACTERIA SPEC AEROBE CULT: ABNORMAL
BACTERIA SPEC AEROBE CULT: ABNORMAL
GRAM STN SPEC: ABNORMAL

## 2019-12-26 ENCOUNTER — TELEPHONE (OUTPATIENT)
Dept: PODIATRY | Facility: CLINIC | Age: 69
End: 2019-12-26

## 2019-12-26 NOTE — TELEPHONE ENCOUNTER
[place]:    PATIENT WIFE KD CALLED ON HIS BEHALF, STATES FOOT IS STILL RED BUT THERE IS NO FOUL ODOR, BUT IS STILL DRAINING.  IT HAS DRAINED SO MUCH THAT INSIDE OF THE BOOT IS WET.  PATIENT HAS FINISHED HIS INITIAL ANTIBIOTICS, HE STILL IS TAKING THE ONES THAT WAS PRESCRIBED ON 12/23.    DID NOT KNOW WHAT HE NEEDED TO DO. ADVISE PATIENT THAT DR COVINGTON AND NURSE IS OUT UNTIL 12/30/2019. HE CAN GO TO URGENT CARE OR PCP.  WIFE ALSO ASKED ABOUT WOUND CULTURES THAT WAS TAKEN ON Monday.  THEY HAVE NOT HEARD ANYTHING.  PLEASE CALL PATIENT @ [phone] IF YOU CAN NOT REACH HIM THEN PLEASE CALL PATIENT WIFE KD @ [phone]

## 2019-12-27 ENCOUNTER — TELEPHONE (OUTPATIENT)
Dept: PODIATRY | Facility: CLINIC | Age: 69
End: 2019-12-27

## 2019-12-27 NOTE — TELEPHONE ENCOUNTER
Continue antibiotics because it is the correct medication per the wound culture.     He states the foot looks much better and is weeping very little now around the packing gauze. His wife is changing the outer bandage but has left the packing intact.

## 2019-12-30 ENCOUNTER — OFFICE VISIT (OUTPATIENT)
Dept: PODIATRY | Facility: CLINIC | Age: 69
End: 2019-12-30

## 2019-12-30 VITALS — BODY MASS INDEX: 30.2 KG/M2 | WEIGHT: 223 LBS | OXYGEN SATURATION: 99 % | HEART RATE: 111 BPM | HEIGHT: 72 IN

## 2019-12-30 DIAGNOSIS — E11.42 DIABETIC POLYNEUROPATHY ASSOCIATED WITH TYPE 2 DIABETES MELLITUS (HCC): ICD-10-CM

## 2019-12-30 DIAGNOSIS — M20.42 HAMMER TOE OF LEFT FOOT: Primary | ICD-10-CM

## 2019-12-30 DIAGNOSIS — L97.522 SKIN ULCER OF TOE OF LEFT FOOT WITH FAT LAYER EXPOSED (HCC): ICD-10-CM

## 2019-12-30 DIAGNOSIS — L03.119 CELLULITIS OF FOOT: ICD-10-CM

## 2019-12-30 PROCEDURE — 99024 POSTOP FOLLOW-UP VISIT: CPT | Performed by: PODIATRIST

## 2019-12-30 RX ORDER — CLINDAMYCIN HYDROCHLORIDE 300 MG/1
CAPSULE ORAL
Qty: 30 CAPSULE | Refills: 0 | Status: SHIPPED | OUTPATIENT
Start: 2019-12-30 | End: 2020-01-09

## 2019-12-30 NOTE — PROGRESS NOTES
Jadiel Dutton  1950  69 y.o. male   PCP- Shayy Banda , MARY  10/21/19  BS : 110 PER PATIENT      Patient presents today for post op appointment of left foot   12/30/2019        Chief Complaint   Patient presents with   • Left Foot - Follow-up         History of Present Illness     Jadiel Dutton is a 69 y.o. male who presents for follow-up of left third and fourth hammertoe correction, partial excision of fifth metatarsal and gastrocnemius recession for treatment of chronic wound formation.  Date of surgery 12/18/2019.  Previously experienced a postoperative hematoma and local cellulitis.  Has been performing home wound care with packing as well as taking doxycycline and clindamycin.  He feels much better today.    Past Medical History:   Diagnosis Date   • Abdominal pain    • Abnormal weight loss    • Acute bronchitis    • Anxiety state    • Benign essential hypertension    • Chronic sinusitis    • Constipation    • Cystitis    • Degenerative joint disease involving multiple joints    • Diabetic neuropathy (CMS/HCC)      bilateral hands      • Diastolic dysfunction    • Diverticular disease of colon    • Dyspnea    • Epigastric pain    • Essential hypertension    • Foot ulcer (CMS/HCC)    • Gastroesophageal reflux disease    • Generalized anxiety disorder    • Hyperlipidemia    • Inflammatory bowel disease     Possible Inflammatory Bowel Disease      • Lumbar pain     Pain radiating to lumbar region of back   n      • Pleuritic pain    • Radiculopathy     site unspecified      • Type 2 diabetes mellitus (CMS/HCC)    • Vesicular eczema of hands and feet          Past Surgical History:   Procedure Laterality Date   • BACK SURGERY      LOWER BACK SURGERY   • CARPAL TUNNEL RELEASE Bilateral    • COLONOSCOPY  06/11/2012    Internal & external hemorrhoids found.   • COLONOSCOPY N/A 8/22/2017    Procedure: COLONOSCOPY;  Surgeon: Cesar Hollis MD;  Location: [facility] ENDOSCOPY;  Service:    • ENDOSCOPY   06/11/2012    Slight stricture in the distal esophagus. Gastritis in stomach. Biopsy taken. Normal duodenum.   • ENDOSCOPY     • ENDOSCOPY N/A 8/22/2017    Procedure: ESOPHAGOGASTRODUODENOSCOPY possible dilation ;  Surgeon: Cesar Hollis MD;  Location: [facility] ENDOSCOPY;  Service:    • ENTEROSCOPY SMALL BOWEL  08/27/2012    Gastritis in stomach. Normal jejunum. Biopsy taken. Normal duodenum   • FOOT SURGERY     • HAMMER TOE REPAIR Left 12/18/2019    Procedure: FIFTH METATARSAL EXOSTECTOMY, FOURTH HAMMER TOE CORRECTION, GASTROCNEMIUS RECESSION;  Surgeon: Jacoby Serrano DPM;  Location: [facility] OR;  Service: Podiatry   • INJECTION OF MEDICATION  02/28/2014    Depo Medrol   • INJECTION OF MEDICATION  03/07/2014    Rocephin(2)   • ULNAR TUNNEL RELEASE           Family History   Problem Relation Age of Onset   • No Known Problems Other    • Hypertension Mother    • Heart attack Father    • No Known Problems Sister    • No Known Problems Brother    • No Known Problems Daughter    • No Known Problems Son    • No Known Problems Maternal Aunt    • No Known Problems Maternal Uncle    • No Known Problems Paternal Aunt    • No Known Problems Paternal Uncle    • No Known Problems Maternal Grandmother    • No Known Problems Maternal Grandfather    • No Known Problems Paternal Grandmother    • No Known Problems Paternal Grandfather          Social History     Socioeconomic History   • Marital status: Single     Spouse name: Not on file   • Number of children: Not on file   • Years of education: Not on file   • Highest education level: Not on file   Tobacco Use   • Smoking status: Never Smoker   • Smokeless tobacco: Never Used   Substance and Sexual Activity   • Alcohol use: No   • Drug use: No   • Sexual activity: Defer         Current Outpatient Medications   Medication Sig Dispense Refill   • albuterol (PROVENTIL HFA;VENTOLIN HFA) 108 (90 Base) MCG/ACT inhaler Inhale 2 puffs Every 4 (Four) Hours As Needed for Wheezing.  "1 inhaler 2   • B-D UF III MINI PEN NEEDLES 31G X 5 MM misc USE FOUR TIMES A DAY WITH INSULIN PENS 360 each 2   • cetirizine (zyrTEC) 10 MG tablet Take 10 mg by mouth Daily As Needed for Allergies.     • clindamycin (CLEOCIN) 300 MG capsule TAKE 1 CAPSULE BY MOUTH THREE TIMES A DAY 30 capsule 0   • dexlansoprazole (DEXILANT) 60 MG capsule Take 60 mg by mouth Daily.     • doxycycline (VIBRAMYCIN) 100 MG capsule Take 1 capsule by mouth 2 (Two) Times a Day. 20 capsule 0   • fluticasone (FLONASE) 50 MCG/ACT nasal spray 2 sprays into the nostril(s) as directed by provider Daily. 1 bottle 0   • gabapentin (NEURONTIN) 300 MG capsule Take 1 capsule by mouth 4 (Four) Times a Day As Needed.     • HYDROcodone-acetaminophen (NORCO) 7.5-325 MG per tablet Take 1 tablet by mouth Every 6 (Six) Hours As Needed for Moderate Pain  or Severe Pain . 40 tablet 0   • insulin NPH-insulin regular (humuLIN 70/30,novoLIN 70/30) (70-30) 100 UNIT/ML injection Inject  under the skin into the appropriate area as directed 2 (Two) Times a Day With Meals. 50 in the morning and 60 at night     • lisinopril-hydrochlorothiazide (PRINZIDE,ZESTORETIC) 20-25 MG per tablet Take 1 tablet by mouth Daily.     • ONETOUCH VERIO test strip      • rosuvastatin (CRESTOR) 20 MG tablet Take 20 mg by mouth Every Night.     • Semaglutide (OZEMPIC) 0.25 or 0.5 MG/DOSE solution pen-injector Inject 1 mg under the skin into the appropriate area as directed 1 (One) Time Per Week.     • traMADol (ULTRAM) 50 MG tablet Take 50 mg by mouth Every 6 (Six) Hours As Needed for Moderate Pain .       No current facility-administered medications for this visit.          OBJECTIVE    Pulse 111   Ht 182.9 cm (72\")   Wt 101 kg (223 lb)   SpO2 99%   BMI 30.24 kg/m²       Review of Systems   Constitutional: Negative.    HENT: Negative.    Eyes: Negative.    Respiratory: Negative.    Cardiovascular: Negative.    Gastrointestinal: Negative.    Endocrine: Negative.    Genitourinary: " Negative.    Musculoskeletal: Positive for back pain.   Skin: Positive for wound.   Allergic/Immunologic: Negative.    Neurological: Negative.    Hematological: Negative.    Psychiatric/Behavioral: Negative.          Physical Exam   Constitutional: he appears well-developed and well-nourished.   HEENT: Normocephalic. Atraumatic  CV: No tenderness. RRR  Resp: Non-labored respiration. No wheezes.   Psychiatric: he has a normal mood and affect. his   behavior is normal.      Lower Extremity Exam:  Pulses palpable   left foot and lower leg incisions coapted with sutures in place.  No active drainage from incision sites.  Mild erythema to dorsal lateral left foot   Dorsal lateral left foot ulceration measuring approximately 2 x 1 x 1 cm.  No active drainage.  Fibrous base.  Left third and fourth digits rectus  Negative Antoni        ASSESSMENT AND PLAN    Jadiel was seen today for follow-up.    Diagnoses and all orders for this visit:    Hammer toe of left foot  -     XR Foot 3+ View Left    Diabetic polyneuropathy associated with type 2 diabetes mellitus (CMS/HCC)    Cellulitis of foot    Skin ulcer of toe of left foot with fat layer exposed (CMS/HCC)      -Patient doing much better today.  -Third and fourth digit as well as posterior calf sutures removed.  Plantar foot sutures left intact.  -Continue local wound care, packing of prior hematoma site  -Complete current antibiotic course  -Follow up 1 week            This document has been electronically signed by Jacoby Serrano DPM on December 31, 2019 7:29 AM     EMR Dragon/Transcription disclaimer:   Much of this encounter note is an electronic transcription/translation of spoken language to printed text. The electronic translation of spoken language may permit erroneous, or at times, nonsensical words or phrases to be inadvertently transcribed; Although I have reviewed the note for such errors, some may still exist.    Jacoby Serrano DPM  12/31/2019  7:29  AM

## 2019-12-31 ENCOUNTER — TELEPHONE (OUTPATIENT)
Dept: PODIATRY | Facility: CLINIC | Age: 69
End: 2019-12-31

## 2019-12-31 RX ORDER — ALBUTEROL SULFATE 90 UG/1
2 AEROSOL, METERED RESPIRATORY (INHALATION) EVERY 4 HOURS PRN
Qty: 1 INHALER | Refills: 2 | Status: SHIPPED | OUTPATIENT
Start: 2019-12-31 | End: 2020-10-21 | Stop reason: SDUPTHER

## 2019-12-31 NOTE — TELEPHONE ENCOUNTER
Spoke with patient and his question was can he take a bath/shower. I advised patient that yes he could as long as the dressing does not get wet.

## 2019-12-31 NOTE — TELEPHONE ENCOUNTER
NURSE PRADEEP CALLED PATIENT PER DR MOYER REQUEST BECAUSE DR COVINGTON WAS OUT OF OFFICE, SHE DISCUSSED WITH HIM TO     Continue antibiotics; Home Health was discussed with the patient in which he declined until he is evaluated on Monday by Dr Covington. patient requested the name of the bacteria and it was given to him.     ALSO THAT HE WAS ON PROPER ANTIBIOTICS.  EVERYTHING IS DOCUMENTED IN A DIFFERENT TELEPHONE ENCOUNTER ON 12/27/2019 IN PATIENT CHART

## 2020-01-06 ENCOUNTER — OFFICE VISIT (OUTPATIENT)
Dept: PODIATRY | Facility: CLINIC | Age: 70
End: 2020-01-06

## 2020-01-06 VITALS — HEART RATE: 104 BPM | WEIGHT: 223 LBS | BODY MASS INDEX: 30.2 KG/M2 | HEIGHT: 72 IN | OXYGEN SATURATION: 100 %

## 2020-01-06 DIAGNOSIS — E11.42 DIABETIC POLYNEUROPATHY ASSOCIATED WITH TYPE 2 DIABETES MELLITUS (HCC): ICD-10-CM

## 2020-01-06 DIAGNOSIS — Z89.422 HISTORY OF AMPUTATION OF LESSER TOE OF LEFT FOOT (HCC): ICD-10-CM

## 2020-01-06 DIAGNOSIS — L97.522 SKIN ULCER OF LEFT FOOT WITH FAT LAYER EXPOSED (HCC): Primary | ICD-10-CM

## 2020-01-06 PROCEDURE — 11042 DBRDMT SUBQ TIS 1ST 20SQCM/<: CPT | Performed by: PODIATRIST

## 2020-01-06 PROCEDURE — 99024 POSTOP FOLLOW-UP VISIT: CPT | Performed by: PODIATRIST

## 2020-01-06 NOTE — PROGRESS NOTES
Jadiel Dutton  1950  69 y.o. male   PCP- Shayy Banda , MARY  12/19/2019  BS : 105 PER PATIENT      Patient presents today for post op appointment of left foot   01/06/2020        Chief Complaint   Patient presents with   • Left Foot - Post-op         History of Present Illness     Jadiel Dutton is a 69 y.o. male who presents for follow-up of left third and fourth hammertoe correction, partial excision of fifth metatarsal and gastrocnemius recession for treatment of chronic wound formation.  Date of surgery 12/18/2019.  Previously experienced a postoperative hematoma and local cellulitis.  Has been performing home wound care with packing.  Has completed his doxycycline course and has 1 additional day of clindamycin.  He is feeling well today.    Past Medical History:   Diagnosis Date   • Abdominal pain    • Abnormal weight loss    • Acute bronchitis    • Anxiety state    • Benign essential hypertension    • Chronic sinusitis    • Constipation    • Cystitis    • Degenerative joint disease involving multiple joints    • Diabetic neuropathy (CMS/HCC)      bilateral hands      • Diastolic dysfunction    • Diverticular disease of colon    • Dyspnea    • Epigastric pain    • Essential hypertension    • Foot ulcer (CMS/HCC)    • Gastroesophageal reflux disease    • Generalized anxiety disorder    • Hyperlipidemia    • Inflammatory bowel disease     Possible Inflammatory Bowel Disease      • Lumbar pain     Pain radiating to lumbar region of back   n      • Pleuritic pain    • Radiculopathy     site unspecified      • Type 2 diabetes mellitus (CMS/HCC)    • Vesicular eczema of hands and feet          Past Surgical History:   Procedure Laterality Date   • BACK SURGERY      LOWER BACK SURGERY   • CARPAL TUNNEL RELEASE Bilateral    • COLONOSCOPY  06/11/2012    Internal & external hemorrhoids found.   • COLONOSCOPY N/A 8/22/2017    Procedure: COLONOSCOPY;  Surgeon: Cesar Hollis MD;  Location: [facility]  ENDOSCOPY;  Service:    • ENDOSCOPY  06/11/2012    Slight stricture in the distal esophagus. Gastritis in stomach. Biopsy taken. Normal duodenum.   • ENDOSCOPY     • ENDOSCOPY N/A 8/22/2017    Procedure: ESOPHAGOGASTRODUODENOSCOPY possible dilation ;  Surgeon: Cesar Hollis MD;  Location: Health system ENDOSCOPY;  Service:    • ENTEROSCOPY SMALL BOWEL  08/27/2012    Gastritis in stomach. Normal jejunum. Biopsy taken. Normal duodenum   • FOOT SURGERY     • HAMMER TOE REPAIR Left 12/18/2019    Procedure: FIFTH METATARSAL EXOSTECTOMY, FOURTH HAMMER TOE CORRECTION, GASTROCNEMIUS RECESSION;  Surgeon: Jacoby Serrano DPM;  Location: Health system OR;  Service: Podiatry   • INJECTION OF MEDICATION  02/28/2014    Depo Medrol   • INJECTION OF MEDICATION  03/07/2014    Rocephin(2)   • ULNAR TUNNEL RELEASE           Family History   Problem Relation Age of Onset   • No Known Problems Other    • Hypertension Mother    • Heart attack Father    • No Known Problems Sister    • No Known Problems Brother    • No Known Problems Daughter    • No Known Problems Son    • No Known Problems Maternal Aunt    • No Known Problems Maternal Uncle    • No Known Problems Paternal Aunt    • No Known Problems Paternal Uncle    • No Known Problems Maternal Grandmother    • No Known Problems Maternal Grandfather    • No Known Problems Paternal Grandmother    • No Known Problems Paternal Grandfather          Social History     Socioeconomic History   • Marital status: Single     Spouse name: Not on file   • Number of children: Not on file   • Years of education: Not on file   • Highest education level: Not on file   Tobacco Use   • Smoking status: Never Smoker   • Smokeless tobacco: Never Used   Substance and Sexual Activity   • Alcohol use: No   • Drug use: No   • Sexual activity: Defer         Current Outpatient Medications   Medication Sig Dispense Refill   • albuterol sulfate  (90 Base) MCG/ACT inhaler Inhale 2 puffs Every 4 (Four) Hours As  "Needed for Wheezing. 1 inhaler 2   • B-D UF III MINI PEN NEEDLES 31G X 5 MM misc USE FOUR TIMES A DAY WITH INSULIN PENS 360 each 2   • cetirizine (zyrTEC) 10 MG tablet Take 10 mg by mouth Daily As Needed for Allergies.     • clindamycin (CLEOCIN) 300 MG capsule TAKE 1 CAPSULE BY MOUTH THREE TIMES A DAY 30 capsule 0   • dexlansoprazole (DEXILANT) 60 MG capsule Take 60 mg by mouth Daily.     • doxycycline (VIBRAMYCIN) 100 MG capsule Take 1 capsule by mouth 2 (Two) Times a Day. 20 capsule 0   • fluticasone (FLONASE) 50 MCG/ACT nasal spray 2 sprays into the nostril(s) as directed by provider Daily. 1 bottle 0   • gabapentin (NEURONTIN) 300 MG capsule Take 1 capsule by mouth 4 (Four) Times a Day As Needed.     • HYDROcodone-acetaminophen (NORCO) 7.5-325 MG per tablet Take 1 tablet by mouth Every 6 (Six) Hours As Needed for Moderate Pain  or Severe Pain . 40 tablet 0   • insulin NPH-insulin regular (humuLIN 70/30,novoLIN 70/30) (70-30) 100 UNIT/ML injection Inject  under the skin into the appropriate area as directed 2 (Two) Times a Day With Meals. 50 in the morning and 60 at night     • lisinopril-hydrochlorothiazide (PRINZIDE,ZESTORETIC) 20-25 MG per tablet Take 1 tablet by mouth Daily.     • ONETOUCH VERIO test strip      • rosuvastatin (CRESTOR) 20 MG tablet Take 20 mg by mouth Every Night.     • Semaglutide (OZEMPIC) 0.25 or 0.5 MG/DOSE solution pen-injector Inject 1 mg under the skin into the appropriate area as directed 1 (One) Time Per Week.     • traMADol (ULTRAM) 50 MG tablet Take 50 mg by mouth Every 6 (Six) Hours As Needed for Moderate Pain .       No current facility-administered medications for this visit.          OBJECTIVE    Pulse 104   Ht 182.9 cm (72\")   Wt 101 kg (223 lb)   SpO2 100%   BMI 30.24 kg/m²       Review of Systems   Constitutional: Negative.    HENT: Negative.    Eyes: Negative.    Respiratory: Negative.    Cardiovascular: Negative.    Gastrointestinal: Negative.    Endocrine: Negative.  "   Genitourinary: Negative.    Musculoskeletal: Positive for back pain.   Skin: Positive for wound.   Allergic/Immunologic: Negative.    Neurological: Negative.    Hematological: Negative.    Psychiatric/Behavioral: Negative.          Physical Exam   Constitutional: he appears well-developed and well-nourished.   HEENT: Normocephalic. Atraumatic  CV: No tenderness. RRR  Resp: Non-labored respiration. No wheezes.   Psychiatric: he has a normal mood and affect. his   behavior is normal.      Lower Extremity Exam:  Pulses palpable  Left plantar foot  incisions coapted with sutures in place.  No active drainage from incision sites.  Mild erythema to dorsal lateral left foot   Dorsal lateral left foot ulceration measuring approximately 2 x 1 x 0.3 cm.  No active drainage.  Fibrous base.  Left third and fourth digits rectus  Negative Antoni    Left foot wound debridement:  Risks and benefits discussed.  Left lateral foot ulcer debrided of surrounding hyperkeratosis and devitalized tissue with dermal curette  to level of subq  Pressure hemostasis obtained  Abx ointment and dsd applied.      ASSESSMENT AND PLAN    Jadiel was seen today for post-op.    Diagnoses and all orders for this visit:    Skin ulcer of left foot with fat layer exposed (CMS/HCC)    Diabetic polyneuropathy associated with type 2 diabetes mellitus (CMS/HCC)    History of amputation of lesser toe of left foot (CMS/HCC)      -Patient doing well postoperatively.  -Remaining sutures removed.  Debridement of residual wound site as above.  -Continue local wound care, packing of prior hematoma site  -Complete current antibiotic course  -Follow up 1 week            This document has been electronically signed by Jacoby Serrano DPM on January 6, 2020 12:31 PM     EMR Dragon/Transcription disclaimer:   Much of this encounter note is an electronic transcription/translation of spoken language to printed text. The electronic translation of spoken language may  permit erroneous, or at times, nonsensical words or phrases to be inadvertently transcribed; Although I have reviewed the note for such errors, some may still exist.    Jacoby Serrano DPM  1/6/2020  12:31 PM

## 2020-01-09 ENCOUNTER — OFFICE VISIT (OUTPATIENT)
Dept: ORTHOPEDIC SURGERY | Facility: CLINIC | Age: 70
End: 2020-01-09

## 2020-01-09 VITALS — WEIGHT: 233 LBS | HEIGHT: 72 IN | BODY MASS INDEX: 31.56 KG/M2

## 2020-01-09 DIAGNOSIS — Z79.4 TYPE 2 DIABETES MELLITUS WITH COMPLICATION, WITH LONG-TERM CURRENT USE OF INSULIN (HCC): ICD-10-CM

## 2020-01-09 DIAGNOSIS — I10 ESSENTIAL HYPERTENSION: ICD-10-CM

## 2020-01-09 DIAGNOSIS — E11.8 TYPE 2 DIABETES MELLITUS WITH COMPLICATION, WITH LONG-TERM CURRENT USE OF INSULIN (HCC): ICD-10-CM

## 2020-01-09 DIAGNOSIS — S46.012D TRAUMATIC INCOMPLETE TEAR OF LEFT ROTATOR CUFF, SUBSEQUENT ENCOUNTER: Primary | ICD-10-CM

## 2020-01-09 DIAGNOSIS — S46.912D STRAIN OF ACROMIOCLAVICULAR JOINT, LEFT, SUBSEQUENT ENCOUNTER: ICD-10-CM

## 2020-01-09 DIAGNOSIS — M89.8X1 PAIN OF LEFT CLAVICLE: ICD-10-CM

## 2020-01-09 PROCEDURE — 99214 OFFICE O/P EST MOD 30 MIN: CPT | Performed by: ORTHOPAEDIC SURGERY

## 2020-01-09 NOTE — PROGRESS NOTES
Jadiel Dutton is a 69 y.o. male returns for     Chief Complaint   Patient presents with   • Left Shoulder - Follow-up   • Results     MRI [facility] 12/16/19       HISTORY OF PRESENT ILLNESS:  Patient reports a fall approximately 6 weeks ago.  He has been having pain in his left shoulder since that time.  He has pain at night which causes him difficulty with sleeping.  Pain is worse with activity.  Intermittent sharp stabbing pains.  No numbness or tingling.     CONCURRENT MEDICAL HISTORY:    Past Medical History:   Diagnosis Date   • Abdominal pain    • Abnormal weight loss    • Acute bronchitis    • Anxiety state    • Benign essential hypertension    • Chronic sinusitis    • Constipation    • Cystitis    • Degenerative joint disease involving multiple joints    • Diabetic neuropathy (CMS/HCC)      bilateral hands      • Diastolic dysfunction    • Diverticular disease of colon    • Dyspnea    • Epigastric pain    • Essential hypertension    • Foot ulcer (CMS/HCC)    • Gastroesophageal reflux disease    • Generalized anxiety disorder    • Hyperlipidemia    • Inflammatory bowel disease     Possible Inflammatory Bowel Disease      • Lumbar pain     Pain radiating to lumbar region of back   n      • Pleuritic pain    • Radiculopathy     site unspecified      • Type 2 diabetes mellitus (CMS/HCC)    • Vesicular eczema of hands and feet        Allergies   Allergen Reactions   • Penicillins Shortness Of Breath   • Bactrim [Sulfamethoxazole-Trimethoprim] GI Intolerance   • Ceftin [Cefuroxime Axetil] GI Intolerance   • Sulfa Antibiotics GI Intolerance   • Ciprofloxacin Itching and Rash       Current Outpatient Medications on File Prior to Visit   Medication Sig   • albuterol sulfate  (90 Base) MCG/ACT inhaler Inhale 2 puffs Every 4 (Four) Hours As Needed for Wheezing.   • B-D UF III MINI PEN NEEDLES 31G X 5 MM misc USE FOUR TIMES A DAY WITH INSULIN PENS   • cetirizine (zyrTEC) 10 MG tablet Take 10 mg by  mouth Daily As Needed for Allergies.   • dexlansoprazole (DEXILANT) 60 MG capsule Take 60 mg by mouth Daily.   • gabapentin (NEURONTIN) 300 MG capsule Take 1 capsule by mouth 4 (Four) Times a Day As Needed.   • insulin NPH-insulin regular (humuLIN 70/30,novoLIN 70/30) (70-30) 100 UNIT/ML injection Inject  under the skin into the appropriate area as directed 2 (Two) Times a Day With Meals. 50 in the morning and 60 at night   • lisinopril-hydrochlorothiazide (PRINZIDE,ZESTORETIC) 20-25 MG per tablet Take 1 tablet by mouth Daily.   • ONETOUCH VERIO test strip    • rosuvastatin (CRESTOR) 20 MG tablet Take 20 mg by mouth Every Night.   • Semaglutide (OZEMPIC) 0.25 or 0.5 MG/DOSE solution pen-injector Inject 1 mg under the skin into the appropriate area as directed 1 (One) Time Per Week.   • traMADol (ULTRAM) 50 MG tablet Take 50 mg by mouth Every 6 (Six) Hours As Needed for Moderate Pain .     No current facility-administered medications on file prior to visit.        Past Surgical History:   Procedure Laterality Date   • BACK SURGERY      LOWER BACK SURGERY   • CARPAL TUNNEL RELEASE Bilateral    • COLONOSCOPY  06/11/2012    Internal & external hemorrhoids found.   • COLONOSCOPY N/A 8/22/2017    Procedure: COLONOSCOPY;  Surgeon: Cesar Hollis MD;  Location: [facility] ENDOSCOPY;  Service:    • ENDOSCOPY  06/11/2012    Slight stricture in the distal esophagus. Gastritis in stomach. Biopsy taken. Normal duodenum.   • ENDOSCOPY     • ENDOSCOPY N/A 8/22/2017    Procedure: ESOPHAGOGASTRODUODENOSCOPY possible dilation ;  Surgeon: Cesar Hollis MD;  Location: [facility] ENDOSCOPY;  Service:    • ENTEROSCOPY SMALL BOWEL  08/27/2012    Gastritis in stomach. Normal jejunum. Biopsy taken. Normal duodenum   • FOOT SURGERY     • HAMMER TOE REPAIR Left 12/18/2019    Procedure: FIFTH METATARSAL EXOSTECTOMY, FOURTH HAMMER TOE CORRECTION, GASTROCNEMIUS RECESSION;  Surgeon: Jacboy Serrano DPM;  Location: [facility] OR;  Service:  "Podiatry   • INJECTION OF MEDICATION  02/28/2014    Depo Medrol   • INJECTION OF MEDICATION  03/07/2014    Rocephin(2)   • ULNAR TUNNEL RELEASE         Family History   Problem Relation Age of Onset   • No Known Problems Other    • Hypertension Mother    • Heart attack Father    • No Known Problems Sister    • No Known Problems Brother    • No Known Problems Daughter    • No Known Problems Son    • No Known Problems Maternal Aunt    • No Known Problems Maternal Uncle    • No Known Problems Paternal Aunt    • No Known Problems Paternal Uncle    • No Known Problems Maternal Grandmother    • No Known Problems Maternal Grandfather    • No Known Problems Paternal Grandmother    • No Known Problems Paternal Grandfather        Social History     Socioeconomic History   • Marital status: Single     Spouse name: Not on file   • Number of children: Not on file   • Years of education: Not on file   • Highest education level: Not on file   Tobacco Use   • Smoking status: Never Smoker   • Smokeless tobacco: Never Used   Substance and Sexual Activity   • Alcohol use: No   • Drug use: No   • Sexual activity: Defer           ROS  No fevers or chills.  No chest pain or shortness of air.  No GI or  disturbances.  Other than left shoulder pain, all other systems reviewed as negative.    PHYSICAL EXAMINATION:       Ht 182.9 cm (72\")   Wt 106 kg (233 lb)   BMI 31.60 kg/m²     Physical Exam   Constitutional: He is oriented to person, place, and time. He appears well-developed and well-nourished. No distress.   Cardiovascular: Normal rate, regular rhythm and normal heart sounds.   Pulmonary/Chest: Effort normal and breath sounds normal.   Abdominal: Soft. Bowel sounds are normal.   Neurological: He is alert and oriented to person, place, and time.   Psychiatric: He has a normal mood and affect. His behavior is normal. Judgment and thought content normal.   Vitals reviewed.      GAIT:     []  Normal  [x]  Antalgic    Assistive " device: [x]  None  []  Walker     []  Crutches  []  Cane     []  Wheelchair  []  Stretcher  Currently wear a fracture boot on left leg.      Right Shoulder Exam     Tenderness   The patient is experiencing no tenderness.    Range of Motion   The patient has normal right shoulder ROM.    Muscle Strength   The patient has normal right shoulder strength.    Tests   Rosales test: negative    Other   Erythema: absent  Sensation: normal  Pulse: present      Left Shoulder Exam     Tenderness   The patient is experiencing tenderness in the acromioclavicular joint and acromion.    Range of Motion   Active abduction: 150   Forward flexion: 130     Muscle Strength   Abduction: 4/5   Supraspinatus: 4/5     Tests   Rosales test: positive  Impingement: positive    Other   Erythema: absent  Sensation: normal  Pulse: present                     Last hemoglobin A1c was 6.8 on December 9, 2019.        ASSESSMENT:    Diagnoses and all orders for this visit:    Traumatic incomplete tear of left rotator cuff, subsequent encounter  -     Case Request; Standing  -     clindamycin (CLEOCIN) 600 mg in dextrose (D5W) 5 % 100 mL IVPB  -     Case Request    Pain of left clavicle  -     Case Request; Standing  -     clindamycin (CLEOCIN) 600 mg in dextrose (D5W) 5 % 100 mL IVPB  -     Case Request    Strain of acromioclavicular joint, left, subsequent encounter  -     Case Request; Standing  -     clindamycin (CLEOCIN) 600 mg in dextrose (D5W) 5 % 100 mL IVPB  -     Case Request    Essential hypertension  -     Case Request; Standing  -     clindamycin (CLEOCIN) 600 mg in dextrose (D5W) 5 % 100 mL IVPB  -     Case Request    Type 2 diabetes mellitus with complication, with long-term current use of insulin (CMS/[facility])  -     Case Request; Standing  -     clindamycin (CLEOCIN) 600 mg in dextrose (D5W) 5 % 100 mL IVPB  -     Case Request    Other orders  -     Follow Anesthesia Guidelines / Standing Orders; Future  -     Provide instructions to  patient regarding NPO status  -     Follow Anesthesia Guidelines / Standing Orders; Standing  -     Verify NPO Status; Standing  -     POC Glucose Once; Standing  -     Clip operative site; Standing  -     Obtain informed consent (if not collected inpatient or PAT); Standing  -     NPO After Midnight  -     Nerve Block; Standing          PLAN    The patient voiced understanding of the risks, benefits, and alternative forms of treatment that were discussed and the patient consents to proceed with surgery.  All risks, benefits and alternatives were discussed.  Risks including but not exclusive to anesthetic complications, including death, MI, CVA, infection, bleeding DVT, fracture, residual pain and need for future surgery.    This discussion was held with the patient by Troy Barillas MD and all questions were answered.    Plan arthroscopy of the left shoulder with subacromial decompression, Ramone procedure, and rotator cuff repair.    Patient's Body mass index is 31.6 kg/m². BMI is above normal parameters. Recommendations include: exercise counseling and nutrition counseling.      Return for Post-operative eval.    Troy Barillas MD

## 2020-01-12 PROBLEM — S46.012A TRAUMATIC INCOMPLETE TEAR OF LEFT ROTATOR CUFF: Status: ACTIVE | Noted: 2020-01-12

## 2020-01-12 RX ORDER — CLINDAMYCIN PHOSPHATE 600 MG/50ML
600 INJECTION INTRAVENOUS ONCE
Status: CANCELLED | OUTPATIENT
Start: 2020-02-07 | End: 2020-01-12

## 2020-01-14 ENCOUNTER — OFFICE VISIT (OUTPATIENT)
Dept: PODIATRY | Facility: CLINIC | Age: 70
End: 2020-01-14

## 2020-01-14 ENCOUNTER — TELEPHONE (OUTPATIENT)
Dept: ORTHOPEDIC SURGERY | Facility: CLINIC | Age: 70
End: 2020-01-14

## 2020-01-14 VITALS — WEIGHT: 233 LBS | BODY MASS INDEX: 31.56 KG/M2 | HEIGHT: 72 IN | OXYGEN SATURATION: 99 % | HEART RATE: 97 BPM

## 2020-01-14 DIAGNOSIS — L97.522 SKIN ULCER OF LEFT FOOT WITH FAT LAYER EXPOSED (HCC): Primary | ICD-10-CM

## 2020-01-14 DIAGNOSIS — Z89.422 HISTORY OF AMPUTATION OF LESSER TOE OF LEFT FOOT (HCC): ICD-10-CM

## 2020-01-14 DIAGNOSIS — E11.42 DIABETIC POLYNEUROPATHY ASSOCIATED WITH TYPE 2 DIABETES MELLITUS (HCC): ICD-10-CM

## 2020-01-14 PROBLEM — S46.919A STRAIN OF ACROMIOCLAVICULAR JOINT: Status: ACTIVE | Noted: 2020-01-14

## 2020-01-14 PROCEDURE — 11042 DBRDMT SUBQ TIS 1ST 20SQCM/<: CPT | Performed by: PODIATRIST

## 2020-01-14 PROCEDURE — 99024 POSTOP FOLLOW-UP VISIT: CPT | Performed by: PODIATRIST

## 2020-01-14 NOTE — PROGRESS NOTES
Jadiel Dutton  1950  69 y.o. male   PCP- Shayy Banda , MARY  12/19/2019  BS :110  PER PATIENT      Patient presents today for post op appointment of left foot. Patient has a little sore on his right 2nd toe.     01/14/2020      Chief Complaint   Patient presents with   • Left Foot - Post-op   • Right Foot - Pain     2nd toe           History of Present Illness     Jadiel Dutton is a 69 y.o. male who presents for follow-up of left third and fourth hammertoe correction, partial excision of fifth metatarsal and gastrocnemius recession for treatment of chronic wound formation.  Date of surgery 12/18/2019.  Previously experienced a postoperative hematoma and local cellulitis.  Has been performing home wound care without issue.  He is feeling well today.    Past Medical History:   Diagnosis Date   • Abdominal pain    • Abnormal weight loss    • Acute bronchitis    • Anxiety state    • Benign essential hypertension    • Chronic sinusitis    • Constipation    • Cystitis    • Degenerative joint disease involving multiple joints    • Diabetic neuropathy (CMS/HCC)      bilateral hands      • Diastolic dysfunction    • Diverticular disease of colon    • Dyspnea    • Epigastric pain    • Essential hypertension    • Foot ulcer (CMS/HCC)    • Gastroesophageal reflux disease    • Generalized anxiety disorder    • Hyperlipidemia    • Inflammatory bowel disease     Possible Inflammatory Bowel Disease      • Lumbar pain     Pain radiating to lumbar region of back   n      • Pleuritic pain    • Radiculopathy     site unspecified      • Type 2 diabetes mellitus (CMS/HCC)    • Vesicular eczema of hands and feet          Past Surgical History:   Procedure Laterality Date   • BACK SURGERY      LOWER BACK SURGERY   • CARPAL TUNNEL RELEASE Bilateral    • COLONOSCOPY  06/11/2012    Internal & external hemorrhoids found.   • COLONOSCOPY N/A 8/22/2017    Procedure: COLONOSCOPY;  Surgeon: Cesar Hollis MD;  Location: [facility]  ENDOSCOPY;  Service:    • ENDOSCOPY  06/11/2012    Slight stricture in the distal esophagus. Gastritis in stomach. Biopsy taken. Normal duodenum.   • ENDOSCOPY     • ENDOSCOPY N/A 8/22/2017    Procedure: ESOPHAGOGASTRODUODENOSCOPY possible dilation ;  Surgeon: Cesar Hollis MD;  Location: [facility] ENDOSCOPY;  Service:    • ENTEROSCOPY SMALL BOWEL  08/27/2012    Gastritis in stomach. Normal jejunum. Biopsy taken. Normal duodenum   • FOOT SURGERY     • HAMMER TOE REPAIR Left 12/18/2019    Procedure: FIFTH METATARSAL EXOSTECTOMY, FOURTH HAMMER TOE CORRECTION, GASTROCNEMIUS RECESSION;  Surgeon: Jacoby Serrano DPM;  Location: [facility] OR;  Service: Podiatry   • INJECTION OF MEDICATION  02/28/2014    Depo Medrol   • INJECTION OF MEDICATION  03/07/2014    Rocephin(2)   • ULNAR TUNNEL RELEASE           Family History   Problem Relation Age of Onset   • No Known Problems Other    • Hypertension Mother    • Heart attack Father    • No Known Problems Sister    • No Known Problems Brother    • No Known Problems Daughter    • No Known Problems Son    • No Known Problems Maternal Aunt    • No Known Problems Maternal Uncle    • No Known Problems Paternal Aunt    • No Known Problems Paternal Uncle    • No Known Problems Maternal Grandmother    • No Known Problems Maternal Grandfather    • No Known Problems Paternal Grandmother    • No Known Problems Paternal Grandfather          Social History     Socioeconomic History   • Marital status: Single     Spouse name: Not on file   • Number of children: Not on file   • Years of education: Not on file   • Highest education level: Not on file   Tobacco Use   • Smoking status: Never Smoker   • Smokeless tobacco: Never Used   Substance and Sexual Activity   • Alcohol use: No   • Drug use: No   • Sexual activity: Defer         Current Outpatient Medications   Medication Sig Dispense Refill   • albuterol sulfate  (90 Base) MCG/ACT inhaler Inhale 2 puffs Every 4 (Four) Hours As  "Needed for Wheezing. 1 inhaler 2   • B-D UF III MINI PEN NEEDLES 31G X 5 MM misc USE FOUR TIMES A DAY WITH INSULIN PENS 360 each 2   • cetirizine (zyrTEC) 10 MG tablet Take 10 mg by mouth Daily As Needed for Allergies.     • dexlansoprazole (DEXILANT) 60 MG capsule Take 60 mg by mouth Daily.     • gabapentin (NEURONTIN) 300 MG capsule Take 1 capsule by mouth 4 (Four) Times a Day As Needed.     • insulin NPH-insulin regular (humuLIN 70/30,novoLIN 70/30) (70-30) 100 UNIT/ML injection Inject  under the skin into the appropriate area as directed 2 (Two) Times a Day With Meals. 50 in the morning and 60 at night     • lisinopril-hydrochlorothiazide (PRINZIDE,ZESTORETIC) 20-25 MG per tablet Take 1 tablet by mouth Daily.     • ONETOUCH VERIO test strip      • rosuvastatin (CRESTOR) 20 MG tablet Take 20 mg by mouth Every Night.     • Semaglutide (OZEMPIC) 0.25 or 0.5 MG/DOSE solution pen-injector Inject 1 mg under the skin into the appropriate area as directed 1 (One) Time Per Week.     • traMADol (ULTRAM) 50 MG tablet Take 50 mg by mouth Every 6 (Six) Hours As Needed for Moderate Pain .       No current facility-administered medications for this visit.          OBJECTIVE    Pulse 97   Ht 182.9 cm (72\")   Wt 106 kg (233 lb)   SpO2 99%   BMI 31.60 kg/m²       Review of Systems   Constitutional: Negative.    HENT: Negative.    Eyes: Negative.    Respiratory: Negative.    Cardiovascular: Negative.    Gastrointestinal: Negative.    Endocrine: Negative.    Genitourinary: Negative.    Musculoskeletal: Positive for back pain.   Skin: Positive for wound.   Allergic/Immunologic: Negative.    Neurological: Negative.    Hematological: Negative.    Psychiatric/Behavioral: Negative.          Physical Exam   Constitutional: he appears well-developed and well-nourished.   HEENT: Normocephalic. Atraumatic  CV: No tenderness. RRR  Resp: Non-labored respiration. No wheezes.   Psychiatric: he has a normal mood and affect. his   behavior is " normal.      Lower Extremity Exam:  Pulses palpable  Left plantar foot  incisions coapted with sutures in place.  No active drainage from incision sites.  Mild erythema to dorsal lateral left foot   Dorsal lateral left foot ulceration measuring approximately 1.5 x 1.1 x 0.2 cm.  No active drainage.  Fibrogranular base.  New onset partial thickness ulceration just proximal to this ulceration measuring 0.8 x 0.6 cm.  Left third and fourth digits rectus  Negative Antoni    Left foot wound debridement:  Risks and benefits discussed.  Left lateral foot ulcer debrided of surrounding hyperkeratosis and devitalized tissue with dermal curette  to level of subq  Pressure hemostasis obtained  Abx ointment and dsd applied.      ASSESSMENT AND PLAN    Jadiel was seen today for post-op and pain.    Diagnoses and all orders for this visit:    Skin ulcer of left foot with fat layer exposed (CMS/HCC)    Diabetic polyneuropathy associated with type 2 diabetes mellitus (CMS/HCC)    History of amputation of lesser toe of left foot (CMS/HCC)      -Patient doing well postoperatively.  -Remaining sutures removed.  Debridement of residual wound site as above.  -Continue local wound care, packing of prior hematoma site  -Follow up 1 week            This document has been electronically signed by Jacoby Serrano DPM on January 15, 2020 12:12 PM     EMR Dragon/Transcription disclaimer:   Much of this encounter note is an electronic transcription/translation of spoken language to printed text. The electronic translation of spoken language may permit erroneous, or at times, nonsensical words or phrases to be inadvertently transcribed; Although I have reviewed the note for such errors, some may still exist.    Jacoby Serrano DPM  1/15/2020  12:12 PM

## 2020-01-20 ENCOUNTER — OFFICE VISIT (OUTPATIENT)
Dept: PODIATRY | Facility: CLINIC | Age: 70
End: 2020-01-20

## 2020-01-20 ENCOUNTER — APPOINTMENT (OUTPATIENT)
Dept: PREADMISSION TESTING | Facility: HOSPITAL | Age: 70
End: 2020-01-20

## 2020-01-20 VITALS
SYSTOLIC BLOOD PRESSURE: 136 MMHG | HEIGHT: 72 IN | WEIGHT: 224 LBS | OXYGEN SATURATION: 100 % | DIASTOLIC BLOOD PRESSURE: 66 MMHG | HEART RATE: 82 BPM | RESPIRATION RATE: 12 BRPM | BODY MASS INDEX: 30.34 KG/M2

## 2020-01-20 VITALS — HEIGHT: 72 IN | HEART RATE: 77 BPM | WEIGHT: 224 LBS | BODY MASS INDEX: 30.34 KG/M2 | OXYGEN SATURATION: 99 %

## 2020-01-20 DIAGNOSIS — Z89.422 HISTORY OF AMPUTATION OF LESSER TOE OF LEFT FOOT (HCC): ICD-10-CM

## 2020-01-20 DIAGNOSIS — L97.522 SKIN ULCER OF LEFT FOOT WITH FAT LAYER EXPOSED (HCC): Primary | ICD-10-CM

## 2020-01-20 DIAGNOSIS — E11.42 DIABETIC POLYNEUROPATHY ASSOCIATED WITH TYPE 2 DIABETES MELLITUS (HCC): ICD-10-CM

## 2020-01-20 LAB
ANION GAP SERPL CALCULATED.3IONS-SCNC: 12 MMOL/L (ref 5–15)
BUN BLD-MCNC: 9 MG/DL (ref 8–23)
BUN/CREAT SERPL: 6.4 (ref 7–25)
CALCIUM SPEC-SCNC: 9.6 MG/DL (ref 8.6–10.5)
CHLORIDE SERPL-SCNC: 95 MMOL/L (ref 98–107)
CO2 SERPL-SCNC: 33 MMOL/L (ref 22–29)
CREAT BLD-MCNC: 1.41 MG/DL (ref 0.76–1.27)
GFR SERPL CREATININE-BSD FRML MDRD: 50 ML/MIN/1.73
GLUCOSE BLD-MCNC: 96 MG/DL (ref 65–99)
POTASSIUM BLD-SCNC: 4 MMOL/L (ref 3.5–5.2)
SODIUM BLD-SCNC: 140 MMOL/L (ref 136–145)

## 2020-01-20 PROCEDURE — 99024 POSTOP FOLLOW-UP VISIT: CPT | Performed by: PODIATRIST

## 2020-01-20 PROCEDURE — 80048 BASIC METABOLIC PNL TOTAL CA: CPT | Performed by: ANESTHESIOLOGY

## 2020-01-20 PROCEDURE — 36415 COLL VENOUS BLD VENIPUNCTURE: CPT

## 2020-01-20 PROCEDURE — 11042 DBRDMT SUBQ TIS 1ST 20SQCM/<: CPT | Performed by: PODIATRIST

## 2020-01-20 RX ORDER — SODIUM CHLORIDE 9 MG/ML
1000 INJECTION, SOLUTION INTRAVENOUS CONTINUOUS
Status: CANCELLED | OUTPATIENT
Start: 2020-02-07

## 2020-01-20 NOTE — PROGRESS NOTES
Jadiel Dutton  1950  69 y.o. male   PCP- Shayy Banda , MARY  12/19/2019  BS :110  PER PATIENT      Patient presents today for post op appointment of left foot.   01/20/2020        Chief Complaint   Patient presents with   • Left Foot - Post-op         History of Present Illness     Jadiel Dutton is a 69 y.o. male who presents for follow-up of left third and fourth hammertoe correction, partial excision of fifth metatarsal and gastrocnemius recession for treatment of chronic wound formation.  Date of surgery 12/18/2019.  Previously experienced a postoperative hematoma and local cellulitis.  Has been performing home wound care without issue.  He is feeling well today.    Past Medical History:   Diagnosis Date   • Abdominal pain    • Abnormal weight loss    • Acute bronchitis    • Anxiety state    • Benign essential hypertension    • Chronic sinusitis    • Constipation    • Cystitis    • Degenerative joint disease involving multiple joints    • Diabetic neuropathy (CMS/HCC)      bilateral hands      • Diastolic dysfunction    • Diverticular disease of colon    • Dyspnea    • Epigastric pain    • Essential hypertension    • Foot ulcer (CMS/HCC)    • Gastroesophageal reflux disease    • Generalized anxiety disorder    • Hyperlipidemia    • Inflammatory bowel disease     Possible Inflammatory Bowel Disease      • Lumbar pain     Pain radiating to lumbar region of back   n      • Pleuritic pain    • Radiculopathy     site unspecified      • Type 2 diabetes mellitus (CMS/HCC)    • Vesicular eczema of hands and feet          Past Surgical History:   Procedure Laterality Date   • BACK SURGERY      LOWER BACK SURGERY   • CARPAL TUNNEL RELEASE Bilateral    • COLONOSCOPY  06/11/2012    Internal & external hemorrhoids found.   • COLONOSCOPY N/A 8/22/2017    Procedure: COLONOSCOPY;  Surgeon: Cesar Hollis MD;  Location: [facility] ENDOSCOPY;  Service:    • ENDOSCOPY  06/11/2012    Slight stricture in the distal  esophagus. Gastritis in stomach. Biopsy taken. Normal duodenum.   • ENDOSCOPY     • ENDOSCOPY N/A 8/22/2017    Procedure: ESOPHAGOGASTRODUODENOSCOPY possible dilation ;  Surgeon: Cesar Hollis MD;  Location: [facility] ENDOSCOPY;  Service:    • ENTEROSCOPY SMALL BOWEL  08/27/2012    Gastritis in stomach. Normal jejunum. Biopsy taken. Normal duodenum   • EYE SURGERY Bilateral     cataracts removed with implants   • FOOT SURGERY     • HAMMER TOE REPAIR Left 12/18/2019    Procedure: FIFTH METATARSAL EXOSTECTOMY, FOURTH HAMMER TOE CORRECTION, GASTROCNEMIUS RECESSION;  Surgeon: Jacoby Serrano DPM;  Location: [facility] OR;  Service: Podiatry   • INJECTION OF MEDICATION  02/28/2014    Depo Medrol   • INJECTION OF MEDICATION  03/07/2014    Rocephin(2)   • ULNAR TUNNEL RELEASE           Family History   Problem Relation Age of Onset   • No Known Problems Other    • Hypertension Mother    • Heart attack Father    • No Known Problems Sister    • No Known Problems Brother    • No Known Problems Daughter    • No Known Problems Son    • No Known Problems Maternal Aunt    • No Known Problems Maternal Uncle    • No Known Problems Paternal Aunt    • No Known Problems Paternal Uncle    • No Known Problems Maternal Grandmother    • No Known Problems Maternal Grandfather    • No Known Problems Paternal Grandmother    • No Known Problems Paternal Grandfather          Social History     Socioeconomic History   • Marital status: Single     Spouse name: Not on file   • Number of children: Not on file   • Years of education: Not on file   • Highest education level: Not on file   Tobacco Use   • Smoking status: Never Smoker   • Smokeless tobacco: Never Used   Substance and Sexual Activity   • Alcohol use: No   • Drug use: No   • Sexual activity: Defer         Current Outpatient Medications   Medication Sig Dispense Refill   • albuterol sulfate  (90 Base) MCG/ACT inhaler Inhale 2 puffs Every 4 (Four) Hours As Needed for Wheezing.  "1 inhaler 2   • B-D UF III MINI PEN NEEDLES 31G X 5 MM misc USE FOUR TIMES A DAY WITH INSULIN PENS 360 each 2   • cetirizine (zyrTEC) 10 MG tablet Take 10 mg by mouth Daily As Needed for Allergies.     • dexlansoprazole (DEXILANT) 60 MG capsule Take 60 mg by mouth Daily.     • gabapentin (NEURONTIN) 300 MG capsule Take 1 capsule by mouth 4 (Four) Times a Day As Needed.     • insulin NPH-insulin regular (humuLIN 70/30,novoLIN 70/30) (70-30) 100 UNIT/ML injection Inject  under the skin into the appropriate area as directed 2 (Two) Times a Day With Meals. 50 in the morning and 60 at night     • lisinopril-hydrochlorothiazide (PRINZIDE,ZESTORETIC) 20-25 MG per tablet Take 1 tablet by mouth Daily.     • ONETOUCH VERIO test strip      • rosuvastatin (CRESTOR) 20 MG tablet Take 20 mg by mouth Every Night.     • Semaglutide (OZEMPIC) 0.25 or 0.5 MG/DOSE solution pen-injector Inject 1 mg under the skin into the appropriate area as directed 1 (One) Time Per Week.     • traMADol (ULTRAM) 50 MG tablet Take 50 mg by mouth Every 6 (Six) Hours As Needed for Moderate Pain .       No current facility-administered medications for this visit.          OBJECTIVE    Pulse 77   Ht 182.9 cm (72\")   Wt 102 kg (224 lb)   SpO2 99%   BMI 30.38 kg/m²       Review of Systems   Constitutional: Negative.    HENT: Negative.    Eyes: Negative.    Respiratory: Negative.    Cardiovascular: Negative.    Gastrointestinal: Negative.    Endocrine: Negative.    Genitourinary: Negative.    Musculoskeletal: Positive for back pain.   Skin: Positive for wound.   Allergic/Immunologic: Negative.    Neurological: Negative.    Hematological: Negative.    Psychiatric/Behavioral: Negative.          Physical Exam   Constitutional: he appears well-developed and well-nourished.   HEENT: Normocephalic. Atraumatic  CV: No tenderness. RRR  Resp: Non-labored respiration. No wheezes.   Psychiatric: he has a normal mood and affect. his   behavior is normal.      Lower " Extremity Exam:  Pulses palpable  Left plantar foot  incisions coapted with sutures in place.  No active drainage from incision sites.  Mild erythema to dorsal lateral left foot   Dorsal lateral left foot ulceration measuring approximately 1.3 x 0.8 x 0.2 cm.  No active drainage.  Fibrogranular base.  Partial thickness ulceration just proximal to this ulceration measuring 0.8 x 0.5 cm.  Left third and fourth digits rectus  Negative Antoni    Left foot wound debridement:  Risks and benefits discussed.  Left lateral foot ulcer debrided of surrounding hyperkeratosis and devitalized tissue with dermal curette  to level of subq  Pressure hemostasis obtained  Abx ointment and dsd applied.      ASSESSMENT AND PLAN    Jadiel was seen today for post-op.    Diagnoses and all orders for this visit:    Skin ulcer of left foot with fat layer exposed (CMS/HCC)    Diabetic polyneuropathy associated with type 2 diabetes mellitus (CMS/HCC)    History of amputation of lesser toe of left foot (CMS/HCC)      -Patient doing well postoperatively.  -Debridement of residual wound site as above.  -Continue local wound care, CAM boot  -Follow up 1 week            This document has been electronically signed by Jacoby Serrano DPM on January 20, 2020 1:23 PM     EMR Dragon/Transcription disclaimer:   Much of this encounter note is an electronic transcription/translation of spoken language to printed text. The electronic translation of spoken language may permit erroneous, or at times, nonsensical words or phrases to be inadvertently transcribed; Although I have reviewed the note for such errors, some may still exist.    Jacoby Serrano DPM  1/20/2020  1:23 PM

## 2020-01-20 NOTE — DISCHARGE INSTRUCTIONS
[facility]  Pre-op Information and Guidelines    You will be called after 2 p.m. the day before your surgery (Friday for Monday surgery) and notified of your time for arrival and approximate surgery time.  If you have not received a call by 4P.M., please contact Same Day Surgery at [phone] of if outside [facility] call [phone].    Please Follow these Important Safety Guidelines:    • The morning of your procedure, take only the medications listed below with   A sip of water:_____________________________________________       __DEXILANT, GABAPENTIN, TRAMADOL___________    • DO NOT eat or drink anything after 12:00 midnight the night before surgery  Specific instructions concerning drinking clear liquids will be discussed during  the pre-surgery instruction call the day before your surgery.    • If you take a blood thinner (ex. Plavix, Coumadin, aspirin), ask your doctor when to stop it before surgery  STOP DATE: _________________    • Only 2 visitors are allowed in patient rooms at a time  Your visitors will be asked to wait in the lobby until the admission process is complete with the exception of a parent with a child and patients in need of special assistance.    • YOU CANNOT DRIVE YOURSELF HOME  You must be accompanied by someone who will be responsible for driving you home after surgery and for your care at home.    • DO NOT chew gum, use breath mints, hard candy, or smoke the day of surgery  • DO NOT drink alcohol for at least 24 hours before your surgery  • DO NOT wear any jewelry and remove all body piercing before coming to the hospital  • DO NOT wear make-up to the hospital  • If you are having surgery on an extremity (arm/leg/foot) remove nail polish/artificial nails on the surgical side  • Clothing, glasses, contacts, dentures, and hairpieces must be removed before surgery  • Bathe the night before or the morning of your surgery and do not use powders/lotions on  skin.

## 2020-01-28 ENCOUNTER — OFFICE VISIT (OUTPATIENT)
Dept: PODIATRY | Facility: CLINIC | Age: 70
End: 2020-01-28

## 2020-01-28 VITALS — WEIGHT: 224 LBS | OXYGEN SATURATION: 97 % | HEIGHT: 72 IN | HEART RATE: 107 BPM | BODY MASS INDEX: 30.34 KG/M2

## 2020-01-28 DIAGNOSIS — E11.42 DIABETIC POLYNEUROPATHY ASSOCIATED WITH TYPE 2 DIABETES MELLITUS (HCC): ICD-10-CM

## 2020-01-28 DIAGNOSIS — L97.522 SKIN ULCER OF LEFT FOOT WITH FAT LAYER EXPOSED (HCC): Primary | ICD-10-CM

## 2020-01-28 DIAGNOSIS — Z89.422 HISTORY OF AMPUTATION OF LESSER TOE OF LEFT FOOT (HCC): ICD-10-CM

## 2020-01-28 PROCEDURE — 99024 POSTOP FOLLOW-UP VISIT: CPT | Performed by: PODIATRIST

## 2020-01-28 NOTE — PROGRESS NOTES
Jadiel Dutton  1950  69 y.o. male   PCP- Shayy Banda , MARY  12/19/2019  BS :147  PER PATIENT      Patient presents today for post op appointment of left foot.   01/28/2020      Chief Complaint   Patient presents with   • Left Foot - Post-op         History of Present Illness     Jadiel Dutton is a 69 y.o. male who presents for follow-up of left third and fourth hammertoe correction, partial excision of fifth metatarsal and gastrocnemius recession for treatment of chronic wound formation.  Date of surgery 12/18/2019.  Previously experienced a postoperative hematoma and local cellulitis.  Has been performing home wound care without issue.  He is feeling well today.    Past Medical History:   Diagnosis Date   • Abdominal pain    • Abnormal weight loss    • Acute bronchitis    • Anxiety state    • Benign essential hypertension    • Chronic sinusitis    • Constipation    • Cystitis    • Degenerative joint disease involving multiple joints    • Diabetic neuropathy (CMS/HCC)      bilateral hands      • Diastolic dysfunction    • Diverticular disease of colon    • Dyspnea    • Epigastric pain    • Essential hypertension    • Foot ulcer (CMS/HCC)    • Gastroesophageal reflux disease    • Generalized anxiety disorder    • Hyperlipidemia    • Inflammatory bowel disease     Possible Inflammatory Bowel Disease      • Lumbar pain     Pain radiating to lumbar region of back   n      • Pleuritic pain    • Radiculopathy     site unspecified      • Type 2 diabetes mellitus (CMS/HCC)    • Vesicular eczema of hands and feet          Past Surgical History:   Procedure Laterality Date   • BACK SURGERY      LOWER BACK SURGERY   • CARPAL TUNNEL RELEASE Bilateral    • COLONOSCOPY  06/11/2012    Internal & external hemorrhoids found.   • COLONOSCOPY N/A 8/22/2017    Procedure: COLONOSCOPY;  Surgeon: Cesar Hollis MD;  Location: [facility] ENDOSCOPY;  Service:    • ENDOSCOPY  06/11/2012    Slight stricture in the distal  esophagus. Gastritis in stomach. Biopsy taken. Normal duodenum.   • ENDOSCOPY     • ENDOSCOPY N/A 8/22/2017    Procedure: ESOPHAGOGASTRODUODENOSCOPY possible dilation ;  Surgeon: Cesar Hollis MD;  Location: [facility] ENDOSCOPY;  Service:    • ENTEROSCOPY SMALL BOWEL  08/27/2012    Gastritis in stomach. Normal jejunum. Biopsy taken. Normal duodenum   • EYE SURGERY Bilateral     cataracts removed with implants   • FOOT SURGERY     • HAMMER TOE REPAIR Left 12/18/2019    Procedure: FIFTH METATARSAL EXOSTECTOMY, FOURTH HAMMER TOE CORRECTION, GASTROCNEMIUS RECESSION;  Surgeon: Jacoby Serrano DPM;  Location: [facility] OR;  Service: Podiatry   • INJECTION OF MEDICATION  02/28/2014    Depo Medrol   • INJECTION OF MEDICATION  03/07/2014    Rocephin(2)   • ULNAR TUNNEL RELEASE           Family History   Problem Relation Age of Onset   • No Known Problems Other    • Hypertension Mother    • Heart attack Father    • No Known Problems Sister    • No Known Problems Brother    • No Known Problems Daughter    • No Known Problems Son    • No Known Problems Maternal Aunt    • No Known Problems Maternal Uncle    • No Known Problems Paternal Aunt    • No Known Problems Paternal Uncle    • No Known Problems Maternal Grandmother    • No Known Problems Maternal Grandfather    • No Known Problems Paternal Grandmother    • No Known Problems Paternal Grandfather          Social History     Socioeconomic History   • Marital status: Single     Spouse name: Not on file   • Number of children: Not on file   • Years of education: Not on file   • Highest education level: Not on file   Tobacco Use   • Smoking status: Never Smoker   • Smokeless tobacco: Never Used   Substance and Sexual Activity   • Alcohol use: No   • Drug use: No   • Sexual activity: Defer         Current Outpatient Medications   Medication Sig Dispense Refill   • albuterol sulfate  (90 Base) MCG/ACT inhaler Inhale 2 puffs Every 4 (Four) Hours As Needed for Wheezing.  "1 inhaler 2   • B-D UF III MINI PEN NEEDLES 31G X 5 MM misc USE FOUR TIMES A DAY WITH INSULIN PENS 360 each 2   • cetirizine (zyrTEC) 10 MG tablet Take 10 mg by mouth Daily As Needed for Allergies.     • dexlansoprazole (DEXILANT) 60 MG capsule Take 60 mg by mouth Daily.     • gabapentin (NEURONTIN) 300 MG capsule Take 1 capsule by mouth 4 (Four) Times a Day As Needed.     • insulin NPH-insulin regular (humuLIN 70/30,novoLIN 70/30) (70-30) 100 UNIT/ML injection Inject  under the skin into the appropriate area as directed 2 (Two) Times a Day With Meals. 50 in the morning and 60 at night     • lisinopril-hydrochlorothiazide (PRINZIDE,ZESTORETIC) 20-25 MG per tablet Take 1 tablet by mouth Daily.     • ONETOUCH VERIO test strip      • rosuvastatin (CRESTOR) 20 MG tablet Take 20 mg by mouth Every Night.     • Semaglutide (OZEMPIC) 0.25 or 0.5 MG/DOSE solution pen-injector Inject 1 mg under the skin into the appropriate area as directed 1 (One) Time Per Week.     • traMADol (ULTRAM) 50 MG tablet Take 50 mg by mouth Every 6 (Six) Hours As Needed for Moderate Pain .       No current facility-administered medications for this visit.          OBJECTIVE    Pulse 107   Ht 182.9 cm (72\")   Wt 102 kg (224 lb)   SpO2 97%   BMI 30.38 kg/m²       Review of Systems   Constitutional: Negative.    HENT: Negative.    Eyes: Negative.    Respiratory: Negative.    Cardiovascular: Negative.    Gastrointestinal: Negative.    Endocrine: Negative.    Genitourinary: Negative.    Musculoskeletal: Positive for back pain.   Skin: Positive for wound.   Allergic/Immunologic: Negative.    Neurological: Negative.    Hematological: Negative.    Psychiatric/Behavioral: Negative.          Physical Exam   Constitutional: he appears well-developed and well-nourished.   HEENT: Normocephalic. Atraumatic  CV: No tenderness. RRR  Resp: Non-labored respiration. No wheezes.   Psychiatric: he has a normal mood and affect. his   behavior is normal.      Lower " Extremity Exam:  Pulses palpable  Left plantar foot  incisions coapted with sutures in place.  No active drainage from incision sites.  Mild erythema to dorsal lateral left foot   Dorsal lateral left foot ulceration measuring approximately 0.8 x 0.3 x 0.2 cm.  No active drainage.  Fibrogranular base.  Partial thickness ulceration just proximal to this ulceration measuring 0.3 x 0.4 cm.  Left third and fourth digits rectus  Negative Antoni        ASSESSMENT AND PLAN    Jadiel was seen today for post-op.    Diagnoses and all orders for this visit:    Skin ulcer of left foot with fat layer exposed (CMS/HCC)    Diabetic polyneuropathy associated with type 2 diabetes mellitus (CMS/HCC)    History of amputation of lesser toe of left foot (CMS/HCC)      -Patient doing well postoperatively.  -Debridement of residual wound site with dermal curette  -Continue local wound care, may wean CAM boot, transition to regular shoes  -Follow up 1 week            This document has been electronically signed by Jacoby Serrano DPM on January 28, 2020 11:53 AM     EMR Dragon/Transcription disclaimer:   Much of this encounter note is an electronic transcription/translation of spoken language to printed text. The electronic translation of spoken language may permit erroneous, or at times, nonsensical words or phrases to be inadvertently transcribed; Although I have reviewed the note for such errors, some may still exist.    Jacoby Serrano DPM  1/28/2020  11:53 AM

## 2020-02-04 ENCOUNTER — OFFICE VISIT (OUTPATIENT)
Dept: PODIATRY | Facility: CLINIC | Age: 70
End: 2020-02-04

## 2020-02-04 VITALS — WEIGHT: 224 LBS | HEIGHT: 72 IN | OXYGEN SATURATION: 98 % | HEART RATE: 90 BPM | BODY MASS INDEX: 30.34 KG/M2

## 2020-02-04 DIAGNOSIS — E11.42 DIABETIC POLYNEUROPATHY ASSOCIATED WITH TYPE 2 DIABETES MELLITUS (HCC): Primary | ICD-10-CM

## 2020-02-04 DIAGNOSIS — Z89.422 HISTORY OF AMPUTATION OF LESSER TOE OF LEFT FOOT (HCC): ICD-10-CM

## 2020-02-04 DIAGNOSIS — M20.42 HAMMER TOE OF LEFT FOOT: ICD-10-CM

## 2020-02-04 PROCEDURE — 99024 POSTOP FOLLOW-UP VISIT: CPT | Performed by: PODIATRIST

## 2020-02-04 NOTE — PROGRESS NOTES
Jadiel Dutton  1950  69 y.o. male   PCP- Shayy Banda , MARY  12/19/2019  BS :110  PER PATIENT      Patient presents today for post op appointment of left foot.   02/04/2020        Chief Complaint   Patient presents with   • Left Foot - Post-op         History of Present Illness     Jadiel Dutton is a 69 y.o. male who presents for follow-up of left third and fourth hammertoe correction, partial excision of fifth metatarsal and gastrocnemius recession for treatment of chronic wound formation.  Date of surgery 12/18/2019.  Previously experienced a postoperative hematoma and local cellulitis.  Has been performing home wound care without issue.  He is feeling well today.    Past Medical History:   Diagnosis Date   • Abdominal pain    • Abnormal weight loss    • Acute bronchitis    • Anxiety state    • Benign essential hypertension    • Chronic sinusitis    • Constipation    • Cystitis    • Degenerative joint disease involving multiple joints    • Diabetic neuropathy (CMS/HCC)      bilateral hands      • Diastolic dysfunction    • Diverticular disease of colon    • Dyspnea    • Epigastric pain    • Essential hypertension    • Foot ulcer (CMS/HCC)    • Gastroesophageal reflux disease    • Generalized anxiety disorder    • Hyperlipidemia    • Inflammatory bowel disease     Possible Inflammatory Bowel Disease      • Lumbar pain     Pain radiating to lumbar region of back   n      • Pleuritic pain    • Radiculopathy     site unspecified      • Type 2 diabetes mellitus (CMS/HCC)    • Vesicular eczema of hands and feet          Past Surgical History:   Procedure Laterality Date   • BACK SURGERY      LOWER BACK SURGERY   • CARPAL TUNNEL RELEASE Bilateral    • COLONOSCOPY  06/11/2012    Internal & external hemorrhoids found.   • COLONOSCOPY N/A 8/22/2017    Procedure: COLONOSCOPY;  Surgeon: Cesar Hollis MD;  Location: [facility] ENDOSCOPY;  Service:    • ENDOSCOPY  06/11/2012    Slight stricture in the distal  esophagus. Gastritis in stomach. Biopsy taken. Normal duodenum.   • ENDOSCOPY     • ENDOSCOPY N/A 8/22/2017    Procedure: ESOPHAGOGASTRODUODENOSCOPY possible dilation ;  Surgeon: Cesar Hollis MD;  Location: [facility] ENDOSCOPY;  Service:    • ENTEROSCOPY SMALL BOWEL  08/27/2012    Gastritis in stomach. Normal jejunum. Biopsy taken. Normal duodenum   • EYE SURGERY Bilateral     cataracts removed with implants   • FOOT SURGERY     • HAMMER TOE REPAIR Left 12/18/2019    Procedure: FIFTH METATARSAL EXOSTECTOMY, FOURTH HAMMER TOE CORRECTION, GASTROCNEMIUS RECESSION;  Surgeon: Jacoby Serrano DPM;  Location: [facility] OR;  Service: Podiatry   • INJECTION OF MEDICATION  02/28/2014    Depo Medrol   • INJECTION OF MEDICATION  03/07/2014    Rocephin(2)   • ULNAR TUNNEL RELEASE           Family History   Problem Relation Age of Onset   • No Known Problems Other    • Hypertension Mother    • Heart attack Father    • No Known Problems Sister    • No Known Problems Brother    • No Known Problems Daughter    • No Known Problems Son    • No Known Problems Maternal Aunt    • No Known Problems Maternal Uncle    • No Known Problems Paternal Aunt    • No Known Problems Paternal Uncle    • No Known Problems Maternal Grandmother    • No Known Problems Maternal Grandfather    • No Known Problems Paternal Grandmother    • No Known Problems Paternal Grandfather          Social History     Socioeconomic History   • Marital status: Single     Spouse name: Not on file   • Number of children: Not on file   • Years of education: Not on file   • Highest education level: Not on file   Tobacco Use   • Smoking status: Never Smoker   • Smokeless tobacco: Never Used   Substance and Sexual Activity   • Alcohol use: No   • Drug use: No   • Sexual activity: Defer         Current Outpatient Medications   Medication Sig Dispense Refill   • albuterol sulfate  (90 Base) MCG/ACT inhaler Inhale 2 puffs Every 4 (Four) Hours As Needed for Wheezing.  "1 inhaler 2   • B-D UF III MINI PEN NEEDLES 31G X 5 MM misc USE FOUR TIMES A DAY WITH INSULIN PENS 360 each 2   • cetirizine (zyrTEC) 10 MG tablet Take 10 mg by mouth Daily As Needed for Allergies.     • dexlansoprazole (DEXILANT) 60 MG capsule Take 60 mg by mouth Daily.     • gabapentin (NEURONTIN) 300 MG capsule Take 1 capsule by mouth 4 (Four) Times a Day As Needed.     • insulin NPH-insulin regular (humuLIN 70/30,novoLIN 70/30) (70-30) 100 UNIT/ML injection Inject  under the skin into the appropriate area as directed 2 (Two) Times a Day With Meals. 50 in the morning and 60 at night     • lisinopril-hydrochlorothiazide (PRINZIDE,ZESTORETIC) 20-25 MG per tablet Take 1 tablet by mouth Daily.     • ONETOUCH VERIO test strip      • rosuvastatin (CRESTOR) 20 MG tablet Take 20 mg by mouth Every Night.     • Semaglutide (OZEMPIC) 0.25 or 0.5 MG/DOSE solution pen-injector Inject 1 mg under the skin into the appropriate area as directed 1 (One) Time Per Week.     • traMADol (ULTRAM) 50 MG tablet Take 50 mg by mouth Every 6 (Six) Hours As Needed for Moderate Pain .       No current facility-administered medications for this visit.          OBJECTIVE    Pulse 90   Ht 182.9 cm (72\")   Wt 102 kg (224 lb)   SpO2 98%   BMI 30.38 kg/m²       Review of Systems   Constitutional: Negative.    HENT: Negative.    Eyes: Negative.    Respiratory: Negative.    Cardiovascular: Negative.    Gastrointestinal: Negative.    Endocrine: Negative.    Genitourinary: Negative.    Musculoskeletal: Positive for back pain.   Skin: Positive for wound.   Allergic/Immunologic: Negative.    Neurological: Negative.    Hematological: Negative.    Psychiatric/Behavioral: Negative.          Physical Exam   Constitutional: he appears well-developed and well-nourished.   HEENT: Normocephalic. Atraumatic  CV: No tenderness. RRR  Resp: Non-labored respiration. No wheezes.   Psychiatric: he has a normal mood and affect. his   behavior is normal.      Lower " Extremity Exam:  Pulses palpable  Left plantar foot  incisions healed  Mild erythema to dorsal lateral left foot   Dorsal lateral left foot ulceration now healed.  Left third and fourth digits rectus  Negative Antoni        ASSESSMENT AND PLAN    Jadiel was seen today for post-op.    Diagnoses and all orders for this visit:    Diabetic polyneuropathy associated with type 2 diabetes mellitus (CMS/HCC)    History of amputation of lesser toe of left foot (CMS/HCC)    Hammer toe of left foot      -Patient doing well postoperatively.  -Ulcerations now healed. May d/c wound care. Return to DM shoes. Increase activity as tolerated  -Follow up as needed            This document has been electronically signed by Jacoby Serrano DPM on February 5, 2020 10:00 AM     EMR Dragon/Transcription disclaimer:   Much of this encounter note is an electronic transcription/translation of spoken language to printed text. The electronic translation of spoken language may permit erroneous, or at times, nonsensical words or phrases to be inadvertently transcribed; Although I have reviewed the note for such errors, some may still exist.    Jacoby Serrano DPM  2/5/2020  10:00 AM

## 2020-02-06 ENCOUNTER — ANESTHESIA EVENT (OUTPATIENT)
Dept: PERIOP | Facility: HOSPITAL | Age: 70
End: 2020-02-06

## 2020-02-07 ENCOUNTER — HOSPITAL ENCOUNTER (OUTPATIENT)
Facility: HOSPITAL | Age: 70
Setting detail: HOSPITAL OUTPATIENT SURGERY
Discharge: HOME | End: 2020-02-07
Attending: ORTHOPAEDIC SURGERY | Admitting: ORTHOPAEDIC SURGERY

## 2020-02-07 ENCOUNTER — ANESTHESIA (OUTPATIENT)
Dept: PERIOP | Facility: HOSPITAL | Age: 70
End: 2020-02-07

## 2020-02-07 VITALS
OXYGEN SATURATION: 94 % | RESPIRATION RATE: 18 BRPM | SYSTOLIC BLOOD PRESSURE: 132 MMHG | DIASTOLIC BLOOD PRESSURE: 77 MMHG | WEIGHT: 221.56 LBS | TEMPERATURE: 97.7 F | HEART RATE: 84 BPM | HEIGHT: 72 IN | BODY MASS INDEX: 30.01 KG/M2

## 2020-02-07 DIAGNOSIS — M89.8X1 PAIN OF LEFT CLAVICLE: ICD-10-CM

## 2020-02-07 DIAGNOSIS — I10 ESSENTIAL HYPERTENSION: ICD-10-CM

## 2020-02-07 DIAGNOSIS — S46.012D TRAUMATIC INCOMPLETE TEAR OF LEFT ROTATOR CUFF, SUBSEQUENT ENCOUNTER: Primary | ICD-10-CM

## 2020-02-07 DIAGNOSIS — Z79.4 TYPE 2 DIABETES MELLITUS WITH COMPLICATION, WITH LONG-TERM CURRENT USE OF INSULIN (HCC): ICD-10-CM

## 2020-02-07 DIAGNOSIS — S46.912D STRAIN OF ACROMIOCLAVICULAR JOINT, LEFT, SUBSEQUENT ENCOUNTER: ICD-10-CM

## 2020-02-07 DIAGNOSIS — E11.8 TYPE 2 DIABETES MELLITUS WITH COMPLICATION, WITH LONG-TERM CURRENT USE OF INSULIN (HCC): ICD-10-CM

## 2020-02-07 LAB
GLUCOSE BLDC GLUCOMTR-MCNC: 117 MG/DL (ref 70–130)
GLUCOSE BLDC GLUCOMTR-MCNC: 128 MG/DL (ref 70–130)

## 2020-02-07 PROCEDURE — 29827 SHO ARTHRS SRG RT8TR CUF RPR: CPT | Performed by: ORTHOPAEDIC SURGERY

## 2020-02-07 PROCEDURE — 25010000002 PHENYLEPHRINE PER 1 ML: Performed by: NURSE ANESTHETIST, CERTIFIED REGISTERED

## 2020-02-07 PROCEDURE — 25010000002 PROPOFOL 10 MG/ML EMULSION: Performed by: NURSE ANESTHETIST, CERTIFIED REGISTERED

## 2020-02-07 PROCEDURE — 25010000002 ROPIVACAINE PER 1 MG: Performed by: ANESTHESIOLOGY

## 2020-02-07 PROCEDURE — 25010000002 MIDAZOLAM PER 1 MG: Performed by: NURSE ANESTHETIST, CERTIFIED REGISTERED

## 2020-02-07 PROCEDURE — 82962 GLUCOSE BLOOD TEST: CPT

## 2020-02-07 PROCEDURE — C1713 ANCHOR/SCREW BN/BN,TIS/BN: HCPCS | Performed by: ORTHOPAEDIC SURGERY

## 2020-02-07 PROCEDURE — 29826 SHO ARTHRS SRG DECOMPRESSION: CPT | Performed by: ORTHOPAEDIC SURGERY

## 2020-02-07 PROCEDURE — 25010000002 ONDANSETRON PER 1 MG: Performed by: NURSE ANESTHETIST, CERTIFIED REGISTERED

## 2020-02-07 PROCEDURE — 25010000002 FENTANYL CITRATE (PF) 100 MCG/2ML SOLUTION: Performed by: NURSE ANESTHETIST, CERTIFIED REGISTERED

## 2020-02-07 DEVICE — SUT/ANCH BIOCOMP SWIVELOCK/C DBL 4.75X22MM: Type: IMPLANTABLE DEVICE | Site: SHOULDER | Status: FUNCTIONAL

## 2020-02-07 DEVICE — SUT FIBERLINK W/SUT TP 1.3MM WHT/BLU: Type: IMPLANTABLE DEVICE | Site: SHOULDER | Status: FUNCTIONAL

## 2020-02-07 DEVICE — SUT FIBERTAPE TW 2 7IN WHT/BLK AR72377T: Type: IMPLANTABLE DEVICE | Site: SHOULDER | Status: FUNCTIONAL

## 2020-02-07 DEVICE — SUT FIBERTAPE FW 2MM 7IN BLU AR72377: Type: IMPLANTABLE DEVICE | Site: SHOULDER | Status: FUNCTIONAL

## 2020-02-07 RX ORDER — ROPIVACAINE HYDROCHLORIDE 5 MG/ML
INJECTION, SOLUTION EPIDURAL; INFILTRATION; PERINEURAL
Status: COMPLETED | OUTPATIENT
Start: 2020-02-07 | End: 2020-02-07

## 2020-02-07 RX ORDER — LIDOCAINE HYDROCHLORIDE 20 MG/ML
INJECTION, SOLUTION INFILTRATION; PERINEURAL AS NEEDED
Status: DISCONTINUED | OUTPATIENT
Start: 2020-02-07 | End: 2020-02-07 | Stop reason: SURG

## 2020-02-07 RX ORDER — MEPERIDINE HYDROCHLORIDE 100 MG/ML
12.5 INJECTION INTRAMUSCULAR; INTRAVENOUS; SUBCUTANEOUS
Status: DISCONTINUED | OUTPATIENT
Start: 2020-02-07 | End: 2020-02-07 | Stop reason: HOSPADM

## 2020-02-07 RX ORDER — PROPOFOL 10 MG/ML
VIAL (ML) INTRAVENOUS AS NEEDED
Status: DISCONTINUED | OUTPATIENT
Start: 2020-02-07 | End: 2020-02-07 | Stop reason: SURG

## 2020-02-07 RX ORDER — SODIUM CHLORIDE 9 MG/ML
1000 INJECTION, SOLUTION INTRAVENOUS CONTINUOUS
Status: DISCONTINUED | OUTPATIENT
Start: 2020-02-07 | End: 2020-02-07 | Stop reason: HOSPADM

## 2020-02-07 RX ORDER — CLINDAMYCIN PHOSPHATE 600 MG/50ML
600 INJECTION INTRAVENOUS ONCE
Status: COMPLETED | OUTPATIENT
Start: 2020-02-07 | End: 2020-02-07

## 2020-02-07 RX ORDER — HYDROCODONE BITARTRATE AND ACETAMINOPHEN 7.5; 325 MG/1; MG/1
1 TABLET ORAL EVERY 4 HOURS PRN
Qty: 30 TABLET | Refills: 0 | Status: SHIPPED | OUTPATIENT
Start: 2020-02-07 | End: 2020-02-13 | Stop reason: SDUPTHER

## 2020-02-07 RX ORDER — SODIUM CHLORIDE, SODIUM GLUCONATE, SODIUM ACETATE, POTASSIUM CHLORIDE, AND MAGNESIUM CHLORIDE 526; 502; 368; 37; 30 MG/100ML; MG/100ML; MG/100ML; MG/100ML; MG/100ML
INJECTION, SOLUTION INTRAVENOUS CONTINUOUS PRN
Status: DISCONTINUED | OUTPATIENT
Start: 2020-02-07 | End: 2020-02-07 | Stop reason: SURG

## 2020-02-07 RX ORDER — FENTANYL CITRATE 50 UG/ML
INJECTION, SOLUTION INTRAMUSCULAR; INTRAVENOUS AS NEEDED
Status: DISCONTINUED | OUTPATIENT
Start: 2020-02-07 | End: 2020-02-07 | Stop reason: SURG

## 2020-02-07 RX ORDER — MIDAZOLAM HYDROCHLORIDE 1 MG/ML
INJECTION INTRAMUSCULAR; INTRAVENOUS AS NEEDED
Status: DISCONTINUED | OUTPATIENT
Start: 2020-02-07 | End: 2020-02-07 | Stop reason: SURG

## 2020-02-07 RX ORDER — ONDANSETRON 2 MG/ML
INJECTION INTRAMUSCULAR; INTRAVENOUS AS NEEDED
Status: DISCONTINUED | OUTPATIENT
Start: 2020-02-07 | End: 2020-02-07 | Stop reason: SURG

## 2020-02-07 RX ADMIN — SODIUM CHLORIDE, SODIUM GLUCONATE, SODIUM ACETATE, POTASSIUM CHLORIDE, AND MAGNESIUM CHLORIDE: 526; 502; 368; 37; 30 INJECTION, SOLUTION INTRAVENOUS at 14:11

## 2020-02-07 RX ADMIN — PHENYLEPHRINE HYDROCHLORIDE 50 MCG: 10 INJECTION INTRAVENOUS at 12:05

## 2020-02-07 RX ADMIN — ONDANSETRON 4 MG: 2 INJECTION INTRAMUSCULAR; INTRAVENOUS at 14:03

## 2020-02-07 RX ADMIN — ROPIVACAINE HYDROCHLORIDE 20 ML: 5 INJECTION, SOLUTION EPIDURAL; INFILTRATION; PERINEURAL at 11:52

## 2020-02-07 RX ADMIN — FENTANYL CITRATE 100 MCG: 50 INJECTION, SOLUTION INTRAMUSCULAR; INTRAVENOUS at 11:49

## 2020-02-07 RX ADMIN — LIDOCAINE HYDROCHLORIDE 60 MG: 20 INJECTION, SOLUTION INFILTRATION; PERINEURAL at 11:57

## 2020-02-07 RX ADMIN — SODIUM CHLORIDE 1000 ML: 9 INJECTION, SOLUTION INTRAVENOUS at 10:53

## 2020-02-07 RX ADMIN — MIDAZOLAM HYDROCHLORIDE 2 MG: 2 INJECTION, SOLUTION INTRAMUSCULAR; INTRAVENOUS at 11:49

## 2020-02-07 RX ADMIN — CLINDAMYCIN IN 5 PERCENT DEXTROSE 600 MG: 12 INJECTION, SOLUTION INTRAVENOUS at 12:03

## 2020-02-07 RX ADMIN — PHENYLEPHRINE HYDROCHLORIDE 50 MCG: 10 INJECTION INTRAVENOUS at 12:10

## 2020-02-07 RX ADMIN — PROPOFOL 150 MG: 10 INJECTION, EMULSION INTRAVENOUS at 11:57

## 2020-02-07 RX ADMIN — PHENYLEPHRINE HYDROCHLORIDE 50 MCG: 10 INJECTION INTRAVENOUS at 12:01

## 2020-02-07 NOTE — ANESTHESIA PROCEDURE NOTES
Peripheral Block      Patient location during procedure: OR  Start time: 2/7/2020 11:49 AM  Stop time: 2/7/2020 11:52 AM  Reason for block: at surgeon's request and post-op pain management  Performed by  Anesthesiologist: Tulio Cook MD  Assisted by: Lion Johnson CRNA  Preanesthetic Checklist  Completed: patient identified, site marked, surgical consent, pre-op evaluation, timeout performed, IV checked, risks and benefits discussed and monitors and equipment checked  Prep:  Pt Position: supine  Sterile barriers:gloves, mask and cap  Prep: ChloraPrep  Patient monitoring: blood pressure monitoring, continuous pulse oximetry and EKG  Procedure  Sedation:yes  Performed under: local infiltration  Guidance:ultrasound guided  ULTRASOUND INTERPRETATION.  Using ultrasound guidance a 21 G gauge needle was placed in close proximity to the brachial plexus nerve, at which point, under ultrasound guidance anesthetic was injected in the area of the nerve and spread of the anesthesia was seen on ultrasound in close proximity thereto.  There were no abnormalities seen on ultrasound; a digital image was taken; and the patient tolerated the procedure with no complications. Images:still images obtained, printed/placed on chart    Laterality:left  Block Type:interscalene  Injection Technique:single-shot  Needle Type:echogenic  Needle Gauge:21 G  Resistance on Injection: none    Medications Used: ropivacaine (NAROPIN) 0.5 % injection, 20 mL  Med admintered at 2/7/2020 11:52 AM      Post Assessment  Injection Assessment: negative aspiration for heme, no paresthesia on injection and incremental injection  Patient Tolerance:comfortable throughout block  Complications:no

## 2020-02-07 NOTE — ANESTHESIA POSTPROCEDURE EVALUATION
Patient: Jadiel Dutton    Procedure Summary     Date:  02/07/20 Room / Location:  [facility] OR  /  [facility] OR    Anesthesia Start:  1149 Anesthesia Stop:  1420    Procedure:  LEFT SHOULDER ARTHROSCOPY with rotator cuff repair, DEDRICK procedure, and Subacromial decompression (Left Shoulder) Diagnosis:       Pain of left clavicle      Strain of acromioclavicular joint, left, subsequent encounter      Essential hypertension      Type 2 diabetes mellitus with complication, with long-term current use of insulin (CMS/[facility])      Traumatic incomplete tear of left rotator cuff, subsequent encounter      (Pain of left clavicle [M89.8X1])      (Strain of acromioclavicular joint, left, subsequent encounter [S46.912D])      (Essential hypertension [I10])      (Type 2 diabetes mellitus with complication, with long-term current use of insulin (CMS/HCC) [E11.8, Z79.4])      (Traumatic incomplete tear of left rotator cuff, subsequent encounter [S46.012D])    Surgeon:  Troy Barillas MD Provider:  Tulio Cook MD    Anesthesia Type:  general with block ASA Status:  3          Anesthesia Type: general with block    Vitals  No vitals data found for the desired time range.          Post Anesthesia Care and Evaluation    Patient location during evaluation: PACU  Patient participation: waiting for patient participation  Level of consciousness: responsive to verbal stimuli  Pain management: adequate  Airway patency: patent  Anesthetic complications: No anesthetic complications    Cardiovascular status: acceptable  Respiratory status: acceptable  Hydration status: acceptable

## 2020-02-07 NOTE — ANESTHESIA PREPROCEDURE EVALUATION
Anesthesia Evaluation     no history of anesthetic complications:  NPO Solid Status: > 8 hours  NPO Liquid Status: > 2 hours           Airway   Mallampati: II  TM distance: >3 FB  Neck ROM: full  No difficulty expected  Dental    (+) poor dentition        Pulmonary - normal exam    breath sounds clear to auscultation  (+) asthma,  (-) sleep apnea, not a smoker    ROS comment: PND  Cardiovascular - normal exam  Exercise tolerance: good (4-7 METS)    ECG reviewed  Rhythm: regular  Rate: normal    (+) hypertension less than 2 medications, hyperlipidemia,   (-) valvular problems/murmurs, past MI, dysrhythmias, angina, cardiac stents, DVT    ROS comment: Sinus rhythm with 1st degree AV block with premature atrial complexes  Otherwise normal ECG  No previous ECGs available  Confirmed by DARLIN    Neuro/Psych  (+) numbness,     (-) seizures, TIA, CVA, headaches, psychiatric history  GI/Hepatic/Renal/Endo    (+) obesity,  hiatal hernia, GERD well controlled,  diabetes mellitus (glu 128) type 2 well controlled using insulin,   (-) hepatitis, liver disease, no renal disease, no thyroid disorder    Musculoskeletal     (+) back pain, chronic pain,       ROS comment: CONCLUSION:  There remains some inferior displacement of the lateral aspect of the left clavicle with respect to the acromion process.  Abdominal   (+) obese,    Substance History   (-) alcohol use, drug use     OB/GYN          Other   arthritis,      (-) history of cancer                  Anesthesia Plan    ASA 3     general with block   (Interscalene block discussed and patient agrees to proceed)  intravenous induction     Anesthetic plan, all risks, benefits, and alternatives have been provided, discussed and informed consent has been obtained with: patient and spouse/significant other.

## 2020-02-07 NOTE — ANESTHESIA PROCEDURE NOTES
Airway  Urgency: elective    Date/Time: 2/7/2020 11:58 AM  Airway not difficult    General Information and Staff    Patient location during procedure: OR  CRNA: Lion Johnson CRNA    Indications and Patient Condition  Indications for airway management: airway protection    Preoxygenated: yes  MILS maintained throughout  Mask difficulty assessment: 0 - not attempted    Final Airway Details  Final airway type: supraglottic airway      Successful airway: LMA  Size 4    Number of attempts at approach: 1  Assessment: lips, teeth, and gum same as pre-op

## 2020-02-10 ENCOUNTER — TELEPHONE (OUTPATIENT)
Dept: ORTHOPEDIC SURGERY | Facility: CLINIC | Age: 70
End: 2020-02-10

## 2020-02-12 ENCOUNTER — OFFICE VISIT (OUTPATIENT)
Dept: FAMILY MEDICINE CLINIC | Facility: CLINIC | Age: 70
End: 2020-02-12

## 2020-02-12 ENCOUNTER — TELEPHONE (OUTPATIENT)
Dept: ORTHOPEDIC SURGERY | Facility: CLINIC | Age: 70
End: 2020-02-12

## 2020-02-12 VITALS
HEIGHT: 72 IN | SYSTOLIC BLOOD PRESSURE: 130 MMHG | DIASTOLIC BLOOD PRESSURE: 82 MMHG | BODY MASS INDEX: 31.06 KG/M2 | WEIGHT: 229.3 LBS | TEMPERATURE: 98.6 F | HEART RATE: 101 BPM

## 2020-02-12 DIAGNOSIS — R60.0 FLUID RETENTION IN LEGS: Primary | ICD-10-CM

## 2020-02-12 DIAGNOSIS — I10 ESSENTIAL HYPERTENSION: Chronic | ICD-10-CM

## 2020-02-12 DIAGNOSIS — Z98.890 POST-OPERATIVE STATE: ICD-10-CM

## 2020-02-12 DIAGNOSIS — K59.00 CONSTIPATION, UNSPECIFIED CONSTIPATION TYPE: ICD-10-CM

## 2020-02-12 PROBLEM — E78.00 HYPERCHOLESTEROLEMIA: Status: ACTIVE | Noted: 2020-02-12

## 2020-02-12 PROCEDURE — 99214 OFFICE O/P EST MOD 30 MIN: CPT | Performed by: NURSE PRACTITIONER

## 2020-02-12 RX ORDER — FUROSEMIDE 20 MG/1
20 TABLET ORAL DAILY
Qty: 30 TABLET | Refills: 0 | Status: SHIPPED | OUTPATIENT
Start: 2020-02-12 | End: 2020-03-19 | Stop reason: DRUGHIGH

## 2020-02-12 NOTE — TELEPHONE ENCOUNTER
DR EDDY.  PATIENT HAD SHOULDER SURGERY LAST Friday.  HE SAYS THE PAIN MEDICATION IS CAUSING  FLUID RETENTION.  HE IS ASKING IF YOU CAN CALL IN [place] TO CLINIC PHARMACY, [place]?  I SUGGEST HE CALL JORDAN MCCOY, HIS PRIMARY CARE PHYSICIAN, BUT HE REQUEST THAT I ASK YOU.

## 2020-02-12 NOTE — PATIENT INSTRUCTIONS

## 2020-02-12 NOTE — PROGRESS NOTES
"Subjective   Jadiel Dutton is a 69 y.o. male. Patient here today with complaints of Edema  pt here today with complaints of BLE swelling since lst week when he had L shoulder surgery with dr mares, states since then he has been sleeping in a rocking chair with his legs dangled, he reports has not been elevating his legs. Denies shortness of breath, no complaints of chest pain. Has been increasing sodium recently. Reports last week weight was 221 and today he is up 8 pounds. He has been taking norco prn pain and does report constipation since.      Vitals:    02/12/20 1338   BP: 130/82   Pulse: 101   Temp: 98.6 °F (37 °C)   Weight: 104 kg (229 lb 4.8 oz)   Height: 182.9 cm (72\")     Body mass index is 31.1 kg/m².  Past Medical History:   Diagnosis Date   • Abdominal pain    • Abnormal weight loss    • Acute bronchitis    • Anxiety state    • Benign essential hypertension    • Chronic sinusitis    • Constipation    • Cystitis    • Degenerative joint disease involving multiple joints    • Diabetic neuropathy (CMS/HCC)      bilateral hands      • Diastolic dysfunction    • Diverticular disease of colon    • Dyspnea    • Epigastric pain    • Essential hypertension    • Foot ulcer (CMS/HCC)    • Gastroesophageal reflux disease    • Generalized anxiety disorder    • Hyperlipidemia    • Inflammatory bowel disease     Possible Inflammatory Bowel Disease      • Lumbar pain     Pain radiating to lumbar region of back   n      • Pleuritic pain    • Radiculopathy     site unspecified      • Type 2 diabetes mellitus (CMS/HCC)    • Vesicular eczema of hands and feet      Leg Swelling   This is a new problem. The current episode started in the past 7 days. The problem occurs constantly. The problem has been gradually worsening. Pertinent negatives include no chest pain or headaches. Nothing aggravates the symptoms. He has tried nothing for the symptoms. The treatment provided no relief.   Hypertension   This is a chronic " problem. The current episode started more than 1 year ago. The problem is unchanged. The problem is controlled. Pertinent negatives include no chest pain, headaches or shortness of breath. There are no associated agents to hypertension. Risk factors for coronary artery disease include obesity, male gender, dyslipidemia and diabetes mellitus. Past treatments include lifestyle changes, diuretics and ACE inhibitors. Current antihypertension treatment includes lifestyle changes, diuretics and ACE inhibitors. The current treatment provides moderate improvement. Compliance problems include exercise and diet.         The following portions of the patient's history were reviewed and updated as appropriate: allergies, current medications, past family history, past medical history, past social history, past surgical history and problem list.    Review of Systems   Constitutional: Negative.    HENT: Negative.    Eyes: Negative.    Respiratory: Negative.  Negative for shortness of breath.    Cardiovascular: Negative.  Negative for chest pain.   Gastrointestinal: Negative.    Endocrine: Negative.    Genitourinary: Negative.    Musculoskeletal: Negative.    Skin: Negative.    Allergic/Immunologic: Negative.    Neurological: Negative.  Negative for headaches.   Hematological: Negative.    Psychiatric/Behavioral: Negative.        Objective   Physical Exam   Constitutional: He is oriented to person, place, and time. He appears well-developed and well-nourished. No distress.   HENT:   Head: Normocephalic and atraumatic.   Cardiovascular: Normal rate, regular rhythm and normal heart sounds. Exam reveals no gallop and no friction rub.   No murmur heard.  Pulmonary/Chest: Effort normal and breath sounds normal. No stridor. No respiratory distress. He has no wheezes. He has no rales.   Musculoskeletal: He exhibits edema.   He has 2-3 + pitting edema noted to BLE, from knees down to feet bilat, also distal legs are erythematous slightly  worse on L than R   Neurological: He is alert and oriented to person, place, and time.   Skin: Skin is warm and dry. No rash noted. He is not diaphoretic. There is erythema. No pallor.   Nursing note and vitals reviewed.      Assessment/Plan   Jadiel was seen today for edema.    Diagnoses and all orders for this visit:    Fluid retention in legs    Constipation, unspecified constipation type    Post-operative state  Comments:  following up with dr wood and performing PT as scheduled    Essential hypertension    Other orders  -     furosemide (LASIX) 20 MG tablet; Take 1 tablet by mouth Daily.    he is treated with lasix 20mg daily for the next week and advised when taking this to increase K in diet  Advised to elevate BLE above heart level and perform ankle pumps as well as decrease sodium intake  He is going to RTC in 1 week for recheck or sooner if needed  Can consider obtaining CMP at next visit to monitor bun, cr and K  He is aware and is in agreement to this plan  All questions and concerns are addressed with understanding noted.   Follow up with dr mares as scheduled regarding post op eval and advised to call his office regarding refill on pain medication if needed  Also advised to increase fiber, fluids and start daily stool softener while on norco for constipation  Pt aware

## 2020-02-12 NOTE — TELEPHONE ENCOUNTER
As discussed with Taty, he needs to discuss fluid retention with BIBI Banda.    I think she was going to reach out to him.  SO

## 2020-02-13 DIAGNOSIS — M89.8X1 PAIN OF LEFT CLAVICLE: ICD-10-CM

## 2020-02-13 DIAGNOSIS — E11.8 TYPE 2 DIABETES MELLITUS WITH COMPLICATION, WITH LONG-TERM CURRENT USE OF INSULIN (HCC): ICD-10-CM

## 2020-02-13 DIAGNOSIS — S46.012D TRAUMATIC INCOMPLETE TEAR OF LEFT ROTATOR CUFF, SUBSEQUENT ENCOUNTER: ICD-10-CM

## 2020-02-13 DIAGNOSIS — I10 ESSENTIAL HYPERTENSION: ICD-10-CM

## 2020-02-13 DIAGNOSIS — Z79.4 TYPE 2 DIABETES MELLITUS WITH COMPLICATION, WITH LONG-TERM CURRENT USE OF INSULIN (HCC): ICD-10-CM

## 2020-02-13 RX ORDER — HYDROCODONE BITARTRATE AND ACETAMINOPHEN 7.5; 325 MG/1; MG/1
1 TABLET ORAL EVERY 6 HOURS PRN
Qty: 30 TABLET | Refills: 0 | Status: SHIPPED | OUTPATIENT
Start: 2020-02-13 | End: 2020-02-26 | Stop reason: SDUPTHER

## 2020-02-13 NOTE — TELEPHONE ENCOUNTER
NUNU        Pt REQUESTING A REFILL OF PAIN MEDICATION  LAST FILL 2/7/2020 HYDROCODONE (NORCO) 7.5    Pt WANTED TO MAKE YOU AWARE THAT HE HAS BEEN TAKING FLUID PILLS BECAUSE THIS  MEDICATION HAS MADE HIM RETAIN WATER.    Pt IS OKAY WITH A CHANGE OF MEDICATION OR EVEN EXTENDING THE AMOUNT OF TIME IN BETWEEN TAKING THEM OR IF YOU THINK IT IS BEST HE STAYS ON THIS MEDICATION    Pt WOULD LIKE A CALL BACK CONCERNING THIS AND WHAT NUNU SEES BEST FIT FOR HIM..     CALL BACK # [phone]

## 2020-02-18 ENCOUNTER — OFFICE VISIT (OUTPATIENT)
Dept: ORTHOPEDIC SURGERY | Facility: CLINIC | Age: 70
End: 2020-02-18

## 2020-02-18 VITALS — HEIGHT: 72 IN | WEIGHT: 229 LBS | BODY MASS INDEX: 31.02 KG/M2

## 2020-02-18 DIAGNOSIS — Z98.890 STATUS POST ARTHROSCOPY OF LEFT SHOULDER: Primary | ICD-10-CM

## 2020-02-18 DIAGNOSIS — M25.512 CHRONIC LEFT SHOULDER PAIN: ICD-10-CM

## 2020-02-18 DIAGNOSIS — S46.012D TRAUMATIC INCOMPLETE TEAR OF LEFT ROTATOR CUFF, SUBSEQUENT ENCOUNTER: ICD-10-CM

## 2020-02-18 DIAGNOSIS — G89.29 CHRONIC LEFT SHOULDER PAIN: ICD-10-CM

## 2020-02-18 DIAGNOSIS — Z98.890 STATUS POST LEFT ROTATOR CUFF REPAIR: ICD-10-CM

## 2020-02-18 PROCEDURE — 99024 POSTOP FOLLOW-UP VISIT: CPT | Performed by: NURSE PRACTITIONER

## 2020-02-18 RX ORDER — TRAMADOL HYDROCHLORIDE 50 MG/1
50 TABLET ORAL EVERY 6 HOURS PRN
COMMUNITY
End: 2020-11-28

## 2020-02-18 RX ORDER — DOXYCYCLINE 50 MG/1
50 CAPSULE ORAL 2 TIMES DAILY
Qty: 20 CAPSULE | Refills: 0 | Status: SHIPPED | OUTPATIENT
Start: 2020-02-18 | End: 2020-02-28

## 2020-02-18 NOTE — PROGRESS NOTES
Jadiel Dutton is a 69 y.o. male is s/p       Chief Complaint   Patient presents with   • Left Shoulder - Post-op   • Suture / Staple Removal     HISTORY OF PRESENT ILLNESS: Patient presents to office for postoperative follow-up status post left shoulder arthroscopy with rotator cuff repair, Dedrick procedure and subacromial decompression performed per Dr. Barillas on 2/7/2020.  Patient is doing well postoperatively with no unusual complaints or concerns noted.  He has continue to use the abductor sling to the left arm as instructed.  Patient has attended physical therapy as directed.  He has been instructed on pendulum exercises but does not do them often as he states that he lives alone and needs someone there to help him reapply his left arm sling.  Patient reports his pain has been manageable with pain medication and is gradually improving.  Patient continues to take Norco as needed but also states that the pain medication has caused him to be constipated and retain fluid.  He reports increased swelling in his lower extremities and he has redness in the anterior aspect of his right lower leg.  Patient has been evaluated by his primary care provider for his increased swelling and started on Lasix, which has helped.  Patient has decreased the amount of pain medication he is taking.  Sutures are removed today.    02/07/20 (11d) Troy Barillas MD     LEFT SHOULDER ARTHROSCOPY with rotator cuff repair, DEDRICK procedure, and Subacromial decompression - Left       Allergies   Allergen Reactions   • Penicillins Shortness Of Breath   • Bactrim [Sulfamethoxazole-Trimethoprim] GI Intolerance   • Ceftin [Cefuroxime Axetil] GI Intolerance   • Sulfa Antibiotics GI Intolerance   • Ciprofloxacin Itching and Rash         Current Outpatient Medications:   •  albuterol sulfate  (90 Base) MCG/ACT inhaler, Inhale 2 puffs Every 4 (Four) Hours As Needed for Wheezing., Disp: 1 inhaler, Rfl: 2  •  B-D UF III MINI PEN  NEEDLES 31G X 5 MM misc, USE FOUR TIMES A DAY WITH INSULIN PENS, Disp: 360 each, Rfl: 2  •  cetirizine (zyrTEC) 10 MG tablet, Take 10 mg by mouth Daily As Needed for Allergies., Disp: , Rfl:   •  dexlansoprazole (DEXILANT) 60 MG capsule, Take 60 mg by mouth Daily., Disp: , Rfl:   •  furosemide (LASIX) 20 MG tablet, Take 1 tablet by mouth Daily., Disp: 30 tablet, Rfl: 0  •  gabapentin (NEURONTIN) 300 MG capsule, Take 1 capsule by mouth 4 (Four) Times a Day As Needed., Disp: , Rfl:   •  insulin NPH-insulin regular (humuLIN 70/30,novoLIN 70/30) (70-30) 100 UNIT/ML injection, Inject  under the skin into the appropriate area as directed 2 (Two) Times a Day With Meals. 50 in the morning and 60 at night, Disp: , Rfl:   •  ONETOUCH VERIO test strip, , Disp: , Rfl:   •  Semaglutide (OZEMPIC) 0.25 or 0.5 MG/DOSE solution pen-injector, Inject 1 mg under the skin into the appropriate area as directed 1 (One) Time Per Week., Disp: , Rfl:   •  traMADol (ULTRAM) 50 MG tablet, Take 50 mg by mouth Every 6 (Six) Hours As Needed for Moderate Pain ., Disp: , Rfl:   •  doxycycline (MONODOX) 50 MG capsule, Take 1 capsule by mouth 2 (Two) Times a Day for 10 days., Disp: 20 capsule, Rfl: 0  •  HYDROcodone-acetaminophen (NORCO) 7.5-325 MG per tablet, Take 1 tablet by mouth Every 6 (Six) Hours As Needed for Moderate Pain  (Pain)., Disp: 30 tablet, Rfl: 0  •  lisinopril-hydrochlorothiazide (PRINZIDE,ZESTORETIC) 20-25 MG per tablet, Take 1 tablet by mouth Daily., Disp: 90 tablet, Rfl: 3  •  rosuvastatin (CRESTOR) 20 MG tablet, Take 1 tablet by mouth Every Night., Disp: 90 tablet, Rfl: 3    No fevers or chills.  No nausea or vomiting. Left shoulder pain.       PHYSICAL EXAMINATION:       Jadiel Dutton is a 69 y.o. male    Patient is awake and alert, answers questions appropriately and is in no apparent distress.    GAIT:     [x]  Normal  []  Antalgic    Assistive device: [x]  None  []  Walker     []  Crutches  []  Cane     []   Wheelchair  []  Stretcher    Left Shoulder Exam     Tenderness   Left shoulder tenderness location: Mild, diffuse.    Muscle Strength   Left shoulder normal muscle strength: deferred.    Other   Erythema: absent  Sensation: normal  Pulse: present     Comments:  Passive range of motion assessment deferred due to postoperative instructions per Dr. Barillas to not start passive range of motion until 4 weeks postop.  Surgical incisions are well approximated with no erythema no drainage noted.  No signs of infection noted.  No significant swelling appreciated at the shoulder.  No ecchymosis.  Neurovascularly intact.              ASSESSMENT:    Diagnoses and all orders for this visit:    Status post arthroscopy of left shoulder    Status post left rotator cuff repair    Traumatic incomplete tear of left rotator cuff, subsequent encounter    Chronic left shoulder pain    Other orders  -     doxycycline (MONODOX) 50 MG capsule; Take 1 capsule by mouth 2 (Two) Times a Day for 10 days.    PLAN    Patient is doing well postoperatively with no unusual complaints or concerns noted.  Left shoulder pain is gradually improving.  Surgical incisions are healing as expected with no signs of infection noted.  Sutures are removed today and Steri-Strips placed.  Wound care instructions reviewed, including care of Steri-Strips.  Patient is instructed to continue to monitor the incisions/shoulder for any signs of infection including redness, increased swelling, increased tenderness, warmth and/or purulent drainage.  Patient is instructed to notify the office immediately if any such signs of infection are noted.  Patient is instructed to continue with use of his abductor sling to the left arm as instructed for protection and immobilization.  Patient is instructed to continue with pendulum exercises at home as instructed.  Patient states he has not been able to perform them as frequently as he was instructed as he lives alone and states that  "there is not always someone there to help him get his sling back on.  Recommend to continue with his current course of physical therapy.  Per Dr. Barillas' postop instructions, no passive range of motion for 4 weeks and then therapy can begin with some passive range of motion.  No active range of motion until at least 8 weeks postop.  Continue with use of ice therapy to the left shoulder as needed to minimize pain/swelling/inflammation.  Continue Norco as needed for pain.  Patient does state that the Norco has caused him to have constipation.  We discussed starting a stool softener to help with this and decreasing the amount of pain medication he is taking, which he has already done.  Patient also has Tramadol that he can take as needed for pain, which may cause less adverse side effects than the Norco.  Recommend Tylenol as needed for milder pain.  Patient also states that the pain medication caused him to \"retaining fluid\".  He experienced increased swelling in his lower legs and redness in the anterior aspect of his right lower leg.  He has been evaluated by his primary care provider for this and started on Lasix, which has helped his swelling.  Patient is noted to have a small area of bright red erythema in the anterior aspect of his right lower leg consistent with cellulitis.  Recommend starting an antibiotic for this.  Doxycycline is prescribed today.  Patient is instructed to follow-up in regards to his cellulitis with his primary care provider, certainly if it does not clear up and/or worsens.  Follow-up with orthopedics for recheck in 4 weeks.    Return in about 4 weeks (around 3/17/2020) for Recheck.        This document has been electronically signed by MARY Jeffrey on February 19, 2020 4:25 PM      MARY Jeffrey    "

## 2020-02-19 ENCOUNTER — OFFICE VISIT (OUTPATIENT)
Dept: FAMILY MEDICINE CLINIC | Facility: CLINIC | Age: 70
End: 2020-02-19

## 2020-02-19 ENCOUNTER — LAB (OUTPATIENT)
Dept: LAB | Facility: OTHER | Age: 70
End: 2020-02-19

## 2020-02-19 VITALS
HEIGHT: 72 IN | TEMPERATURE: 97.4 F | WEIGHT: 227 LBS | SYSTOLIC BLOOD PRESSURE: 120 MMHG | DIASTOLIC BLOOD PRESSURE: 62 MMHG | BODY MASS INDEX: 30.75 KG/M2 | HEART RATE: 88 BPM

## 2020-02-19 DIAGNOSIS — I10 ESSENTIAL HYPERTENSION: Chronic | ICD-10-CM

## 2020-02-19 DIAGNOSIS — Z98.890 STATUS POST ARTHROSCOPY OF LEFT SHOULDER: ICD-10-CM

## 2020-02-19 DIAGNOSIS — R60.0 FLUID RETENTION IN LEGS: ICD-10-CM

## 2020-02-19 DIAGNOSIS — E78.01 FAMILIAL HYPERCHOLESTEROLEMIA: ICD-10-CM

## 2020-02-19 DIAGNOSIS — R60.0 FLUID RETENTION IN LEGS: Primary | ICD-10-CM

## 2020-02-19 PROBLEM — G89.29 CHRONIC LEFT SHOULDER PAIN: Status: ACTIVE | Noted: 2020-02-19

## 2020-02-19 PROBLEM — M25.512 CHRONIC LEFT SHOULDER PAIN: Status: ACTIVE | Noted: 2020-02-19

## 2020-02-19 LAB
ALBUMIN SERPL-MCNC: 3.5 G/DL (ref 3.5–5)
ALBUMIN/GLOB SERPL: 1 G/DL (ref 1.1–1.8)
ALP SERPL-CCNC: 107 U/L (ref 38–126)
ALT SERPL W P-5'-P-CCNC: 11 U/L
ANION GAP SERPL CALCULATED.3IONS-SCNC: 6 MMOL/L (ref 5–15)
AST SERPL-CCNC: 22 U/L (ref 17–59)
BILIRUB SERPL-MCNC: 0.4 MG/DL (ref 0.2–1.3)
BUN BLD-MCNC: 10 MG/DL (ref 7–23)
BUN/CREAT SERPL: 8.9 (ref 7–25)
CALCIUM SPEC-SCNC: 9 MG/DL (ref 8.4–10.2)
CHLORIDE SERPL-SCNC: 97 MMOL/L (ref 101–112)
CO2 SERPL-SCNC: 33 MMOL/L (ref 22–30)
CREAT BLD-MCNC: 1.12 MG/DL (ref 0.7–1.3)
GFR SERPL CREATININE-BSD FRML MDRD: 65 ML/MIN/1.73 (ref 49–113)
GLOBULIN UR ELPH-MCNC: 3.4 GM/DL (ref 2.3–3.5)
GLUCOSE BLD-MCNC: 84 MG/DL (ref 70–99)
POTASSIUM BLD-SCNC: 3.4 MMOL/L (ref 3.4–5)
PROT SERPL-MCNC: 6.9 G/DL (ref 6.3–8.6)
SODIUM BLD-SCNC: 136 MMOL/L (ref 137–145)

## 2020-02-19 PROCEDURE — 80053 COMPREHEN METABOLIC PANEL: CPT | Performed by: NURSE PRACTITIONER

## 2020-02-19 PROCEDURE — 36415 COLL VENOUS BLD VENIPUNCTURE: CPT | Performed by: NURSE PRACTITIONER

## 2020-02-19 PROCEDURE — 99213 OFFICE O/P EST LOW 20 MIN: CPT | Performed by: NURSE PRACTITIONER

## 2020-02-19 RX ORDER — ROSUVASTATIN CALCIUM 20 MG/1
20 TABLET, COATED ORAL NIGHTLY
Qty: 90 TABLET | Refills: 3 | Status: SHIPPED | OUTPATIENT
Start: 2020-02-19 | End: 2021-05-19

## 2020-02-19 RX ORDER — LISINOPRIL AND HYDROCHLOROTHIAZIDE 25; 20 MG/1; MG/1
1 TABLET ORAL DAILY
Qty: 90 TABLET | Refills: 3 | Status: SHIPPED | OUTPATIENT
Start: 2020-02-19 | End: 2021-05-19

## 2020-02-19 NOTE — PATIENT INSTRUCTIONS

## 2020-02-19 NOTE — PROGRESS NOTES
"Subjective   Jadiel Dutton is a 69 y.o. male. Patient here today with complaints of Follow-up  Patient here today for follow-up regarding fluid retention in bilateral lower extremities, was started on Lasix at last visit and has been taking daily except for yesterday and today he has not taken any.  His weight is down 2 pounds from last week's visit here, he denies chest pain and denies shortness of breath.  Does report an improvement in fluid retention and he states he has been trying to elevate lower extremities as much as he can when sitting, he is still sleeping in a recliner due to left shoulder surgery, he did just follow-up with orthopedic yesterday, started on doxycycline due to redness in lower extremities right greater than left.  He also has hypertension and hyperlipidemia, needing refills on Crestor and lisinopril today, tolerating both well.    Vitals:    02/19/20 0950   BP: 120/62   Pulse: 88   Temp: 97.4 °F (36.3 °C)   Weight: 103 kg (227 lb)   Height: 182.9 cm (72\")     Body mass index is 30.79 kg/m².  Past Medical History:   Diagnosis Date   • Abdominal pain    • Abnormal weight loss    • Acute bronchitis    • Anxiety state    • Benign essential hypertension    • Chronic sinusitis    • Constipation    • Cystitis    • Degenerative joint disease involving multiple joints    • Diabetic neuropathy (CMS/HCC)      bilateral hands      • Diastolic dysfunction    • Diverticular disease of colon    • Dyspnea    • Epigastric pain    • Essential hypertension    • Foot ulcer (CMS/HCC)    • Gastroesophageal reflux disease    • Generalized anxiety disorder    • Hyperlipidemia    • Inflammatory bowel disease     Possible Inflammatory Bowel Disease      • Lumbar pain     Pain radiating to lumbar region of back   n      • Pleuritic pain    • Radiculopathy     site unspecified      • Type 2 diabetes mellitus (CMS/HCC)    • Vesicular eczema of hands and feet      Leg Swelling   This is a new problem. The current " episode started 1 to 4 weeks ago. The problem occurs constantly. The problem has been gradually improving. Associated symptoms include arthralgias. Pertinent negatives include no chest pain, headaches or myalgias. The symptoms are aggravated by standing (sitting for extended period of time). He has tried rest, sleep and relaxation (elevation of BLE, lasix, ankle pumps ) for the symptoms. The treatment provided moderate relief.   Hypertension   This is a chronic problem. The current episode started more than 1 year ago. The problem is unchanged. The problem is controlled. Pertinent negatives include no chest pain, headaches or shortness of breath. There are no associated agents to hypertension. Risk factors for coronary artery disease include obesity, male gender and dyslipidemia. Past treatments include lifestyle changes, ACE inhibitors and diuretics. Current antihypertension treatment includes ACE inhibitors, lifestyle changes and diuretics. The current treatment provides moderate improvement. Compliance problems include exercise and diet.    Hyperlipidemia   This is a chronic problem. The current episode started more than 1 year ago. Exacerbating diseases include obesity. Factors aggravating his hyperlipidemia include fatty foods. Pertinent negatives include no chest pain, focal sensory loss, focal weakness, leg pain, myalgias or shortness of breath. Current antihyperlipidemic treatment includes statins, diet change and exercise. Compliance problems include adherence to exercise and adherence to diet.  Risk factors for coronary artery disease include dyslipidemia, obesity, male sex and hypertension.        The following portions of the patient's history were reviewed and updated as appropriate: allergies, current medications, past family history, past medical history, past social history, past surgical history and problem list.    Review of Systems   Constitutional: Negative.    HENT: Negative.    Eyes: Negative.     Respiratory: Negative.  Negative for shortness of breath.    Cardiovascular: Positive for leg swelling. Negative for chest pain.   Gastrointestinal: Negative.    Endocrine: Negative.    Genitourinary: Negative.    Musculoskeletal: Positive for arthralgias. Negative for myalgias.   Skin: Positive for color change.   Allergic/Immunologic: Negative.    Neurological: Negative.  Negative for focal weakness and headaches.   Hematological: Negative.    Psychiatric/Behavioral: Negative.        Objective   Physical Exam   Constitutional: He is oriented to person, place, and time. He appears well-developed and well-nourished. No distress.   HENT:   Head: Normocephalic and atraumatic.   Cardiovascular: Normal rate, regular rhythm and normal heart sounds. Exam reveals no gallop and no friction rub.   No murmur heard.  Pulmonary/Chest: Effort normal and breath sounds normal. No stridor. No respiratory distress. He has no wheezes. He has no rales.   Musculoskeletal: He exhibits edema (he still has pitting edema to BLE from ankles to knees bilat, has improved since last visit here, now 1-2+ ).   Neurological: He is alert and oriented to person, place, and time.   Skin: Skin is warm and dry. No rash noted. He is not diaphoretic. There is erythema. No pallor.        RLE distally as described on graph is erythematous, was started on doxy at ortho office yesterday, not warm to touch, denies pain  LLE with mild erythema as indicated on graph, skin dried and scaling    Psychiatric: He has a normal mood and affect. His behavior is normal.   Nursing note and vitals reviewed.      Assessment/Plan   Jadiel was seen today for follow-up.    Diagnoses and all orders for this visit:    Fluid retention in legs  -     lisinopril-hydrochlorothiazide (PRINZIDE,ZESTORETIC) 20-25 MG per tablet; Take 1 tablet by mouth Daily.  -     rosuvastatin (CRESTOR) 20 MG tablet; Take 1 tablet by mouth Every Night.  -     Comprehensive metabolic panel;  Future    Essential hypertension  -     lisinopril-hydrochlorothiazide (PRINZIDE,ZESTORETIC) 20-25 MG per tablet; Take 1 tablet by mouth Daily.  -     rosuvastatin (CRESTOR) 20 MG tablet; Take 1 tablet by mouth Every Night.  -     Comprehensive metabolic panel; Future    Familial hypercholesterolemia  -     lisinopril-hydrochlorothiazide (PRINZIDE,ZESTORETIC) 20-25 MG per tablet; Take 1 tablet by mouth Daily.  -     rosuvastatin (CRESTOR) 20 MG tablet; Take 1 tablet by mouth Every Night.  -     Comprehensive metabolic panel; Future    Status post arthroscopy of left shoulder  Comments:  continue follow up with dr mares, ortho     He is given refills on Crestor and lisinopril as above   Since he does still have a considerable amount of swelling I will advise that he restart Lasix as prescribed at last visit for the next 4 to 5 days daily and then as needed and CMP will be drawn today to monitor BUN, creatinine and potassium   He will be informed of results via phone   He will follow-up now as scheduled for chronic conditions or sooner as needed, 3 months   Follow-up with orthopedic as scheduled as well   Continue doxycycline until gone   Patient aware and in agreement to this plan   all questions and concerns are addressed with understanding noted.

## 2020-02-26 DIAGNOSIS — Z79.4 TYPE 2 DIABETES MELLITUS WITH COMPLICATION, WITH LONG-TERM CURRENT USE OF INSULIN (HCC): ICD-10-CM

## 2020-02-26 DIAGNOSIS — S46.012D TRAUMATIC INCOMPLETE TEAR OF LEFT ROTATOR CUFF, SUBSEQUENT ENCOUNTER: ICD-10-CM

## 2020-02-26 DIAGNOSIS — M89.8X1 PAIN OF LEFT CLAVICLE: ICD-10-CM

## 2020-02-26 DIAGNOSIS — I10 ESSENTIAL HYPERTENSION: ICD-10-CM

## 2020-02-26 DIAGNOSIS — E11.8 TYPE 2 DIABETES MELLITUS WITH COMPLICATION, WITH LONG-TERM CURRENT USE OF INSULIN (HCC): ICD-10-CM

## 2020-02-26 RX ORDER — HYDROCODONE BITARTRATE AND ACETAMINOPHEN 7.5; 325 MG/1; MG/1
1 TABLET ORAL EVERY 8 HOURS PRN
Qty: 30 TABLET | Refills: 0 | Status: SHIPPED | OUTPATIENT
Start: 2020-02-26 | End: 2020-03-13 | Stop reason: SDUPTHER

## 2020-03-11 ENCOUNTER — TELEPHONE (OUTPATIENT)
Dept: ORTHOPEDIC SURGERY | Facility: CLINIC | Age: 70
End: 2020-03-11

## 2020-03-12 NOTE — TELEPHONE ENCOUNTER
FLAKITO?    Called PT and let them know this was okay to do per Dr. Barillas.   
Nargis Parry from PT called and said that his left Elbow is pretty stiff and wanting to know if they can send him home with some exercises to do for it.   
Perfect  alecia   
no loss of consciousness, no gait abnormality, no headache, no sensory deficits, and no weakness.

## 2020-03-13 DIAGNOSIS — I10 ESSENTIAL HYPERTENSION: ICD-10-CM

## 2020-03-13 DIAGNOSIS — E11.8 TYPE 2 DIABETES MELLITUS WITH COMPLICATION, WITH LONG-TERM CURRENT USE OF INSULIN (HCC): ICD-10-CM

## 2020-03-13 DIAGNOSIS — Z79.4 TYPE 2 DIABETES MELLITUS WITH COMPLICATION, WITH LONG-TERM CURRENT USE OF INSULIN (HCC): ICD-10-CM

## 2020-03-13 DIAGNOSIS — S46.012D TRAUMATIC INCOMPLETE TEAR OF LEFT ROTATOR CUFF, SUBSEQUENT ENCOUNTER: ICD-10-CM

## 2020-03-13 DIAGNOSIS — M89.8X1 PAIN OF LEFT CLAVICLE: ICD-10-CM

## 2020-03-13 RX ORDER — HYDROCODONE BITARTRATE AND ACETAMINOPHEN 7.5; 325 MG/1; MG/1
1 TABLET ORAL EVERY 8 HOURS PRN
Qty: 30 TABLET | Refills: 0 | Status: SHIPPED | OUTPATIENT
Start: 2020-03-13 | End: 2020-04-01 | Stop reason: SDUPTHER

## 2020-03-13 NOTE — TELEPHONE ENCOUNTER
DR EDDY.  PATIENT IS REQUESTING A REFILL OF HYDROCODONE 7.5/325 AT [facility] [place].  HE HAS ONE LEFT FOR TODAY'S PHYSICAL THERAPY.    [phone]

## 2020-03-17 ENCOUNTER — TELEPHONE (OUTPATIENT)
Dept: ORTHOPEDIC SURGERY | Facility: CLINIC | Age: 70
End: 2020-03-17

## 2020-03-17 ENCOUNTER — OFFICE VISIT (OUTPATIENT)
Dept: ORTHOPEDIC SURGERY | Facility: CLINIC | Age: 70
End: 2020-03-17

## 2020-03-17 VITALS — WEIGHT: 227 LBS | BODY MASS INDEX: 30.75 KG/M2 | HEIGHT: 72 IN

## 2020-03-17 DIAGNOSIS — M89.8X1 PAIN OF LEFT CLAVICLE: Primary | ICD-10-CM

## 2020-03-17 DIAGNOSIS — S46.012D TRAUMATIC INCOMPLETE TEAR OF LEFT ROTATOR CUFF, SUBSEQUENT ENCOUNTER: ICD-10-CM

## 2020-03-17 DIAGNOSIS — Z98.890 STATUS POST LEFT ROTATOR CUFF REPAIR: ICD-10-CM

## 2020-03-17 DIAGNOSIS — I10 ESSENTIAL HYPERTENSION: ICD-10-CM

## 2020-03-17 DIAGNOSIS — E11.8 TYPE 2 DIABETES MELLITUS WITH COMPLICATION, WITH LONG-TERM CURRENT USE OF INSULIN (HCC): ICD-10-CM

## 2020-03-17 DIAGNOSIS — Z79.4 TYPE 2 DIABETES MELLITUS WITH COMPLICATION, WITH LONG-TERM CURRENT USE OF INSULIN (HCC): ICD-10-CM

## 2020-03-17 DIAGNOSIS — Z98.890 STATUS POST ARTHROSCOPY OF LEFT SHOULDER: ICD-10-CM

## 2020-03-17 PROCEDURE — 99024 POSTOP FOLLOW-UP VISIT: CPT | Performed by: ORTHOPAEDIC SURGERY

## 2020-03-17 NOTE — PROGRESS NOTES
moonePostop Follow-up    Name:  Jadiel Dutton  Date:  3/17/2020  :  1950    Chief Complaint:    Chief Complaint   Patient presents with   • Left Shoulder - Follow-up   • Wound Check     Date of surgery:         20 (39d) Troy Barillas MD    LEFT SHOULDER ARTHROSCOPY with rotator cuff repair, DEDRICK procedure, and Subacromial decompression - Left     Pain is improving  No numbness or tingling    Procedure:    History of Present Illness:follow up left shoulder.  Pain scale today 610        Current Outpatient Medications:   •  albuterol sulfate  (90 Base) MCG/ACT inhaler, Inhale 2 puffs Every 4 (Four) Hours As Needed for Wheezing., Disp: 1 inhaler, Rfl: 2  •  B-D UF III MINI PEN NEEDLES 31G X 5 MM misc, USE FOUR TIMES A DAY WITH INSULIN PENS, Disp: 360 each, Rfl: 2  •  cetirizine (zyrTEC) 10 MG tablet, Take 10 mg by mouth Daily As Needed for Allergies., Disp: , Rfl:   •  dexlansoprazole (DEXILANT) 60 MG capsule, Take 60 mg by mouth Daily., Disp: , Rfl:   •  gabapentin (NEURONTIN) 300 MG capsule, Take 1 capsule by mouth 4 (Four) Times a Day As Needed., Disp: , Rfl:   •  HYDROcodone-acetaminophen (NORCO) 7.5-325 MG per tablet, Take 1 tablet by mouth Every 8 (Eight) Hours As Needed for Moderate Pain  (Pain)., Disp: 30 tablet, Rfl: 0  •  insulin NPH-insulin regular (humuLIN 70/30,novoLIN 70/30) (70-30) 100 UNIT/ML injection, Inject  under the skin into the appropriate area as directed 2 (Two) Times a Day With Meals. 50 in the morning and 60 at night, Disp: , Rfl:   •  lisinopril-hydrochlorothiazide (PRINZIDE,ZESTORETIC) 20-25 MG per tablet, Take 1 tablet by mouth Daily., Disp: 90 tablet, Rfl: 3  •  ONETOUCH VERIO test strip, , Disp: , Rfl:   •  rosuvastatin (CRESTOR) 20 MG tablet, Take 1 tablet by mouth Every Night., Disp: 90 tablet, Rfl: 3  •  Semaglutide (OZEMPIC) 0.25 or 0.5 MG/DOSE solution pen-injector, Inject 1 mg under the skin into the appropriate area as directed 1 (One) Time  "Per Week., Disp: , Rfl:   •  traMADol (ULTRAM) 50 MG tablet, Take 50 mg by mouth Every 6 (Six) Hours As Needed for Moderate Pain ., Disp: , Rfl:   •  furosemide (LASIX) 20 MG tablet, Take 1 tablet by mouth Daily., Disp: 30 tablet, Rfl: 0    Allergies   Allergen Reactions   • Penicillins Shortness Of Breath   • Bactrim [Sulfamethoxazole-Trimethoprim] GI Intolerance   • Ceftin [Cefuroxime Axetil] GI Intolerance   • Sulfa Antibiotics GI Intolerance   • Ciprofloxacin Itching and Rash         Exam:  Vitals:    03/17/20 0955   Weight: 103 kg (227 lb)   Height: 182.9 cm (72\")       The patient is awake, alert, and oriented and in no apparent distress.    Left upper extremity:    Incisions clean and dry, healed well  No erythema            Assessment:  Diagnoses and all orders for this visit:    Pain of left clavicle    Status post arthroscopy of left shoulder    Traumatic incomplete tear of left rotator cuff, subsequent encounter    Status post left rotator cuff repair    Type 2 diabetes mellitus with complication, with long-term current use of insulin (CMS/[facility])    Essential hypertension          Plan:    Continue with strict precautions for another two weeks  Continue abduction sling   Can go without the abduction pillow some at home  Can slowly progress PROM  Begin AAROM in 2 weeks.  F/u 6 weeks.    Patient's Body mass index is 30.79 kg/m². BMI is above normal parameters. Recommendations include: exercise counseling and nutrition counseling.        Return in about 6 weeks (around 4/28/2020) for recheck.      03/17/20 at 9:56 AM by Troy Barillas MD  "

## 2020-03-17 NOTE — TELEPHONE ENCOUNTER
Popping can be because of the stitches for the rotator cuff.  As long as it is not painful then we will just continue to watch that.    Swelling in both legs can be from a number of different things.  Continue to monitor and continue to elevate his legs when he is not up and going.

## 2020-03-17 NOTE — TELEPHONE ENCOUNTER
Spoke with patient, he reports popping noise from time to time. No pain when this is happening. No decreased or limited ROM during these episodes. Complains of burning in arm near shoulder. This has been off and on for about one week.      Also reports swelling in both legs. Wondering if Hydrocodone 7.5 could be the cause of swelling. He reports swelling goes down with elevation.

## 2020-03-17 NOTE — TELEPHONE ENCOUNTER
NUNU        Patient forgot to mention at his visit today he is having a lot of popping in his shoulder. There is no pain but it is bothering him with the noise. He wants to know if this is normal. Please contact patient at [phone].

## 2020-03-19 ENCOUNTER — OFFICE VISIT (OUTPATIENT)
Dept: FAMILY MEDICINE CLINIC | Facility: CLINIC | Age: 70
End: 2020-03-19

## 2020-03-19 VITALS
HEART RATE: 86 BPM | HEIGHT: 72 IN | DIASTOLIC BLOOD PRESSURE: 82 MMHG | OXYGEN SATURATION: 99 % | TEMPERATURE: 97.3 F | SYSTOLIC BLOOD PRESSURE: 126 MMHG | WEIGHT: 230.6 LBS | BODY MASS INDEX: 31.23 KG/M2

## 2020-03-19 DIAGNOSIS — M79.89 LEG SWELLING: Primary | ICD-10-CM

## 2020-03-19 DIAGNOSIS — L03.119 CELLULITIS OF LOWER EXTREMITY, UNSPECIFIED LATERALITY: ICD-10-CM

## 2020-03-19 PROCEDURE — 99214 OFFICE O/P EST MOD 30 MIN: CPT | Performed by: NURSE PRACTITIONER

## 2020-03-19 RX ORDER — CLINDAMYCIN HYDROCHLORIDE 300 MG/1
300 CAPSULE ORAL 4 TIMES DAILY
Qty: 28 CAPSULE | Refills: 0 | Status: SHIPPED | OUTPATIENT
Start: 2020-03-19 | End: 2020-03-26

## 2020-03-19 RX ORDER — LANCETS 33 GAUGE
EACH MISCELLANEOUS
COMMUNITY
Start: 2020-02-24 | End: 2022-04-12

## 2020-03-19 RX ORDER — FUROSEMIDE 40 MG/1
40 TABLET ORAL DAILY
Qty: 30 TABLET | Refills: 2 | Status: SHIPPED | OUTPATIENT
Start: 2020-03-19 | End: 2020-10-21 | Stop reason: SDUPTHER

## 2020-03-19 NOTE — PROGRESS NOTES
"Subjective   Jadiel Dutton is a 69 y.o. male. Patient here today with complaints of Follow-up (1 month )  pt here today with complaints still of BLE edema, reports is improved since last visit here but states they are still swollen and now also still erythematous , states legs are burning to touch. Denies itching. Denies fever, chest pain or shortness of breath.     Vitals:    03/19/20 1008   BP: 126/82   Pulse: 86   Temp: 97.3 °F (36.3 °C)   TempSrc: Tympanic   SpO2: 99%   Weight: 105 kg (230 lb 9.6 oz)   Height: 182.9 cm (72\")     Body mass index is 31.27 kg/m².  Past Medical History:   Diagnosis Date   • Abdominal pain    • Abnormal weight loss    • Acute bronchitis    • Anxiety state    • Benign essential hypertension    • Chronic sinusitis    • Constipation    • Cystitis    • Degenerative joint disease involving multiple joints    • Diabetic neuropathy (CMS/HCC)      bilateral hands      • Diastolic dysfunction    • Diverticular disease of colon    • Dyspnea    • Epigastric pain    • Essential hypertension    • Foot ulcer (CMS/HCC)    • Gastroesophageal reflux disease    • Generalized anxiety disorder    • Hyperlipidemia    • Inflammatory bowel disease     Possible Inflammatory Bowel Disease      • Lumbar pain     Pain radiating to lumbar region of back   n      • Pleuritic pain    • Radiculopathy     site unspecified      • Type 2 diabetes mellitus (CMS/HCC)    • Vesicular eczema of hands and feet      Leg Swelling   This is a new problem. The current episode started more than 1 month ago. The problem occurs constantly. The problem has been gradually improving. Associated symptoms include a rash. Nothing aggravates the symptoms. Treatments tried: lasix 20 qd. The treatment provided mild relief.   Wound Infection   This is a new problem. The current episode started more than 1 month ago. The problem occurs constantly. The problem has been gradually improving. Associated symptoms include a rash. The " symptoms are aggravated by standing. Treatments tried: doxycycline. The treatment provided mild relief.        The following portions of the patient's history were reviewed and updated as appropriate: allergies, current medications, past family history, past medical history, past social history, past surgical history and problem list.    Review of Systems   Constitutional: Negative.    HENT: Negative.    Eyes: Negative.    Respiratory: Negative.    Cardiovascular: Positive for leg swelling.   Gastrointestinal: Negative.    Endocrine: Negative.    Genitourinary: Negative.    Musculoskeletal: Negative.    Skin: Positive for color change and rash.   Allergic/Immunologic: Negative.    Neurological: Negative.    Hematological: Negative.    Psychiatric/Behavioral: Negative.        Objective   Physical Exam   Constitutional: He is oriented to person, place, and time. He appears well-developed and well-nourished. No distress.   HENT:   Head: Normocephalic and atraumatic.   Neck: Neck supple.   Cardiovascular: Normal rate, regular rhythm, normal heart sounds and intact distal pulses. Exam reveals no gallop and no friction rub.   No murmur heard.  Pulmonary/Chest: Effort normal and breath sounds normal. No stridor. No respiratory distress. He has no wheezes. He has no rales.   Musculoskeletal: He exhibits edema (RLE with 3+ pitting edema and LLE with 1+ pitting edema, BLE are also erythematous , scaling, dried skin ).        Left lower leg: He exhibits tenderness and swelling.        Legs:  Neurological: He is alert and oriented to person, place, and time.   Skin: Skin is warm and dry. No rash noted. He is not diaphoretic. There is erythema. No pallor.   Psychiatric: He has a normal mood and affect. His behavior is normal.   Nursing note and vitals reviewed.      Assessment/Plan   Jadiel was seen today for follow-up.    Diagnoses and all orders for this visit:    Leg swelling    Cellulitis of lower extremity, unspecified  laterality    Other orders  -     triamcinolone (KENALOG) 0.1 % ointment; Apply  topically to the appropriate area as directed 2 (Two) Times a Day.  -     furosemide (Lasix) 40 MG tablet; Take 1 tablet by mouth Daily.  -     clindamycin (Cleocin) 300 MG capsule; Take 1 capsule by mouth 4 (Four) Times a Day for 7 days.    will increase lasix to 40mg daily until improved and then can decrease to taking prn, advised to increase K in diet while on lasix. Advised to return for cmp and for recheck in the next 2 months, extended follow up at this time due to COVID 19 pandemic but advised to return here sooner if needed, ER if nec. Will give him kenalog oint to apply topically bid and to otherwise use cetaphil cream for BLE dry skin. Will treat with cleocin for cellulitis , he has several antibiotic allergies. He is aware and is in agreement to this plan. All questions and concerns are addressed with understanding noted.

## 2020-03-20 ENCOUNTER — TELEPHONE (OUTPATIENT)
Dept: ORTHOPEDIC SURGERY | Facility: CLINIC | Age: 70
End: 2020-03-20

## 2020-03-20 DIAGNOSIS — Z98.890 STATUS POST ARTHROSCOPY OF LEFT SHOULDER: Primary | ICD-10-CM

## 2020-03-23 ENCOUNTER — HOSPITAL ENCOUNTER (OUTPATIENT)
Dept: PHYSICAL THERAPY | Facility: HOSPITAL | Age: 70
Setting detail: THERAPIES SERIES
Discharge: HOME | End: 2020-03-23

## 2020-03-23 DIAGNOSIS — Z98.890 STATUS POST LEFT ROTATOR CUFF REPAIR: ICD-10-CM

## 2020-03-23 DIAGNOSIS — Z98.890 STATUS POST ARTHROSCOPY OF LEFT SHOULDER: Primary | ICD-10-CM

## 2020-03-23 DIAGNOSIS — S46.012D TRAUMATIC INCOMPLETE TEAR OF LEFT ROTATOR CUFF, SUBSEQUENT ENCOUNTER: ICD-10-CM

## 2020-03-23 PROCEDURE — 97161 PT EVAL LOW COMPLEX 20 MIN: CPT | Performed by: PHYSICAL THERAPIST

## 2020-03-23 NOTE — THERAPY EVALUATION
Outpatient Physical Therapy Ortho Initial Evaluation  [facility] ER     Patient Name: Jadiel Dutton  : 1950  MRN: [id number]  Today's Date: 3/23/2020      Visit Date: 2020  Attendance:  waithing further auth  Subjective Improvement: NA  Next MD Appt: 20  Recert Date: 20    Therapy Diagnosis: left RTCR/SAD/Ramone procedure 20  Allergies       PenicillinsShortness Of Breath   Bactrim [Sulfamethoxazole-trimethoprim]GI Intolerance   Ceftin [Cefuroxime Axetil]GI Intolerance   Sulfa AntibioticsGI Intolerance   CiprofloxacinItching, Rash   [place] as Reviewed Reviewed by You at 1:10 PM CDT     Medications (Admitted on 3/23/2020)     albuterol sulfate  (90 Base) MCG/ACT inhaler     B-D UF III MINI PEN NEEDLES 31G X 5 MM misc     cetirizine (zyrTEC) 10 MG tablet     clindamycin (Cleocin) 300 MG capsule     dexlansoprazole (DEXILANT) 60 MG capsule     furosemide (Lasix) 40 MG tablet     gabapentin (NEURONTIN) 300 MG capsule     HYDROcodone-acetaminophen (NORCO) 7.5-325 MG per tablet     insulin NPH-insulin regular (humuLIN 70/30,novoLIN 70/30) (70-30) 100 UNIT/ML injection     Lancets (ONETOUCH DELICA PLUS [id number]) misc     lisinopril-hydrochlorothiazide (PRINZIDE,ZESTORETIC) 20-25 MG per tablet     ONETOUCH VERIO test strip     rosuvastatin (CRESTOR) 20 MG tablet     Semaglutide (OZEMPIC) 0.25 or 0.5 MG/DOSE solution pen-injector     traMADol (ULTRAM) 50 MG tablet     triamcinolone (KENALOG) 0.1 % ointment        Patient Active Problem List   Diagnosis   • Essential hypertension   • Familial hypercholesterolemia   • Type 2 diabetes mellitus with complication, with long-term current use of insulin (CMS/[facility])   • DDD (degenerative disc disease), thoracolumbar   • Gastroesophageal reflux disease   • Left lower quadrant pain   • Injury of kidney   • Cellulitis   • Spinal stenosis of cervical region   • Degeneration of thoracic or thoracolumbar intervertebral disc   • Abscess of  foot   • Hypokalemia   • Other specified mononeuropathies   • Leukocytosis   • Neuropathic pain syndrome (non-herpetic)   • Systemic infection (CMS/HCC)   • Spinal stenosis of lumbar region   • Tachycardia   • Spondylolisthesis   • Drug-induced constipation   • Dysphagia   • Nausea   • Generalized abdominal cramping   • Chronic pansinusitis   • Anxiety state   • Bilateral hand pain   • Ulnar neuropathy of both upper extremities   • Dupuytren's contracture of both hands   • Bilateral carpal tunnel syndrome   • Skin ulcer of toe of left foot with fat layer exposed (CMS/HCC)   • Diabetic polyneuropathy associated with type 2 diabetes mellitus (CMS/HCC)   • Hammer toe of left foot   • Equinus contracture of ankle   • Exostosis of bone of foot   • Pain of left clavicle   • Traumatic incomplete tear of left rotator cuff   • Strain of acromioclavicular joint   • Post-operative state   • Hypercholesterolemia   • Status post arthroscopy of left shoulder   • Status post left rotator cuff repair   • Chronic left shoulder pain        Past Medical History:   Diagnosis Date   • Abdominal pain    • Abnormal weight loss    • Acute bronchitis    • Anxiety state    • Benign essential hypertension    • Chronic sinusitis    • Constipation    • Cystitis    • Degenerative joint disease involving multiple joints    • Diabetic neuropathy (CMS/HCC)      bilateral hands      • Diastolic dysfunction    • Diverticular disease of colon    • Dyspnea    • Epigastric pain    • Essential hypertension    • Foot ulcer (CMS/HCC)    • Gastroesophageal reflux disease    • Generalized anxiety disorder    • Hyperlipidemia    • Inflammatory bowel disease     Possible Inflammatory Bowel Disease      • Lumbar pain     Pain radiating to lumbar region of back   n      • Pleuritic pain    • Radiculopathy     site unspecified      • Type 2 diabetes mellitus (CMS/HCC)    • Vesicular eczema of hands and feet         Past Surgical History:   Procedure Laterality  Date   • BACK SURGERY      LOWER BACK SURGERY   • CARPAL TUNNEL RELEASE Bilateral    • COLONOSCOPY  06/11/2012    Internal & external hemorrhoids found.   • COLONOSCOPY N/A 8/22/2017    Procedure: COLONOSCOPY;  Surgeon: Cesar Hollis MD;  Location: [facility] ENDOSCOPY;  Service:    • ENDOSCOPY  06/11/2012    Slight stricture in the distal esophagus. Gastritis in stomach. Biopsy taken. Normal duodenum.   • ENDOSCOPY     • ENDOSCOPY N/A 8/22/2017    Procedure: ESOPHAGOGASTRODUODENOSCOPY possible dilation ;  Surgeon: Cesar Hollis MD;  Location: [facility] ENDOSCOPY;  Service:    • ENTEROSCOPY SMALL BOWEL  08/27/2012    Gastritis in stomach. Normal jejunum. Biopsy taken. Normal duodenum   • EYE SURGERY Bilateral     cataracts removed with implants   • FOOT SURGERY     • HAMMER TOE REPAIR Left 12/18/2019    Procedure: FIFTH METATARSAL EXOSTECTOMY, FOURTH HAMMER TOE CORRECTION, GASTROCNEMIUS RECESSION;  Surgeon: Jacoby Serrano DPM;  Location: [facility] OR;  Service: Podiatry   • INJECTION OF MEDICATION  02/28/2014    Depo Medrol   • INJECTION OF MEDICATION  03/07/2014    Rocephin(2)   • SHOULDER ARTHROSCOPY Left 2/7/2020    Procedure: LEFT SHOULDER ARTHROSCOPY with rotator cuff repair, DEDRICK procedure, and Subacromial decompression;  Surgeon: Troy Barillas MD;  Location: [facility] OR;  Service: Orthopedics;  Laterality: Left;   • ULNAR TUNNEL RELEASE         Visit Dx:     ICD-10-CM ICD-9-CM   1. Status post arthroscopy of left shoulder Z98.890 V45.89   2. Traumatic incomplete tear of left rotator cuff, subsequent encounter S46.012D V58.89     840.4         Patient History     Row Name 03/23/20 1300             History    Chief Complaint  Pain;Difficulty with daily activities  -      Brief Description of Current Complaint  Patient underwent left RTCR, SAD, and Dedrick procedure on 2-7-20. Patient has been attending therapy in [place] for a few weeks. They shut down due to coronavirus, and he  tranferred here. Patient is performing elbow exercises and pendulum exercises. currently.   -SW      Patient/Caregiver Goals  Relieve pain;Return to prior level of function  -SW      Hand Dominance  right-handed  -SW      Occupation/sports/leisure activities  laid off from [employer]/household activitites  -SW         Pain     Pain at Present  7  -SW      Is your sleep disturbed?  No  -SW      Is medication used to assist with sleep?  Yes  -SW         Fall Risk Assessment    Any falls in the past year:  Yes  -SW      Number of falls reported in the last 12 months  1  -SW         Daily Activities    Primary Language  English  -SW      Barriers to learning  None  -SW      Pt Participated in POC and Goals  Yes  -SW         Safety    Are you being hurt, hit, or frightened by anyone at home or in your life?  No  -SW      Are you being neglected by a caregiver  No  -SW        User Key  (r) = Recorded By, (t) = Taken By, (c) = Cosigned By    Initials Name Provider Type    SW Lucina Tobar Physical Therapist          PT Ortho     Row Name 03/23/20 1300       Subjective Comments    Subjective Comments  Patient underwent left RTCR, SAD, and Ramone procedure on 2-7-20. Patient has been attending therapy in [place] for a few weeks. They shut down due to coronavirus, and he tranferred here. Patient is performing elbow exercises and pendulum exercises. currently.   -SW       Precautions and Contraindications    Precautions/Limitations  (S) -- no passive motion until 4 weeks/no AAROM until 8 weeks  -SW       Subjective Pain    Able to rate subjective pain?  yes  -SW    Pre-Treatment Pain Level  7  -SW       Left Upper Ext    Lt Shoulder Abduction PROM  90  -SW    Lt Shoulder Flexion PROM  95  -SW    Lt Shoulder External Rotation PROM  0@45  -SW    Lt Shoulder Internal Rotation PROM  40@45  -SW    Lt Elbow Extension/Flexion PROM  -20 for extension  -SW      User Key  (r) = Recorded By, (t) = Taken By, (c) = Cosigned By    Initials Name  Provider Type    Lucina Baker Physical Therapist                      Therapy Education  Education Details: continue pendelum and elbow/wrist exercises     PT OP Goals     Row Name 03/23/20 1300          PT Short Term Goals    STG Date to Achieve  04/13/20  -     STG 1  Patient will be independent in perform HEP when he is able to start AAROM at 8 weeks post op.   -     STG 2  Patient will exhibit full left elbow extension active and passive ROM.  -        Long Term Goals    LTG Date to Achieve  05/04/20  -     LTG 1  Patient will score 60 on quick dash.   -     LTG 2  Patient will exhibit 75% PROM all direction left shoulder.   -     LTG 3  Patient will exhibit 3+/5 MMT left shoulder all directions.   -        Time Calculation    PT Goal Re-Cert Due Date  04/13/20  -       User Key  (r) = Recorded By, (t) = Taken By, (c) = Cosigned By    Initials Name Provider Type    Lucina Baker Physical Therapist          PT Assessment/Plan     Row Name 03/23/20 1300          PT Assessment    Functional Limitations  Decreased safety during functional activities;Limitation in home management;Limitations in community activities;Performance in leisure activities;Limitations in functional capacity and performance;Performance in self-care ADL;Performance in work activities  -     Impairments  Impaired muscle length;Joint mobility;Muscle strength;Pain;Range of motion  -     Assessment Comments  69 yom presents 6 weeks s/p left RTCR/ SAD/ and Ramone procedure with a marked reduction in PROM especially external rotation. Continue PROM for another 2 weeks then initiatie AAROM per MD order.   -     Rehab Potential  Good  -     Patient/caregiver participated in establishment of treatment plan and goals  Yes  -SW     Patient would benefit from skilled therapy intervention  Yes  -SW        PT Plan    PT Frequency  2x/week;3x/week  -     Predicted Duration of Therapy Intervention (Therapy Eval)  6-8 weeks   -SW     Planned CPT's?  PT EVAL LOW COMPLEXITY: 27295;PT RE-EVAL: 27303;PT THER PROC EA 15 MIN: 69128;PT THER ACT EA 15 MIN: 82754;PT MANUAL THERAPY EA 15 MIN: 64953;PT NEUROMUSC RE-EDUCATION EA 15 MIN: 25959;PT SELF CARE/HOME MGMT/TRAIN EA 15: 37461;PT HOT OR COLD PACK TREAT [facility]  -     Physical Therapy Interventions (Optional Details)  home exercise program;joint mobilization;manual therapy techniques;neuromuscular re-education;ROM (Range of Motion);strengthening;stretching  -     PT Plan Comments  Focus on improving PROM at left shoulder and active and passive left elbow extension.   -       User Key  (r) = Recorded By, (t) = Taken By, (c) = Cosigned By    Initials Name Provider Type    Lucina Baker Physical Therapist            OP Exercises     Row Name 03/23/20 1300             Subjective Comments    Subjective Comments  Patient underwent left RTCR, SAD, and Ramone procedure on 2-7-20. Patient has been attending therapy in [place] for a few weeks. They shut down due to coronavirus, and he tranferred here. Patient is performing elbow exercises and pendulum exercises. currently.   -         Subjective Pain    Able to rate subjective pain?  yes  -      Pre-Treatment Pain Level  7  -         Exercise 1    Exercise Name 1  see manual  -        User Key  (r) = Recorded By, (t) = Taken By, (c) = Cosigned By    Initials Name Provider Type    Lucina Baker Physical Therapist           Manual Rx (last 36 hours)      Manual Treatments     Row Name 03/23/20 1300             Manual Rx 1    Manual Rx 1 Location  left shoulder  -      Manual Rx 1 Type  PROM only  -      Manual Rx 1 Duration  8'  -        User Key  (r) = Recorded By, (t) = Taken By, (c) = Cosigned By    Initials Name Provider Type    Lucina Baker Physical Therapist                      Outcome Measure Options: Quick DASH  Quick DASH  Open a tight or new jar.: Unable  Do heavy household chores (e.g., wash walls, wash floors):  Unable  Carry a shopping bag or briefcase: Unable  Wash your back: Unable  Use a knife to cut food: Unable  Recreational activities in which you take some force or impact through your arm, should or hand (e.g. golf, hammering, tennis, etc.): Unable  During the past week, to what extent has your arm, shoulder, or hand problem interfered with your normal social activites with family, friends, neighbors or groups?: Extremely  During the past week, were you limited in your work or other regular daily activities as a result of your arm, shoulder or hand problem?: Unable  Arm, Shoulder, or hand pain: Severe  Tingling (pins and needles) in your arm, shoulder, or hand: Moderate  During the past week, how much difficulty have you had sleeping because of the pain in your arm, shoulder or hand?: No difficulty  Number of Questions Answered: 11  Quick DASH Score: 84.09         Time Calculation:     Start Time: 1300  Stop Time: 1340  Time Calculation (min): 40 min     Therapy Charges for Today     Code Description Service Date Service Provider Modifiers Qty    [id number] HC PT EVAL LOW COMPLEXITY 1 3/23/2020 Lucina Tobar GP 1    [id number]  PT THER SUPP EA 15 MIN 3/23/2020 Lucina Tobar GP 1          PT G-Codes  Outcome Measure Options: Quick DASH  Quick DASH Score: 84.09         Lucina Tobar  3/23/2020

## 2020-03-24 ENCOUNTER — DOCUMENTATION (OUTPATIENT)
Dept: PHYSICAL THERAPY | Facility: HOSPITAL | Age: 70
End: 2020-03-24

## 2020-03-26 ENCOUNTER — TELEPHONE (OUTPATIENT)
Dept: ORTHOPEDIC SURGERY | Facility: CLINIC | Age: 70
End: 2020-03-26

## 2020-03-26 ENCOUNTER — APPOINTMENT (OUTPATIENT)
Dept: PHYSICAL THERAPY | Facility: HOSPITAL | Age: 70
End: 2020-03-26

## 2020-03-26 DIAGNOSIS — Z98.890 STATUS POST LEFT ROTATOR CUFF REPAIR: ICD-10-CM

## 2020-03-26 DIAGNOSIS — S46.012D TRAUMATIC INCOMPLETE TEAR OF LEFT ROTATOR CUFF, SUBSEQUENT ENCOUNTER: ICD-10-CM

## 2020-03-26 DIAGNOSIS — Z98.890 STATUS POST ARTHROSCOPY OF LEFT SHOULDER: Primary | ICD-10-CM

## 2020-03-26 NOTE — TELEPHONE ENCOUNTER
DR EDDY.  PATIENT CALLED STATING [facility] WILL BE STARTING TO DO PHYSICAL THERAPY 2 DAYS A WEEK.  PLEASE SEND ORDER TO [facility].  HE HAS AN APPOINTMENT TOMORROW, 03/27/2020 AT 2:00 PM.

## 2020-03-27 ENCOUNTER — APPOINTMENT (OUTPATIENT)
Dept: PHYSICAL THERAPY | Facility: HOSPITAL | Age: 70
End: 2020-03-27

## 2020-04-01 DIAGNOSIS — S46.012D TRAUMATIC INCOMPLETE TEAR OF LEFT ROTATOR CUFF, SUBSEQUENT ENCOUNTER: ICD-10-CM

## 2020-04-01 DIAGNOSIS — E11.8 TYPE 2 DIABETES MELLITUS WITH COMPLICATION, WITH LONG-TERM CURRENT USE OF INSULIN (HCC): ICD-10-CM

## 2020-04-01 DIAGNOSIS — M89.8X1 PAIN OF LEFT CLAVICLE: ICD-10-CM

## 2020-04-01 DIAGNOSIS — Z79.4 TYPE 2 DIABETES MELLITUS WITH COMPLICATION, WITH LONG-TERM CURRENT USE OF INSULIN (HCC): ICD-10-CM

## 2020-04-01 DIAGNOSIS — I10 ESSENTIAL HYPERTENSION: ICD-10-CM

## 2020-04-01 RX ORDER — HYDROCODONE BITARTRATE AND ACETAMINOPHEN 7.5; 325 MG/1; MG/1
1 TABLET ORAL EVERY 8 HOURS PRN
Qty: 30 TABLET | Refills: 0 | Status: SHIPPED | OUTPATIENT
Start: 2020-04-01 | End: 2020-04-20 | Stop reason: SDUPTHER

## 2020-04-01 NOTE — TELEPHONE ENCOUNTER
DR EDDY.  PATIENT CALLED REQUESTING A REFILL OF HYDROCODONE 7.5/325 MG AT CLINIC PHARM[facility], [place]..  HE HAS 4 PILLS LEFT AND NEEDS THEM TO DO HIS PHYSICAL THERAPY.

## 2020-04-20 DIAGNOSIS — Z79.4 TYPE 2 DIABETES MELLITUS WITH COMPLICATION, WITH LONG-TERM CURRENT USE OF INSULIN (HCC): ICD-10-CM

## 2020-04-20 DIAGNOSIS — M89.8X1 PAIN OF LEFT CLAVICLE: ICD-10-CM

## 2020-04-20 DIAGNOSIS — S46.012D TRAUMATIC INCOMPLETE TEAR OF LEFT ROTATOR CUFF, SUBSEQUENT ENCOUNTER: ICD-10-CM

## 2020-04-20 DIAGNOSIS — E11.8 TYPE 2 DIABETES MELLITUS WITH COMPLICATION, WITH LONG-TERM CURRENT USE OF INSULIN (HCC): ICD-10-CM

## 2020-04-20 DIAGNOSIS — I10 ESSENTIAL HYPERTENSION: ICD-10-CM

## 2020-04-20 RX ORDER — HYDROCODONE BITARTRATE AND ACETAMINOPHEN 7.5; 325 MG/1; MG/1
1 TABLET ORAL EVERY 8 HOURS PRN
Qty: 30 TABLET | Refills: 0 | Status: SHIPPED | OUTPATIENT
Start: 2020-04-20 | End: 2020-05-08 | Stop reason: SDUPTHER

## 2020-04-20 NOTE — TELEPHONE ENCOUNTER
DR EDDY.  PATIENT IS REQUESTING A REFILL OF HYDROCODONE 7.5/325 MG AT CLINIC PHARM[facility], [place].  HE HAS 3 PILLS LEFT.

## 2020-05-08 DIAGNOSIS — E11.8 TYPE 2 DIABETES MELLITUS WITH COMPLICATION, WITH LONG-TERM CURRENT USE OF INSULIN (HCC): ICD-10-CM

## 2020-05-08 DIAGNOSIS — M89.8X1 PAIN OF LEFT CLAVICLE: ICD-10-CM

## 2020-05-08 DIAGNOSIS — I10 ESSENTIAL HYPERTENSION: ICD-10-CM

## 2020-05-08 DIAGNOSIS — S46.012D TRAUMATIC INCOMPLETE TEAR OF LEFT ROTATOR CUFF, SUBSEQUENT ENCOUNTER: ICD-10-CM

## 2020-05-08 DIAGNOSIS — Z79.4 TYPE 2 DIABETES MELLITUS WITH COMPLICATION, WITH LONG-TERM CURRENT USE OF INSULIN (HCC): ICD-10-CM

## 2020-05-08 RX ORDER — HYDROCODONE BITARTRATE AND ACETAMINOPHEN 7.5; 325 MG/1; MG/1
1 TABLET ORAL EVERY 8 HOURS PRN
Qty: 30 TABLET | Refills: 0 | Status: SHIPPED | OUTPATIENT
Start: 2020-05-08 | End: 2020-06-01 | Stop reason: SDUPTHER

## 2020-05-08 NOTE — TELEPHONE ENCOUNTER
DR EDDY.  PATIENT IS REQUESTING A  REFILL OF HYDROCODONE 7.5 / 325 AT CLINIC PHARM[facility]Y, [place].  HE HAS 1 PILL LEFT.

## 2020-05-19 ENCOUNTER — OFFICE VISIT (OUTPATIENT)
Dept: FAMILY MEDICINE CLINIC | Facility: CLINIC | Age: 70
End: 2020-05-19

## 2020-05-19 DIAGNOSIS — Z79.4 TYPE 2 DIABETES MELLITUS WITH COMPLICATION, WITH LONG-TERM CURRENT USE OF INSULIN (HCC): ICD-10-CM

## 2020-05-19 DIAGNOSIS — E78.01 FAMILIAL HYPERCHOLESTEROLEMIA: ICD-10-CM

## 2020-05-19 DIAGNOSIS — I10 ESSENTIAL HYPERTENSION: Primary | ICD-10-CM

## 2020-05-19 DIAGNOSIS — S46.012D TRAUMATIC INCOMPLETE TEAR OF LEFT ROTATOR CUFF, SUBSEQUENT ENCOUNTER: ICD-10-CM

## 2020-05-19 DIAGNOSIS — E11.8 TYPE 2 DIABETES MELLITUS WITH COMPLICATION, WITH LONG-TERM CURRENT USE OF INSULIN (HCC): ICD-10-CM

## 2020-05-19 PROCEDURE — G2025 DIS SITE TELE SVCS RHC/FQHC: HCPCS | Performed by: NURSE PRACTITIONER

## 2020-05-19 NOTE — PROGRESS NOTES
Subjective   Jadiel Dutton is a 69 y.o. male. Patient here today with complaints of No chief complaint on file.  pt here today for recheck of htn, hyperlipidemia, diabetes, states he is scheduled to see carson breaux, this afternoon at 2:30. Reports not having any problems, is continuing follow up with dr mares and [facility] PT dept for L rotator cuff tear with subsequent surgery.  is on pain meds from ortho, helps some with the pain.  is due to have labs with endocrinology this afternoon and will bring copies of these with him to the clinic to be scanned in his chart here as well.     There were no vitals filed for this visit.  There is no height or weight on file to calculate BMI.  Past Medical History:   Diagnosis Date   • Abdominal pain    • Abnormal weight loss    • Acute bronchitis    • Anxiety state    • Benign essential hypertension    • Chronic sinusitis    • Constipation    • Cystitis    • Degenerative joint disease involving multiple joints    • Diabetic neuropathy (CMS/HCC)      bilateral hands      • Diastolic dysfunction    • Diverticular disease of colon    • Dyspnea    • Epigastric pain    • Essential hypertension    • Foot ulcer (CMS/HCC)    • Gastroesophageal reflux disease    • Generalized anxiety disorder    • Hyperlipidemia    • Inflammatory bowel disease     Possible Inflammatory Bowel Disease      • Lumbar pain     Pain radiating to lumbar region of back   n      • Pleuritic pain    • Radiculopathy     site unspecified      • Type 2 diabetes mellitus (CMS/HCC)    • Vesicular eczema of hands and feet      Hypertension   This is a chronic problem. The current episode started more than 1 year ago. The problem is unchanged. The problem is controlled. Pertinent negatives include no chest pain, headaches or shortness of breath. Risk factors for coronary artery disease include diabetes mellitus, dyslipidemia, obesity, male gender and stress. Past treatments include lifestyle changes,  diuretics and ACE inhibitors. Current antihypertension treatment includes lifestyle changes, diuretics and ACE inhibitors. The current treatment provides mild improvement. Compliance problems include exercise, diet, medication cost and psychosocial issues.    Hyperlipidemia   This is a chronic problem. The current episode started more than 1 year ago. Exacerbating diseases include diabetes and obesity. Factors aggravating his hyperlipidemia include thiazides and fatty foods. Pertinent negatives include no chest pain, myalgias or shortness of breath. Current antihyperlipidemic treatment includes statins, diet change and exercise. Compliance problems include adherence to exercise, adherence to diet, medication cost and psychosocial issues.  Risk factors for coronary artery disease include obesity, male sex, hypertension, diabetes mellitus and dyslipidemia.        The following portions of the patient's history were reviewed and updated as appropriate: allergies, current medications, past family history, past medical history, past social history, past surgical history and problem list.    Review of Systems   Constitutional: Negative.    HENT: Negative.    Eyes: Negative.    Respiratory: Negative.  Negative for shortness of breath.    Cardiovascular: Negative.  Negative for chest pain.   Gastrointestinal: Negative.    Endocrine: Negative.    Genitourinary: Negative.    Musculoskeletal: Positive for arthralgias. Negative for myalgias.   Skin: Negative.    Allergic/Immunologic: Negative.    Neurological: Negative.  Negative for headaches.   Hematological: Negative.    Psychiatric/Behavioral: Negative.        Objective   Physical Exam  Deferred, telephone visit today   Assessment/Plan   Diagnoses and all orders for this visit:    Essential hypertension    Familial hypercholesterolemia    Traumatic incomplete tear of left rotator cuff, subsequent encounter  Comments:  continues follow up with dr mares and PT at [facility]     Type  2 diabetes mellitus with complication, with long-term current use of insulin (CMS/[facility])  Comments:  seeing dr cornejo this afternoon at 2:30 and due for labs with him, will obtain copies and bring here for review as well so he doesn't have to have both places     will have labs at endocrinology office and bring in copies for review at his convenience  No refills needed today  Follow up with ortho and PT as scheduled  Follow up here in 6 months for recheck or sooner if nec  He is aware and is in agreement to this plan  All questions and concerns are addressed with understanding noted  You have chosen to receive care through a telephone visit. Do you consent to use a telephone visit for your medical care today? Yes  This visit has been rescheduled as a phone visit to comply with patient safety concerns in accordance with CDC recommendations. Total time of discussion was 5 minutes.

## 2020-05-22 ENCOUNTER — OFFICE VISIT (OUTPATIENT)
Dept: ORTHOPEDIC SURGERY | Facility: CLINIC | Age: 70
End: 2020-05-22

## 2020-05-22 VITALS — WEIGHT: 221 LBS | BODY MASS INDEX: 29.93 KG/M2 | HEIGHT: 72 IN

## 2020-05-22 DIAGNOSIS — Z79.4 TYPE 2 DIABETES MELLITUS WITH COMPLICATION, WITH LONG-TERM CURRENT USE OF INSULIN (HCC): ICD-10-CM

## 2020-05-22 DIAGNOSIS — M25.512 CHRONIC LEFT SHOULDER PAIN: ICD-10-CM

## 2020-05-22 DIAGNOSIS — Z98.890 STATUS POST ARTHROSCOPY OF LEFT SHOULDER: ICD-10-CM

## 2020-05-22 DIAGNOSIS — G89.29 CHRONIC LEFT SHOULDER PAIN: ICD-10-CM

## 2020-05-22 DIAGNOSIS — S46.012D TRAUMATIC INCOMPLETE TEAR OF LEFT ROTATOR CUFF, SUBSEQUENT ENCOUNTER: Primary | ICD-10-CM

## 2020-05-22 DIAGNOSIS — E11.8 TYPE 2 DIABETES MELLITUS WITH COMPLICATION, WITH LONG-TERM CURRENT USE OF INSULIN (HCC): ICD-10-CM

## 2020-05-22 PROCEDURE — 99213 OFFICE O/P EST LOW 20 MIN: CPT | Performed by: ORTHOPAEDIC SURGERY

## 2020-05-22 RX ORDER — NABUMETONE 750 MG/1
750 TABLET, FILM COATED ORAL 2 TIMES DAILY
Qty: 60 TABLET | Refills: 0 | Status: SHIPPED | OUTPATIENT
Start: 2020-05-22 | End: 2020-11-19

## 2020-05-22 RX ORDER — DIPHENHYDRAMINE HCL 25 MG
25 CAPSULE ORAL EVERY 6 HOURS PRN
COMMUNITY
End: 2021-01-14

## 2020-06-01 DIAGNOSIS — Z79.4 TYPE 2 DIABETES MELLITUS WITH COMPLICATION, WITH LONG-TERM CURRENT USE OF INSULIN (HCC): ICD-10-CM

## 2020-06-01 DIAGNOSIS — M89.8X1 PAIN OF LEFT CLAVICLE: ICD-10-CM

## 2020-06-01 DIAGNOSIS — E11.8 TYPE 2 DIABETES MELLITUS WITH COMPLICATION, WITH LONG-TERM CURRENT USE OF INSULIN (HCC): ICD-10-CM

## 2020-06-01 DIAGNOSIS — I10 ESSENTIAL HYPERTENSION: ICD-10-CM

## 2020-06-01 DIAGNOSIS — S46.012D TRAUMATIC INCOMPLETE TEAR OF LEFT ROTATOR CUFF, SUBSEQUENT ENCOUNTER: ICD-10-CM

## 2020-06-01 RX ORDER — HYDROCODONE BITARTRATE AND ACETAMINOPHEN 7.5; 325 MG/1; MG/1
1 TABLET ORAL DAILY PRN
Qty: 15 TABLET | Refills: 0 | Status: SHIPPED | OUTPATIENT
Start: 2020-06-01 | End: 2020-06-16

## 2020-06-01 NOTE — TELEPHONE ENCOUNTER
DR EDDY.  PATIENT IS REQUESTING A REFILL OF HYDROCODONE 7.5MG AT [facility], [place].  TRAMADOL IS NOT HELPING.  HE ESPECIALLY NEEDS THIS FOR PHYSICAL THERAPY.

## 2020-07-02 ENCOUNTER — OFFICE VISIT (OUTPATIENT)
Dept: ORTHOPEDIC SURGERY | Facility: CLINIC | Age: 70
End: 2020-07-02

## 2020-07-02 VITALS — HEIGHT: 72 IN | WEIGHT: 229 LBS | BODY MASS INDEX: 31.02 KG/M2

## 2020-07-02 DIAGNOSIS — E11.8 TYPE 2 DIABETES MELLITUS WITH COMPLICATION, WITH LONG-TERM CURRENT USE OF INSULIN (HCC): ICD-10-CM

## 2020-07-02 DIAGNOSIS — G89.29 CHRONIC LEFT SHOULDER PAIN: ICD-10-CM

## 2020-07-02 DIAGNOSIS — Z79.4 TYPE 2 DIABETES MELLITUS WITH COMPLICATION, WITH LONG-TERM CURRENT USE OF INSULIN (HCC): ICD-10-CM

## 2020-07-02 DIAGNOSIS — Z98.890 STATUS POST LEFT ROTATOR CUFF REPAIR: ICD-10-CM

## 2020-07-02 DIAGNOSIS — M25.512 CHRONIC LEFT SHOULDER PAIN: ICD-10-CM

## 2020-07-02 DIAGNOSIS — S46.012D TRAUMATIC INCOMPLETE TEAR OF LEFT ROTATOR CUFF, SUBSEQUENT ENCOUNTER: Primary | ICD-10-CM

## 2020-07-02 DIAGNOSIS — Z98.890 STATUS POST ARTHROSCOPY OF LEFT SHOULDER: ICD-10-CM

## 2020-07-02 PROCEDURE — 99213 OFFICE O/P EST LOW 20 MIN: CPT | Performed by: ORTHOPAEDIC SURGERY

## 2020-07-30 ENCOUNTER — OFFICE VISIT (OUTPATIENT)
Dept: FAMILY MEDICINE CLINIC | Facility: CLINIC | Age: 70
End: 2020-07-30

## 2020-07-30 VITALS
WEIGHT: 238 LBS | BODY MASS INDEX: 32.23 KG/M2 | HEIGHT: 72 IN | SYSTOLIC BLOOD PRESSURE: 128 MMHG | TEMPERATURE: 97.7 F | OXYGEN SATURATION: 98 % | DIASTOLIC BLOOD PRESSURE: 74 MMHG | HEART RATE: 104 BPM

## 2020-07-30 DIAGNOSIS — Z79.4 TYPE 2 DIABETES MELLITUS WITH COMPLICATION, WITH LONG-TERM CURRENT USE OF INSULIN (HCC): ICD-10-CM

## 2020-07-30 DIAGNOSIS — R05.9 COUGH: ICD-10-CM

## 2020-07-30 DIAGNOSIS — J01.10 ACUTE NON-RECURRENT FRONTAL SINUSITIS: Primary | ICD-10-CM

## 2020-07-30 DIAGNOSIS — E11.8 TYPE 2 DIABETES MELLITUS WITH COMPLICATION, WITH LONG-TERM CURRENT USE OF INSULIN (HCC): ICD-10-CM

## 2020-07-30 DIAGNOSIS — J01.00 ACUTE NON-RECURRENT MAXILLARY SINUSITIS: ICD-10-CM

## 2020-07-30 PROCEDURE — 99214 OFFICE O/P EST MOD 30 MIN: CPT | Performed by: NURSE PRACTITIONER

## 2020-07-30 RX ORDER — CEPHALEXIN 500 MG/1
500 CAPSULE ORAL 2 TIMES DAILY
Qty: 20 CAPSULE | Refills: 0 | Status: SHIPPED | OUTPATIENT
Start: 2020-07-30 | End: 2020-08-20

## 2020-07-30 RX ORDER — HYDROCODONE BITARTRATE AND ACETAMINOPHEN 7.5; 325 MG/1; MG/1
TABLET ORAL AS NEEDED
COMMUNITY
Start: 2020-07-01 | End: 2022-01-13 | Stop reason: ALTCHOICE

## 2020-07-30 NOTE — PROGRESS NOTES
"Subjective   Jadiel Dutton is a 69 y.o. male. Patient here today with complaints of Nasal Congestion and Cough  Pt in office c/o cough and sinus complaints x 3 weeks. Associated symptoms include headache, facial pressure, PND, yellow drainage, congestion, wheezing. Denies fever, chest tightness, SOB. States symptoms are worse at bedtime when he lays down. Pt has tried Nasal saline BID, Afrin hs, Zyrtec 10mg hs, Flonase/Nascort and PHILLY HFA with minimal relief. Pt reports he did have some leftover Clindamycin at home that he took x 2 days with no improvement. Pt has \"seasonal allergies\" that contribute to his sinus complaints. Reports as a teenager he was on immunotherapy and had much improvement on his allergy symptoms. Pt has never had a CT Sinus or seen an ENT. Pt sees Dr. Gottlieb for DM and was currently taken off of Humulin and Ozempic and started on Novolin L. Pt reports prior to sickness his FSBS were between 60-90, but since being sick they have been elevated around 120. Pt has a follow up appt with Dr. Gottlieb in August. Reports he had lab work in June from Dr. Gottlieb, we will request this for our chart. Pt had rotator cuff surgery in Feb 2020 with Dr. Barillas, pt reports his shoulder is much improved, but he still has mild discomfort. Pt will follow up with Dr. Barillas as scheduled in August.     Vitals:    07/30/20 1040   BP: 128/74   Pulse: 104   Temp: 97.7 °F (36.5 °C)   TempSrc: Tympanic   SpO2: 98%   Weight: 108 kg (238 lb)   Height: 182.9 cm (72\")     Body mass index is 32.28 kg/m².  Past Medical History:   Diagnosis Date   • Abdominal pain    • Abnormal weight loss    • Acute bronchitis    • Anxiety state    • Benign essential hypertension    • Chronic sinusitis    • Constipation    • Cystitis    • Degenerative joint disease involving multiple joints    • Diabetic neuropathy (CMS/HCC)      bilateral hands      • Diastolic dysfunction    • Diverticular disease of colon    • Dyspnea    • " Epigastric pain    • Essential hypertension    • Foot ulcer (CMS/HCC)    • Gastroesophageal reflux disease    • Generalized anxiety disorder    • Hyperlipidemia    • Inflammatory bowel disease     Possible Inflammatory Bowel Disease      • Lumbar pain     Pain radiating to lumbar region of back   n      • Pleuritic pain    • Radiculopathy     site unspecified      • Type 2 diabetes mellitus (CMS/HCC)    • Vesicular eczema of hands and feet      Cough   This is a new problem. The current episode started 1 to 4 weeks ago. The problem has been unchanged. The cough is productive of sputum. Associated symptoms include headaches, nasal congestion, postnasal drip, rhinorrhea, a sore throat and wheezing. Pertinent negatives include no chest pain, chills, ear congestion, ear pain, fever, heartburn, hemoptysis, myalgias, rash, shortness of breath, sweats or weight loss. The symptoms are aggravated by lying down and pollens. He has tried a beta-agonist inhaler for the symptoms. The treatment provided mild relief. His past medical history is significant for bronchitis and environmental allergies. There is no history of asthma, bronchiectasis, COPD, emphysema or pneumonia.   Sinusitis   This is a new problem. The current episode started 1 to 4 weeks ago. The problem has been gradually worsening since onset. There has been no fever. Associated symptoms include congestion, coughing, headaches, a hoarse voice, neck pain, sinus pressure and a sore throat. Pertinent negatives include no chills, diaphoresis, ear pain, shortness of breath, sneezing or swollen glands. Past treatments include saline sprays and spray decongestants. The treatment provided mild relief.        The following portions of the patient's history were reviewed and updated as appropriate: allergies, current medications, past family history, past medical history, past social history, past surgical history and problem list.    Review of Systems   Constitutional:  Negative for chills, diaphoresis, fever and weight loss.   HENT: Positive for congestion, hoarse voice, postnasal drip, rhinorrhea, sinus pressure and sore throat. Negative for ear pain and sneezing.    Respiratory: Positive for cough and wheezing. Negative for hemoptysis and shortness of breath.    Cardiovascular: Negative for chest pain.   Gastrointestinal: Negative for heartburn.   Musculoskeletal: Positive for neck pain. Negative for myalgias.   Skin: Negative for rash.   Allergic/Immunologic: Positive for environmental allergies.   Neurological: Positive for headaches.       Objective   Physical Exam   Constitutional: He is oriented to person, place, and time. He appears well-developed and well-nourished. No distress.   HENT:   Head: Normocephalic.   Right Ear: Hearing and external ear normal. Tympanic membrane is bulging.   Left Ear: Hearing and external ear normal. Tympanic membrane is bulging.   Nose: Mucosal edema and sinus tenderness present. Right sinus exhibits maxillary sinus tenderness and frontal sinus tenderness. Left sinus exhibits maxillary sinus tenderness and frontal sinus tenderness.   Mouth/Throat: Mucous membranes are normal. Posterior oropharyngeal erythema present. No oropharyngeal exudate. No tonsillar exudate.   Eyes: Pupils are equal, round, and reactive to light. Conjunctivae and EOM are normal. Right eye exhibits no discharge. Left eye exhibits no discharge. No scleral icterus.   Neck: Normal range of motion. Neck supple. No JVD present. No tracheal deviation present. No thyromegaly present.   Cardiovascular: Normal rate, regular rhythm, normal heart sounds and intact distal pulses. Exam reveals no gallop and no friction rub.   No murmur heard.  Pulmonary/Chest: Effort normal. No stridor. No respiratory distress. He has no wheezes. He has no rales. He exhibits no tenderness.   Abdominal: Soft. Bowel sounds are normal. He exhibits no distension and no mass. There is no tenderness. There  is no rebound and no guarding. No hernia.   Musculoskeletal: He exhibits edema (gibson lower extremities 1+). He exhibits no tenderness or deformity.   ROM limited on L shoulder d/t rotator cuff surgery in Feb 2020.   Lymphadenopathy:     He has no cervical adenopathy.   Neurological: He is alert and oriented to person, place, and time.   Skin: Skin is warm and dry. Rash (back ) noted. Rash is macular, papular, maculopapular and pustular. He is not diaphoretic. No pallor.        Psychiatric: He has a normal mood and affect. His behavior is normal. Judgment and thought content normal.       Assessment/Plan   Jadiel was seen today for nasal congestion and cough.    Diagnoses and all orders for this visit:    Acute non-recurrent frontal sinusitis    Cough    Acute non-recurrent maxillary sinusitis    Type 2 diabetes mellitus with complication, with long-term current use of insulin (CMS/[facility])    Other orders  -     cephalexin (Keflex) 500 MG capsule; Take 1 capsule by mouth 2 (Two) Times a Day.    Will request most recent labs from Dr. Gottlieb, when dr gottlieb's office called we were informed pt did not have labs recently with him and had not seen him recently   Start Keflex 500mg BID x 10 days  Hold Zyrtec while on Keflex, after completion of ABX restart Zyrtec 10mg qhs  Try to wean off Afrin, use daily hs with Flonase qam then in 1 week, STOP Afrin but continue Flonase  Consider CT Scan/ENT/Allergy referral if symptoms persist or worsen   Continue all other medications as previously outlined  He is aware and in agreement with plan   All questions and concerns addressed  Follow up in clinic in 4 weeks or sooner if nec

## 2020-08-03 RX ORDER — FLUTICASONE PROPIONATE 50 MCG
SPRAY, SUSPENSION (ML) NASAL
Qty: 16 G | Refills: 0 | Status: SHIPPED | OUTPATIENT
Start: 2020-08-03 | End: 2021-05-20 | Stop reason: SDUPTHER

## 2020-08-20 ENCOUNTER — OFFICE VISIT (OUTPATIENT)
Dept: FAMILY MEDICINE CLINIC | Facility: CLINIC | Age: 70
End: 2020-08-20

## 2020-08-20 VITALS
OXYGEN SATURATION: 98 % | TEMPERATURE: 98 F | BODY MASS INDEX: 31.91 KG/M2 | WEIGHT: 235.6 LBS | SYSTOLIC BLOOD PRESSURE: 118 MMHG | HEART RATE: 103 BPM | DIASTOLIC BLOOD PRESSURE: 80 MMHG | HEIGHT: 72 IN

## 2020-08-20 DIAGNOSIS — J01.00 ACUTE NON-RECURRENT MAXILLARY SINUSITIS: Primary | ICD-10-CM

## 2020-08-20 PROCEDURE — 96372 THER/PROPH/DIAG INJ SC/IM: CPT | Performed by: NURSE PRACTITIONER

## 2020-08-20 PROCEDURE — 99213 OFFICE O/P EST LOW 20 MIN: CPT | Performed by: NURSE PRACTITIONER

## 2020-08-20 RX ORDER — METHYLPREDNISOLONE ACETATE 80 MG/ML
60 INJECTION, SUSPENSION INTRA-ARTICULAR; INTRALESIONAL; INTRAMUSCULAR; SOFT TISSUE ONCE
Status: COMPLETED | OUTPATIENT
Start: 2020-08-20 | End: 2020-08-20

## 2020-08-20 RX ORDER — CEPHALEXIN 500 MG/1
500 CAPSULE ORAL 2 TIMES DAILY
Qty: 20 CAPSULE | Refills: 0 | Status: SHIPPED | OUTPATIENT
Start: 2020-08-20 | End: 2020-09-21

## 2020-08-20 RX ADMIN — METHYLPREDNISOLONE ACETATE 60 MG: 80 INJECTION, SUSPENSION INTRA-ARTICULAR; INTRALESIONAL; INTRAMUSCULAR; SOFT TISSUE at 13:38

## 2020-08-20 NOTE — PROGRESS NOTES
"Subjective   Jadiel Dutton is a 69 y.o. male. Patient here today with complaints of Allergies and Tinnitus  pt here today for complaints of ears \"ringing\", nasal congestion, headache, was treated with flonase and keflex recently and states symptoms improved but have not resolved. Has mild cough, denies known covid 19 exposure. Denies fever    Vitals:    08/20/20 1317   BP: 118/80   Pulse: 103   Temp: 98 °F (36.7 °C)   SpO2: 98%   Weight: 107 kg (235 lb 9.6 oz)   Height: 182.9 cm (72\")   PainSc: 0-No pain     Body mass index is 31.95 kg/m².  Past Medical History:   Diagnosis Date   • Abdominal pain    • Abnormal weight loss    • Acute bronchitis    • Anxiety state    • Benign essential hypertension    • Chronic sinusitis    • Constipation    • Cystitis    • Degenerative joint disease involving multiple joints    • Diabetic neuropathy (CMS/HCC)      bilateral hands      • Diastolic dysfunction    • Diverticular disease of colon    • Dyspnea    • Epigastric pain    • Essential hypertension    • Foot ulcer (CMS/HCC)    • Gastroesophageal reflux disease    • Generalized anxiety disorder    • Hyperlipidemia    • Inflammatory bowel disease     Possible Inflammatory Bowel Disease      • Lumbar pain     Pain radiating to lumbar region of back   n      • Pleuritic pain    • Radiculopathy     site unspecified      • Type 2 diabetes mellitus (CMS/HCC)    • Vesicular eczema of hands and feet      Sinusitis   This is a new problem. The current episode started 1 to 4 weeks ago. The problem has been gradually improving since onset. There has been no fever. The pain is mild. Associated symptoms include congestion, coughing, ear pain, headaches, a hoarse voice, sinus pressure and a sore throat. Past treatments include antibiotics and spray decongestants. The treatment provided mild relief.        The following portions of the patient's history were reviewed and updated as appropriate: allergies, current medications, past family " history, past medical history, past social history, past surgical history and problem list.    Review of Systems   Constitutional: Negative.    HENT: Positive for congestion, ear pain, hoarse voice, sinus pressure and sore throat.    Eyes: Negative.    Respiratory: Positive for cough.    Cardiovascular: Negative.    Gastrointestinal: Negative.    Endocrine: Negative.    Genitourinary: Negative.    Musculoskeletal: Negative.    Skin: Negative.    Allergic/Immunologic: Negative.    Neurological: Positive for headaches.   Hematological: Negative.    Psychiatric/Behavioral: Negative.        Objective   Physical Exam   Constitutional: He is oriented to person, place, and time. He appears well-developed and well-nourished. No distress.   HENT:   Head: Normocephalic and atraumatic.   Right Ear: Hearing, external ear and ear canal normal. There is tenderness. A middle ear effusion is present.   Left Ear: Hearing, external ear and ear canal normal. There is tenderness. A middle ear effusion is present.   Nose: Mucosal edema and rhinorrhea present. Right sinus exhibits maxillary sinus tenderness. Left sinus exhibits maxillary sinus tenderness.   Mouth/Throat: Uvula is midline and mucous membranes are normal. Posterior oropharyngeal erythema (mild erythema and cobblestoning appearance noted) present. No tonsillar exudate.   Neck: Neck supple.   Cardiovascular: Normal rate and regular rhythm. Exam reveals no gallop and no friction rub.   No murmur heard.  Pulmonary/Chest: Effort normal and breath sounds normal. No stridor. No respiratory distress. He has no wheezes. He has no rales.   Neurological: He is alert and oriented to person, place, and time.   Skin: Skin is warm and dry. No rash noted. He is not diaphoretic. No erythema. No pallor.   Psychiatric: He has a normal mood and affect. His behavior is normal.   Nursing note and vitals reviewed.      Assessment/Plan   Jadiel was seen today for allergies and  tinnitus.    Diagnoses and all orders for this visit:    Acute non-recurrent maxillary sinusitis  -     cephalexin (Keflex) 500 MG capsule; Take 1 capsule by mouth 2 (Two) Times a Day.  -     methylPREDNISolone acetate (DEPO-medrol) injection 60 mg    he is given depo medrol 60mg inj IM in office today, meet well  Will extend keflex again for another 10d and have him stop claritin, restart flonase  If symptoms persist/worsen he is asked to RTC for recheck  He is aware and is in agreement to this plan  All questions and concerns are addressed with understanding noted

## 2020-09-21 ENCOUNTER — OFFICE VISIT (OUTPATIENT)
Dept: FAMILY MEDICINE CLINIC | Facility: CLINIC | Age: 70
End: 2020-09-21

## 2020-09-21 VITALS
HEART RATE: 98 BPM | WEIGHT: 236.8 LBS | BODY MASS INDEX: 32.07 KG/M2 | SYSTOLIC BLOOD PRESSURE: 120 MMHG | HEIGHT: 72 IN | TEMPERATURE: 98.2 F | DIASTOLIC BLOOD PRESSURE: 70 MMHG

## 2020-09-21 DIAGNOSIS — L73.9 FOLLICULITIS: ICD-10-CM

## 2020-09-21 DIAGNOSIS — R05.8 NOCTURNAL COUGH WITH WHEEZE: ICD-10-CM

## 2020-09-21 DIAGNOSIS — R06.2 NOCTURNAL COUGH WITH WHEEZE: ICD-10-CM

## 2020-09-21 DIAGNOSIS — J34.89 TENDERNESS OVER FRONTAL SINUS: ICD-10-CM

## 2020-09-21 DIAGNOSIS — J34.89 TENDERNESS OVER MAXILLARY SINUS: ICD-10-CM

## 2020-09-21 DIAGNOSIS — J34.89 SINUS DRAINAGE: Primary | ICD-10-CM

## 2020-09-21 PROCEDURE — 99213 OFFICE O/P EST LOW 20 MIN: CPT | Performed by: NURSE PRACTITIONER

## 2020-09-21 RX ORDER — DOXYCYCLINE HYCLATE 100 MG/1
100 CAPSULE ORAL 2 TIMES DAILY
Qty: 14 CAPSULE | Refills: 0 | Status: SHIPPED | OUTPATIENT
Start: 2020-09-21 | End: 2020-09-28

## 2020-09-21 RX ORDER — HYDROXYZINE HYDROCHLORIDE 25 MG/1
25 TABLET, FILM COATED ORAL
Qty: 30 TABLET | Refills: 3 | Status: SHIPPED | OUTPATIENT
Start: 2020-09-21 | End: 2020-11-28

## 2020-09-21 NOTE — PROGRESS NOTES
"Subjective   Jadiel Dutton is a 70 y.o. male. Patient here today with complaints of Allergic Rhinitis and Abrasion  Pt is in office today with complaints of drainage and coughing. Pt reports at last visit he was given Keflex for a sinus infection, he was only able to take 4 days of this medicine due to nausea and vomiting. He reports he has been having yellow tinged drainage and sinus pressure. He states the coughing is worse at night when he lays down to sleep. Denies fever, headache, or chest tightness. He states he does have seasonal allergies, but is only taking Flonase daily and no antihistamines. He is also concerned that he is bruising more often. Denies the use of Aspirin or any blood thinners. Is on relafen for L shoulder pain. Also complaining of rash which itches on back, worsening, has tried cleansers without symptom relief.     Vitals:    09/21/20 1314   BP: 120/70   Pulse: 98   Temp: 98.2 °F (36.8 °C)   Weight: 107 kg (236 lb 12.8 oz)   Height: 182.9 cm (72\")   PainSc: 0-No pain     Body mass index is 32.12 kg/m².  Past Medical History:   Diagnosis Date   • Abdominal pain    • Abnormal weight loss    • Acute bronchitis    • Anxiety state    • Benign essential hypertension    • Chronic sinusitis    • Constipation    • Cystitis    • Degenerative joint disease involving multiple joints    • Diabetic neuropathy (CMS/HCC)      bilateral hands      • Diastolic dysfunction    • Diverticular disease of colon    • Dyspnea    • Epigastric pain    • Essential hypertension    • Foot ulcer (CMS/HCC)    • Gastroesophageal reflux disease    • Generalized anxiety disorder    • Hyperlipidemia    • Inflammatory bowel disease     Possible Inflammatory Bowel Disease      • Lumbar pain     Pain radiating to lumbar region of back   n      • Pleuritic pain    • Radiculopathy     site unspecified      • Type 2 diabetes mellitus (CMS/HCC)    • Vesicular eczema of hands and feet      Allergic Rhinitis  Presents for " follow-up visit. He complains of congestion, cough, ear pain, rash, rhinorrhea, sinus pressure (frontal, maxillary\), sneezing and wheezing (night time). He reports no eye itching, fatigue, fever, headaches, hoarse voice, itchy nose, sore throat or swollen glands. The problem occurs intermittently. Timing of symptoms is constant. Symptom severity has been moderate. Compliance with medications is 51-75%. Compliance problems include medication side effects.  Side effects of treatment include GI discomfort.   Abrasion  This is a recurrent problem. The current episode started more than 1 month ago. The problem occurs intermittently. Associated symptoms include arthralgias, congestion, coughing, myalgias and a rash. Pertinent negatives include no abdominal pain, anorexia, change in bowel habit, chest pain, chills, diaphoresis, fatigue, fever, headaches, joint swelling, nausea, neck pain, numbness, sore throat, swollen glands, urinary symptoms, vertigo, visual change, vomiting or weakness. He has tried nothing for the symptoms. The treatment provided no relief.   Rash  This is a new problem. The current episode started 1 to 4 weeks ago. The problem has been gradually worsening since onset. The affected locations include the back. The rash is characterized by redness and itchiness. He was exposed to nothing. Associated symptoms include congestion, coughing and rhinorrhea. Pertinent negatives include no anorexia, fatigue, fever, shortness of breath, sore throat or vomiting. Past treatments include moisturizer. The treatment provided no relief.        The following portions of the patient's history were reviewed and updated as appropriate: allergies, current medications, past family history, past medical history, past social history, past surgical history and problem list.    Review of Systems   Constitutional: Negative.  Negative for activity change, appetite change, chills, diaphoresis, fatigue and fever.   HENT: Positive  for congestion, ear pain, postnasal drip, rhinorrhea, sinus pressure (frontal, maxillary\), sinus pain, sneezing and tinnitus. Negative for dental problem, drooling, ear discharge, facial swelling, hearing loss, hoarse voice, mouth sores, nosebleeds, sore throat, trouble swallowing and voice change.    Eyes: Negative.  Negative for itching.   Respiratory: Positive for cough and wheezing (night time). Negative for apnea, choking, chest tightness, shortness of breath and stridor.    Cardiovascular: Negative.  Negative for chest pain.   Gastrointestinal: Negative.  Negative for abdominal pain, anorexia, change in bowel habit, nausea and vomiting.   Musculoskeletal: Positive for arthralgias and myalgias. Negative for back pain (L shoulder), gait problem, joint swelling, neck pain and neck stiffness.   Skin: Positive for rash. Negative for color change, pallor and wound.        Scattered bruising     Allergic/Immunologic: Positive for environmental allergies. Negative for food allergies and immunocompromised state.   Neurological: Negative.  Negative for vertigo, weakness, numbness and headaches.   Hematological: Negative for adenopathy. Bruises/bleeds easily.   Psychiatric/Behavioral: Negative.        Objective   Physical Exam  Vitals signs and nursing note reviewed.   Constitutional:       General: He is not in acute distress.     Appearance: Normal appearance. He is obese. He is not ill-appearing, toxic-appearing or diaphoretic.   HENT:      Head: Normocephalic and atraumatic.      Right Ear: Tympanic membrane, ear canal and external ear normal.      Left Ear: Tympanic membrane, ear canal and external ear normal.      Nose: Congestion present. No rhinorrhea.      Right Sinus: Maxillary sinus tenderness and frontal sinus tenderness present.      Left Sinus: Maxillary sinus tenderness and frontal sinus tenderness present.      Mouth/Throat:      Mouth: Mucous membranes are moist.      Pharynx: Oropharynx is clear.       Comments: Drainage noted   Eyes:      General: No scleral icterus.        Right eye: No discharge.         Left eye: No discharge.      Conjunctiva/sclera: Conjunctivae normal.      Pupils: Pupils are equal, round, and reactive to light.   Neck:      Musculoskeletal: Normal range of motion and neck supple. No neck rigidity or muscular tenderness.      Vascular: No carotid bruit.   Cardiovascular:      Rate and Rhythm: Normal rate and regular rhythm.      Pulses: Normal pulses.      Heart sounds: Normal heart sounds. No murmur. No friction rub. No gallop.    Pulmonary:      Effort: Pulmonary effort is normal. No respiratory distress.      Breath sounds: Normal breath sounds. No stridor. No wheezing, rhonchi or rales.   Chest:      Chest wall: No tenderness.   Musculoskeletal:         General: No swelling, tenderness, deformity or signs of injury.      Left shoulder: He exhibits decreased range of motion.      Right lower leg: Edema present.      Left lower leg: Edema present.   Lymphadenopathy:      Cervical: No cervical adenopathy.   Skin:     General: Skin is warm.      Capillary Refill: Capillary refill takes less than 2 seconds.      Coloration: Skin is not jaundiced or pale.      Findings: Bruising, erythema and rash present. No lesion. Rash is macular, papular and urticarial.          Neurological:      Mental Status: He is alert and oriented to person, place, and time.      Cranial Nerves: No cranial nerve deficit.      Motor: No weakness.      Coordination: Coordination normal.      Gait: Gait normal.      Deep Tendon Reflexes: Reflexes normal.   Psychiatric:         Mood and Affect: Mood normal.         Thought Content: Thought content normal.         Assessment/Plan   Jadiel was seen today for allergic rhinitis and abrasion.    Diagnoses and all orders for this visit:    Sinus drainage    Tenderness over maxillary sinus    Tenderness over frontal sinus    Nocturnal cough with wheeze    Folliculitis    Other  orders  -     hydrOXYzine (ATARAX) 25 MG tablet; Take 1 tablet by mouth every night at bedtime.  -     doxycycline (VIBRAMYCIN) 100 MG capsule; Take 1 capsule by mouth 2 (Two) Times a Day for 7 days.    Add Hydroxyzine 25mg qhs for drainage  Continue Flonase 2sp NU QD  Consider CT scan sinuses if symptoms persist or worsen  He is prescribed doxycycline as above, advised to use dial soap also  If symptoms persist/worsen, he is asked to RTC for recheck  Otherwise, will see him back as scheduled for chronic conditions   All questions and concerns are addressed with understanding noted.   The patient is in agreement to above plan.

## 2020-09-22 ENCOUNTER — PRIOR AUTHORIZATION (OUTPATIENT)
Dept: FAMILY MEDICINE CLINIC | Facility: CLINIC | Age: 70
End: 2020-09-22

## 2020-09-22 NOTE — TELEPHONE ENCOUNTER
FRANKIE COLVIN (Peter: [id number]) - [id number]  hydrOXYzine HCl 25MG tablets  Status: PA Response - Approved  Created: September 21st, 2020 [phone]  Sent: September 22nd, 2020

## 2020-09-24 ENCOUNTER — OFFICE VISIT (OUTPATIENT)
Dept: ORTHOPEDIC SURGERY | Facility: CLINIC | Age: 70
End: 2020-09-24

## 2020-09-24 VITALS — HEIGHT: 72 IN | BODY MASS INDEX: 31.69 KG/M2 | WEIGHT: 234 LBS

## 2020-09-24 DIAGNOSIS — I10 ESSENTIAL HYPERTENSION: ICD-10-CM

## 2020-09-24 DIAGNOSIS — E11.8 TYPE 2 DIABETES MELLITUS WITH COMPLICATION, WITH LONG-TERM CURRENT USE OF INSULIN (HCC): ICD-10-CM

## 2020-09-24 DIAGNOSIS — S46.012D TRAUMATIC INCOMPLETE TEAR OF LEFT ROTATOR CUFF, SUBSEQUENT ENCOUNTER: Primary | ICD-10-CM

## 2020-09-24 DIAGNOSIS — Z98.890 STATUS POST ARTHROSCOPY OF LEFT SHOULDER: ICD-10-CM

## 2020-09-24 DIAGNOSIS — Z79.4 TYPE 2 DIABETES MELLITUS WITH COMPLICATION, WITH LONG-TERM CURRENT USE OF INSULIN (HCC): ICD-10-CM

## 2020-09-24 PROCEDURE — 99213 OFFICE O/P EST LOW 20 MIN: CPT | Performed by: ORTHOPAEDIC SURGERY

## 2020-09-24 NOTE — PROGRESS NOTES
"Jadiel Dutton is a 70 y.o. male returns for     Chief Complaint   Patient presents with   • Left Shoulder - Follow-up       HISTORY OF PRESENT ILLNESS:  Patient if for follow up of LEFT SHOULDER ARTHROSCOPY with rotator cuff repair, DEDRICK procedure, and a SUBACROMIAL DECOMPRESSION- left  On 02/07/2020 (7M)   Patient states, he fell over in the driveway 2weeks ago.   His pain is getting better.  He has some soreness and popping at times.  He is not complaining of any significant pain.  He has mild discomfort at night.  No numbness or tingling.  He is doing most of the activities of daily living with some modification.    CONCURRENT MEDICAL HISTORY:    The following portions of the patient's history were reviewed and updated as appropriate: allergies, current medications, past family history, past medical history, past social history, past surgical history and problem list.     ROS  No fevers or chills.  No chest pain or shortness of air.  No GI or  disturbances.    PHYSICAL EXAMINATION:       Ht 182.9 cm (72\")   Wt 106 kg (234 lb)   BMI 31.74 kg/m²     Physical Exam  Constitutional:       General: He is not in acute distress.     Appearance: He is well-developed. He is not ill-appearing.   Pulmonary:      Effort: Pulmonary effort is normal. No respiratory distress.   Neurological:      Mental Status: He is alert and oriented to person, place, and time.   Psychiatric:         Mood and Affect: Mood normal.         Behavior: Behavior normal.         Thought Content: Thought content normal.         Judgment: Judgment normal.         GAIT:     [x]  Normal  []  Antalgic    Assistive device: [x]  None  []  Walker     []  Crutches  []  Cane     []  Wheelchair  []  Stretcher    Left Shoulder Exam     Range of Motion   Active abduction: 120   Forward flexion: 130     Muscle Strength   Abduction: 4/5   Supraspinatus: 4/5     Tests   Rosales test: negative  Impingement: negative    Other   Erythema: absent  Sensation: " normal  Pulse: present               Xr Shoulder 2+ View Left    Result Date: 9/24/2020  Narrative: Ordering Provider:  Troy Barillas MD Ordering Diagnosis/Indication:  Traumatic incomplete tear of left rotator cuff, subsequent encounter Procedure:  XR SHOULDER 2+ VW LEFT Exam Date:  9/24/20 COMPARISON:  Todays X-rays were compared to previous images dated December 3, 2019.     Impression:  4 views of the left shoulder show proximal migration of the humeral head with high riding humeral head in the glenohumeral joint.  He shows moderate arthritic change of the AC joint with evidence of distal clavicle resection.  No fracture or dislocation.  No acute bony abnormality is noted.  Mild worsening of the proximal migration is noticed in comparison to prior x-ray. Troy Barillas MD 9/24/20             ASSESSMENT:    Diagnoses and all orders for this visit:    Traumatic incomplete tear of left rotator cuff, subsequent encounter  -     XR Shoulder 2+ View Left; Future    Type 2 diabetes mellitus with complication, with long-term current use of insulin (CMS/[facility])    Essential hypertension    Status post arthroscopy of left shoulder          PLAN    Continue ROM/STR as tolerated  Continue activity as tolerated  Discussed probable chronic rotator cuff tear with superior migration.  Discussed possible repeat MRI and possible reverse total shoulder.    Return in about 8 weeks (around 11/19/2020) for recheck.    Troy Barillas MD

## 2020-10-08 DIAGNOSIS — M79.642 BILATERAL HAND PAIN: Primary | ICD-10-CM

## 2020-10-08 DIAGNOSIS — M79.641 BILATERAL HAND PAIN: Primary | ICD-10-CM

## 2020-10-09 ENCOUNTER — OFFICE VISIT (OUTPATIENT)
Dept: ORTHOPEDIC SURGERY | Facility: CLINIC | Age: 70
End: 2020-10-09

## 2020-10-09 VITALS — HEIGHT: 72 IN | WEIGHT: 238 LBS | BODY MASS INDEX: 32.23 KG/M2

## 2020-10-09 DIAGNOSIS — M72.0 DUPUYTREN'S CONTRACTURE OF RIGHT HAND: Primary | ICD-10-CM

## 2020-10-09 DIAGNOSIS — M62.59 ATROPHY OF MUSCLE OF MULTIPLE SITES: ICD-10-CM

## 2020-10-09 DIAGNOSIS — M79.642 BILATERAL HAND PAIN: ICD-10-CM

## 2020-10-09 DIAGNOSIS — M65.311 TRIGGER THUMB OF RIGHT HAND: ICD-10-CM

## 2020-10-09 DIAGNOSIS — M79.641 BILATERAL HAND PAIN: ICD-10-CM

## 2020-10-09 PROCEDURE — 99213 OFFICE O/P EST LOW 20 MIN: CPT | Performed by: ORTHOPAEDIC SURGERY

## 2020-10-09 NOTE — PROGRESS NOTES
Jadiel Dutton is a 70 y.o. male   Primary provider:  Shayy Banda APRN       Chief Complaint   Patient presents with   • Right Hand - Pain, Initial Evaluation   • Left Hand - Pain, Initial Evaluation     X-rays done today in office   HISTORY OF PRESENT ILLNESS:   bilateral hand pain.  Pain scale today 5/10  Patient states that he had bilateral ulnar nerve releases and bilateral carpal tunnel releases a few years ago by a neurosurgeon.  He also has previously had Dupuytren's release in the left hand.  He is reporting muscle wasting in both hands.  He also states that he is having difficulty bending his right thumb and has contraction of the right fourth finger.  He is having intermittent pain.   Pain  This is a chronic problem. The current episode started more than 1 year ago (5 years). The problem occurs constantly. The problem has been gradually worsening. Associated symptoms include numbness. Associated symptoms comments: Aching,burning,clicking/popping/snapping. Exacerbated by: use of hands. He has tried acetaminophen for the symptoms. The treatment provided no relief.        CONCURRENT MEDICAL HISTORY:    Past Medical History:   Diagnosis Date   • Abdominal pain    • Abnormal weight loss    • Acute bronchitis    • Anxiety state    • Benign essential hypertension    • Chronic sinusitis    • Constipation    • Cystitis    • Degenerative joint disease involving multiple joints    • Diabetic neuropathy (CMS/HCC)      bilateral hands      • Diastolic dysfunction    • Diverticular disease of colon    • Dyspnea    • Epigastric pain    • Essential hypertension    • Foot ulcer (CMS/HCC)    • Gastroesophageal reflux disease    • Generalized anxiety disorder    • Hyperlipidemia    • Inflammatory bowel disease     Possible Inflammatory Bowel Disease      • Lumbar pain     Pain radiating to lumbar region of back   n      • Pleuritic pain    • Radiculopathy     site unspecified      • Type 2 diabetes mellitus (CMS/HCC)     • Vesicular eczema of hands and feet        Allergies   Allergen Reactions   • Penicillins Shortness Of Breath   • Bactrim [Sulfamethoxazole-Trimethoprim] GI Intolerance   • Ceftin [Cefuroxime Axetil] GI Intolerance   • Sulfa Antibiotics GI Intolerance   • Ciprofloxacin Itching and Rash         Current Outpatient Medications:   •  albuterol sulfate  (90 Base) MCG/ACT inhaler, Inhale 2 puffs Every 4 (Four) Hours As Needed for Wheezing., Disp: 1 inhaler, Rfl: 2  •  B-D UF III MINI PEN NEEDLES 31G X 5 MM misc, USE FOUR TIMES A DAY WITH INSULIN PENS, Disp: 360 each, Rfl: 2  •  dexlansoprazole (DEXILANT) 60 MG capsule, Take 60 mg by mouth Daily., Disp: , Rfl:   •  diphenhydrAMINE (BENADRYL) 25 mg capsule, Take 25 mg by mouth Every 6 (Six) Hours As Needed for Itching., Disp: , Rfl:   •  fluticasone (FLONASE) 50 MCG/ACT nasal spray, USE 2 SPRAYS IN EACH NOSTRIL DAILY AS DIRECTED, Disp: 16 g, Rfl: 0  •  furosemide (Lasix) 40 MG tablet, Take 1 tablet by mouth Daily., Disp: 30 tablet, Rfl: 2  •  gabapentin (NEURONTIN) 300 MG capsule, Take 1 capsule by mouth 4 (Four) Times a Day As Needed., Disp: , Rfl:   •  HYDROcodone-acetaminophen (NORCO) 7.5-325 MG per tablet, As Needed., Disp: , Rfl:   •  hydrOXYzine (ATARAX) 25 MG tablet, Take 1 tablet by mouth every night at bedtime., Disp: 30 tablet, Rfl: 3  •  Insulin Zinc Human (NOVOLIN L SC), Inject  under the skin into the appropriate area as directed., Disp: , Rfl:   •  Lancets (ONETOUCH DELICA PLUS [id number]) misc, , Disp: , Rfl:   •  lisinopril-hydrochlorothiazide (PRINZIDE,ZESTORETIC) 20-25 MG per tablet, Take 1 tablet by mouth Daily., Disp: 90 tablet, Rfl: 3  •  ONETOUCH VERIO test strip, , Disp: , Rfl:   •  rosuvastatin (CRESTOR) 20 MG tablet, Take 1 tablet by mouth Every Night., Disp: 90 tablet, Rfl: 3  •  traMADol (ULTRAM) 50 MG tablet, Take 50 mg by mouth Every 6 (Six) Hours As Needed for Moderate Pain ., Disp: , Rfl:   •  nabumetone (RELAFEN) 750 MG tablet, Take  1 tablet by mouth 2 (Two) Times a Day., Disp: 60 tablet, Rfl: 0    Past Surgical History:   Procedure Laterality Date   • BACK SURGERY      LOWER BACK SURGERY   • CARPAL TUNNEL RELEASE Bilateral    • COLONOSCOPY  06/11/2012    Internal & external hemorrhoids found.   • COLONOSCOPY N/A 8/22/2017    Procedure: COLONOSCOPY;  Surgeon: Cesar Hollis MD;  Location: [facility] ENDOSCOPY;  Service:    • ENDOSCOPY  06/11/2012    Slight stricture in the distal esophagus. Gastritis in stomach. Biopsy taken. Normal duodenum.   • ENDOSCOPY     • ENDOSCOPY N/A 8/22/2017    Procedure: ESOPHAGOGASTRODUODENOSCOPY possible dilation ;  Surgeon: Cesar Hollis MD;  Location: [facility] ENDOSCOPY;  Service:    • ENTEROSCOPY SMALL BOWEL  08/27/2012    Gastritis in stomach. Normal jejunum. Biopsy taken. Normal duodenum   • EYE SURGERY Bilateral     cataracts removed with implants   • FOOT SURGERY     • HAMMER TOE REPAIR Left 12/18/2019    Procedure: FIFTH METATARSAL EXOSTECTOMY, FOURTH HAMMER TOE CORRECTION, GASTROCNEMIUS RECESSION;  Surgeon: Jacoby Serrano DPM;  Location: [facility] OR;  Service: Podiatry   • INJECTION OF MEDICATION  02/28/2014    Depo Medrol   • INJECTION OF MEDICATION  03/07/2014    Rocephin(2)   • SHOULDER ARTHROSCOPY Left 2/7/2020    Procedure: LEFT SHOULDER ARTHROSCOPY with rotator cuff repair, DEDRICK procedure, and Subacromial decompression;  Surgeon: Troy Barillas MD;  Location: [facility] OR;  Service: Orthopedics;  Laterality: Left;   • ULNAR TUNNEL RELEASE         Family History   Problem Relation Age of Onset   • No Known Problems Other    • Hypertension Mother    • Heart attack Father    • No Known Problems Sister    • No Known Problems Brother    • No Known Problems Daughter    • No Known Problems Son    • No Known Problems Maternal Aunt    • No Known Problems Maternal Uncle    • No Known Problems Paternal Aunt    • No Known Problems Paternal Uncle    • No Known Problems Maternal Grandmother   "  • No Known Problems Maternal Grandfather    • No Known Problems Paternal Grandmother    • No Known Problems Paternal Grandfather         Social History     Socioeconomic History   • Marital status: Single     Spouse name: Not on file   • Number of children: Not on file   • Years of education: Not on file   • Highest education level: Not on file   Tobacco Use   • Smoking status: Never Smoker   • Smokeless tobacco: Never Used   Substance and Sexual Activity   • Alcohol use: No   • Drug use: No   • Sexual activity: Defer        Review of Systems   Musculoskeletal:        Bilateral hand pain   Neurological: Positive for numbness.   All other systems reviewed and are negative.      PHYSICAL EXAMINATION:       Ht 182.9 cm (72\")   Wt 108 kg (238 lb)   BMI 32.28 kg/m²     Physical Exam  Constitutional:       General: He is not in acute distress.     Appearance: He is well-developed. He is not ill-appearing.   Pulmonary:      Effort: Pulmonary effort is normal. No respiratory distress.   Neurological:      Mental Status: He is alert and oriented to person, place, and time.   Psychiatric:         Mood and Affect: Mood normal.         Behavior: Behavior normal.         Thought Content: Thought content normal.         Judgment: Judgment normal.         GAIT:     [x]  Normal  []  Antalgic    Assistive device: [x]  None  []  Walker     []  Crutches  []  Cane     []  Wheelchair  []  Stretcher    Right Hand Exam     Comments:  He has a Dupuytren's cord involving the fourth finger.  He has mild flexion contracture with the PIP joint as well as the MCP joint.  He cannot fully extend the ring finger.  He also has a palpable, painful nodule in the flexion crease of the right thumb MCP joint.  He cannot fully extend his thumb at that point.  There is some triggering noted with flexion as well.  The nodule appears to be mobile with flexion and extension.  Good capillary refill.  There is muscle wasting in his hand.  He has mild " decrease sensation but good capillary refill.      Left Hand Exam     Comments:  Generalized muscle wasting in the left hand.  He has scars along his palm consistent with prior Dupuytren's release.  Good capillary refill.  He has mild diffuse arthritic changes in the IP joints.  Mild overall weakness in his hand.              Xr Shoulder 2+ View Left    Result Date: 9/24/2020  Narrative: Ordering Provider:  Troy Barillas MD Ordering Diagnosis/Indication:  Traumatic incomplete tear of left rotator cuff, subsequent encounter Procedure:  XR SHOULDER 2+ VW LEFT Exam Date:  9/24/20 COMPARISON:  Todays X-rays were compared to previous images dated December 3, 2019.     Impression:  4 views of the left shoulder show proximal migration of the humeral head with high riding humeral head in the glenohumeral joint.  He shows moderate arthritic change of the AC joint with evidence of distal clavicle resection.  No fracture or dislocation.  No acute bony abnormality is noted.  Mild worsening of the proximal migration is noticed in comparison to prior x-ray. Troy Barillas MD 9/24/20     Xr Hand 3+ View Bilateral    Result Date: 10/9/2020  Narrative: Ordering Provider:  Troy Barillas MD Ordering Diagnosis/Indication:  Bilateral hand pain Procedure:  XR HAND 3+ VW BILATERAL Exam Date:  10/9/20 COMPARISON:  Todays X-rays were compared to previous images dated March 15, 2019.     Impression:  3 views of both hands show acceptable position and alignment of each hand.  There is mild diffuse arthritic changes noted and mild to moderate arthritic change noted at the base of the first CMC on both sides.  The CMC joint arthritis is slightly progressed in comparison to prior x-ray.  He does have vascular calcifications noted both the radial and ulnar arteries.  No acute bony abnormality in either hand. Troy Barillas MD 10/9/20           ASSESSMENT:    Diagnoses and all orders for this  visit:    Dupuytren's contracture of right hand  -     Ambulatory Referral to Hand Surgery    Bilateral hand pain  -     Ambulatory Referral to Hand Surgery    Trigger thumb of right hand  -     Ambulatory Referral to Hand Surgery    Atrophy of muscle of multiple sites  -     Ambulatory Referral to Hand Surgery          PLAN    Long discussion was held with the patient in regards to further treatment options.  He has Dupuytren's contracture of the right fourth finger and also has apparent triggering of the right thumb.  In addition he has significant muscle wasting but has already had carpal tunnel release and cubital tunnel release of both arms.  Due to the complexity of his clinical picture, discussion was held with the patient in regards to seeing a hand specialist.  Patient was agreeable with this plan and understood that most likely that he would require a EMG.    Patient's Body mass index is 32.28 kg/m². BMI is above normal parameters. Recommendations include: exercise counseling and nutrition counseling.    Return if symptoms worsen or fail to improve, for recheck.    Troy Barillas MD

## 2020-10-21 ENCOUNTER — OFFICE VISIT (OUTPATIENT)
Dept: FAMILY MEDICINE CLINIC | Facility: CLINIC | Age: 70
End: 2020-10-21

## 2020-10-21 DIAGNOSIS — I10 ESSENTIAL HYPERTENSION: ICD-10-CM

## 2020-10-21 DIAGNOSIS — J01.10 ACUTE NON-RECURRENT FRONTAL SINUSITIS: Primary | ICD-10-CM

## 2020-10-21 DIAGNOSIS — J01.00 ACUTE NON-RECURRENT MAXILLARY SINUSITIS: ICD-10-CM

## 2020-10-21 DIAGNOSIS — R60.0 FLUID RETENTION IN LEGS: ICD-10-CM

## 2020-10-21 DIAGNOSIS — R05.9 COUGH: ICD-10-CM

## 2020-10-21 PROCEDURE — G2025 DIS SITE TELE SVCS RHC/FQHC: HCPCS | Performed by: NURSE PRACTITIONER

## 2020-10-21 RX ORDER — FUROSEMIDE 40 MG/1
40 TABLET ORAL DAILY
Qty: 30 TABLET | Refills: 2 | Status: SHIPPED | OUTPATIENT
Start: 2020-10-21 | End: 2020-11-19

## 2020-10-21 RX ORDER — ALBUTEROL SULFATE 90 UG/1
2 AEROSOL, METERED RESPIRATORY (INHALATION) EVERY 4 HOURS PRN
Qty: 1 G | Refills: 0 | Status: SHIPPED | OUTPATIENT
Start: 2020-10-21 | End: 2023-03-29

## 2020-10-21 RX ORDER — AZITHROMYCIN 250 MG/1
TABLET, FILM COATED ORAL
Qty: 6 TABLET | Refills: 0 | Status: SHIPPED | OUTPATIENT
Start: 2020-10-21 | End: 2020-11-19

## 2020-10-21 RX ORDER — METHYLPREDNISOLONE 4 MG/1
TABLET ORAL
Qty: 1 EACH | Refills: 0 | Status: SHIPPED | OUTPATIENT
Start: 2020-10-21 | End: 2020-11-19

## 2020-10-21 NOTE — PROGRESS NOTES
"Subjective   Jadiel Dutton is a 70 y.o. male. Patient here today with complaints of Sinusitis  Patient here today for telehealth visit initially having had an office visit scheduled but when he was complaining of cough and congestion and had some Covid type symptoms. he was contacted and visit was changed to telephone visit.  Patient was not happy with this decision although this was confirmed a [employer] policy and did call back to speak to administration voicing his concerns.  Additionally, he called back to my  stating \"if I come out there and I don't get in it's really not going to be good then\" and \"you give me a call at [phone] and you better do it pretty quick\". Incidentally , admin did complete an occurrence report regarding this.     Pt complaining of productive cough, sinus pain and pressure, congestion, wheezing, denies fever. States felt better when taking doxycycline but symptoms never resolved and then returned and worsened after finishing antibiotics. He reports he has seasonal allergies, has used benadryl prn with mild relief. Reports yellowish nasal drg and productive cough. No complaints of shortness of breath. Does complain of BLE leg swelling, already taking diuretics.     There were no vitals filed for this visit.  There is no height or weight on file to calculate BMI.  Past Medical History:   Diagnosis Date   • Abdominal pain    • Abnormal weight loss    • Acute bronchitis    • Anxiety state    • Benign essential hypertension    • Chronic sinusitis    • Constipation    • Cystitis    • Degenerative joint disease involving multiple joints    • Diabetic neuropathy (CMS/HCC)      bilateral hands      • Diastolic dysfunction    • Diverticular disease of colon    • Dyspnea    • Epigastric pain    • Essential hypertension    • Foot ulcer (CMS/HCC)    • Gastroesophageal reflux disease    • Generalized anxiety disorder    • Hyperlipidemia    • Inflammatory bowel disease     Possible " Inflammatory Bowel Disease      • Lumbar pain     Pain radiating to lumbar region of back   n      • Pleuritic pain    • Radiculopathy     site unspecified      • Type 2 diabetes mellitus (CMS/HCC)    • Vesicular eczema of hands and feet      Sinusitis  This is a new problem. The current episode started 1 to 4 weeks ago. The problem has been gradually worsening since onset. There has been no fever. The pain is mild. Associated symptoms include congestion, coughing, a hoarse voice and sinus pressure. Pertinent negatives include no shortness of breath. Treatments tried: benadryl. The treatment provided mild relief.   Cough  This is a new problem. The current episode started in the past 7 days. The problem has been gradually worsening. The problem occurs every few minutes. The cough is productive of sputum. Associated symptoms include nasal congestion and wheezing. Pertinent negatives include no fever or shortness of breath. He has tried rest and OTC cough suppressant for the symptoms. The treatment provided no relief. His past medical history is significant for environmental allergies.        The following portions of the patient's history were reviewed and updated as appropriate: allergies, current medications, past family history, past medical history, past social history, past surgical history and problem list.    Review of Systems   Constitutional: Negative.  Negative for fever.   HENT: Positive for congestion, hoarse voice and sinus pressure.    Eyes: Negative.    Respiratory: Positive for cough and wheezing. Negative for shortness of breath.    Cardiovascular: Negative.    Gastrointestinal: Negative.    Endocrine: Negative.    Genitourinary: Negative.    Musculoskeletal: Negative.    Skin: Negative.    Allergic/Immunologic: Positive for environmental allergies.   Neurological: Negative.    Hematological: Negative.    Psychiatric/Behavioral: Negative.        Objective   Physical Exam  Deferred, telephone visit    Assessment/Plan   Diagnoses and all orders for this visit:    1. Acute non-recurrent frontal sinusitis (Primary)    2. Cough    3. Acute non-recurrent maxillary sinusitis    4. Fluid retention in legs    5. Essential hypertension    Other orders  -     methylPREDNISolone (MEDROL) 4 MG dose pack; Take as directed on package instructions.  Dispense: 1 each; Refill: 0  -     azithromycin (Zithromax Z-Yonas) 250 MG tablet; Take 2 tablets the first day, then 1 tablet daily for 4 days.  Dispense: 6 tablet; Refill: 0  -     furosemide (Lasix) 40 MG tablet; Take 1 tablet by mouth Daily.  Dispense: 30 tablet; Refill: 2  -     albuterol sulfate  (90 Base) MCG/ACT inhaler; Inhale 2 puffs Every 4 (Four) Hours As Needed for Wheezing.  Dispense: 1 g; Refill: 0    he is treated with medrol dose pk, zithromax pk with refill and albuterol inhaler as above  Will refill lasix as above as well  Advised he elevate BLE and wear AGUSTIN hose  If symptoms worsen or persist he is asked to RTC per telehealth or possibly in office visit depending on symptomatology   Pt aware and is in agreement to this plan  He did state he was wanting to come in for a steroid shot however was informed that he had a depo medrol inj in august and was too early to receive another at this time  Pt continued to mention he was not happy about not coming in the office to be seen  He did call back and ask for my  and MA's names as well  Pt was not beligerent with me on the phone   You have chosen to receive care through a telephone visit. Do you consent to use a telephone visit for your medical care today? Yes  This visit has been rescheduled as a phone visit to comply with patient safety concerns in accordance with CDC recommendations. Total time of discussion was 23 minutes.

## 2020-10-27 ENCOUNTER — TELEPHONE (OUTPATIENT)
Dept: ORTHOPEDIC SURGERY | Facility: CLINIC | Age: 70
End: 2020-10-27

## 2020-10-27 DIAGNOSIS — M79.641 RIGHT HAND PAIN: ICD-10-CM

## 2020-10-27 DIAGNOSIS — M65.311 TRIGGER THUMB OF RIGHT HAND: ICD-10-CM

## 2020-10-27 DIAGNOSIS — M79.642 LEFT HAND PAIN: ICD-10-CM

## 2020-10-27 DIAGNOSIS — G56.03 BILATERAL CARPAL TUNNEL SYNDROME: ICD-10-CM

## 2020-10-27 DIAGNOSIS — M72.0 DUPUYTREN'S CONTRACTURE OF RIGHT HAND: ICD-10-CM

## 2020-10-27 DIAGNOSIS — G56.23 ULNAR NEUROPATHY OF BOTH UPPER EXTREMITIES: ICD-10-CM

## 2020-10-27 DIAGNOSIS — M79.642 BILATERAL HAND PAIN: Primary | ICD-10-CM

## 2020-10-27 DIAGNOSIS — M72.0 DUPUYTREN'S CONTRACTURE OF BOTH HANDS: ICD-10-CM

## 2020-10-27 DIAGNOSIS — M62.59 ATROPHY OF MUSCLE OF MULTIPLE SITES: ICD-10-CM

## 2020-10-27 DIAGNOSIS — M79.641 BILATERAL HAND PAIN: Primary | ICD-10-CM

## 2020-10-27 NOTE — TELEPHONE ENCOUNTER
DR EDDY.  PATIENT CALLED STATING YOU HAD REFERRED HIM TO DR MURCIA.  HE IS REQUESTING AN EMG.  PLEASE SEND ORDER TO DR BUENO.

## 2020-10-29 ENCOUNTER — TELEPHONE (OUTPATIENT)
Dept: ORTHOPEDIC SURGERY | Facility: CLINIC | Age: 70
End: 2020-10-29

## 2020-10-29 NOTE — TELEPHONE ENCOUNTER
Patient is requesting Dr Barillas call Dr Vu himself to get him in next week.  Current appointment is 12/3/20    He states he is not waiting that long for the test, and he is for sure not waiting until the first of the year to have surgery.  I suggested another provider to see if they had any sooner openings.  He states he will never go to [facility].   I suggested Dr Brown in [place] and  he was not interested in him either.      Please advise.

## 2020-10-29 NOTE — TELEPHONE ENCOUNTER
Nargis    Patient called and said that he can not get in with Dr. Vu until first of Dec.  Was wondering if you thought you could get him in any sooner.  He tried with  but she said she couldn't.

## 2020-10-29 NOTE — TELEPHONE ENCOUNTER
I spoke with Dr. Vu who is going to see about getting him in sooner.  No promises because his practice is still greatly affected by COVID-19 and his ability to see multiple patients.   However, he would call the patient when he had a cancellation and could get him in sooner.   SO        Pt notified

## 2020-11-19 ENCOUNTER — OFFICE VISIT (OUTPATIENT)
Dept: ORTHOPEDIC SURGERY | Facility: CLINIC | Age: 70
End: 2020-11-19

## 2020-11-19 VITALS — HEIGHT: 72 IN | BODY MASS INDEX: 32.2 KG/M2 | WEIGHT: 237.7 LBS

## 2020-11-19 DIAGNOSIS — M25.512 CHRONIC LEFT SHOULDER PAIN: ICD-10-CM

## 2020-11-19 DIAGNOSIS — E11.8 TYPE 2 DIABETES MELLITUS WITH COMPLICATION, WITH LONG-TERM CURRENT USE OF INSULIN (HCC): ICD-10-CM

## 2020-11-19 DIAGNOSIS — G89.29 CHRONIC LEFT SHOULDER PAIN: ICD-10-CM

## 2020-11-19 DIAGNOSIS — I10 ESSENTIAL HYPERTENSION: ICD-10-CM

## 2020-11-19 DIAGNOSIS — Z79.4 TYPE 2 DIABETES MELLITUS WITH COMPLICATION, WITH LONG-TERM CURRENT USE OF INSULIN (HCC): ICD-10-CM

## 2020-11-19 DIAGNOSIS — Z98.890 HISTORY OF ARTHROSCOPY OF LEFT SHOULDER: Primary | ICD-10-CM

## 2020-11-19 PROCEDURE — 99213 OFFICE O/P EST LOW 20 MIN: CPT | Performed by: ORTHOPAEDIC SURGERY

## 2020-11-19 NOTE — PROGRESS NOTES
"Jadiel Dutton is a 70 y.o. male returns for     Chief Complaint   Patient presents with   • Left Shoulder - Follow-up       HISTORY OF PRESENT ILLNESS:    02/07/20 (9m) Troy Barillas MD    LEFT SHOULDER ARTHROSCOPY with rotator cuff repair, DEDRICK procedure, and Subacromial decompression - Left     Patient being seen for left shoulder follow up.   Pain in shoulder and pain down arm.  Pain at night.  Is taking hydrocodone with pain management.  Pain in shoulder is better than before surgery.  Still has pain in left shoulder  Has weakness over shoulder level     CONCURRENT MEDICAL HISTORY:    The following portions of the patient's history were reviewed and updated as appropriate: allergies, current medications, past family history, past medical history, past social history, past surgical history and problem list.     ROS  No fevers or chills.  No chest pain or shortness of air.  No GI or  disturbances.    PHYSICAL EXAMINATION:       Ht 182.9 cm (72\")   Wt 108 kg (237 lb 11.2 oz)   BMI 32.24 kg/m²     Physical Exam  Constitutional:       General: He is not in acute distress.     Appearance: Normal appearance. He is well-developed. He is not ill-appearing.   Pulmonary:      Effort: Pulmonary effort is normal. No respiratory distress.   Neurological:      Mental Status: He is alert and oriented to person, place, and time.   Psychiatric:         Mood and Affect: Mood normal.         Behavior: Behavior normal.         Thought Content: Thought content normal.         Judgment: Judgment normal.         GAIT:     [x]  Normal  []  Antalgic    Assistive device: [x]  None  []  Walker     []  Crutches  []  Cane     []  Wheelchair  []  Stretcher    Left Shoulder Exam     Tenderness   The patient is experiencing no tenderness.     Range of Motion   Active abduction: 120   Forward flexion: 150     Muscle Strength   Abduction: 4/5   Supraspinatus: 4/5     Other   Erythema: absent  Sensation: normal  Pulse: " present                   Procedure:  XR SHOULDER 2+ VW LEFT  Exam Date:  9/24/20     COMPARISON:  Todays X-rays were compared to previous images dated December 3, 2019.     IMPRESSION: 4 views of the left shoulder show proximal migration of the humeral head with high riding humeral head in the glenohumeral joint.  He shows moderate arthritic change of the AC joint with evidence of distal clavicle resection.  No fracture or dislocation.  No acute bony abnormality is noted.  Mild worsening of the proximal migration is noticed in comparison to prior x-ray.        Troy Barillas MD  9/24/20      ASSESSMENT:    Diagnoses and all orders for this visit:    History of arthroscopy of left shoulder    Chronic left shoulder pain    Essential hypertension    Type 2 diabetes mellitus with complication, with long-term current use of insulin (CMS/[facility])          PLAN    Slowly progress motion and strength as tolerated.  He is scheduled to have surgery on right hand with Dr Romo in December.  Recheck in January to assess shoulder.  Continue gentle strength and conditioning exercises for shoulder.     Patient's Body mass index is 32.24 kg/m². BMI is above normal parameters. Recommendations include: exercise counseling and nutrition counseling.      Return in about 8 weeks (around 1/14/2021) for recheck.    Troy Barillas MD

## 2021-01-14 ENCOUNTER — OFFICE VISIT (OUTPATIENT)
Dept: ORTHOPEDIC SURGERY | Facility: CLINIC | Age: 71
End: 2021-01-14

## 2021-01-14 VITALS — BODY MASS INDEX: 33.18 KG/M2 | HEIGHT: 72 IN | WEIGHT: 245 LBS

## 2021-01-14 DIAGNOSIS — G89.29 CHRONIC LEFT SHOULDER PAIN: ICD-10-CM

## 2021-01-14 DIAGNOSIS — Z79.4 TYPE 2 DIABETES MELLITUS WITH COMPLICATION, WITH LONG-TERM CURRENT USE OF INSULIN (HCC): ICD-10-CM

## 2021-01-14 DIAGNOSIS — E11.8 TYPE 2 DIABETES MELLITUS WITH COMPLICATION, WITH LONG-TERM CURRENT USE OF INSULIN (HCC): ICD-10-CM

## 2021-01-14 DIAGNOSIS — I10 ESSENTIAL HYPERTENSION: ICD-10-CM

## 2021-01-14 DIAGNOSIS — M25.512 CHRONIC LEFT SHOULDER PAIN: ICD-10-CM

## 2021-01-14 DIAGNOSIS — Z98.890 HISTORY OF ARTHROSCOPY OF LEFT SHOULDER: Primary | ICD-10-CM

## 2021-01-14 PROCEDURE — 99213 OFFICE O/P EST LOW 20 MIN: CPT | Performed by: ORTHOPAEDIC SURGERY

## 2021-01-14 NOTE — PROGRESS NOTES
"Jadiel Dutton is a 70 y.o. male returns for     Chief Complaint   Patient presents with   • Left Shoulder - Follow-up       HISTORY OF PRESENT ILLNESS:  Follow up left shoulder.  Pain level 7     02/07/20 (11m) Troy Barillas MD    LEFT SHOULDER ARTHROSCOPY with rotator cuff repair, DEDRICK procedure, and Subacromial decompression - Left     Patient doing much better since hand surgery with Dr Romo.   No fever or chills  No other new complaints.    Still having soreness in shoulder but doing better.   no fever or chills  Still doing exercises.       CONCURRENT MEDICAL HISTORY:    The following portions of the patient's history were reviewed and updated as appropriate: allergies, current medications, past family history, past medical history, past social history, past surgical history and problem list.     ROS  No fevers or chills.  No chest pain or shortness of air.  No GI or  disturbances.    PHYSICAL EXAMINATION:       Ht 182.9 cm (72\")   Wt 111 kg (245 lb)   BMI 33.23 kg/m²     Physical Exam  Constitutional:       General: He is not in acute distress.     Appearance: Normal appearance. He is well-developed. He is not ill-appearing.   Pulmonary:      Effort: Pulmonary effort is normal. No respiratory distress.   Neurological:      Mental Status: He is alert and oriented to person, place, and time.   Psychiatric:         Mood and Affect: Mood normal.         Behavior: Behavior normal.         Thought Content: Thought content normal.         Judgment: Judgment normal.         GAIT:     [x]  Normal  []  Antalgic    Assistive device: [x]  None  []  Walker     []  Crutches  []  Cane     []  Wheelchair  []  Stretcher    Left Shoulder Exam     Range of Motion   Active abduction: 150   Forward flexion: 170     Muscle Strength   Abduction: 4/5   Supraspinatus: 4/5     Other   Erythema: absent  Sensation: normal  Pulse: present                       ASSESSMENT:    Diagnoses and all orders for this " visit:    History of arthroscopy of left shoulder    Chronic left shoulder pain    Essential hypertension    Type 2 diabetes mellitus with complication, with long-term current use of insulin (CMS/[facility])          PLAN    Continue ROM/STR exercises     His motion is really pretty good at this point.  He still has some mild weakness.    I encouraged him to continue with his home exercise program for his shoulder and to continue to slowly progress motion and activity.    We also discussed continuing strength exercises.  Slowly progress as his pain allows.    Patient is asking about possibility of repeat shoulder surgery and I continue to point out to him that his range of motion is almost within normal limits and he does have some weakness but he only has occasional and intermittent pain in his shoulder.  There is no surgical indication at this time.    Patient's Body mass index is 33.23 kg/m². BMI is above normal parameters. Recommendations include: exercise counseling and nutrition counseling.      Return in about 3 months (around 4/14/2021) for recheck.    Troy Barillas MD

## 2021-04-01 ENCOUNTER — OFFICE VISIT (OUTPATIENT)
Dept: ORTHOPEDIC SURGERY | Facility: CLINIC | Age: 71
End: 2021-04-01

## 2021-04-01 VITALS — BODY MASS INDEX: 33.05 KG/M2 | WEIGHT: 244 LBS | HEIGHT: 72 IN

## 2021-04-01 DIAGNOSIS — Z79.4 TYPE 2 DIABETES MELLITUS WITH COMPLICATION, WITH LONG-TERM CURRENT USE OF INSULIN (HCC): ICD-10-CM

## 2021-04-01 DIAGNOSIS — E11.8 TYPE 2 DIABETES MELLITUS WITH COMPLICATION, WITH LONG-TERM CURRENT USE OF INSULIN (HCC): ICD-10-CM

## 2021-04-01 DIAGNOSIS — M12.812 ROTATOR CUFF ARTHROPATHY, LEFT: Primary | ICD-10-CM

## 2021-04-01 DIAGNOSIS — I10 ESSENTIAL HYPERTENSION: ICD-10-CM

## 2021-04-01 DIAGNOSIS — M25.512 CHRONIC LEFT SHOULDER PAIN: ICD-10-CM

## 2021-04-01 DIAGNOSIS — Z98.890 HISTORY OF ARTHROSCOPY OF LEFT SHOULDER: ICD-10-CM

## 2021-04-01 DIAGNOSIS — G89.29 CHRONIC LEFT SHOULDER PAIN: ICD-10-CM

## 2021-04-01 PROCEDURE — 99213 OFFICE O/P EST LOW 20 MIN: CPT | Performed by: ORTHOPAEDIC SURGERY

## 2021-05-19 DIAGNOSIS — E78.01 FAMILIAL HYPERCHOLESTEROLEMIA: ICD-10-CM

## 2021-05-19 DIAGNOSIS — I10 ESSENTIAL HYPERTENSION: Chronic | ICD-10-CM

## 2021-05-19 DIAGNOSIS — R60.0 FLUID RETENTION IN LEGS: ICD-10-CM

## 2021-05-19 RX ORDER — LISINOPRIL AND HYDROCHLOROTHIAZIDE 25; 20 MG/1; MG/1
TABLET ORAL
Qty: 90 TABLET | Refills: 0 | Status: SHIPPED | OUTPATIENT
Start: 2021-05-19 | End: 2021-05-20 | Stop reason: SDUPTHER

## 2021-05-19 RX ORDER — ROSUVASTATIN CALCIUM 20 MG/1
TABLET, COATED ORAL
Qty: 90 TABLET | Refills: 0 | Status: SHIPPED | OUTPATIENT
Start: 2021-05-19 | End: 2021-05-20 | Stop reason: SDUPTHER

## 2021-05-20 ENCOUNTER — OFFICE VISIT (OUTPATIENT)
Dept: FAMILY MEDICINE CLINIC | Facility: CLINIC | Age: 71
End: 2021-05-20

## 2021-05-20 VITALS
TEMPERATURE: 97.8 F | WEIGHT: 246 LBS | HEIGHT: 72 IN | SYSTOLIC BLOOD PRESSURE: 122 MMHG | BODY MASS INDEX: 33.32 KG/M2 | DIASTOLIC BLOOD PRESSURE: 78 MMHG | HEART RATE: 72 BPM

## 2021-05-20 DIAGNOSIS — R60.0 FLUID RETENTION IN LEGS: ICD-10-CM

## 2021-05-20 DIAGNOSIS — H65.23 BILATERAL CHRONIC SEROUS OTITIS MEDIA: ICD-10-CM

## 2021-05-20 DIAGNOSIS — Z00.00 MEDICARE ANNUAL WELLNESS VISIT, SUBSEQUENT: Primary | ICD-10-CM

## 2021-05-20 DIAGNOSIS — E78.01 FAMILIAL HYPERCHOLESTEROLEMIA: ICD-10-CM

## 2021-05-20 DIAGNOSIS — H92.03 ACUTE EAR PAIN, BILATERAL: ICD-10-CM

## 2021-05-20 DIAGNOSIS — I10 ESSENTIAL HYPERTENSION: Chronic | ICD-10-CM

## 2021-05-20 PROCEDURE — 96372 THER/PROPH/DIAG INJ SC/IM: CPT | Performed by: NURSE PRACTITIONER

## 2021-05-20 PROCEDURE — 99214 OFFICE O/P EST MOD 30 MIN: CPT | Performed by: NURSE PRACTITIONER

## 2021-05-20 PROCEDURE — G0439 PPPS, SUBSEQ VISIT: HCPCS | Performed by: NURSE PRACTITIONER

## 2021-05-20 RX ORDER — FLUTICASONE PROPIONATE 50 MCG
2 SPRAY, SUSPENSION (ML) NASAL DAILY
Qty: 16 G | Refills: 1 | Status: SHIPPED | OUTPATIENT
Start: 2021-05-20 | End: 2021-08-27

## 2021-05-20 RX ORDER — LISINOPRIL AND HYDROCHLOROTHIAZIDE 25; 20 MG/1; MG/1
1 TABLET ORAL DAILY
Qty: 90 TABLET | Refills: 3 | Status: SHIPPED | OUTPATIENT
Start: 2021-05-20 | End: 2022-03-17 | Stop reason: SDUPTHER

## 2021-05-20 RX ORDER — ROSUVASTATIN CALCIUM 20 MG/1
20 TABLET, COATED ORAL NIGHTLY
Qty: 90 TABLET | Refills: 3 | Status: SHIPPED | OUTPATIENT
Start: 2021-05-20 | End: 2022-03-17 | Stop reason: SDUPTHER

## 2021-05-20 RX ORDER — METHYLPREDNISOLONE ACETATE 80 MG/ML
80 INJECTION, SUSPENSION INTRA-ARTICULAR; INTRALESIONAL; INTRAMUSCULAR; SOFT TISSUE ONCE
Status: COMPLETED | OUTPATIENT
Start: 2021-05-20 | End: 2021-05-20

## 2021-05-20 RX ORDER — AZITHROMYCIN 250 MG/1
TABLET, FILM COATED ORAL
Qty: 6 TABLET | Refills: 0 | Status: SHIPPED | OUTPATIENT
Start: 2021-05-20 | End: 2021-05-27

## 2021-05-20 RX ADMIN — METHYLPREDNISOLONE ACETATE 80 MG: 80 INJECTION, SUSPENSION INTRA-ARTICULAR; INTRALESIONAL; INTRAMUSCULAR; SOFT TISSUE at 15:24

## 2021-05-20 NOTE — PROGRESS NOTES
"Chief Complaint  Medicare Wellness-subsequent, Earache, and Med Refill    Subjective          The patient presents today with complaints of bilateral ear ache, as well as a recheck of hyperlipidemia, hypertension, and diabetes mellitus. He reports his left ear is worse than his right ear. The patient adds of sore throat, and post nasal drip, and sinus pain. No complaints of chest pain, shortness of breath, does have headache mainly around sinuses on L , maxillary and frontal. Denies fever. Due for subsequent medicare wellness    Additionally, he hand hand surgery in 12/2020. He has had his COVID-19 vaccination.    Jadiel Dutton presents to [facility]  History of Present Illness    Objective   Vital Signs:   /78   Pulse 72   Temp 97.8 °F (36.6 °C)   Ht 182.9 cm (72\")   Wt 112 kg (246 lb)   BMI 33.36 kg/m²     Physical Exam  Vitals and nursing note reviewed.   Constitutional:       General: He is not in acute distress.     Appearance: Normal appearance. He is obese. He is not ill-appearing, toxic-appearing or diaphoretic.   HENT:      Head: Normocephalic and atraumatic.      Right Ear: Ear canal and external ear normal. There is no impacted cerumen. Tympanic membrane is bulging. Tympanic membrane is not injected.      Left Ear: Ear canal and external ear normal. There is no impacted cerumen. Tympanic membrane is bulging. Tympanic membrane is not injected.      Ears:      Comments: TM's are bulging, with fluid, no cerumen.  Cardiovascular:      Rate and Rhythm: Normal rate and regular rhythm.      Pulses: Normal pulses.      Heart sounds: Normal heart sounds. No murmur heard.   No friction rub. No gallop.    Pulmonary:      Effort: Pulmonary effort is normal. No respiratory distress.      Breath sounds: Normal breath sounds. No stridor. No wheezing, rhonchi or rales.   Musculoskeletal:      Cervical back: Neck supple.      Right lower leg: No edema.      Left lower leg: No " edema.   Skin:     General: Skin is warm and dry.   Neurological:      Mental Status: He is alert and oriented to person, place, and time.   Psychiatric:         Attention and Perception: Attention normal.         Mood and Affect: Mood normal.         Speech: Speech normal.         Behavior: Behavior normal. Behavior is cooperative.         Thought Content: Thought content normal.         Cognition and Memory: Cognition normal.         Judgment: Judgment normal.      Comments: talkative        Result Review :                                     Assessment and Plan    Diagnoses and all orders for this visit:    1. Medicare annual wellness visit, subsequent (Primary)    2. Fluid retention in legs  -     lisinopril-hydrochlorothiazide (PRINZIDE,ZESTORETIC) 20-25 MG per tablet; Take 1 tablet by mouth Daily.  Dispense: 90 tablet; Refill: 3  -     rosuvastatin (CRESTOR) 20 MG tablet; Take 1 tablet by mouth Every Night.  Dispense: 90 tablet; Refill: 3  -     methylPREDNISolone acetate (DEPO-medrol) injection 80 mg    3. Essential hypertension  -     lisinopril-hydrochlorothiazide (PRINZIDE,ZESTORETIC) 20-25 MG per tablet; Take 1 tablet by mouth Daily.  Dispense: 90 tablet; Refill: 3  -     rosuvastatin (CRESTOR) 20 MG tablet; Take 1 tablet by mouth Every Night.  Dispense: 90 tablet; Refill: 3    4. Familial hypercholesterolemia  -     lisinopril-hydrochlorothiazide (PRINZIDE,ZESTORETIC) 20-25 MG per tablet; Take 1 tablet by mouth Daily.  Dispense: 90 tablet; Refill: 3  -     rosuvastatin (CRESTOR) 20 MG tablet; Take 1 tablet by mouth Every Night.  Dispense: 90 tablet; Refill: 3    5. Acute ear pain, bilateral    6. Bilateral chronic serous otitis media    Other orders  -     azithromycin (Zithromax Z-Yonas) 250 MG tablet; Take 2 po now and 1 po d2-5  Dispense: 6 tablet; Refill: 0  -     fluticasone (FLONASE) 50 MCG/ACT nasal spray; 2 sprays by Each Nare route Daily.  Dispense: 16 g; Refill: 1      He is given a Depo 80 mg  injection in the office today, he tolerated this well. He will follow-up with Dr. Barillas regarding his shoulder issues, and return here if he continues to have any additional ear complaints, or low back complaints. He can continue taking Benadryl as needed, since this did help his symptoms. Labs from outside facility reviewed today    Crestor and lisinopril is refilled for him today. He will follow-up in 6 months, or sooner if needed. His most recent lab results are reviewed today.     Health maintenance is reviewed, and he declines immunizations today. Subsequent Medicare wellness is completed today.     All questions and concerns are addressed with understanding verbalized. The patient is in agreement to above plan.    I spent 33 minutes caring for Jadiel on this date of service. This time includes time spent by me in the following activities:preparing for the visit, reviewing tests, performing a medically appropriate examination and/or evaluation , counseling and educating the patient/family/caregiver, ordering medications, tests, or procedures and documenting information in the medical record  Follow Up   Return in 6 months (on 11/20/2021), or if symptoms worsen or fail to improve, for Recheck, or sooner as needed.  Patient was given instructions and counseling regarding his condition or for health maintenance advice. Please see specific information pulled into the AVS if appropriate.     Scribed for MARY Escobar by Adele Pelayo.  05/20/21   15:55 CDT    I have personally performed the services described in this document as scribed by the above individual, and it is both accurate and complete.  MARY Escobar  5/20/2021  16:13 CDT

## 2021-05-20 NOTE — PROGRESS NOTES
The ABCs of the Annual Wellness Visit  Subsequent Medicare Wellness Visit    Chief Complaint   Patient presents with   • Medicare Wellness-subsequent   • Earache   • Med Refill       Subjective   History of Present Illness:  Jadiel Dutton is a 70 y.o. male who presents for a Subsequent Medicare Wellness Visit.    HEALTH RISK ASSESSMENT    Recent Hospitalizations:  No hospitalization(s) within the last year.    Current Medical Providers:  Patient Care Team:  Shyay Banda APRN as PCP - General (Family Medicine)  Jacoby Serrano DPM as Consulting Physician (Podiatry)    Smoking Status:  Social History     Tobacco Use   Smoking Status Never Smoker   Smokeless Tobacco Never Used       Alcohol Consumption:  Social History     Substance and Sexual Activity   Alcohol Use No       Depression Screen:   PHQ-2/PHQ-9 Depression Screening 8/20/2020   Little interest or pleasure in doing things 0   Feeling down, depressed, or hopeless 0   Trouble falling or staying asleep, or sleeping too much -   Feeling tired or having little energy -   Poor appetite or overeating -   Feeling bad about yourself - or that you are a failure or have let yourself or your family down -   Trouble concentrating on things, such as reading the newspaper or watching television -   Moving or speaking so slowly that other people could have noticed. Or the opposite - being so fidgety or restless that you have been moving around a lot more than usual -   Thoughts that you would be better off dead, or of hurting yourself in some way -   Total Score 0   If you checked off any problems, how difficult have these problems made it for you to do your work, take care of things at home, or get along with other people? -       Fall Risk Screen:  STEADI Fall Risk Assessment has not been completed.    Health Habits and Functional and Cognitive Screening:  Functional & Cognitive Status 5/20/2021   Do you have difficulty preparing food and eating? No   Do you  have difficulty bathing yourself, getting dressed or grooming yourself? No   Do you have difficulty using the toilet? No   Do you have difficulty moving around from place to place? No   Do you have trouble with steps or getting out of a bed or a chair? No   Current Diet Limited Junk Food   Dental Exam Up to date   Eye Exam Up to date   Exercise (times per week) 5 times per week   Current Exercises Include Walking   Current Exercise Activities Include -   Do you need help using the phone?  No   Are you deaf or do you have serious difficulty hearing?  No   Do you need help with transportation? No   Do you need help shopping? No   Do you need help preparing meals?  No   Do you need help with housework?  No   Do you need help with laundry? No   Do you need help taking your medications? No   Do you need help managing money? No   Do you ever drive or ride in a car without wearing a seat belt? No   Have you felt unusual stress, anger or loneliness in the last month? No   Who do you live with? Alone   If you need help, do you have trouble finding someone available to you? No   Have you been bothered in the last four weeks by sexual problems? -   Do you have difficulty concentrating, remembering or making decisions? No         Does the patient have evidence of cognitive impairment? No    Asprin use counseling:Start ASA 81 mg daily     Age-appropriate Screening Schedule:  Refer to the list below for future screening recommendations based on patient's age, sex and/or medical conditions. Orders for these recommended tests are listed in the plan section. The patient has been provided with a written plan.    Health Maintenance   Topic Date Due   • DIABETIC FOOT EXAM  05/16/2018   • DIABETIC EYE EXAM  07/08/2020   • ZOSTER VACCINE (2 of 2) 05/20/2021 (Originally 7/11/2017)   • INFLUENZA VACCINE  08/01/2021   • HEMOGLOBIN A1C  09/16/2021   • LIPID PANEL  03/16/2022   • URINE MICROALBUMIN  03/16/2022   • TDAP/TD VACCINES (3 - Td or  Tdap) 11/05/2029          The following portions of the patient's history were reviewed and updated as appropriate: allergies, current medications, past family history, past medical history, past social history, past surgical history and problem list.    Outpatient Medications Prior to Visit   Medication Sig Dispense Refill   • albuterol sulfate  (90 Base) MCG/ACT inhaler Inhale 2 puffs Every 4 (Four) Hours As Needed for Wheezing. 1 g 0   • B-D UF III MINI PEN NEEDLES 31G X 5 MM misc USE FOUR TIMES A DAY WITH INSULIN PENS 360 each 2   • dexlansoprazole (DEXILANT) 60 MG capsule Take 60 mg by mouth Daily.     • gabapentin (NEURONTIN) 300 MG capsule Take 1 capsule by mouth 4 (Four) Times a Day As Needed.     • HYDROcodone-acetaminophen (NORCO) 7.5-325 MG per tablet As Needed.     • Insulin Zinc Human (NOVOLIN L SC) Inject  under the skin into the appropriate area as directed.     • Lancets (ONETOUCH DELICA PLUS [id number]) misc      • ONETOUCH VERIO test strip      • fluticasone (FLONASE) 50 MCG/ACT nasal spray USE 2 SPRAYS IN EACH NOSTRIL DAILY AS DIRECTED 16 g 0   • lisinopril-hydrochlorothiazide (PRINZIDE,ZESTORETIC) 20-25 MG per tablet TAKE 1 TABLET DAILY 90 tablet 0   • rosuvastatin (CRESTOR) 20 MG tablet TAKE 1 TABLET EVERY NIGHT 90 tablet 0   • ondansetron ODT (ZOFRAN-ODT) 4 MG disintegrating tablet Place 1 tablet on the tongue Every 8 (Eight) Hours As Needed for Nausea or Vomiting. 5 tablet 0   • albuterol sulfate  (90 Base) MCG/ACT inhaler Inhale 2 puffs 4 (Four) Times a Day. 6.7 g 2     No facility-administered medications prior to visit.       Patient Active Problem List   Diagnosis   • Essential hypertension   • Familial hypercholesterolemia   • Type 2 diabetes mellitus with complication, with long-term current use of insulin (CMS/[facility])   • DDD (degenerative disc disease), thoracolumbar   • Gastroesophageal reflux disease   • Left lower quadrant pain   • Injury of kidney   • Cellulitis   •  "Spinal stenosis of cervical region   • Degeneration of thoracic or thoracolumbar intervertebral disc   • Abscess of foot   • Hypokalemia   • Other specified mononeuropathies   • Leukocytosis   • Neuropathic pain syndrome (non-herpetic)   • Systemic infection (CMS/HCC)   • Spinal stenosis of lumbar region   • Tachycardia   • Spondylolisthesis   • Drug-induced constipation   • Dysphagia   • Nausea   • Generalized abdominal cramping   • Chronic pansinusitis   • Anxiety state   • Bilateral hand pain   • Ulnar neuropathy of both upper extremities   • Dupuytren's contracture of both hands   • Bilateral carpal tunnel syndrome   • Skin ulcer of toe of left foot with fat layer exposed (CMS/HCC)   • Diabetic polyneuropathy associated with type 2 diabetes mellitus (CMS/HCC)   • Hammer toe of left foot   • Equinus contracture of ankle   • Exostosis of bone of foot   • Pain of left clavicle   • Traumatic incomplete tear of left rotator cuff   • Strain of acromioclavicular joint   • Post-operative state   • Hypercholesterolemia   • Status post arthroscopy of left shoulder   • Status post left rotator cuff repair   • Chronic left shoulder pain   • Trigger thumb of right hand   • Dupuytren's contracture of right hand   • Atrophy of muscle of multiple sites       Advanced Care Planning:  ACP discussion was held with the patient during this visit. Patient has an advance directive (not in EMR), copy requested.    Review of Systems    Compared to one year ago, the patient feels his physical health is better.  Compared to one year ago, the patient feels his mental health is better.    Reviewed chart for potential of high risk medication in the elderly: yes  Reviewed chart for potential of harmful drug interactions in the elderly:yes    Objective         Vitals:    05/20/21 1421   BP: 122/78   Pulse: 72   Temp: 97.8 °F (36.6 °C)   Weight: 112 kg (246 lb)   Height: 182.9 cm (72\")   PainSc: 0-No pain       Body mass index is 33.36 " kg/m².  Discussed the patient's BMI with him. The BMI is below average; BMI management plan is completed.    Physical Exam          Assessment/Plan   Medicare Risks and Personalized Health Plan  CMS Preventative Services Quick Reference  Advance Directive Discussion  Chronic Pain   Dementia/Memory   Depression/Dysphoria  Diabetic Lab Screening   Fall Risk  Immunizations Discussed/Encouraged (specific immunizations; Pneumococcal 23 and Shingrix )  Polypharmacy    The above risks/problems have been discussed with the patient.  Pertinent information has been shared with the patient in the After Visit Summary.  Follow up plans and orders are seen below in the Assessment/Plan Section.    Diagnoses and all orders for this visit:    1. Medicare annual wellness visit, subsequent (Primary)    2. Fluid retention in legs  -     lisinopril-hydrochlorothiazide (PRINZIDE,ZESTORETIC) 20-25 MG per tablet; Take 1 tablet by mouth Daily.  Dispense: 90 tablet; Refill: 3  -     rosuvastatin (CRESTOR) 20 MG tablet; Take 1 tablet by mouth Every Night.  Dispense: 90 tablet; Refill: 3  -     methylPREDNISolone acetate (DEPO-medrol) injection 80 mg    3. Essential hypertension  -     lisinopril-hydrochlorothiazide (PRINZIDE,ZESTORETIC) 20-25 MG per tablet; Take 1 tablet by mouth Daily.  Dispense: 90 tablet; Refill: 3  -     rosuvastatin (CRESTOR) 20 MG tablet; Take 1 tablet by mouth Every Night.  Dispense: 90 tablet; Refill: 3    4. Familial hypercholesterolemia  -     lisinopril-hydrochlorothiazide (PRINZIDE,ZESTORETIC) 20-25 MG per tablet; Take 1 tablet by mouth Daily.  Dispense: 90 tablet; Refill: 3  -     rosuvastatin (CRESTOR) 20 MG tablet; Take 1 tablet by mouth Every Night.  Dispense: 90 tablet; Refill: 3    5. Acute ear pain, bilateral    6. Bilateral chronic serous otitis media    Other orders  -     azithromycin (Zithromax Z-Yonas) 250 MG tablet; Take 2 po now and 1 po d2-5  Dispense: 6 tablet; Refill: 0  -     fluticasone (FLONASE)  50 MCG/ACT nasal spray; 2 sprays by Each Nare route Daily.  Dispense: 16 g; Refill: 1      Follow Up:  Return in 6 months (on 11/20/2021), or if symptoms worsen or fail to improve, for Recheck, or sooner as needed.     An After Visit Summary and PPPS were given to the patient.

## 2021-05-27 ENCOUNTER — OFFICE VISIT (OUTPATIENT)
Dept: ORTHOPEDIC SURGERY | Facility: CLINIC | Age: 71
End: 2021-05-27

## 2021-05-27 VITALS
OXYGEN SATURATION: 98 % | SYSTOLIC BLOOD PRESSURE: 130 MMHG | DIASTOLIC BLOOD PRESSURE: 70 MMHG | HEIGHT: 72 IN | WEIGHT: 243 LBS | BODY MASS INDEX: 32.91 KG/M2 | HEART RATE: 102 BPM

## 2021-05-27 DIAGNOSIS — E11.8 TYPE 2 DIABETES MELLITUS WITH COMPLICATION, WITH LONG-TERM CURRENT USE OF INSULIN (HCC): ICD-10-CM

## 2021-05-27 DIAGNOSIS — G89.29 CHRONIC LEFT SHOULDER PAIN: ICD-10-CM

## 2021-05-27 DIAGNOSIS — M25.512 CHRONIC LEFT SHOULDER PAIN: ICD-10-CM

## 2021-05-27 DIAGNOSIS — Z98.890 HISTORY OF ARTHROSCOPY OF LEFT SHOULDER: ICD-10-CM

## 2021-05-27 DIAGNOSIS — I10 ESSENTIAL HYPERTENSION: ICD-10-CM

## 2021-05-27 DIAGNOSIS — Z79.4 TYPE 2 DIABETES MELLITUS WITH COMPLICATION, WITH LONG-TERM CURRENT USE OF INSULIN (HCC): ICD-10-CM

## 2021-05-27 PROCEDURE — 99213 OFFICE O/P EST LOW 20 MIN: CPT | Performed by: ORTHOPAEDIC SURGERY

## 2021-05-27 NOTE — PROGRESS NOTES
"Jadiel Dutton is a 70 y.o. male returns for     Chief Complaint   Patient presents with   • Left Shoulder - Follow-up       HISTORY OF PRESENT ILLNESS:  Follow up left shoulder pain.     02/07/20 (15m 20d) Troy Barillas MD    LEFT SHOULDER ARTHROSCOPY with rotator cuff repair, DEDRICK procedure, and Subacromial decompression - Left     He reports more tenderness in his left shoulder. He states that he did start being more active a couple of months ago and has been using his arm more. No new injury. He has some soreness at night but it goes away with rest. No numbness or tingling.       CONCURRENT MEDICAL HISTORY:    The following portions of the patient's history were reviewed and updated as appropriate: allergies, current medications, past family history, past medical history, past social history, past surgical history and problem list.     ROS  No fevers or chills.  No chest pain or shortness of air.  No GI or  disturbances.    PHYSICAL EXAMINATION:       /70   Pulse 102   Ht 182.9 cm (72\")   Wt 110 kg (243 lb)   SpO2 98%   BMI 32.96 kg/m²     Physical Exam  Constitutional:       General: He is not in acute distress.     Appearance: Normal appearance.   Pulmonary:      Effort: Pulmonary effort is normal. No respiratory distress.   Neurological:      Mental Status: He is alert and oriented to person, place, and time.         GAIT:     [x]  Normal  []  Antalgic    Assistive device: [x]  None  []  Walker     []  Crutches  []  Cane     []  Wheelchair  []  Stretcher    Left Shoulder Exam     Tenderness   The patient is experiencing no tenderness.     Range of Motion   Active abduction: 150   Forward flexion: 160     Muscle Strength   Abduction: 4/5   Supraspinatus: 4/5     Other   Erythema: absent  Sensation: normal  Pulse: present                         ASSESSMENT:    Diagnoses and all orders for this visit:    History of arthroscopy of left shoulder    Chronic left shoulder pain    Type 2 " diabetes mellitus with complication, with long-term current use of insulin (CMS/[facility])    Essential hypertension          PLAN    He will continue with general strength and conditioning exercises    No restrictions in regards to use of his left shoulder. However, he was encouraged to let pain be his guide.    We discussed x-rays and further evaluation of his left shoulder if he returns for shoulder pain.    Activity as tolerated and follow-up as needed.    Return if symptoms worsen or fail to improve, for Recheck with repeat xrays.    Troy Barillas MD

## 2021-08-10 RX ORDER — ONDANSETRON HYDROCHLORIDE 8 MG/1
8 TABLET, FILM COATED ORAL EVERY 8 HOURS PRN
Qty: 20 TABLET | Refills: 0 | Status: SHIPPED | OUTPATIENT
Start: 2021-08-10 | End: 2021-08-27

## 2021-08-27 ENCOUNTER — OFFICE VISIT (OUTPATIENT)
Dept: PODIATRY | Facility: CLINIC | Age: 71
End: 2021-08-27

## 2021-08-27 VITALS
HEART RATE: 95 BPM | SYSTOLIC BLOOD PRESSURE: 163 MMHG | WEIGHT: 243 LBS | BODY MASS INDEX: 32.91 KG/M2 | DIASTOLIC BLOOD PRESSURE: 93 MMHG | OXYGEN SATURATION: 100 % | HEIGHT: 72 IN

## 2021-08-27 DIAGNOSIS — E11.42 DIABETIC POLYNEUROPATHY ASSOCIATED WITH TYPE 2 DIABETES MELLITUS (HCC): ICD-10-CM

## 2021-08-27 DIAGNOSIS — Z89.422 HISTORY OF AMPUTATION OF LESSER TOE OF LEFT FOOT (HCC): ICD-10-CM

## 2021-08-27 DIAGNOSIS — S90.421A BLISTER OF GREAT TOE OF RIGHT FOOT, INITIAL ENCOUNTER: Primary | ICD-10-CM

## 2021-08-27 PROCEDURE — 99213 OFFICE O/P EST LOW 20 MIN: CPT | Performed by: PODIATRIST

## 2021-08-27 NOTE — PROGRESS NOTES
Jadiel Dutton  1950  70 y.o. male   PCP- Shayy Banda , MARY  12/19/2019  BS :88  PER PATIENT      Patient presents today for pain on right hallux.  08/27/2021          Chief Complaint   Patient presents with   • Right Foot - Pain         History of Present Illness     Jadiel Dutton is a 70 y.o. male with history of of left third and fourth hammertoe correction, partial excision of fifth metatarsal and gastrocnemius recession for treatment of chronic wound formation.  Presents today for new problem of right hallux blister which he states he noticed approximately 1 week prior.    Past Medical History:   Diagnosis Date   • Abdominal pain    • Abnormal weight loss    • Acute bronchitis    • Anxiety state    • Benign essential hypertension    • Chronic sinusitis    • Constipation    • Cystitis    • Degenerative joint disease involving multiple joints    • Diabetic neuropathy (CMS/HCC)      bilateral hands      • Diastolic dysfunction    • Diverticular disease of colon    • Dyspnea    • Epigastric pain    • Essential hypertension    • Foot ulcer (CMS/HCC)    • Gastroesophageal reflux disease    • Generalized anxiety disorder    • Hyperlipidemia    • Inflammatory bowel disease     Possible Inflammatory Bowel Disease      • Lumbar pain     Pain radiating to lumbar region of back   n      • Pleuritic pain    • Radiculopathy     site unspecified      • Type 2 diabetes mellitus (CMS/HCC)    • Vesicular eczema of hands and feet          Past Surgical History:   Procedure Laterality Date   • BACK SURGERY      LOWER BACK SURGERY   • CARPAL TUNNEL RELEASE Bilateral    • COLONOSCOPY  06/11/2012    Internal & external hemorrhoids found.   • COLONOSCOPY N/A 8/22/2017    Procedure: COLONOSCOPY;  Surgeon: Cesar Hollis MD;  Location: [facility] ENDOSCOPY;  Service:    • ENDOSCOPY  06/11/2012    Slight stricture in the distal esophagus. Gastritis in stomach. Biopsy taken. Normal duodenum.   • ENDOSCOPY     • ENDOSCOPY  N/A 8/22/2017    Procedure: ESOPHAGOGASTRODUODENOSCOPY possible dilation ;  Surgeon: Cesar Hollis MD;  Location: [facility] ENDOSCOPY;  Service:    • ENTEROSCOPY SMALL BOWEL  08/27/2012    Gastritis in stomach. Normal jejunum. Biopsy taken. Normal duodenum   • EYE SURGERY Bilateral     cataracts removed with implants   • FOOT SURGERY     • HAMMER TOE REPAIR Left 12/18/2019    Procedure: FIFTH METATARSAL EXOSTECTOMY, FOURTH HAMMER TOE CORRECTION, GASTROCNEMIUS RECESSION;  Surgeon: Jacoby Serrano DPM;  Location: [facility] OR;  Service: Podiatry   • HAND SURGERY  12/23/2020   • INJECTION OF MEDICATION  02/28/2014    Depo Medrol   • INJECTION OF MEDICATION  03/07/2014    Rocephin(2)   • SHOULDER ARTHROSCOPY Left 2/7/2020    Procedure: LEFT SHOULDER ARTHROSCOPY with rotator cuff repair, DEDRICK procedure, and Subacromial decompression;  Surgeon: Troy Barillas MD;  Location: [facility] OR;  Service: Orthopedics;  Laterality: Left;   • ULNAR TUNNEL RELEASE           Family History   Problem Relation Age of Onset   • No Known Problems Other    • Hypertension Mother    • Heart attack Father    • No Known Problems Sister    • No Known Problems Brother    • No Known Problems Daughter    • No Known Problems Son    • No Known Problems Maternal Aunt    • No Known Problems Maternal Uncle    • No Known Problems Paternal Aunt    • No Known Problems Paternal Uncle    • No Known Problems Maternal Grandmother    • No Known Problems Maternal Grandfather    • No Known Problems Paternal Grandmother    • No Known Problems Paternal Grandfather          Social History     Socioeconomic History   • Marital status: Single     Spouse name: Not on file   • Number of children: Not on file   • Years of education: Not on file   • Highest education level: Not on file   Tobacco Use   • Smoking status: Never Smoker   • Smokeless tobacco: Never Used   Substance and Sexual Activity   • Alcohol use: No   • Drug use: No   • Sexual activity:  "Defer         Current Outpatient Medications   Medication Sig Dispense Refill   • albuterol sulfate  (90 Base) MCG/ACT inhaler Inhale 2 puffs Every 4 (Four) Hours As Needed for Wheezing. 1 g 0   • B-D UF III MINI PEN NEEDLES 31G X 5 MM misc USE FOUR TIMES A DAY WITH INSULIN PENS 360 each 2   • dexlansoprazole (DEXILANT) 60 MG capsule Take 60 mg by mouth Daily.     • gabapentin (NEURONTIN) 300 MG capsule Take 1 capsule by mouth 4 (Four) Times a Day As Needed.     • HYDROcodone-acetaminophen (NORCO) 7.5-325 MG per tablet As Needed.     • Insulin Zinc Human (NOVOLIN L SC) Inject  under the skin into the appropriate area as directed.     • Lancets (ONETOUCH DELICA PLUS [id number]) misc      • lisinopril-hydrochlorothiazide (PRINZIDE,ZESTORETIC) 20-25 MG per tablet Take 1 tablet by mouth Daily. 90 tablet 3   • ONETOUCH VERIO test strip      • rosuvastatin (CRESTOR) 20 MG tablet Take 1 tablet by mouth Every Night. 90 tablet 3     No current facility-administered medications for this visit.         OBJECTIVE    /93   Pulse 95   Ht 182.9 cm (72\")   Wt 110 kg (243 lb)   SpO2 100%   BMI 32.96 kg/m²       Review of Systems   Constitutional: Negative.    HENT: Negative.    Eyes: Negative.    Respiratory: Negative.    Cardiovascular: Negative.    Gastrointestinal: Negative.    Endocrine: Negative.    Genitourinary: Negative.    Musculoskeletal: Positive for back pain.   Skin: Positive for wound.   Allergic/Immunologic: Negative.    Neurological: Negative.    Hematological: Negative.    Psychiatric/Behavioral: Negative.          Diabetic Foot Exam Performed and Monofilament Test Performed       Constitutional: he appears well-developed and well-nourished.   HEENT: Normocephalic. Atraumatic  CV: No tenderness. RRR  Resp: Non-labored respiration. No wheezes.   Psychiatric: he has a normal mood and affect. his   behavior is normal.      Lower Extremity Exam:  Vascular: DP/PT pulses palpable 2+.   Positive hair " growth.   No perimalleolar edema  Neuro: Protective sensation Diminished, b/l.  Light touch sensation diminished, b/l  DTRs intact  Integument: Desiccated bulla tissue to plantar aspect right hallux.  No full-thickness skin breakdown noted.  No erythema.  No tenderness palpation  No masses  Webspaces c/d/i  Musculoskeletal: LE muscle strength 4/5  Gait Normal  Mild hammertoe deformity toes 2 through 4 on right.  Ankle ROM full without pain or crepitus, b/l          ASSESSMENT AND PLAN    Diagnoses and all orders for this visit:    1. Blister of great toe of right foot, initial encounter (Primary)    2. Diabetic polyneuropathy associated with type 2 diabetes mellitus (CMS/[facility])    3. History of amputation of lesser toe of left foot (CMS/[facility])      -`Comprehensive foot and ankle exam  -Above-noted bulla tissue debrided with 15 blade to epidermis out incident.  No residual wound noted.  -Advised proper extra depth diabetic style walking shoe with multidensity insert, continue daily foot checks  -Recheck as needed            This document has been electronically signed by Jacoby Serrano DPM on August 29, 2021 13:12 CDT     EMR Dragon/Transcription disclaimer:   Much of this encounter note is an electronic transcription/translation of spoken language to printed text. The electronic translation of spoken language may permit erroneous, or at times, nonsensical words or phrases to be inadvertently transcribed; Although I have reviewed the note for such errors, some may still exist.    Jacoby Serrano DPM  8/29/2021  13:12 CDT

## 2021-09-16 ENCOUNTER — OFFICE VISIT (OUTPATIENT)
Dept: FAMILY MEDICINE CLINIC | Facility: CLINIC | Age: 71
End: 2021-09-16

## 2021-09-16 VITALS
SYSTOLIC BLOOD PRESSURE: 128 MMHG | WEIGHT: 240.2 LBS | HEIGHT: 72 IN | TEMPERATURE: 97.1 F | HEART RATE: 96 BPM | DIASTOLIC BLOOD PRESSURE: 76 MMHG | OXYGEN SATURATION: 97 % | BODY MASS INDEX: 32.53 KG/M2

## 2021-09-16 DIAGNOSIS — M54.41 CHRONIC MIDLINE LOW BACK PAIN WITH RIGHT-SIDED SCIATICA: Primary | ICD-10-CM

## 2021-09-16 DIAGNOSIS — M48.061 SPINAL STENOSIS OF LUMBAR REGION, UNSPECIFIED WHETHER NEUROGENIC CLAUDICATION PRESENT: ICD-10-CM

## 2021-09-16 DIAGNOSIS — M25.551 RIGHT HIP PAIN: ICD-10-CM

## 2021-09-16 DIAGNOSIS — G89.29 CHRONIC MIDLINE LOW BACK PAIN WITH RIGHT-SIDED SCIATICA: Primary | ICD-10-CM

## 2021-09-16 DIAGNOSIS — M25.561 ACUTE PAIN OF RIGHT KNEE: ICD-10-CM

## 2021-09-16 PROCEDURE — 96372 THER/PROPH/DIAG INJ SC/IM: CPT | Performed by: NURSE PRACTITIONER

## 2021-09-16 PROCEDURE — 99214 OFFICE O/P EST MOD 30 MIN: CPT | Performed by: NURSE PRACTITIONER

## 2021-09-16 RX ORDER — TRIAMCINOLONE ACETONIDE 40 MG/ML
80 INJECTION, SUSPENSION INTRA-ARTICULAR; INTRAMUSCULAR ONCE
Status: COMPLETED | OUTPATIENT
Start: 2021-09-16 | End: 2021-09-16

## 2021-09-16 RX ADMIN — TRIAMCINOLONE ACETONIDE 80 MG: 40 INJECTION, SUSPENSION INTRA-ARTICULAR; INTRAMUSCULAR at 09:57

## 2021-09-16 NOTE — PROGRESS NOTES
"Chief Complaint  Hip Pain (right hip pain, starts at lower back and goes down leg, x1 month, taking flexeril, constant pain)    Subjective          The patient presents today with complaints of right hip pain. The symptoms started when he got up 1 night, approximately 1-month ago, when he sat down on his bed after getting up and he heard a series of pops. He is unsure where the pops originated from but thinks his R knee was popping. The pain hurts from his lower back through his right hip, down his posterior right leg, down to the right hip and through the right knee. The pain makes ambulation difficult. He has been treating the pain with gabapentin and hydrocodone as needed. It was prescribed to him by a pain clinic in [place] secondary to a previous back surgery by Dr. Bruno 2 years ago. If the problem is shown to be his back, the patient has a doctor at [facility] in [place] that he would like to be referred to. He will call back with their name.     In addition, the patient is being followed by Dr. Barillas due to left shoulder pain secondary to his left shoulder surgery.    Jadiel Dutton presents to [facility]  History of Present Illness    Objective   Vital Signs:   /76   Pulse 96   Temp 97.1 °F (36.2 °C)   Ht 182.9 cm (72\")   Wt 109 kg (240 lb 3.2 oz)   SpO2 97%   BMI 32.58 kg/m²     Physical Exam  Vitals and nursing note reviewed.   Constitutional:       General: He is not in acute distress.     Appearance: Normal appearance. He is not ill-appearing, toxic-appearing or diaphoretic.   HENT:      Head: Normocephalic and atraumatic.   Cardiovascular:      Rate and Rhythm: Normal rate and regular rhythm.      Pulses: Normal pulses.      Heart sounds: Normal heart sounds. No murmur heard.   No friction rub. No gallop.    Pulmonary:      Effort: Pulmonary effort is normal. No respiratory distress.      Breath sounds: Normal breath sounds. " No stridor. No wheezing, rhonchi or rales.      Comments: Kyphosis on exam   Musculoskeletal:         General: Tenderness present.      Lumbar back: Tenderness and bony tenderness present. Decreased range of motion. Positive right straight leg raise test.      Comments: He does have positive straight leg raise noted on the right. There is pain with flexion and extension of the right knee. He is tender to palpation of lumbar spine and right lumbar area with slightly decreased strength to bilateral lower extremities.   Skin:     General: Skin is warm.      Coloration: Skin is not jaundiced or pale.      Findings: No bruising, erythema, lesion or rash.   Neurological:      Mental Status: He is alert and oriented to person, place, and time.      Motor: Weakness present.      Gait: Gait abnormal.      Deep Tendon Reflexes: Reflexes abnormal.      Comments: He is ambulatory without assistance, but gait is very guarded and he is able to get on the exam table with assistance.   Decreased reflex on the right knee is noted   Psychiatric:         Mood and Affect: Mood normal.         Behavior: Behavior normal.         Thought Content: Thought content normal.         Judgment: Judgment normal.        Result Review :                 Assessment and Plan    Diagnoses and all orders for this visit:    1. Chronic midline low back pain with right-sided sciatica (Primary)    2. Right hip pain  -     XR Hip With or Without Pelvis 2 - 3 View Right; Future  -     triamcinolone acetonide (KENALOG-40) injection 80 mg    3. Acute pain of right knee  -     triamcinolone acetonide (KENALOG-40) injection 80 mg  -     XR Knee 1 or 2 View Right    4. Spinal stenosis of lumbar region, unspecified whether neurogenic claudication present  -     triamcinolone acetonide (KENALOG-40) injection 80 mg  -     XR Spine Lumbar 2 or 3 View    he will continue with pain medication as prescribed by the pain clinic. He will follow up with Dr. Barillas regarding  continued left shoulder pain. I will obtain x-rays of the lumbar spine, right knee, and right hip and inform him of results by phone. If needed, we will obtain an MRI of the lumbar spine and, if needed, he already has a physician's name in mind that he would like to be referred to for neurosurgery. This is a [employer] physician in [place], but he does not know the exact name. he will call back or come back with this name in case he needs to be referred in the future. He is prescribed Kenalog 80 mg intramuscular injections in the office today. He was given this and tolerated it well. He will follow up here if his symptoms should persist or worsen. All questions and concerns are addressed with understanding noted. The patient is in agreement to above plan.    I spent 33 minutes caring for Jadiel on this date of service. This time includes time spent by me in the following activities:preparing for the visit, reviewing tests, performing a medically appropriate examination and/or evaluation , counseling and educating the patient/family/caregiver, ordering medications, tests, or procedures and documenting information in the medical record  Follow Up   Return if symptoms worsen or fail to improve, for Recheck, or sooner as needed.  Patient was given instructions and counseling regarding his condition or for health maintenance advice. Please see specific information pulled into the AVS if appropriate.     Transcribed from ambient dictation for MARY Escobar by Laurie Vizcaino.  09/16/21   10:38 CDT    I have personally performed the services described in this document as transcribed by the above individual, and it is both accurate and complete.  MARY Escobar  9/16/2021  10:47 CDT

## 2021-09-27 DIAGNOSIS — M25.512 CHRONIC LEFT SHOULDER PAIN: Primary | ICD-10-CM

## 2021-09-27 DIAGNOSIS — G89.29 CHRONIC LEFT SHOULDER PAIN: Primary | ICD-10-CM

## 2021-09-28 ENCOUNTER — OFFICE VISIT (OUTPATIENT)
Dept: ORTHOPEDIC SURGERY | Facility: CLINIC | Age: 71
End: 2021-09-28

## 2021-09-28 VITALS
HEART RATE: 82 BPM | WEIGHT: 253 LBS | OXYGEN SATURATION: 100 % | HEIGHT: 72 IN | BODY MASS INDEX: 34.27 KG/M2 | DIASTOLIC BLOOD PRESSURE: 80 MMHG | SYSTOLIC BLOOD PRESSURE: 136 MMHG

## 2021-09-28 DIAGNOSIS — Z79.4 TYPE 2 DIABETES MELLITUS WITH COMPLICATION, WITH LONG-TERM CURRENT USE OF INSULIN (HCC): ICD-10-CM

## 2021-09-28 DIAGNOSIS — G89.29 CHRONIC LEFT SHOULDER PAIN: ICD-10-CM

## 2021-09-28 DIAGNOSIS — I10 ESSENTIAL HYPERTENSION: ICD-10-CM

## 2021-09-28 DIAGNOSIS — E11.8 TYPE 2 DIABETES MELLITUS WITH COMPLICATION, WITH LONG-TERM CURRENT USE OF INSULIN (HCC): ICD-10-CM

## 2021-09-28 DIAGNOSIS — M12.812 ROTATOR CUFF ARTHROPATHY, LEFT: Primary | ICD-10-CM

## 2021-09-28 DIAGNOSIS — M25.512 CHRONIC LEFT SHOULDER PAIN: ICD-10-CM

## 2021-09-28 PROCEDURE — 99214 OFFICE O/P EST MOD 30 MIN: CPT | Performed by: ORTHOPAEDIC SURGERY

## 2021-09-28 RX ORDER — MELOXICAM 15 MG/1
TABLET ORAL
Qty: 30 TABLET | Refills: 3 | Status: SHIPPED | OUTPATIENT
Start: 2021-09-28 | End: 2021-09-28

## 2021-09-28 RX ORDER — MELOXICAM 15 MG/1
TABLET ORAL
Qty: 30 TABLET | Refills: 3 | Status: SHIPPED | OUTPATIENT
Start: 2021-09-28 | End: 2022-01-13 | Stop reason: ALTCHOICE

## 2021-09-30 RX ORDER — FUROSEMIDE 40 MG/1
40 TABLET ORAL DAILY
Qty: 30 TABLET | Refills: 1 | Status: SHIPPED | OUTPATIENT
Start: 2021-09-30 | End: 2022-01-13 | Stop reason: ALTCHOICE

## 2021-10-11 ENCOUNTER — OFFICE VISIT (OUTPATIENT)
Dept: FAMILY MEDICINE CLINIC | Facility: CLINIC | Age: 71
End: 2021-10-11

## 2021-10-11 VITALS
BODY MASS INDEX: 34.27 KG/M2 | HEART RATE: 97 BPM | DIASTOLIC BLOOD PRESSURE: 78 MMHG | TEMPERATURE: 97.9 F | WEIGHT: 253 LBS | HEIGHT: 72 IN | OXYGEN SATURATION: 100 % | SYSTOLIC BLOOD PRESSURE: 140 MMHG

## 2021-10-11 DIAGNOSIS — M54.41 CHRONIC BILATERAL LOW BACK PAIN WITH RIGHT-SIDED SCIATICA: ICD-10-CM

## 2021-10-11 DIAGNOSIS — G89.29 CHRONIC BILATERAL LOW BACK PAIN WITH RIGHT-SIDED SCIATICA: ICD-10-CM

## 2021-10-11 DIAGNOSIS — M48.061 SPINAL STENOSIS OF LUMBAR REGION, UNSPECIFIED WHETHER NEUROGENIC CLAUDICATION PRESENT: Primary | ICD-10-CM

## 2021-10-11 DIAGNOSIS — J32.0 CHRONIC MAXILLARY SINUSITIS: ICD-10-CM

## 2021-10-11 PROCEDURE — 99214 OFFICE O/P EST MOD 30 MIN: CPT | Performed by: NURSE PRACTITIONER

## 2021-10-11 RX ORDER — AZELASTINE 1 MG/ML
SPRAY, METERED NASAL
COMMUNITY

## 2021-10-11 RX ORDER — AZITHROMYCIN 250 MG/1
TABLET, FILM COATED ORAL
Qty: 6 TABLET | Refills: 0 | Status: SHIPPED | OUTPATIENT
Start: 2021-10-11 | End: 2021-11-18 | Stop reason: SDUPTHER

## 2021-10-11 NOTE — PROGRESS NOTES
"Chief Complaint  Back Pain (Follow up on back pain)    Subjective          The patient presents today for complaints of back pain. He is accompanied by an adult female today. The patient reports that he saw Dr. Grace, neurosurgery, approximately 1 week ago. He adds that Dr. Grace informed him that he needs to have \"hardware\" removed from his back. The patient adds that Dr. Grace informed him that he would like to remove some of the hardware and leave the screws remaining; the patient is very unsure about this as he does not wish to have another surgery in the future if screws need to be removed later. He adds that Dr. Grace said the surgery would be done out-patient and he would be able to return home the same day; he has reservations about returning home so soon following lumbar surgery. The patient notes that his pain radiates inferiorly to his R hip and he thinks his R knee pain is associated with this although after reviewing L knee xrays he does have marked DJD. He adds that he has been sleeping on his right side since his L shoulder surgery. The patient notes that he had an open MRI in 09/2021. He notes of paresthesia in his lower extremities. The patient notes that he takes hydrocodone for his pain.     Additionally, he reports that he continues to have sinus pain, ringing in his ears, as well as possibly some water in his ears due to taking baths. He notes that he is around people who smoke cigars at work and he feels as if he is choking at times. The patient adds that he utilizes Astelin. He reports he has difficulty getting air through his right nasal passageway. He notes that \"the right side of my head is worse than the left side of my head\". The patient denies seeing an ENT. He notes that he has been cleaning his ears recently and he was able to extract a small amount of \"some yellow\" out of his ear canal. He reports cough and his right maxillary/face is sore.     The patient is allergic to PENICILLIN, " "BACTRIM, CEPHALOSPORIN, and CIPRO.     Jadiel Dutton presents to [facility]  History of Present Illness    Objective   Vital Signs:   /78   Pulse 97   Temp 97.9 °F (36.6 °C)   Ht 182.9 cm (72\")   Wt 115 kg (253 lb)   SpO2 100%   BMI 34.31 kg/m²     Physical Exam  Vitals and nursing note reviewed.   Constitutional:       General: He is not in acute distress.     Appearance: Normal appearance. He is not ill-appearing, toxic-appearing or diaphoretic.   HENT:      Head: Normocephalic and atraumatic.      Comments: He is tender to palpation of right maxillary sinus.     Right Ear: Ear canal and external ear normal. There is no impacted cerumen.      Left Ear: Tympanic membrane, ear canal and external ear normal. There is no impacted cerumen.      Ears:      Comments: Right TM is slightly injected and bulging.     Mouth/Throat:      Comments: Posterior pharynx is clear.  Neck:      Comments: No lymphadenopathy palpated.   Cardiovascular:      Rate and Rhythm: Normal rate and regular rhythm.      Pulses: Normal pulses.      Heart sounds: Normal heart sounds. No murmur heard.  No friction rub. No gallop.    Pulmonary:      Effort: Pulmonary effort is normal. No respiratory distress.      Breath sounds: Normal breath sounds. No stridor. No wheezing, rhonchi or rales.   Musculoskeletal:         General: Tenderness (pt ambulatory without assist but gait is somewhat guarded) present.      Lumbar back: Tenderness and bony tenderness present. Decreased range of motion.        Back:       Comments: Scar from previous surgery noted on exam as indicated above on graph    Skin:     General: Skin is warm and dry.   Neurological:      Mental Status: He is alert and oriented to person, place, and time.      Gait: Gait abnormal.   Psychiatric:         Mood and Affect: Mood normal.         Behavior: Behavior normal.         Thought Content: Thought content normal.        "  Judgment: Judgment normal.        Result Review :                 Assessment and Plan    Diagnoses and all orders for this visit:    1. Spinal stenosis of lumbar region, unspecified whether neurogenic claudication present (Primary)  -     Ambulatory Referral to Neurosurgery    2. Chronic maxillary sinusitis  -     XR Sinus Redd View Only  -     azithromycin (Zithromax Z-Yonas) 250 MG tablet; Take 2 po now and 1 po d2-5  Dispense: 6 tablet; Refill: 0    3. Chronic bilateral low back pain with right-sided sciatica      He is advised to continue with Astelin. He is prescribed azithromycin as he has multiple allergies, or intolerances, to antibiotics. I will also obtain sinus water view x-ray and if complaints persist, we can consider a referral to ENT. He has seen Dr. Colón in the past. He is referred on to neurosurgery out of [place] with [employer] Dr. Christiano Woodruff per his request. He most recently had an MRI at [facility] in [place] and is advised to take this with him to his appointment. He will continue with pain medicine as prescribed by pain clinic and he states that he is going to wait on surgery, although this was recommended, and this is scheduled to be done in the next couple of weeks in [place] with Dr. Grace. All questions and concerns are addressed with understanding noted. The patient is in agreement to above plan.    I spent 33 minutes caring for Jadiel on this date of service. This time includes time spent by me in the following activities:preparing for the visit, reviewing tests, performing a medically appropriate examination and/or evaluation , counseling and educating the patient/family/caregiver, ordering medications, tests, or procedures, referring and communicating with other health care professionals  and documenting information in the medical record  Follow Up   Return if symptoms worsen or fail to improve, for Recheck, or sooner as needed.  Patient was given instructions and counseling  regarding his condition or for health maintenance advice. Please see specific information pulled into the AVS if appropriate.     Transcribed from ambient dictation for MARY Escobar by Adele Pelayo.  10/11/21   12:07 CDT    I have personally performed the services described in this document as transcribed by the above individual, and it is both accurate and complete.  MARY Escobar  10/11/2021  12:41 CDT

## 2021-11-18 DIAGNOSIS — J32.0 CHRONIC MAXILLARY SINUSITIS: ICD-10-CM

## 2021-11-18 RX ORDER — AZITHROMYCIN 250 MG/1
TABLET, FILM COATED ORAL
Qty: 6 TABLET | Refills: 0 | Status: SHIPPED | OUTPATIENT
Start: 2021-11-18 | End: 2021-12-07

## 2021-12-07 ENCOUNTER — LAB (OUTPATIENT)
Dept: LAB | Facility: OTHER | Age: 71
End: 2021-12-07

## 2021-12-07 ENCOUNTER — OFFICE VISIT (OUTPATIENT)
Dept: FAMILY MEDICINE CLINIC | Facility: CLINIC | Age: 71
End: 2021-12-07

## 2021-12-07 VITALS
DIASTOLIC BLOOD PRESSURE: 70 MMHG | OXYGEN SATURATION: 100 % | SYSTOLIC BLOOD PRESSURE: 130 MMHG | WEIGHT: 240.2 LBS | HEART RATE: 103 BPM | HEIGHT: 72 IN | BODY MASS INDEX: 32.53 KG/M2

## 2021-12-07 DIAGNOSIS — R05.9 COUGH: ICD-10-CM

## 2021-12-07 DIAGNOSIS — J06.9 ACUTE URI: Primary | ICD-10-CM

## 2021-12-07 DIAGNOSIS — D72.829 LEUKOCYTOSIS, UNSPECIFIED TYPE: ICD-10-CM

## 2021-12-07 LAB
ALBUMIN SERPL-MCNC: 3.8 G/DL (ref 3.5–5)
ALBUMIN/GLOB SERPL: 1.3 G/DL (ref 1.1–1.8)
ALP SERPL-CCNC: 179 U/L (ref 38–126)
ALT SERPL W P-5'-P-CCNC: 17 U/L
ANION GAP SERPL CALCULATED.3IONS-SCNC: 4 MMOL/L (ref 5–15)
AST SERPL-CCNC: 28 U/L (ref 17–59)
BASOPHILS # BLD AUTO: 0.06 10*3/MM3 (ref 0–0.2)
BASOPHILS NFR BLD AUTO: 0.5 % (ref 0–1.5)
BILIRUB SERPL-MCNC: 0.3 MG/DL (ref 0.2–1.3)
BUN SERPL-MCNC: 10 MG/DL (ref 7–23)
BUN/CREAT SERPL: 8.2 (ref 7–25)
CALCIUM SPEC-SCNC: 9.5 MG/DL (ref 8.4–10.2)
CHLORIDE SERPL-SCNC: 94 MMOL/L (ref 101–112)
CO2 SERPL-SCNC: 36 MMOL/L (ref 22–30)
CREAT SERPL-MCNC: 1.22 MG/DL (ref 0.7–1.3)
DEPRECATED RDW RBC AUTO: 46.4 FL (ref 37–54)
EOSINOPHIL # BLD AUTO: 0.33 10*3/MM3 (ref 0–0.4)
EOSINOPHIL NFR BLD AUTO: 2.8 % (ref 0.3–6.2)
ERYTHROCYTE [DISTWIDTH] IN BLOOD BY AUTOMATED COUNT: 15.2 % (ref 12.3–15.4)
GFR SERPL CREATININE-BSD FRML MDRD: 59 ML/MIN/1.73 (ref 49–113)
GLOBULIN UR ELPH-MCNC: 2.9 GM/DL (ref 2.3–3.5)
GLUCOSE SERPL-MCNC: 137 MG/DL (ref 70–99)
HCT VFR BLD AUTO: 38.7 % (ref 37.5–51)
HGB BLD-MCNC: 12.1 G/DL (ref 13–17.7)
LYMPHOCYTES # BLD AUTO: 1.48 10*3/MM3 (ref 0.7–3.1)
LYMPHOCYTES NFR BLD AUTO: 12.7 % (ref 19.6–45.3)
MCH RBC QN AUTO: 26.8 PG (ref 26.6–33)
MCHC RBC AUTO-ENTMCNC: 31.3 G/DL (ref 31.5–35.7)
MCV RBC AUTO: 85.6 FL (ref 79–97)
MONOCYTES # BLD AUTO: 0.96 10*3/MM3 (ref 0.1–0.9)
MONOCYTES NFR BLD AUTO: 8.3 % (ref 5–12)
NEUTROPHILS NFR BLD AUTO: 75.7 % (ref 42.7–76)
NEUTROPHILS NFR BLD AUTO: 8.8 10*3/MM3 (ref 1.7–7)
PLATELET # BLD AUTO: 266 10*3/MM3 (ref 140–450)
PMV BLD AUTO: 9 FL (ref 6–12)
POTASSIUM SERPL-SCNC: 4 MMOL/L (ref 3.4–5)
PROT SERPL-MCNC: 6.7 G/DL (ref 6.3–8.6)
RBC # BLD AUTO: 4.52 10*6/MM3 (ref 4.14–5.8)
SODIUM SERPL-SCNC: 134 MMOL/L (ref 137–145)
WBC NRBC COR # BLD: 11.63 10*3/MM3 (ref 3.4–10.8)

## 2021-12-07 PROCEDURE — 99214 OFFICE O/P EST MOD 30 MIN: CPT | Performed by: NURSE PRACTITIONER

## 2021-12-07 PROCEDURE — 85025 COMPLETE CBC W/AUTO DIFF WBC: CPT | Performed by: NURSE PRACTITIONER

## 2021-12-07 PROCEDURE — 80053 COMPREHEN METABOLIC PANEL: CPT | Performed by: NURSE PRACTITIONER

## 2021-12-07 PROCEDURE — 36415 COLL VENOUS BLD VENIPUNCTURE: CPT | Performed by: NURSE PRACTITIONER

## 2021-12-07 RX ORDER — HYDROCODONE BITARTRATE AND ACETAMINOPHEN 10; 325 MG/1; MG/1
1 TABLET ORAL EVERY 6 HOURS PRN
COMMUNITY
Start: 2021-10-27 | End: 2022-12-15

## 2021-12-07 RX ORDER — CEPHALEXIN 500 MG/1
500 CAPSULE ORAL 2 TIMES DAILY
Qty: 14 CAPSULE | Refills: 0 | Status: SHIPPED | OUTPATIENT
Start: 2021-12-07 | End: 2021-12-14

## 2021-12-07 NOTE — PROGRESS NOTES
Chief Complaint  Allergies (Cough, and sneezing. Complains of some drainage.)    Subjective          Jadiel Dutton presents to [facility]  History of Present Illness     The patient presents today with complaints of rhinorrhea. He reports that he had surgery on 10/27/2021 on his back at [facility] by Dr. Barnard and was informed after the MRI and x-ray that he has a screw that is broken off. The patient adds that he was informed the other screw is not looking well and stated that he did not need them and they should be removed. He adds that he was informed that his white count was elevated and he was informed that if he had an infection, felt sluggish, or run down he should follow up with me. He was advised to have this repeated after surgery. The patient reports that he is still experiencing rhinorrhea, post nasal drip, and fatigue. He notes some soreness across his sinuses. He adds that he has had to use his albuterol inhaler due to feeling choked and that he has some tinnitus. He denies being on a steroid lately, however, notes he was on azithromycin recently. He adds he took Keflex twice a day for a few days that he had at home for the rhinorrhea and noted some improvement. The patient notes that he has been using salt water and Afrin.     He notes he has not done anything different, except maybe a minute amount of exercise. He adds he has just felt extremely tired.    He adds at work he was around some coal dust and asbestos, however, he did touch it. He states his supervisor informed him he did not think was affected by it. He requests an x-ray.    Additionally, he reports that he has received the [employer] COVID-19 vaccination in 03/2021. The patient adds that he has not received the booster vaccination. He denies having the influenza vaccination and adds he is leery of receiving it. He states he will have the pneumonia vaccination after  "he improves from this illness.    Objective   Vital Signs:   /70   Pulse 103   Ht 182.9 cm (72\")   Wt 109 kg (240 lb 3.2 oz)   SpO2 100%   BMI 32.58 kg/m²     Physical Exam  Vitals and nursing note reviewed.   Constitutional:       General: He is not in acute distress.     Appearance: Normal appearance. He is not ill-appearing, toxic-appearing or diaphoretic.   HENT:      Head: Normocephalic and atraumatic.      Comments: Voice hoarse today      Right Ear: Ear canal and external ear normal. There is no impacted cerumen. Tympanic membrane is injected.      Left Ear: Ear canal and external ear normal. There is no impacted cerumen. Tympanic membrane is injected.      Ears:      Comments: He complains of some tenderness to palpation of maxillary sinuses bilaterally.     Nose: Congestion and rhinorrhea present.      Mouth/Throat:      Mouth: Mucous membranes are moist.   Neck:      Comments: Without adenopathy noted. Posterior pharynx with mild erythema.  Cardiovascular:      Rate and Rhythm: Regular rhythm.      Heart sounds: Normal heart sounds. No murmur heard.  No friction rub. No gallop.    Pulmonary:      Effort: Pulmonary effort is normal. No respiratory distress.      Breath sounds: Normal breath sounds. No stridor. No wheezing, rhonchi or rales.   Musculoskeletal:      Cervical back: Neck supple.   Lymphadenopathy:      Cervical: No cervical adenopathy.   Skin:     General: Skin is warm and dry.      Coloration: Skin is not jaundiced or pale.      Findings: No bruising, erythema, lesion or rash.   Neurological:      Mental Status: He is alert and oriented to person, place, and time.   Psychiatric:         Mood and Affect: Mood normal.         Behavior: Behavior normal.         Thought Content: Thought content normal.         Judgment: Judgment normal.        Result Review :                 Assessment and Plan    Diagnoses and all orders for this visit:  1. Acute URI    2. Cough (Primary)  -     CBC & " Differential; Future  -     Comprehensive metabolic panel; Future  -     cephalexin (Keflex) 500 MG capsule; Take 1 capsule by mouth 2 (Two) Times a Day for 7 days.  Dispense: 14 capsule; Refill: 0  -     XR Chest 2 View; Future    3. Elevated WBC    I will obtain CBC, CMP, and chest x-ray and inform him of results by phone. Since he has already started on Keflex, I will continue Keflex 500 mg twice daily  for 7 more days for an addition for a total of approximately 10 days. If his symptoms should persist or worsen, he will let me know. We will try to obtain his previous CBC results from [facility] that he states revealed an elevated white count for review of today's labs. Health maintenance reviewed and updated. He is informed that I would suggest a influenza shot, pneumonia vaccine, as well as COVID-19 booster, but none are given today since he is ill. He will return to have these done in the fairly near future. All questions and concerns are addressed with understanding noted. He is aware and is in agreement to this plan.     I spent 33 minutes caring for Jadiel on this date of service. This time includes time spent by me in the following activities:preparing for the visit, reviewing tests, obtaining and/or reviewing a separately obtained history, performing a medically appropriate examination and/or evaluation , counseling and educating the patient/family/caregiver, ordering medications, tests, or procedures and documenting information in the medical record  Follow Up   Transcribed from ambient dictation for MARY Escobar by Jad Godinez.  12/07/21   14:11 CST    Patient verbalized consent to the visit recording.  I have personally performed the services described in this document as transcribed by the above individual, and it is both accurate and complete.  MARY Escobar  12/7/2021  15:58 CST      Return if symptoms worsen or fail to improve, for Recheck, or sooner as needed.  Patient was given  instructions and counseling regarding his condition or for health maintenance advice. Please see specific information pulled into the AVS if appropriate.

## 2021-12-09 DIAGNOSIS — D72.828 OTHER ELEVATED WHITE BLOOD CELL (WBC) COUNT: Primary | ICD-10-CM

## 2021-12-21 ENCOUNTER — OFFICE VISIT (OUTPATIENT)
Dept: FAMILY MEDICINE CLINIC | Facility: CLINIC | Age: 71
End: 2021-12-21

## 2021-12-21 ENCOUNTER — LAB (OUTPATIENT)
Dept: LAB | Facility: OTHER | Age: 71
End: 2021-12-21

## 2021-12-21 VITALS
OXYGEN SATURATION: 98 % | HEART RATE: 102 BPM | SYSTOLIC BLOOD PRESSURE: 118 MMHG | TEMPERATURE: 96.9 F | HEIGHT: 72 IN | BODY MASS INDEX: 32.78 KG/M2 | DIASTOLIC BLOOD PRESSURE: 70 MMHG | WEIGHT: 242 LBS

## 2021-12-21 DIAGNOSIS — D72.829 LEUKOCYTOSIS, UNSPECIFIED TYPE: ICD-10-CM

## 2021-12-21 DIAGNOSIS — J01.01 ACUTE RECURRENT MAXILLARY SINUSITIS: ICD-10-CM

## 2021-12-21 DIAGNOSIS — Z79.4 TYPE 2 DIABETES MELLITUS WITH COMPLICATION, WITH LONG-TERM CURRENT USE OF INSULIN (HCC): Primary | ICD-10-CM

## 2021-12-21 DIAGNOSIS — Z79.4 TYPE 2 DIABETES MELLITUS WITH COMPLICATION, WITH LONG-TERM CURRENT USE OF INSULIN (HCC): ICD-10-CM

## 2021-12-21 DIAGNOSIS — E11.8 TYPE 2 DIABETES MELLITUS WITH COMPLICATION, WITH LONG-TERM CURRENT USE OF INSULIN (HCC): ICD-10-CM

## 2021-12-21 DIAGNOSIS — J01.01 ACUTE RECURRENT MAXILLARY SINUSITIS: Primary | ICD-10-CM

## 2021-12-21 DIAGNOSIS — E11.8 TYPE 2 DIABETES MELLITUS WITH COMPLICATION, WITH LONG-TERM CURRENT USE OF INSULIN (HCC): Primary | ICD-10-CM

## 2021-12-21 LAB
ANION GAP SERPL CALCULATED.3IONS-SCNC: 3 MMOL/L (ref 5–15)
BASOPHILS # BLD AUTO: 0.09 10*3/MM3 (ref 0–0.2)
BASOPHILS NFR BLD AUTO: 0.7 % (ref 0–1.5)
BUN SERPL-MCNC: 12 MG/DL (ref 7–23)
BUN/CREAT SERPL: 8.8 (ref 7–25)
CALCIUM SPEC-SCNC: 9.1 MG/DL (ref 8.4–10.2)
CHLORIDE SERPL-SCNC: 94 MMOL/L (ref 101–112)
CO2 SERPL-SCNC: 37 MMOL/L (ref 22–30)
CREAT SERPL-MCNC: 1.36 MG/DL (ref 0.7–1.3)
DEPRECATED RDW RBC AUTO: 46.5 FL (ref 37–54)
EOSINOPHIL # BLD AUTO: 0.37 10*3/MM3 (ref 0–0.4)
EOSINOPHIL NFR BLD AUTO: 3 % (ref 0.3–6.2)
ERYTHROCYTE [DISTWIDTH] IN BLOOD BY AUTOMATED COUNT: 15 % (ref 12.3–15.4)
GFR SERPL CREATININE-BSD FRML MDRD: 52 ML/MIN/1.73 (ref 49–113)
GLUCOSE SERPL-MCNC: 242 MG/DL (ref 70–99)
HBA1C MFR BLD: 7 % (ref 4.8–5.6)
HCT VFR BLD AUTO: 38.1 % (ref 37.5–51)
HGB BLD-MCNC: 12 G/DL (ref 13–17.7)
LYMPHOCYTES # BLD AUTO: 1.54 10*3/MM3 (ref 0.7–3.1)
LYMPHOCYTES NFR BLD AUTO: 12.5 % (ref 19.6–45.3)
MCH RBC QN AUTO: 27 PG (ref 26.6–33)
MCHC RBC AUTO-ENTMCNC: 31.5 G/DL (ref 31.5–35.7)
MCV RBC AUTO: 85.6 FL (ref 79–97)
MONOCYTES # BLD AUTO: 0.85 10*3/MM3 (ref 0.1–0.9)
MONOCYTES NFR BLD AUTO: 6.9 % (ref 5–12)
NEUTROPHILS NFR BLD AUTO: 76.9 % (ref 42.7–76)
NEUTROPHILS NFR BLD AUTO: 9.51 10*3/MM3 (ref 1.7–7)
PLATELET # BLD AUTO: 247 10*3/MM3 (ref 140–450)
PMV BLD AUTO: 9.3 FL (ref 6–12)
POTASSIUM SERPL-SCNC: 4.4 MMOL/L (ref 3.4–5)
RBC # BLD AUTO: 4.45 10*6/MM3 (ref 4.14–5.8)
SODIUM SERPL-SCNC: 134 MMOL/L (ref 137–145)
WBC NRBC COR # BLD: 12.36 10*3/MM3 (ref 3.4–10.8)

## 2021-12-21 PROCEDURE — 83036 HEMOGLOBIN GLYCOSYLATED A1C: CPT | Performed by: NURSE PRACTITIONER

## 2021-12-21 PROCEDURE — 85025 COMPLETE CBC W/AUTO DIFF WBC: CPT | Performed by: NURSE PRACTITIONER

## 2021-12-21 PROCEDURE — 99214 OFFICE O/P EST MOD 30 MIN: CPT | Performed by: NURSE PRACTITIONER

## 2021-12-21 PROCEDURE — 36415 COLL VENOUS BLD VENIPUNCTURE: CPT | Performed by: NURSE PRACTITIONER

## 2021-12-21 PROCEDURE — 80048 BASIC METABOLIC PNL TOTAL CA: CPT | Performed by: NURSE PRACTITIONER

## 2021-12-21 RX ORDER — CEPHALEXIN 500 MG/1
500 CAPSULE ORAL 3 TIMES DAILY
Qty: 30 CAPSULE | Refills: 0 | Status: SHIPPED | OUTPATIENT
Start: 2021-12-21 | End: 2021-12-31

## 2021-12-21 NOTE — PROGRESS NOTES
"Chief Complaint  Headache and Neck Pain    Subjective          Jadiel Dutton presents [facility]  History of Present Illness  Patient here today with complaints of sinus pain and pressure congestion with drainage in the back of his throat, he reports after he was treated with Keflex for about 7 days his symptoms improved but then returned.  He did have dizziness after he finished the antibiotic as well a couple of nights ago felt weak.  He says he feels some better today but still feels weak and thinks he has been wheezing occasionally.  Reports head and chest congestion does not feel achy denies fever reports he has had [employer] Covid vaccine.  States he felt better after taking a warm shower.  His white count was up on last visit and he is concerned and would like to have that repeated today.  Objective   Vital Signs:   /70 (BP Location: Left arm, Patient Position: Sitting, Cuff Size: Large Adult)   Pulse 102 Comment: regular  Temp 96.9 °F (36.1 °C) (Tympanic)   Ht 182.9 cm (72\")   Wt 110 kg (242 lb)   SpO2 98%   BMI 32.82 kg/m²     Physical Exam  Vitals and nursing note reviewed.   Constitutional:       General: He is not in acute distress.     Appearance: Normal appearance. He is obese. He is not ill-appearing, toxic-appearing or diaphoretic.   HENT:      Head: Normocephalic and atraumatic.      Right Ear: Hearing, tympanic membrane, ear canal and external ear normal.      Left Ear: Hearing, ear canal and external ear normal. Tympanic membrane is bulging.      Nose:      Right Sinus: Maxillary sinus tenderness and frontal sinus tenderness present.      Left Sinus: Maxillary sinus tenderness and frontal sinus tenderness present.      Mouth/Throat:      Lips: Pink.      Pharynx: Posterior oropharyngeal erythema present.   Cardiovascular:      Rate and Rhythm: Normal rate and regular rhythm.      Heart sounds: Normal heart sounds. No " murmur heard.  No friction rub. No gallop.    Pulmonary:      Effort: Pulmonary effort is normal. No respiratory distress.      Breath sounds: Normal breath sounds. No stridor. No decreased breath sounds, wheezing, rhonchi or rales.      Comments: Pulse ox on RA 98%  Musculoskeletal:      Cervical back: Neck supple. Tenderness present. No rigidity.   Lymphadenopathy:      Cervical: No cervical adenopathy.   Skin:     General: Skin is warm and dry.      Coloration: Skin is not jaundiced or pale.      Findings: No bruising, erythema, lesion or rash.   Neurological:      Mental Status: He is alert and oriented to person, place, and time.   Psychiatric:         Mood and Affect: Mood normal.         Behavior: Behavior normal.         Thought Content: Thought content normal.         Judgment: Judgment normal.        Result Review :     Common labs    Common Labsle 12/7/21 12/7/21 12/21/21 12/21/21    1332 1332 1453 1453   Glucose  137 (A)  242 (A)   BUN  10  12   Creatinine  1.22  1.36 (A)   eGFR Non African Am  59  52   Sodium  134 (A)  134 (A)   Potassium  4.0  4.4   Chloride  94 (A)  94 (A)   Calcium  9.5  9.1   Albumin  3.80     Total Bilirubin  0.3     Alkaline Phosphatase  179 (A)     AST (SGOT)  28     ALT (SGPT)  17     WBC 11.63 (A)  12.36 (A)    Hemoglobin 12.1 (A)  12.0 (A)    Hematocrit 38.7  38.1    Platelets 266  247    (A) Abnormal value            CMP    CMP 12/7/21 12/21/21   Glucose 137 (A) 242 (A)   BUN 10 12   Creatinine 1.22 1.36 (A)   eGFR Non African Am 59 52   Sodium 134 (A) 134 (A)   Potassium 4.0 4.4   Chloride 94 (A) 94 (A)   Calcium 9.5 9.1   Albumin 3.80    Total Bilirubin 0.3    Alkaline Phosphatase 179 (A)    AST (SGOT) 28    ALT (SGPT) 17    (A) Abnormal value            CBC    CBC 12/7/21 12/21/21   WBC 11.63 (A) 12.36 (A)   RBC 4.52 4.45   Hemoglobin 12.1 (A) 12.0 (A)   Hematocrit 38.7 38.1   MCV 85.6 85.6   MCH 26.8 27.0   MCHC 31.3 (A) 31.5   RDW 15.2 15.0   Platelets 266 247   (A)  Abnormal value            CBC w/diff    CBC w/Diff 12/7/21 12/21/21   WBC 11.63 (A) 12.36 (A)   RBC 4.52 4.45   Hemoglobin 12.1 (A) 12.0 (A)   Hematocrit 38.7 38.1   MCV 85.6 85.6   MCH 26.8 27.0   MCHC 31.3 (A) 31.5   RDW 15.2 15.0   Platelets 266 247   Neutrophil Rel % 75.7 76.9 (A)   Lymphocyte Rel % 12.7 (A) 12.5 (A)   Monocyte Rel % 8.3 6.9   Eosinophil Rel % 2.8 3.0   Basophil Rel % 0.5 0.7   (A) Abnormal value                      Assessment and Plan    Diagnoses and all orders for this visit:    1. Acute recurrent maxillary sinusitis (Primary)  -     CBC & Differential; Future  -     cephalexin (Keflex) 500 MG capsule; Take 1 capsule by mouth 3 (Three) Times a Day for 10 days.  Dispense: 30 capsule; Refill: 0    2. Leukocytosis, unspecified type  -     CBC & Differential; Future  -     cephalexin (Keflex) 500 MG capsule; Take 1 capsule by mouth 3 (Three) Times a Day for 10 days.  Dispense: 30 capsule; Refill: 0  -     Basic metabolic panel; Future    I will treat him with Keflex as above we will repeat BMP and CBC as above and inform of results via phone.  He is currently taking insulin for diabetes.  We have discussed potentially setting him up with ENT for recurrent sinusitis if his symptoms should persist or worsen, if elevation in white count continues.  He will continue with Flonase as prescribed previously.  He verbalizes understanding but wants to wait on ENT referral at this time.  Offered but declined Covid/flu swabs today.  If his symptoms should persist or worsen he will let me know otherwise I will see him back as scheduled for chronic conditions.  All questions and concerns are addressed with understanding verbalized.  Patient is aware and in agreement to this plan    I spent 33 minutes caring for Jadiel on this date of service. This time includes time spent by me in the following activities:preparing for the visit, reviewing tests, performing a medically appropriate examination and/or evaluation  , counseling and educating the patient/family/caregiver, ordering medications, tests, or procedures and documenting information in the medical record  Follow Up   Return if symptoms worsen or fail to improve, for Recheck, or sooner as needed.  Patient was given instructions and counseling regarding his condition or for health maintenance advice. Please see specific information pulled into the AVS if appropriate.

## 2021-12-27 ENCOUNTER — OFFICE VISIT (OUTPATIENT)
Dept: FAMILY MEDICINE CLINIC | Facility: CLINIC | Age: 71
End: 2021-12-27

## 2021-12-27 VITALS
HEIGHT: 72 IN | BODY MASS INDEX: 32.78 KG/M2 | HEART RATE: 95 BPM | WEIGHT: 242 LBS | DIASTOLIC BLOOD PRESSURE: 78 MMHG | OXYGEN SATURATION: 99 % | SYSTOLIC BLOOD PRESSURE: 122 MMHG

## 2021-12-27 DIAGNOSIS — J01.00 ACUTE MAXILLARY SINUSITIS, RECURRENCE NOT SPECIFIED: Primary | ICD-10-CM

## 2021-12-27 DIAGNOSIS — D72.829 LEUKOCYTOSIS, UNSPECIFIED TYPE: ICD-10-CM

## 2021-12-27 DIAGNOSIS — E11.65 TYPE 2 DIABETES MELLITUS WITH HYPERGLYCEMIA, WITH LONG-TERM CURRENT USE OF INSULIN (HCC): Chronic | ICD-10-CM

## 2021-12-27 DIAGNOSIS — Z79.4 TYPE 2 DIABETES MELLITUS WITH HYPERGLYCEMIA, WITH LONG-TERM CURRENT USE OF INSULIN (HCC): Chronic | ICD-10-CM

## 2021-12-27 PROCEDURE — 99214 OFFICE O/P EST MOD 30 MIN: CPT | Performed by: NURSE PRACTITIONER

## 2021-12-27 PROCEDURE — 96372 THER/PROPH/DIAG INJ SC/IM: CPT | Performed by: NURSE PRACTITIONER

## 2021-12-27 RX ORDER — TRIAMCINOLONE ACETONIDE 40 MG/ML
60 INJECTION, SUSPENSION INTRA-ARTICULAR; INTRAMUSCULAR ONCE
Status: COMPLETED | OUTPATIENT
Start: 2021-12-27 | End: 2021-12-27

## 2021-12-27 RX ADMIN — TRIAMCINOLONE ACETONIDE 60 MG: 40 INJECTION, SUSPENSION INTRA-ARTICULAR; INTRAMUSCULAR at 14:42

## 2021-12-28 NOTE — PROGRESS NOTES
"Chief Complaint  Diabetes    Subjective          Jadiel Dutton presents to [facility] [place]  History of Present Illness    The patient has consented to being recorded using [facility].    The patient presents today for a recheck of diabetes mellitus and still complaining of sinus congestion/pain/pressure.    The patient reports continued issues with his sinuses, as well as coughing, post nasal drainage, and wheezing. He reports that he often notices these symptoms upon waking, which are then improved after taking his Keflex. He is currently on a 10-day course of Keflex with 4 days remaining. He is taking 500 mg of Keflex 3 times per day. He also endorses a decreased energy level as of late. The patient has been using Astelin nasal spray and salt water. He is not taking a decongestant at this time. He is requesting to have a steroid injection administered today, as this has helped to resolve these symptoms in the past. He reportedly had a Kenalog injection approximately 2 to 3 months ago.     He endorses having an appointment with Dr. Carbajal, hematologist, on 01/10/2022 to discuss his elevated white blood cell count. According to the patient, Dr. Gottlieb informed him that his gabapentin and hydrocodone could be \"causing him problems,\" and he requested the patient to wean off of these medications.     The patient states that his blood sugar today was 110. He reports that his blood sugar was likely high when he presented for his lab work a few days prior, as he had just ate candy, drank some soda, and had a sandwich. His fasting blood sugars have reportedly been 90 to 95 and his postprandial blood sugars are approximately 130 to 150. He notes that if he consumes something high in sugar, his blood glucose often elevates to approximately 180 to 190. He will follow up with his endocrinologist, Dr. Gottlieb next month. The patient states that at his last appointment with the " "endocrinologist, his A1c was approximately 5.9 percent. At that time, he was told to continue lifestyle changes with diet and exercise. Since that time, the patient has admittedly gained 3 to 4 pounds of weight back. He believes that his recent back surgery has attributed to his recent increase in his A1c. He is not currently exercising due to back pain. He is still taking insulin.     According to the patient, the surgeon who performed his back procedure told him to resume normal activities. He has continued to participate in physical therapy and will follow up after completing this. He does have a copy of the x-ray of his back but forgot to bring this with him today. He states that his pain is improved following the surgery; however, he still has some pain around his sciatic nerve.     The patient endorses having a chest x-ray on 12/07/2021, which was unremarkable. His oxygen saturation is stable.       Objective   Vital Signs:   /78   Pulse 95   Ht 182.9 cm (72\")   Wt 110 kg (242 lb)   SpO2 99%   BMI 32.82 kg/m²     Physical Exam  Vitals and nursing note reviewed.   Constitutional:       General: He is not in acute distress.     Appearance: Normal appearance. He is obese. He is not ill-appearing, toxic-appearing or diaphoretic.   HENT:      Head: Normocephalic and atraumatic.      Right Ear: Tympanic membrane is retracted (slight).      Left Ear: Tympanic membrane is retracted (slight).   Cardiovascular:      Rate and Rhythm: Normal rate and regular rhythm.      Heart sounds: Normal heart sounds. No murmur heard.  No friction rub. No gallop.    Pulmonary:      Effort: Pulmonary effort is normal. No respiratory distress.      Breath sounds: Normal breath sounds. No stridor. No wheezing, rhonchi or rales.      Comments: The patient is coughing in the exam room   Musculoskeletal:      Comments: Complaining of some back pain from previous, recent back surgery.   Skin:     General: Skin is warm and dry.     "  Coloration: Skin is not jaundiced or pale.      Findings: No bruising, erythema, lesion or rash.   Neurological:      Mental Status: He is alert and oriented to person, place, and time.   Psychiatric:         Mood and Affect: Mood normal.         Behavior: Behavior normal.         Thought Content: Thought content normal.         Judgment: Judgment normal.        Result Review :     Common labs    Common Labsle 12/7/21 12/7/21 12/21/21 12/21/21 12/21/21    1332 1332 1453 1453 1648   Glucose  137 (A)  242 (A)    BUN  10  12    Creatinine  1.22  1.36 (A)    eGFR Non African Am  59  52    Sodium  134 (A)  134 (A)    Potassium  4.0  4.4    Chloride  94 (A)  94 (A)    Calcium  9.5  9.1    Albumin  3.80      Total Bilirubin  0.3      Alkaline Phosphatase  179 (A)      AST (SGOT)  28      ALT (SGPT)  17      WBC 11.63 (A)  12.36 (A)     Hemoglobin 12.1 (A)  12.0 (A)     Hematocrit 38.7  38.1     Platelets 266  247     Hemoglobin A1C     7.00 (A)   (A) Abnormal value            CMP    CMP 12/7/21 12/21/21   Glucose 137 (A) 242 (A)   BUN 10 12   Creatinine 1.22 1.36 (A)   eGFR Non African Am 59 52   Sodium 134 (A) 134 (A)   Potassium 4.0 4.4   Chloride 94 (A) 94 (A)   Calcium 9.5 9.1   Albumin 3.80    Total Bilirubin 0.3    Alkaline Phosphatase 179 (A)    AST (SGOT) 28    ALT (SGPT) 17    (A) Abnormal value            CBC    CBC 12/7/21 12/21/21   WBC 11.63 (A) 12.36 (A)   RBC 4.52 4.45   Hemoglobin 12.1 (A) 12.0 (A)   Hematocrit 38.7 38.1   MCV 85.6 85.6   MCH 26.8 27.0   MCHC 31.3 (A) 31.5   RDW 15.2 15.0   Platelets 266 247   (A) Abnormal value            CBC w/diff    CBC w/Diff 12/7/21 12/21/21   WBC 11.63 (A) 12.36 (A)   RBC 4.52 4.45   Hemoglobin 12.1 (A) 12.0 (A)   Hematocrit 38.7 38.1   MCV 85.6 85.6   MCH 26.8 27.0   MCHC 31.3 (A) 31.5   RDW 15.2 15.0   Platelets 266 247   Neutrophil Rel % 75.7 76.9 (A)   Lymphocyte Rel % 12.7 (A) 12.5 (A)   Monocyte Rel % 8.3 6.9   Eosinophil Rel % 2.8 3.0   Basophil Rel % 0.5 0.7    (A) Abnormal value                    A1C Last 3 Results    HGBA1C Last 3 Results 12/21/21   Hemoglobin A1C 7.00 (A)   (A) Abnormal value                      Assessment and Plan    Diagnoses and all orders for this visit:    1. Acute maxillary sinusitis, recurrence not specified (Primary)  -     triamcinolone acetonide (KENALOG-40) injection 60 mg    2. Leukocytosis, unspecified type    3. Type 2 diabetes mellitus with hyperglycemia, with long-term current use of insulin (HCC)  Comments:  follow up with endo, dr manzano as scheduled next month    We have discussed his lab and copies of his lab work can be given to him if desired. He will follow up with Dr. Gottlieb, endocrinology, as scheduled on 01/27/2022 and Dr. Carbajal on 01/10/2022 as scheduled for chronically elevated white count. He is given a Kenalog 6 mg injection in the office today, he tolerated well. We will advise him to continue with Keflex until gone. He should continue with all his current medications for symptom relief including Astelin. His chest x-ray and sinus x-ray results were reviewed with him again in our office, sinus Redd' view from 10/2021 and chest x-ray from 12/07/2021. I offered a referral for ENT if his symptoms should persist or worsen. He will be encouraged to follow a better diabetic diet and increase exercise as tolerable. He is informed that steroid injection, although may help his symptoms today, may increase his blood sugar and white count as well. He needs to monitor diet very closely in the next few weeks since given the steroid shot today. All questions and concerns are addressed with understanding noted. The patient is in agreement to above plan.    I spent 33 minutes caring for Jadiel on this date of service. This time includes time spent by me in the following activities:preparing for the visit, reviewing tests, performing a medically appropriate examination and/or evaluation , counseling and educating the  patient/family/caregiver, ordering medications, tests, or procedures and documenting information in the medical record  Follow Up   Return if symptoms worsen or fail to improve, for Recheck, or sooner as needed.  Patient was given instructions and counseling regarding his condition or for health maintenance advice. Please see specific information pulled into the AVS if appropriate.     Transcribed from ambient dictation for MARY Escobar by Pauly Carey.  12/28/21   11:05 CST    Patient verbalized consent to the visit recording.  I have personally performed the services described in this document as transcribed by the above individual, and it is both accurate and complete.  MARY Escobar  12/28/2021  13:28 CST

## 2021-12-30 ENCOUNTER — LAB (OUTPATIENT)
Dept: LAB | Facility: OTHER | Age: 71
End: 2021-12-30

## 2021-12-30 PROCEDURE — 87635 SARS-COV-2 COVID-19 AMP PRB: CPT | Performed by: EMERGENCY MEDICINE

## 2022-01-13 ENCOUNTER — OFFICE VISIT (OUTPATIENT)
Dept: HEMATOLOGY/ONCOLOGY | Facility: CLINIC | Age: 72
End: 2022-01-13

## 2022-01-13 ENCOUNTER — LAB (OUTPATIENT)
Dept: LAB | Facility: OTHER | Age: 72
End: 2022-01-13

## 2022-01-13 VITALS
RESPIRATION RATE: 18 BRPM | HEIGHT: 72 IN | WEIGHT: 235 LBS | TEMPERATURE: 98.6 F | DIASTOLIC BLOOD PRESSURE: 86 MMHG | SYSTOLIC BLOOD PRESSURE: 135 MMHG | BODY MASS INDEX: 31.83 KG/M2 | HEART RATE: 108 BPM

## 2022-01-13 DIAGNOSIS — D64.9 ANEMIA, UNSPECIFIED TYPE: ICD-10-CM

## 2022-01-13 DIAGNOSIS — D50.9 IRON DEFICIENCY ANEMIA, UNSPECIFIED IRON DEFICIENCY ANEMIA TYPE: ICD-10-CM

## 2022-01-13 DIAGNOSIS — D72.829 LEUKOCYTOSIS, UNSPECIFIED TYPE: Primary | ICD-10-CM

## 2022-01-13 LAB
BASOPHILS # BLD AUTO: 0.05 10*3/MM3 (ref 0–0.2)
BASOPHILS NFR BLD AUTO: 0.5 % (ref 0–1.5)
CRP SERPL-MCNC: 0.41 MG/DL (ref 0–0.5)
DEPRECATED RDW RBC AUTO: 47.2 FL (ref 37–54)
EOSINOPHIL # BLD AUTO: 0.18 10*3/MM3 (ref 0–0.4)
EOSINOPHIL NFR BLD AUTO: 1.7 % (ref 0.3–6.2)
ERYTHROCYTE [DISTWIDTH] IN BLOOD BY AUTOMATED COUNT: 15.5 % (ref 12.3–15.4)
FERRITIN SERPL-MCNC: 27.88 NG/ML (ref 30–400)
HCT VFR BLD AUTO: 38.1 % (ref 37.5–51)
HGB BLD-MCNC: 12.2 G/DL (ref 13–17.7)
IRON 24H UR-MRATE: 39 MCG/DL (ref 59–158)
IRON SATN MFR SERPL: 10 % (ref 20–50)
LDH SERPL-CCNC: 218 U/L (ref 135–225)
LYMPHOCYTES # BLD AUTO: 1.55 10*3/MM3 (ref 0.7–3.1)
LYMPHOCYTES NFR BLD AUTO: 14.3 % (ref 19.6–45.3)
MCH RBC QN AUTO: 26.9 PG (ref 26.6–33)
MCHC RBC AUTO-ENTMCNC: 32 G/DL (ref 31.5–35.7)
MCV RBC AUTO: 84.1 FL (ref 79–97)
MONOCYTES # BLD AUTO: 0.94 10*3/MM3 (ref 0.1–0.9)
MONOCYTES NFR BLD AUTO: 8.7 % (ref 5–12)
NEUTROPHILS NFR BLD AUTO: 74.8 % (ref 42.7–76)
NEUTROPHILS NFR BLD AUTO: 8.13 10*3/MM3 (ref 1.7–7)
PLATELET # BLD AUTO: 300 10*3/MM3 (ref 140–450)
PMV BLD AUTO: 9.2 FL (ref 6–12)
RBC # BLD AUTO: 4.53 10*6/MM3 (ref 4.14–5.8)
TIBC SERPL-MCNC: 398 MCG/DL (ref 298–536)
TRANSFERRIN SERPL-MCNC: 267 MG/DL (ref 200–360)
WBC NRBC COR # BLD: 10.85 10*3/MM3 (ref 3.4–10.8)

## 2022-01-13 PROCEDURE — 82607 VITAMIN B-12: CPT | Performed by: INTERNAL MEDICINE

## 2022-01-13 PROCEDURE — 85025 COMPLETE CBC W/AUTO DIFF WBC: CPT | Performed by: INTERNAL MEDICINE

## 2022-01-13 PROCEDURE — 82746 ASSAY OF FOLIC ACID SERUM: CPT | Performed by: INTERNAL MEDICINE

## 2022-01-13 PROCEDURE — 99204 OFFICE O/P NEW MOD 45 MIN: CPT | Performed by: INTERNAL MEDICINE

## 2022-01-13 PROCEDURE — 1125F AMNT PAIN NOTED PAIN PRSNT: CPT | Performed by: INTERNAL MEDICINE

## 2022-01-13 PROCEDURE — 86140 C-REACTIVE PROTEIN: CPT | Performed by: INTERNAL MEDICINE

## 2022-01-13 PROCEDURE — 83615 LACTATE (LD) (LDH) ENZYME: CPT | Performed by: INTERNAL MEDICINE

## 2022-01-13 PROCEDURE — 84466 ASSAY OF TRANSFERRIN: CPT | Performed by: INTERNAL MEDICINE

## 2022-01-13 PROCEDURE — 83540 ASSAY OF IRON: CPT | Performed by: INTERNAL MEDICINE

## 2022-01-13 PROCEDURE — 82728 ASSAY OF FERRITIN: CPT | Performed by: INTERNAL MEDICINE

## 2022-01-13 RX ORDER — DEXLANSOPRAZOLE 60 MG/1
1 CAPSULE, DELAYED RELEASE ORAL DAILY
COMMUNITY
Start: 2021-12-30 | End: 2022-01-13 | Stop reason: ALTCHOICE

## 2022-01-13 RX ORDER — AZITHROMYCIN 250 MG/1
TABLET, FILM COATED ORAL
COMMUNITY
Start: 2022-01-05 | End: 2022-01-13

## 2022-01-13 NOTE — PROGRESS NOTES
DATE OF CONSULT: 1/14/2022    REQUESTING SOURCE:  Shayy Banda, APRN  [address] [place] [address]      REASON FOR CONSULTATION: Leukocytosis, anemia      HISTORY OF PRESENT ILLNESS:    71-year-old male with medical problem consisting of hypertension, dyslipidemia, diabetes mellitus with neuropathy affecting bilateral hand, arthritis affecting multiple joints, history of back surgery has been referred to [facility] for further evaluation and recommendation regarding leukocytosis and anemia.  Patient states he has been having sinus issue for which she has recently started to receive Kenalog injection 2 weeks ago along with course of antibiotic with azithromycin.  Complains of chronic back pain for which she had a back surgery done x2.  Most recent revision was done in October 2021 at [facility].  Complains of Dupuytren's contracture affecting bilateral hands for which she had surgeries done in the past.  Denies any new lymph node enlargement.  Complains of chronic constipation as well as stomach upset.  Denies any personal history of malignancy.  Denies any history of DVT or pulmonary embolism.            PAST MEDICAL HISTORY:    Past Medical History:   Diagnosis Date   • Abdominal pain    • Abnormal weight loss    • Acute bronchitis    • Anxiety state    • Benign essential hypertension    • Chronic sinusitis    • Constipation    • Cystitis    • Degenerative joint disease involving multiple joints    • Diabetic neuropathy (HCC)      bilateral hands      • Diastolic dysfunction    • Diverticular disease of colon    • Dyspnea    • Epigastric pain    • Essential hypertension    • Foot ulcer (HCC)    • Gastroesophageal reflux disease    • Generalized anxiety disorder    • Hyperlipidemia    • Inflammatory bowel disease     Possible Inflammatory Bowel Disease      • Lumbar pain     Pain radiating to lumbar region of back   n      • Pleuritic pain    • Radiculopathy     site unspecified      • Type 2  diabetes mellitus (HCC)    • Vesicular eczema of hands and feet        PAST SURGICAL HISTORY:  Past Surgical History:   Procedure Laterality Date   • BACK SURGERY      LOWER BACK SURGERY   • CARPAL TUNNEL RELEASE Bilateral    • COLONOSCOPY  06/11/2012    Internal & external hemorrhoids found.   • COLONOSCOPY N/A 8/22/2017    Procedure: COLONOSCOPY;  Surgeon: Cesar Hollis MD;  Location: [facility] ENDOSCOPY;  Service:    • ENDOSCOPY  06/11/2012    Slight stricture in the distal esophagus. Gastritis in stomach. Biopsy taken. Normal duodenum.   • ENDOSCOPY     • ENDOSCOPY N/A 8/22/2017    Procedure: ESOPHAGOGASTRODUODENOSCOPY possible dilation ;  Surgeon: Cesar Hollis MD;  Location: [facility] ENDOSCOPY;  Service:    • ENTEROSCOPY SMALL BOWEL  08/27/2012    Gastritis in stomach. Normal jejunum. Biopsy taken. Normal duodenum   • EYE SURGERY Bilateral     cataracts removed with implants   • FOOT SURGERY     • HAMMER TOE REPAIR Left 12/18/2019    Procedure: FIFTH METATARSAL EXOSTECTOMY, FOURTH HAMMER TOE CORRECTION, GASTROCNEMIUS RECESSION;  Surgeon: Jacoby Serrano DPM;  Location: [facility] OR;  Service: Podiatry   • HAND SURGERY  12/23/2020   • INJECTION OF MEDICATION  02/28/2014    Depo Medrol   • INJECTION OF MEDICATION  03/07/2014    Rocephin(2)   • SHOULDER ARTHROSCOPY Left 2/7/2020    Procedure: LEFT SHOULDER ARTHROSCOPY with rotator cuff repair, DEDRICK procedure, and Subacromial decompression;  Surgeon: Troy Barillas MD;  Location: [facility] OR;  Service: Orthopedics;  Laterality: Left;   • ULNAR TUNNEL RELEASE         ALLERGIES:    Allergies   Allergen Reactions   • Penicillins Shortness Of Breath   • Bactrim [Sulfamethoxazole-Trimethoprim] GI Intolerance   • Ceftin [Cefuroxime Axetil] GI Intolerance   • Sulfa Antibiotics GI Intolerance   • Ciprofloxacin Itching and Rash       SOCIAL HISTORY:   Social History     Tobacco Use   • Smoking status: Never Smoker   • Smokeless tobacco: Never Used    Vaping Use   • Vaping Use: Never used   Substance Use Topics   • Alcohol use: No   • Drug use: No       CURRENT MEDICATIONS:    Current Outpatient Medications   Medication Sig Dispense Refill   • albuterol sulfate  (90 Base) MCG/ACT inhaler Inhale 2 puffs Every 4 (Four) Hours As Needed for Wheezing. 1 g 0   • azelastine (ASTELIN) 0.1 % nasal spray azelastine 137 mcg (0.1 %) nasal spray aerosol     • B-D UF III MINI PEN NEEDLES 31G X 5 MM misc USE FOUR TIMES A DAY WITH INSULIN PENS 360 each 2   • dexlansoprazole (DEXILANT) 60 MG capsule Take 60 mg by mouth Daily.     • gabapentin (NEURONTIN) 300 MG capsule Take 1 capsule by mouth 4 (Four) Times a Day As Needed.     • HYDROcodone-acetaminophen (NORCO)  MG per tablet      • insulin NPH-insulin regular (humuLIN 70/30,novoLIN 70/30) (70-30) 100 UNIT/ML injection Inject  under the skin into the appropriate area as directed 2 (Two) Times a Day With Meals.     • Insulin Zinc Human (NOVOLIN L SC) Inject  under the skin into the appropriate area as directed.     • Lancets (ONETOUCH DELICA PLUS [id number]) misc      • lisinopril-hydrochlorothiazide (PRINZIDE,ZESTORETIC) 20-25 MG per tablet Take 1 tablet by mouth Daily. 90 tablet 3   • ONETOUCH VERIO test strip      • rosuvastatin (CRESTOR) 20 MG tablet Take 1 tablet by mouth Every Night. 90 tablet 3     No current facility-administered medications for this visit.        HOME MEDICATIONS:   Current Outpatient Medications on File Prior to Visit   Medication Sig Dispense Refill   • albuterol sulfate  (90 Base) MCG/ACT inhaler Inhale 2 puffs Every 4 (Four) Hours As Needed for Wheezing. 1 g 0   • azelastine (ASTELIN) 0.1 % nasal spray azelastine 137 mcg (0.1 %) nasal spray aerosol     • B-D UF III MINI PEN NEEDLES 31G X 5 MM misc USE FOUR TIMES A DAY WITH INSULIN PENS 360 each 2   • dexlansoprazole (DEXILANT) 60 MG capsule Take 60 mg by mouth Daily.     • gabapentin (NEURONTIN) 300 MG capsule Take 1 capsule by  mouth 4 (Four) Times a Day As Needed.     • HYDROcodone-acetaminophen (NORCO)  MG per tablet      • insulin NPH-insulin regular (humuLIN 70/30,novoLIN 70/30) (70-30) 100 UNIT/ML injection Inject  under the skin into the appropriate area as directed 2 (Two) Times a Day With Meals.     • Insulin Zinc Human (NOVOLIN L SC) Inject  under the skin into the appropriate area as directed.     • Lancets (ONETOUCH DELICA PLUS [id number]) misc      • lisinopril-hydrochlorothiazide (PRINZIDE,ZESTORETIC) 20-25 MG per tablet Take 1 tablet by mouth Daily. 90 tablet 3   • ONETOUCH VERIO test strip      • rosuvastatin (CRESTOR) 20 MG tablet Take 1 tablet by mouth Every Night. 90 tablet 3     No current facility-administered medications on file prior to visit.       FAMILY HISTORY:    Family History   Problem Relation Age of Onset   • No Known Problems Other    • Hypertension Mother    • Heart attack Father    • No Known Problems Sister    • No Known Problems Brother    • No Known Problems Daughter    • No Known Problems Son    • No Known Problems Maternal Aunt    • No Known Problems Maternal Uncle    • No Known Problems Paternal Aunt    • No Known Problems Paternal Uncle    • Cancer Maternal Grandmother    • Hodgkin's lymphoma Maternal Grandmother    • No Known Problems Maternal Grandfather    • No Known Problems Paternal Grandmother    • No Known Problems Paternal Grandfather        REVIEW OF SYSTEMS:        CONSTITUTIONAL:  Complains of fatigue. Denies any fever, chills or weight loss.     EYES: No visual disturbances. No discharge. No new lesion.    ENMT:  No epistaxis, mouth sores or difficulty swallowing.    RESPIRATORY:  No new shortness of breath. No new cough or hemoptysis.    CARDIOVASCULAR:  No chest pain or palpitations.    GASTROINTESTINAL: Positive for constipation.  Positive for chronic intermittent stomach upset.  No abdominal pain or blood in stool.    GENITOURINARY: No dysuria or hematuria.    MUSCULOSKELETAL:  "Positive for chronic back pain.  Positive for Dupuytren's contracture affecting bilateral hands    LYMPHATICS:  Denies any abnormal swollen glands anywhere in the body.    NEUROLOGICAL : Positive for neuropathy affecting bilateral. No headache or dizziness. No seizures or balance problems.    SKIN: Positive for easy bruising    ENDOCRINE : No new hot or cold intolerance. No new polyuria. No polydipsia.        PHYSICAL EXAMINATION:      VITAL SIGNS:  /86   Pulse 108   Temp 98.6 °F (37 °C)   Resp 18   Ht 182.9 cm (72\")   Wt 107 kg (235 lb)   BMI 31.87 kg/m²       01/13/22  1300   Weight: 107 kg (235 lb)       ECOG performance status: 1    CONSTITUTIONAL:  Not in any distress.    EYES: Mild conjunctival pallor. No Icterus. No pterygium. Extraocular movements intact. No ptosis.    ENMT:  Normocephalic, atraumatic. No facial asymmetry noted.    NECK:  No adenopathy. Trachea midline. No JVD.    RESPIRATORY:  Fair air entry bilateral. No rhonchi or wheezing. Fair respiratory effort.    CARDIOVASCULAR:  S1, S2. Regular rate and rhythm. No murmur or gallop appreciated.    ABDOMEN:  Soft, obese, nontender. Bowel sounds present in all four quadrants.  No hepatosplenomegaly appreciated.    MUSCULOSKELETAL:  No edema. No calf tenderness.  Decreased range of motion.    NEUROLOGIC:    No motor  deficit appreciated. Cranial nerves II-XII grossly intact.    SKIN : Bruising present in bilateral hands. Skin is warm and dry to touch.    LYMPHATICS: No new enlarged lymph nodes in neck or supraclavicular area.    PSYCHIATRY: Alert, awake and oriented ×3. Normal affect.  Normal judgment.  Makes good eye contact.          DIAGNOSTIC DATA:    WBC   Date Value Ref Range Status   01/13/2022 10.85 (H) 3.40 - 10.80 10*3/mm3 Final     RBC   Date Value Ref Range Status   01/13/2022 4.53 4.14 - 5.80 10*6/mm3 Final     Hemoglobin   Date Value Ref Range Status   01/13/2022 12.2 (L) 13.0 - 17.7 g/dL Final     Hematocrit   Date Value Ref " Range Status   01/13/2022 38.1 37.5 - 51.0 % Final     MCV   Date Value Ref Range Status   01/13/2022 84.1 79.0 - 97.0 fL Final     MCH   Date Value Ref Range Status   01/13/2022 26.9 26.6 - 33.0 pg Final     MCHC   Date Value Ref Range Status   01/13/2022 32.0 31.5 - 35.7 g/dL Final     RDW   Date Value Ref Range Status   01/13/2022 15.5 (H) 12.3 - 15.4 % Final     RDW-SD   Date Value Ref Range Status   01/13/2022 47.2 37.0 - 54.0 fl Final     MPV   Date Value Ref Range Status   01/13/2022 9.2 6.0 - 12.0 fL Final     Platelets   Date Value Ref Range Status   01/13/2022 300 140 - 450 10*3/mm3 Final     Neutrophil %   Date Value Ref Range Status   01/13/2022 74.8 42.7 - 76.0 % Final     Lymphocyte %   Date Value Ref Range Status   01/13/2022 14.3 (L) 19.6 - 45.3 % Final     Monocyte %   Date Value Ref Range Status   01/13/2022 8.7 5.0 - 12.0 % Final     Eosinophil %   Date Value Ref Range Status   01/13/2022 1.7 0.3 - 6.2 % Final     Basophil %   Date Value Ref Range Status   01/13/2022 0.5 0.0 - 1.5 % Final     Immature Grans %   Date Value Ref Range Status   08/28/2018 0.2 0.0 - 0.5 % Final     Neutrophils, Absolute   Date Value Ref Range Status   01/13/2022 8.13 (H) 1.70 - 7.00 10*3/mm3 Final     Lymphocytes, Absolute   Date Value Ref Range Status   01/13/2022 1.55 0.70 - 3.10 10*3/mm3 Final     Monocytes, Absolute   Date Value Ref Range Status   01/13/2022 0.94 (H) 0.10 - 0.90 10*3/mm3 Final     Eosinophils, Absolute   Date Value Ref Range Status   01/13/2022 0.18 0.00 - 0.40 10*3/mm3 Final     Basophils, Absolute   Date Value Ref Range Status   01/13/2022 0.05 0.00 - 0.20 10*3/mm3 Final     Immature Grans, Absolute   Date Value Ref Range Status   08/28/2018 0.02 0.00 - 0.02 10*3/mm3 Final     nRBC   Date Value Ref Range Status   03/28/2016 0  Final   03/28/2016 0  Final     Glucose   Date Value Ref Range Status   12/21/2021 242 (H) 70 - 99 mg/dL Final     Sodium   Date Value Ref Range Status   12/21/2021 134 (L)  137 - 145 mmol/L Final     Potassium   Date Value Ref Range Status   12/21/2021 4.4 3.4 - 5.0 mmol/L Final     CO2   Date Value Ref Range Status   12/21/2021 37.0 (H) 22.0 - 30.0 mmol/L Final     Chloride   Date Value Ref Range Status   12/21/2021 94 (L) 101 - 112 mmol/L Final     Anion Gap   Date Value Ref Range Status   12/21/2021 3.0 (L) 5.0 - 15.0 mmol/L Final     Creatinine   Date Value Ref Range Status   12/21/2021 1.36 (H) 0.70 - 1.30 mg/dL Final     BUN   Date Value Ref Range Status   12/21/2021 12 7 - 23 mg/dL Final     BUN/Creatinine Ratio   Date Value Ref Range Status   12/21/2021 8.8 7.0 - 25.0 Final     Calcium   Date Value Ref Range Status   12/21/2021 9.1 8.4 - 10.2 mg/dL Final     eGFR Non  Amer   Date Value Ref Range Status   12/21/2021 52 49 - 113 mL/min/1.73 Final     Alkaline Phosphatase   Date Value Ref Range Status   12/07/2021 179 (H) 38 - 126 U/L Final     Total Protein   Date Value Ref Range Status   12/07/2021 6.7 6.3 - 8.6 g/dL Final     ALT (SGPT)   Date Value Ref Range Status   12/07/2021 17 <=50 U/L Final     AST (SGOT)   Date Value Ref Range Status   12/07/2021 28 17 - 59 U/L Final     Total Bilirubin   Date Value Ref Range Status   12/07/2021 0.3 0.2 - 1.3 mg/dL Final     Albumin   Date Value Ref Range Status   12/07/2021 3.80 3.50 - 5.00 g/dL Final     Globulin   Date Value Ref Range Status   12/07/2021 2.9 2.3 - 3.5 gm/dL Final     Lab Results   Component Value Date    IRON 39 (L) 01/13/2022    TIBC 398 01/13/2022    LABIRON 10 (L) 01/13/2022    FERRITIN 27.88 (L) 01/13/2022    [id number] 479 01/13/2022    FOLATE 5.51 01/13/2022     No results found for: , LABCA2, AFPTM, HCGQUANT, , CHROMGRNA, [id number], CEA, REFLABREPO]    Radiology Data :  No radiology results for the last 7 days    Pathology :  * Cannot find OR log *    ASSESSMENT AND PLAN:      1.  Leukocytosis:  - Patient has ongoing chronic leukocytosis since August 18, 2018 with white blood cell count  ranging between 10.5 and 16,000.  - Predominant increase in differential remains on neutrophils.  - Differential diagnosis for chronic leukocytosis includes reactive causes like smoking versus inflammation versus infection versus medication like steroids versus bone marrow pathology.  - Patient has been having sinus issues recently requiring steroid as well as antibiotic.  Sinus infection has been a chronic issue for patient.  Discussed with patient and his family that his leukocytosis may be reactive secondary to sinus plus or minus intermittent steroid injections that he has been requiring.  - Recommend repeating CBC, LDH, C-reactive protein with iron studies, ferritin, B12 and folate today.  - We will commend continue with clinical monitoring for now  - We will have patient return to clinic in 2 months with repeat CBC, iron studies, ferritin, B12 and folate to be done prior to that    2.  Anemia:  - Patient has been having anemia with a hemoglobin around 12.  - MCV is 85.  - Anemia work-up done today shows hemoglobin is 12.2.  Iron studies are consistent with severe iron deficiency with iron saturation of 10% and ferritin of 27.  B12 level is 479, folate level is 5.  - Recommend starting ferrous sulfate 1 tablet p.o. daily with stool softener.  Recommend B12 and folic acid p.o. daily  - Patient was encouraged to call our office if he cannot tolerate iron by mouth in that case we will start the patient on intravenous Venofer.  Side effect of Venofer including allergic reaction were discussed with patient.  - Recommend gastroenterology evaluation for iron deficiency anemia.  Referral has been placed today.        3.  Health maintenance: Patient does not smoke.  Had a colonoscopy in August 2017          PHQ-9 Total Score:         Jadiel Dutton reports a pain score of 8.  Given his pain assessment as noted, treatment options were discussed and the following options were decided upon as a follow-up plan to  address the patient's pain: continuation of current treatment plan for pain.             Thank you for this consultation.    Norman Carbajal MD  1/14/2022  06:38 CST        Part of this note may be an electronic transcription/translation of spoken language to printed text using the Dragon Dictation System.

## 2022-01-14 ENCOUNTER — TELEPHONE (OUTPATIENT)
Dept: ONCOLOGY | Facility: CLINIC | Age: 72
End: 2022-01-14

## 2022-01-14 LAB
FOLATE SERPL-MCNC: 5.51 NG/ML (ref 4.78–24.2)
VIT B12 BLD-MCNC: 479 PG/ML (ref 211–946)

## 2022-01-14 RX ORDER — FERROUS SULFATE 325(65) MG
325 TABLET ORAL
Qty: 30 TABLET | Refills: 3 | Status: SHIPPED | OUTPATIENT
Start: 2022-01-14 | End: 2022-02-28

## 2022-01-14 RX ORDER — FOLIC ACID 1 MG/1
1 TABLET ORAL DAILY
Qty: 90 TABLET | Refills: 1 | Status: SHIPPED | OUTPATIENT
Start: 2022-01-14 | End: 2022-02-28

## 2022-01-14 RX ORDER — DOCUSATE SODIUM 100 MG/1
100 CAPSULE, LIQUID FILLED ORAL 2 TIMES DAILY PRN
Qty: 60 CAPSULE | Refills: 2 | Status: SHIPPED | OUTPATIENT
Start: 2022-01-14

## 2022-01-14 RX ORDER — LANOLIN ALCOHOL/MO/W.PET/CERES
1000 CREAM (GRAM) TOPICAL DAILY
Qty: 90 TABLET | Refills: 1 | Status: SHIPPED | OUTPATIENT
Start: 2022-01-14 | End: 2022-02-28

## 2022-01-14 NOTE — TELEPHONE ENCOUNTER
Pt returns call, lab results given along with med instructions as per Dr. Carbajal, v/u obtained.

## 2022-01-14 NOTE — TELEPHONE ENCOUNTER
----- Message from Norman Carbajal MD sent at 1/14/2022  6:42 AM CST -----  Please let patient know, white blood cell count today is improved to 10.85.  Normal white blood cell count is 10.80.  Hemoglobin is still low at 12.2.  Iron studies are consistent with severe iron deficiency.  B12 and folate level is also borderline low.  I want him to start taking ferrous sulfate, B12 and folic acid p.o. daily.  I have sent prescription for ferrous sulfate, Colace, B12 and folic acid to his pharmacy.  If he is not able to tolerate iron by mouth due to severe constipation or stomach upset he needs to call us.  In that case we will start him on intravenous iron replacement.  I have also placed referral for gastroenterology team for evaluation of iron deficiency to rule out any ongoing GI blood loss.  Thank you

## 2022-01-31 ENCOUNTER — TELEPHONE (OUTPATIENT)
Dept: ONCOLOGY | Facility: CLINIC | Age: 72
End: 2022-01-31

## 2022-01-31 PROBLEM — T45.4X5A ADVERSE EFFECT OF IRON: Status: ACTIVE | Noted: 2022-01-31

## 2022-01-31 PROBLEM — D50.0 IRON DEFICIENCY ANEMIA DUE TO CHRONIC BLOOD LOSS: Status: ACTIVE | Noted: 2022-01-31

## 2022-01-31 RX ORDER — ACETAMINOPHEN 325 MG/1
650 TABLET ORAL ONCE
Status: CANCELLED | OUTPATIENT
Start: 2022-02-07

## 2022-01-31 RX ORDER — SODIUM CHLORIDE 9 MG/ML
250 INJECTION, SOLUTION INTRAVENOUS ONCE
Status: CANCELLED | OUTPATIENT
Start: 2022-02-07

## 2022-01-31 RX ORDER — DIPHENHYDRAMINE HYDROCHLORIDE 50 MG/ML
25 INJECTION INTRAMUSCULAR; INTRAVENOUS ONCE
Status: CANCELLED | OUTPATIENT
Start: 2022-02-07

## 2022-01-31 NOTE — TELEPHONE ENCOUNTER
PT states constipation has increasingly gotten worse. PT is complaining of severe bloat. PT is taking it with food.

## 2022-01-31 NOTE — TELEPHONE ENCOUNTER
He can stop taking ferrous sulfate by mouth.  I have ordered intravenous Venofer to be started next week once we get it approved through insurance company.  Please provide him appointment for intravenous Venofer starting next week.  Thank you

## 2022-01-31 NOTE — TELEPHONE ENCOUNTER
Contacted pt regarding IV iron. Informed office would get auth'd through insurance and would call with appts. PT verbalizes understanding and denies any further questions.

## 2022-02-07 ENCOUNTER — INFUSION (OUTPATIENT)
Dept: ONCOLOGY | Facility: HOSPITAL | Age: 72
End: 2022-02-07

## 2022-02-07 VITALS
RESPIRATION RATE: 18 BRPM | HEART RATE: 101 BPM | SYSTOLIC BLOOD PRESSURE: 143 MMHG | DIASTOLIC BLOOD PRESSURE: 75 MMHG | TEMPERATURE: 97.3 F

## 2022-02-07 DIAGNOSIS — D50.0 IRON DEFICIENCY ANEMIA DUE TO CHRONIC BLOOD LOSS: Primary | ICD-10-CM

## 2022-02-07 DIAGNOSIS — T45.4X5A ADVERSE EFFECT OF IRON, INITIAL ENCOUNTER: ICD-10-CM

## 2022-02-07 PROCEDURE — 25010000002 IRON SUCROSE PER 1 MG: Performed by: INTERNAL MEDICINE

## 2022-02-07 PROCEDURE — 96365 THER/PROPH/DIAG IV INF INIT: CPT | Performed by: INTERNAL MEDICINE

## 2022-02-07 PROCEDURE — 96375 TX/PRO/DX INJ NEW DRUG ADDON: CPT | Performed by: INTERNAL MEDICINE

## 2022-02-07 PROCEDURE — 25010000002 DIPHENHYDRAMINE PER 50 MG: Performed by: INTERNAL MEDICINE

## 2022-02-07 RX ORDER — SODIUM CHLORIDE 9 MG/ML
250 INJECTION, SOLUTION INTRAVENOUS ONCE
Status: COMPLETED | OUTPATIENT
Start: 2022-02-07 | End: 2022-02-07

## 2022-02-07 RX ORDER — DIPHENHYDRAMINE HYDROCHLORIDE 50 MG/ML
25 INJECTION INTRAMUSCULAR; INTRAVENOUS ONCE
Status: COMPLETED | OUTPATIENT
Start: 2022-02-07 | End: 2022-02-07

## 2022-02-07 RX ORDER — ACETAMINOPHEN 325 MG/1
650 TABLET ORAL ONCE
Status: CANCELLED | OUTPATIENT
Start: 2022-02-09

## 2022-02-07 RX ORDER — ACETAMINOPHEN 325 MG/1
650 TABLET ORAL ONCE
Status: COMPLETED | OUTPATIENT
Start: 2022-02-07 | End: 2022-02-07

## 2022-02-07 RX ORDER — SODIUM CHLORIDE 9 MG/ML
250 INJECTION, SOLUTION INTRAVENOUS ONCE
Status: CANCELLED | OUTPATIENT
Start: 2022-02-09

## 2022-02-07 RX ORDER — DIPHENHYDRAMINE HYDROCHLORIDE 50 MG/ML
25 INJECTION INTRAMUSCULAR; INTRAVENOUS ONCE
Status: CANCELLED | OUTPATIENT
Start: 2022-02-09

## 2022-02-07 RX ADMIN — FAMOTIDINE 20 MG: 10 INJECTION INTRAVENOUS at 14:02

## 2022-02-07 RX ADMIN — SODIUM CHLORIDE 250 ML: 9 INJECTION, SOLUTION INTRAVENOUS at 13:46

## 2022-02-07 RX ADMIN — DIPHENHYDRAMINE HYDROCHLORIDE 25 MG: 50 INJECTION INTRAMUSCULAR; INTRAVENOUS at 13:46

## 2022-02-07 RX ADMIN — ACETAMINOPHEN 650 MG: 325 TABLET, FILM COATED ORAL at 13:40

## 2022-02-07 RX ADMIN — IRON SUCROSE 200 MG: 20 INJECTION, SOLUTION INTRAVENOUS at 14:23

## 2022-02-09 ENCOUNTER — INFUSION (OUTPATIENT)
Dept: ONCOLOGY | Facility: HOSPITAL | Age: 72
End: 2022-02-09

## 2022-02-09 VITALS
SYSTOLIC BLOOD PRESSURE: 138 MMHG | TEMPERATURE: 96.7 F | DIASTOLIC BLOOD PRESSURE: 63 MMHG | RESPIRATION RATE: 16 BRPM | HEART RATE: 98 BPM

## 2022-02-09 DIAGNOSIS — T45.4X5A ADVERSE EFFECT OF IRON, INITIAL ENCOUNTER: ICD-10-CM

## 2022-02-09 DIAGNOSIS — D50.0 IRON DEFICIENCY ANEMIA DUE TO CHRONIC BLOOD LOSS: Primary | ICD-10-CM

## 2022-02-09 PROCEDURE — 25010000002 DIPHENHYDRAMINE PER 50 MG: Performed by: INTERNAL MEDICINE

## 2022-02-09 PROCEDURE — 96375 TX/PRO/DX INJ NEW DRUG ADDON: CPT | Performed by: INTERNAL MEDICINE

## 2022-02-09 PROCEDURE — 96365 THER/PROPH/DIAG IV INF INIT: CPT | Performed by: INTERNAL MEDICINE

## 2022-02-09 PROCEDURE — 25010000002 IRON SUCROSE PER 1 MG: Performed by: INTERNAL MEDICINE

## 2022-02-09 RX ORDER — SODIUM CHLORIDE 9 MG/ML
250 INJECTION, SOLUTION INTRAVENOUS ONCE
Status: CANCELLED | OUTPATIENT
Start: 2022-02-11

## 2022-02-09 RX ORDER — DIPHENHYDRAMINE HYDROCHLORIDE 50 MG/ML
25 INJECTION INTRAMUSCULAR; INTRAVENOUS ONCE
Status: COMPLETED | OUTPATIENT
Start: 2022-02-09 | End: 2022-02-09

## 2022-02-09 RX ORDER — ACETAMINOPHEN 325 MG/1
650 TABLET ORAL ONCE
Status: CANCELLED | OUTPATIENT
Start: 2022-02-11

## 2022-02-09 RX ORDER — DIPHENHYDRAMINE HYDROCHLORIDE 50 MG/ML
25 INJECTION INTRAMUSCULAR; INTRAVENOUS ONCE
Status: CANCELLED | OUTPATIENT
Start: 2022-02-11

## 2022-02-09 RX ORDER — ACETAMINOPHEN 325 MG/1
650 TABLET ORAL ONCE
Status: COMPLETED | OUTPATIENT
Start: 2022-02-09 | End: 2022-02-09

## 2022-02-09 RX ORDER — SODIUM CHLORIDE 9 MG/ML
250 INJECTION, SOLUTION INTRAVENOUS ONCE
Status: COMPLETED | OUTPATIENT
Start: 2022-02-09 | End: 2022-02-09

## 2022-02-09 RX ADMIN — DIPHENHYDRAMINE HYDROCHLORIDE 25 MG: 50 INJECTION INTRAMUSCULAR; INTRAVENOUS at 13:17

## 2022-02-09 RX ADMIN — FAMOTIDINE 20 MG: 10 INJECTION, SOLUTION INTRAVENOUS at 13:25

## 2022-02-09 RX ADMIN — IRON SUCROSE 200 MG: 20 INJECTION, SOLUTION INTRAVENOUS at 13:41

## 2022-02-09 RX ADMIN — SODIUM CHLORIDE 250 ML: 9 INJECTION, SOLUTION INTRAVENOUS at 13:17

## 2022-02-09 RX ADMIN — ACETAMINOPHEN 650 MG: 325 TABLET, FILM COATED ORAL at 13:16

## 2022-02-11 ENCOUNTER — INFUSION (OUTPATIENT)
Dept: ONCOLOGY | Facility: HOSPITAL | Age: 72
End: 2022-02-11

## 2022-02-11 VITALS
SYSTOLIC BLOOD PRESSURE: 145 MMHG | HEART RATE: 96 BPM | RESPIRATION RATE: 16 BRPM | DIASTOLIC BLOOD PRESSURE: 69 MMHG | TEMPERATURE: 96.8 F

## 2022-02-11 DIAGNOSIS — T45.4X5A ADVERSE EFFECT OF IRON, INITIAL ENCOUNTER: ICD-10-CM

## 2022-02-11 DIAGNOSIS — D50.0 IRON DEFICIENCY ANEMIA DUE TO CHRONIC BLOOD LOSS: Primary | ICD-10-CM

## 2022-02-11 PROCEDURE — 25010000002 DIPHENHYDRAMINE PER 50 MG: Performed by: INTERNAL MEDICINE

## 2022-02-11 PROCEDURE — 96365 THER/PROPH/DIAG IV INF INIT: CPT | Performed by: INTERNAL MEDICINE

## 2022-02-11 PROCEDURE — 25010000002 IRON SUCROSE PER 1 MG: Performed by: INTERNAL MEDICINE

## 2022-02-11 PROCEDURE — 96375 TX/PRO/DX INJ NEW DRUG ADDON: CPT | Performed by: INTERNAL MEDICINE

## 2022-02-11 RX ORDER — DIPHENHYDRAMINE HYDROCHLORIDE 50 MG/ML
25 INJECTION INTRAMUSCULAR; INTRAVENOUS ONCE
Status: COMPLETED | OUTPATIENT
Start: 2022-02-11 | End: 2022-02-11

## 2022-02-11 RX ORDER — ACETAMINOPHEN 325 MG/1
650 TABLET ORAL ONCE
Status: CANCELLED | OUTPATIENT
Start: 2022-02-13

## 2022-02-11 RX ORDER — ACETAMINOPHEN 325 MG/1
650 TABLET ORAL ONCE
Status: COMPLETED | OUTPATIENT
Start: 2022-02-11 | End: 2022-02-11

## 2022-02-11 RX ORDER — SODIUM CHLORIDE 9 MG/ML
250 INJECTION, SOLUTION INTRAVENOUS ONCE
Status: CANCELLED | OUTPATIENT
Start: 2022-02-13

## 2022-02-11 RX ORDER — DIPHENHYDRAMINE HYDROCHLORIDE 50 MG/ML
25 INJECTION INTRAMUSCULAR; INTRAVENOUS ONCE
Status: CANCELLED | OUTPATIENT
Start: 2022-02-13

## 2022-02-11 RX ORDER — SODIUM CHLORIDE 9 MG/ML
250 INJECTION, SOLUTION INTRAVENOUS ONCE
Status: COMPLETED | OUTPATIENT
Start: 2022-02-11 | End: 2022-02-11

## 2022-02-11 RX ADMIN — ACETAMINOPHEN 650 MG: 325 TABLET, FILM COATED ORAL at 13:14

## 2022-02-11 RX ADMIN — SODIUM CHLORIDE 250 ML: 9 INJECTION, SOLUTION INTRAVENOUS at 13:14

## 2022-02-11 RX ADMIN — DIPHENHYDRAMINE HYDROCHLORIDE 25 MG: 50 INJECTION INTRAMUSCULAR; INTRAVENOUS at 13:14

## 2022-02-11 RX ADMIN — FAMOTIDINE 20 MG: 10 INJECTION, SOLUTION INTRAVENOUS at 13:16

## 2022-02-11 RX ADMIN — IRON SUCROSE 200 MG: 20 INJECTION, SOLUTION INTRAVENOUS at 13:49

## 2022-02-14 ENCOUNTER — INFUSION (OUTPATIENT)
Dept: ONCOLOGY | Facility: HOSPITAL | Age: 72
End: 2022-02-14

## 2022-02-14 VITALS
DIASTOLIC BLOOD PRESSURE: 82 MMHG | TEMPERATURE: 98.2 F | HEART RATE: 106 BPM | RESPIRATION RATE: 18 BRPM | SYSTOLIC BLOOD PRESSURE: 157 MMHG

## 2022-02-14 DIAGNOSIS — D50.0 IRON DEFICIENCY ANEMIA DUE TO CHRONIC BLOOD LOSS: Primary | ICD-10-CM

## 2022-02-14 DIAGNOSIS — T45.4X5A ADVERSE EFFECT OF IRON, INITIAL ENCOUNTER: ICD-10-CM

## 2022-02-14 PROCEDURE — 96365 THER/PROPH/DIAG IV INF INIT: CPT | Performed by: INTERNAL MEDICINE

## 2022-02-14 PROCEDURE — 96375 TX/PRO/DX INJ NEW DRUG ADDON: CPT | Performed by: INTERNAL MEDICINE

## 2022-02-14 PROCEDURE — 25010000002 IRON SUCROSE PER 1 MG: Performed by: INTERNAL MEDICINE

## 2022-02-14 PROCEDURE — 25010000002 DIPHENHYDRAMINE PER 50 MG: Performed by: INTERNAL MEDICINE

## 2022-02-14 RX ORDER — SODIUM CHLORIDE 9 MG/ML
250 INJECTION, SOLUTION INTRAVENOUS ONCE
Status: CANCELLED | OUTPATIENT
Start: 2022-02-15

## 2022-02-14 RX ORDER — ACETAMINOPHEN 325 MG/1
650 TABLET ORAL ONCE
Status: COMPLETED | OUTPATIENT
Start: 2022-02-14 | End: 2022-02-14

## 2022-02-14 RX ORDER — DIPHENHYDRAMINE HYDROCHLORIDE 50 MG/ML
25 INJECTION INTRAMUSCULAR; INTRAVENOUS ONCE
Status: COMPLETED | OUTPATIENT
Start: 2022-02-14 | End: 2022-02-14

## 2022-02-14 RX ORDER — ACETAMINOPHEN 325 MG/1
650 TABLET ORAL ONCE
Status: CANCELLED | OUTPATIENT
Start: 2022-02-15

## 2022-02-14 RX ORDER — SODIUM CHLORIDE 9 MG/ML
250 INJECTION, SOLUTION INTRAVENOUS ONCE
Status: COMPLETED | OUTPATIENT
Start: 2022-02-14 | End: 2022-02-14

## 2022-02-14 RX ORDER — DIPHENHYDRAMINE HYDROCHLORIDE 50 MG/ML
25 INJECTION INTRAMUSCULAR; INTRAVENOUS ONCE
Status: CANCELLED | OUTPATIENT
Start: 2022-02-15

## 2022-02-14 RX ADMIN — SODIUM CHLORIDE 250 ML: 9 INJECTION, SOLUTION INTRAVENOUS at 13:29

## 2022-02-14 RX ADMIN — FAMOTIDINE 20 MG: 10 INJECTION, SOLUTION INTRAVENOUS at 13:41

## 2022-02-14 RX ADMIN — DIPHENHYDRAMINE HYDROCHLORIDE 25 MG: 50 INJECTION INTRAMUSCULAR; INTRAVENOUS at 13:31

## 2022-02-14 RX ADMIN — ACETAMINOPHEN 650 MG: 325 TABLET, FILM COATED ORAL at 13:25

## 2022-02-14 RX ADMIN — IRON SUCROSE 200 MG: 20 INJECTION, SOLUTION INTRAVENOUS at 13:56

## 2022-02-16 ENCOUNTER — INFUSION (OUTPATIENT)
Dept: ONCOLOGY | Facility: HOSPITAL | Age: 72
End: 2022-02-16

## 2022-02-16 ENCOUNTER — PREP FOR SURGERY (OUTPATIENT)
Dept: OTHER | Facility: HOSPITAL | Age: 72
End: 2022-02-16

## 2022-02-16 VITALS
TEMPERATURE: 97.4 F | SYSTOLIC BLOOD PRESSURE: 144 MMHG | HEART RATE: 96 BPM | RESPIRATION RATE: 18 BRPM | DIASTOLIC BLOOD PRESSURE: 76 MMHG

## 2022-02-16 DIAGNOSIS — D50.0 IRON DEFICIENCY ANEMIA DUE TO CHRONIC BLOOD LOSS: Primary | ICD-10-CM

## 2022-02-16 DIAGNOSIS — Z11.52 ENCOUNTER FOR SCREENING FOR COVID-19: ICD-10-CM

## 2022-02-16 DIAGNOSIS — T45.4X5A ADVERSE EFFECT OF IRON, INITIAL ENCOUNTER: ICD-10-CM

## 2022-02-16 DIAGNOSIS — Z86.010 PERSONAL HISTORY OF COLONIC POLYPS: ICD-10-CM

## 2022-02-16 DIAGNOSIS — K21.9 GASTROESOPHAGEAL REFLUX DISEASE: Primary | ICD-10-CM

## 2022-02-16 PROCEDURE — 96365 THER/PROPH/DIAG IV INF INIT: CPT | Performed by: INTERNAL MEDICINE

## 2022-02-16 PROCEDURE — 25010000002 IRON SUCROSE PER 1 MG: Performed by: INTERNAL MEDICINE

## 2022-02-16 PROCEDURE — 96375 TX/PRO/DX INJ NEW DRUG ADDON: CPT | Performed by: INTERNAL MEDICINE

## 2022-02-16 PROCEDURE — 25010000002 DIPHENHYDRAMINE PER 50 MG: Performed by: INTERNAL MEDICINE

## 2022-02-16 RX ORDER — ACETAMINOPHEN 325 MG/1
650 TABLET ORAL ONCE
Status: CANCELLED | OUTPATIENT
Start: 2022-02-16

## 2022-02-16 RX ORDER — SODIUM CHLORIDE 9 MG/ML
250 INJECTION, SOLUTION INTRAVENOUS ONCE
Status: CANCELLED | OUTPATIENT
Start: 2022-02-16

## 2022-02-16 RX ORDER — DEXTROSE AND SODIUM CHLORIDE 5; .45 G/100ML; G/100ML
30 INJECTION, SOLUTION INTRAVENOUS CONTINUOUS PRN
Status: CANCELLED | OUTPATIENT
Start: 2022-03-01

## 2022-02-16 RX ORDER — ACETAMINOPHEN 325 MG/1
650 TABLET ORAL ONCE
Status: COMPLETED | OUTPATIENT
Start: 2022-02-16 | End: 2022-02-16

## 2022-02-16 RX ORDER — DIPHENHYDRAMINE HYDROCHLORIDE 50 MG/ML
25 INJECTION INTRAMUSCULAR; INTRAVENOUS ONCE
Status: COMPLETED | OUTPATIENT
Start: 2022-02-16 | End: 2022-02-16

## 2022-02-16 RX ORDER — SODIUM CHLORIDE 9 MG/ML
250 INJECTION, SOLUTION INTRAVENOUS ONCE
Status: COMPLETED | OUTPATIENT
Start: 2022-02-16 | End: 2022-02-16

## 2022-02-16 RX ORDER — DIPHENHYDRAMINE HYDROCHLORIDE 50 MG/ML
25 INJECTION INTRAMUSCULAR; INTRAVENOUS ONCE
Status: CANCELLED | OUTPATIENT
Start: 2022-02-16

## 2022-02-16 RX ADMIN — ACETAMINOPHEN 650 MG: 325 TABLET, FILM COATED ORAL at 14:18

## 2022-02-16 RX ADMIN — SODIUM CHLORIDE 250 ML: 9 INJECTION, SOLUTION INTRAVENOUS at 14:02

## 2022-02-16 RX ADMIN — IRON SUCROSE 200 MG: 20 INJECTION, SOLUTION INTRAVENOUS at 14:32

## 2022-02-16 RX ADMIN — DIPHENHYDRAMINE HYDROCHLORIDE 25 MG: 50 INJECTION INTRAMUSCULAR; INTRAVENOUS at 14:02

## 2022-02-16 RX ADMIN — FAMOTIDINE 20 MG: 10 INJECTION, SOLUTION INTRAVENOUS at 14:12

## 2022-02-28 ENCOUNTER — LAB (OUTPATIENT)
Dept: LAB | Facility: HOSPITAL | Age: 72
End: 2022-02-28

## 2022-02-28 DIAGNOSIS — Z11.52 ENCOUNTER FOR SCREENING FOR COVID-19: ICD-10-CM

## 2022-02-28 LAB — SARS-COV-2 N GENE RESP QL NAA+PROBE: NOT DETECTED

## 2022-02-28 PROCEDURE — 87635 SARS-COV-2 COVID-19 AMP PRB: CPT

## 2022-02-28 PROCEDURE — C9803 HOPD COVID-19 SPEC COLLECT: HCPCS

## 2022-03-01 ENCOUNTER — ANESTHESIA EVENT (OUTPATIENT)
Dept: GASTROENTEROLOGY | Facility: HOSPITAL | Age: 72
End: 2022-03-01

## 2022-03-01 ENCOUNTER — ANESTHESIA (OUTPATIENT)
Dept: GASTROENTEROLOGY | Facility: HOSPITAL | Age: 72
End: 2022-03-01

## 2022-03-01 ENCOUNTER — HOSPITAL ENCOUNTER (OUTPATIENT)
Facility: HOSPITAL | Age: 72
Setting detail: HOSPITAL OUTPATIENT SURGERY
Discharge: HOME | End: 2022-03-01
Attending: INTERNAL MEDICINE | Admitting: INTERNAL MEDICINE

## 2022-03-01 VITALS
BODY MASS INDEX: 32.7 KG/M2 | SYSTOLIC BLOOD PRESSURE: 131 MMHG | HEART RATE: 81 BPM | WEIGHT: 241.4 LBS | DIASTOLIC BLOOD PRESSURE: 64 MMHG | HEIGHT: 72 IN | OXYGEN SATURATION: 100 % | TEMPERATURE: 97.4 F | RESPIRATION RATE: 20 BRPM

## 2022-03-01 DIAGNOSIS — K21.9 GASTROESOPHAGEAL REFLUX DISEASE: ICD-10-CM

## 2022-03-01 DIAGNOSIS — Z86.010 PERSONAL HISTORY OF COLONIC POLYPS: ICD-10-CM

## 2022-03-01 LAB — GLUCOSE BLDC GLUCOMTR-MCNC: 56 MG/DL (ref 70–130)

## 2022-03-01 PROCEDURE — 25010000002 PROPOFOL 10 MG/ML EMULSION

## 2022-03-01 PROCEDURE — 88305 TISSUE EXAM BY PATHOLOGIST: CPT

## 2022-03-01 PROCEDURE — 82962 GLUCOSE BLOOD TEST: CPT

## 2022-03-01 RX ORDER — LIDOCAINE HYDROCHLORIDE 20 MG/ML
INJECTION, SOLUTION INTRAVENOUS AS NEEDED
Status: DISCONTINUED | OUTPATIENT
Start: 2022-03-01 | End: 2022-03-01 | Stop reason: SURG

## 2022-03-01 RX ORDER — PROPOFOL 10 MG/ML
VIAL (ML) INTRAVENOUS AS NEEDED
Status: DISCONTINUED | OUTPATIENT
Start: 2022-03-01 | End: 2022-03-01 | Stop reason: SURG

## 2022-03-01 RX ORDER — DEXTROSE AND SODIUM CHLORIDE 5; .45 G/100ML; G/100ML
30 INJECTION, SOLUTION INTRAVENOUS CONTINUOUS PRN
Status: DISCONTINUED | OUTPATIENT
Start: 2022-03-01 | End: 2022-03-01 | Stop reason: HOSPADM

## 2022-03-01 RX ORDER — PROMETHAZINE HYDROCHLORIDE 25 MG/1
25 TABLET ORAL ONCE AS NEEDED
Status: DISCONTINUED | OUTPATIENT
Start: 2022-03-01 | End: 2022-03-01 | Stop reason: HOSPADM

## 2022-03-01 RX ORDER — PROMETHAZINE HYDROCHLORIDE 25 MG/1
25 SUPPOSITORY RECTAL ONCE AS NEEDED
Status: DISCONTINUED | OUTPATIENT
Start: 2022-03-01 | End: 2022-03-01 | Stop reason: HOSPADM

## 2022-03-01 RX ORDER — ONDANSETRON 2 MG/ML
4 INJECTION INTRAMUSCULAR; INTRAVENOUS ONCE AS NEEDED
Status: DISCONTINUED | OUTPATIENT
Start: 2022-03-01 | End: 2022-03-01 | Stop reason: HOSPADM

## 2022-03-01 RX ORDER — MEPERIDINE HYDROCHLORIDE 25 MG/ML
12.5 INJECTION INTRAMUSCULAR; INTRAVENOUS; SUBCUTANEOUS
Status: DISCONTINUED | OUTPATIENT
Start: 2022-03-01 | End: 2022-03-01 | Stop reason: HOSPADM

## 2022-03-01 RX ADMIN — DEXTROSE AND SODIUM CHLORIDE 30 ML/HR: 5; 450 INJECTION, SOLUTION INTRAVENOUS at 08:04

## 2022-03-01 RX ADMIN — PROPOFOL 20 MG: 10 INJECTION, EMULSION INTRAVENOUS at 09:06

## 2022-03-01 RX ADMIN — PROPOFOL 20 MG: 10 INJECTION, EMULSION INTRAVENOUS at 09:11

## 2022-03-01 RX ADMIN — PROPOFOL 20 MG: 10 INJECTION, EMULSION INTRAVENOUS at 09:17

## 2022-03-01 RX ADMIN — PROPOFOL 20 MG: 10 INJECTION, EMULSION INTRAVENOUS at 09:15

## 2022-03-01 RX ADMIN — PROPOFOL 80 MG: 10 INJECTION, EMULSION INTRAVENOUS at 09:02

## 2022-03-01 RX ADMIN — PROPOFOL 20 MG: 10 INJECTION, EMULSION INTRAVENOUS at 09:05

## 2022-03-01 RX ADMIN — LIDOCAINE HYDROCHLORIDE 100 MG: 20 INJECTION, SOLUTION INTRAVENOUS at 09:02

## 2022-03-01 RX ADMIN — PROPOFOL 30 MG: 10 INJECTION, EMULSION INTRAVENOUS at 09:19

## 2022-03-01 RX ADMIN — PROPOFOL 20 MG: 10 INJECTION, EMULSION INTRAVENOUS at 09:08

## 2022-03-01 NOTE — H&P
Aziza Estrada DO,[facility]  Gastroenterology  Hepatology  Endoscopy  Board Certified in Internal Medicine and gastroenterology  [address], [address]  [place]. [address]  - [phone]   F - [phone]     GASTROENTEROLOGY HISTORY AND PHYSICAL  NOTE   AZIZA ESTRADA DO.         SUBJECTIVE:   3/1/2022    Name: Jadiel Dutton  DOD: 1950        Chief Complaint:       Subjective : Anemia. Acid reflux. Personal history of colon polyps    Patient is 71 y.o. male presents with desire for elective EGD with biopsy as well as colonoscopy.      ROS/HISTORY/ CURRENT MEDICATIONS/OBJECTIVE/VS/PE:   Review of Systems:  All systems unremarkable unless specified below.  Constitutional   HENT  Eyes   Respiratory    Cardiovascular  Gastrointestinal   Endocrine  Genitourinary    Musculoskeletal   Skin  Allergic/Immunologic    Neurological    Hematological  Psychiatric/Behavioral    History:     Past Medical History:   Diagnosis Date   • Abdominal pain    • Abnormal weight loss    • Acute bronchitis    • Anxiety state    • Benign essential hypertension    • Chronic sinusitis    • Constipation    • Cystitis    • Degenerative joint disease involving multiple joints    • Diabetic neuropathy (HCC)      bilateral hands      • Diastolic dysfunction    • Diverticular disease of colon    • Dyspnea    • Epigastric pain    • Essential hypertension    • Foot ulcer (HCC)    • Gastroesophageal reflux disease    • Generalized anxiety disorder    • Hyperlipidemia    • Inflammatory bowel disease     Possible Inflammatory Bowel Disease      • Lumbar pain     Pain radiating to lumbar region of back   n      • Pleuritic pain    • Radiculopathy     site unspecified      • Type 2 diabetes mellitus (HCC)    • Vesicular eczema of hands and feet      Past Surgical History:   Procedure Laterality Date   • BACK SURGERY      LOWER BACK SURGERY   • CARPAL TUNNEL RELEASE Bilateral    • COLONOSCOPY  06/11/2012    Internal & external  hemorrhoids found.   • COLONOSCOPY N/A 8/22/2017    Procedure: COLONOSCOPY;  Surgeon: Cesar Hollis MD;  Location: [facility] ENDOSCOPY;  Service:    • DEQUERVAIN RELEASE Bilateral    • ENDOSCOPY  06/11/2012    Slight stricture in the distal esophagus. Gastritis in stomach. Biopsy taken. Normal duodenum.   • ENDOSCOPY     • ENDOSCOPY N/A 8/22/2017    Procedure: ESOPHAGOGASTRODUODENOSCOPY possible dilation ;  Surgeon: Cesar Hollis MD;  Location: [facility] ENDOSCOPY;  Service:    • EYE SURGERY Bilateral     cataracts removed with implants   • HAMMER TOE REPAIR Left 12/18/2019    Procedure: FIFTH METATARSAL EXOSTECTOMY, FOURTH HAMMER TOE CORRECTION, GASTROCNEMIUS RECESSION;  Surgeon: Jacoby Serrano DPM;  Location: [facility] OR;  Service: Podiatry   • SHOULDER ARTHROSCOPY Left 2/7/2020    Procedure: LEFT SHOULDER ARTHROSCOPY with rotator cuff repair, DEDRICK procedure, and Subacromial decompression;  Surgeon: Troy Barillas MD;  Location: [facility] OR;  Service: Orthopedics;  Laterality: Left;   • ULNAR TUNNEL RELEASE Bilateral      Family History   Problem Relation Age of Onset   • No Known Problems Other    • Hypertension Mother    • Heart attack Father    • No Known Problems Sister    • No Known Problems Brother    • No Known Problems Daughter    • No Known Problems Son    • No Known Problems Maternal Aunt    • No Known Problems Maternal Uncle    • No Known Problems Paternal Aunt    • No Known Problems Paternal Uncle    • Cancer Maternal Grandmother    • Hodgkin's lymphoma Maternal Grandmother    • No Known Problems Maternal Grandfather    • No Known Problems Paternal Grandmother    • No Known Problems Paternal Grandfather      Social History     Tobacco Use   • Smoking status: Never Smoker   • Smokeless tobacco: Never Used   Vaping Use   • Vaping Use: Never used   Substance Use Topics   • Alcohol use: No   • Drug use: No     Prior to Admission medications    Medication Sig Start Date End Date Taking?  Authorizing Provider   albuterol sulfate  (90 Base) MCG/ACT inhaler Inhale 2 puffs Every 4 (Four) Hours As Needed for Wheezing. 10/21/20  Yes Shayy Banda APRN   azelastine (ASTELIN) 0.1 % nasal spray azelastine 137 mcg (0.1 %) nasal spray aerosol   Yes Marta Coffman MD   dexlansoprazole (DEXILANT) 60 MG capsule Take 60 mg by mouth Daily As Needed.   Yes Marta Coffman MD   docusate sodium (COLACE) 100 MG capsule Take 1 capsule by mouth 2 (Two) Times a Day As Needed for Constipation. 1/14/22  Yes Norman Carbajal MD   gabapentin (NEURONTIN) 300 MG capsule Take 1 capsule by mouth 4 (Four) Times a Day As Needed. 5/8/17  Yes Marta Coffman MD   HYDROcodone-acetaminophen (NORCO)  MG per tablet Take 1 tablet by mouth Every 6 (Six) Hours As Needed. 10/27/21  Yes Marta Coffman MD   lisinopril-hydrochlorothiazide (PRINZIDE,ZESTORETIC) 20-25 MG per tablet Take 1 tablet by mouth Daily. 5/20/21  Yes Shayy Banda APRN   rosuvastatin (CRESTOR) 20 MG tablet Take 1 tablet by mouth Every Night. 5/20/21  Yes Shayy Banda APRN   B-D UF III MINI PEN NEEDLES 31G X 5 MM misc USE FOUR TIMES A DAY WITH INSULIN PENS 9/24/18   Brody Jiménez MD   insulin NPH-insulin regular (humuLIN 70/30,novoLIN 70/30) (70-30) 100 UNIT/ML injection Inject 0-10 Units under the skin into the appropriate area as directed 2 (Two) Times a Day As Needed (according to blood sugar).    Marta Coffman MD   Lancets (ONETOUCH DELICA PLUS [id number]) misc  2/24/20   Provider, Historical, MD   ONETOUCH VERIO test strip  8/5/17   Marta Coffman MD     Allergies:  Penicillins, Bactrim [sulfamethoxazole-trimethoprim], Ceftin [cefuroxime axetil], Sulfa antibiotics, and Ciprofloxacin    I have reviewed the patients medical history, surgical history and family history in the available medical record system.     Current Medications:     Current Facility-Administered Medications   Medication Dose Route  Frequency Provider Last Rate Last Admin   • dextrose 5 % and sodium chloride 0.45 % infusion  30 mL/hr Intravenous Continuous PRN Jacoby Robertson DO 30 mL/hr at 03/01/22 0804 30 mL/hr at 03/01/22 0804       Objective     Physical Exam:   Temp:  [97.1 °F (36.2 °C)] 97.1 °F (36.2 °C)  Heart Rate:  [85] 85  Resp:  [16] 16  BP: (175)/(74) 175/74    Physical Exam:  General Appearance:    Alert, cooperative, in no acute distress   Head:    Normocephalic, without obvious abnormality, atraumatic   Eyes:            Lids and lashes normal, conjunctivae and sclerae normal, no icterus, no pallor, corneas clear, PERRLA   Ears:    Ears appear intact with no abnormalities noted   Throat:   No oral lesions, no thrush, oral mucosa moist   Neck:   No adenopathy, supple, trachea midline, no thyromegaly, no  carotid bruit, no JVD   Back:     No kyphosis present, no scoliosis present, no skin lesions,   erythema or scars, no tenderness to percussion or                 palpation,  range of motion normal   Lungs:     Clear to auscultation,respirations regular, even and         unlabored    Heart:    Regular rhythm and normal rate, normal S1 and S2, no  murmur, no gallop, no rub, no click   Breast Exam:    Deferred   Abdomen:     Normal bowel sounds, no masses, no organomegaly, soft  nontender, nondistended, no guarding, no rebound                 tenderness   Genitalia:    Deferred   Extremities:   Moves all extremities well, no edema, no cyanosis, no          redness   Pulses:   Pulses palpable and equal bilaterally   Skin:   No bleeding, bruising or rash   Lymph nodes:   No palpable adenopathy   Neurologic:   Cranial nerves 2 - 12 grossly intact, sensation intact, DTR     present and equal bilaterally      Results Review:     Lab Results   Component Value Date    WBC 10.85 (H) 01/13/2022    WBC 12.36 (H) 12/21/2021    WBC 11.63 (H) 12/07/2021    HGB 12.2 (L) 01/13/2022    HGB 12.0 (L) 12/21/2021    HGB 12.1 (L) 12/07/2021    HCT 38.1  01/13/2022    HCT 38.1 12/21/2021    HCT 38.7 12/07/2021     01/13/2022     12/21/2021     12/07/2021             No results found for: LIPASE  No results found for: INR  No results found for: THROATCX    Radiology Review:  Imaging Results (Last 72 Hours)     ** No results found for the last 72 hours. **           I reviewed the patient's new clinical results.  I reviewed the patient's new imaging results and agree with the interpretation.     ASSESSMENT/PLAN:   ASSESSMENT:  1. Acid reflux  2. Personal history of colon polyps  3. Anemia    PLAN:  1. Esophagogastroduodenoscopy with biopsy  2. Colonoscopy    Risk and benefits associated with the procedure are reviewed with the patient. The patient wished to proceed     Jacoby Robertson DO  03/01/22  08:49 CST

## 2022-03-01 NOTE — ANESTHESIA POSTPROCEDURE EVALUATION
Patient: Jadiel Dutton    Procedure Summary     Date: 03/01/22 Room / Location: [facility] ENDOSCOPY 2 / [facility] ENDOSCOPY    Anesthesia Start: 0857 Anesthesia Stop: 0923    Procedures:       ESOPHAGOGASTRODUODENOSCOPY 8:30 (N/A )      COLONOSCOPY 8:30 (N/A ) Diagnosis:       Gastroesophageal reflux disease      Personal history of colonic polyps      (Gastroesophageal reflux disease [K21.9])      (Personal history of colonic polyps [Z86.010])    Surgeons: Jacoby Robertson DO Provider: Kelly Yun CRNA    Anesthesia Type: MAC ASA Status: 3          Anesthesia Type: MAC    Vitals  No vitals data found for the desired time range.          Post Anesthesia Care and Evaluation    Patient location during evaluation: bedside  Patient participation: waiting for patient participation  Level of consciousness: responsive to verbal stimuli  Pain management: adequate  Airway patency: patent  Anesthetic complications: No anesthetic complications  PONV Status: none  Cardiovascular status: acceptable  Respiratory status: acceptable  Hydration status: acceptable    Comments: ---------------------------               03/01/22                      0747       0924  ---------------------------   BP:          175/74      130/61   Pulse:         85        83   Resp:          16         17   Temp:   97.1 °F (36.2 °C)   SpO2:         100%       100  ---------------------------

## 2022-03-01 NOTE — ANESTHESIA PREPROCEDURE EVALUATION
Anesthesia Evaluation     no history of anesthetic complications:  NPO Solid Status: > 8 hours  NPO Liquid Status: > 8 hours           Airway   Mallampati: II  TM distance: >3 FB  Neck ROM: full  No difficulty expected  Dental    (+) poor dentition        Pulmonary - normal exam    breath sounds clear to auscultation  (+) asthma,  (-) sleep apnea, not a smoker    ROS comment: PND  Cardiovascular - normal exam  Exercise tolerance: good (4-7 METS)    ECG reviewed  Rhythm: regular  Rate: normal    (+) hypertension less than 2 medications, hyperlipidemia,   (-) valvular problems/murmurs, past MI, dysrhythmias, angina, cardiac stents, DVT    ROS comment: Sinus rhythm with 1st degree AV block with premature atrial complexes  Otherwise normal ECG  No previous ECGs available  Confirmed by DARLIN    Neuro/Psych  (+) numbness,    (-) seizures, TIA, CVA, headaches, psychiatric history  GI/Hepatic/Renal/Endo    (+) obesity,  hiatal hernia, GERD well controlled,  diabetes mellitus (glu 128) type 2 well controlled using insulin,   (-) hepatitis, liver disease, no renal disease, no thyroid disorder    Musculoskeletal     (+) back pain, chronic pain,       ROS comment: CONCLUSION:  There remains some inferior displacement of the lateral aspect of the left clavicle with respect to the acromion process.  Abdominal   (+) obese,    Substance History   (-) alcohol use, drug use     OB/GYN          Other   arthritis,      (-) history of cancer                    Anesthesia Plan    ASA 3     MAC     intravenous induction     Anesthetic plan, all risks, benefits, and alternatives have been provided, discussed and informed consent has been obtained with: patient.    Plan discussed with CRNA.

## 2022-03-01 NOTE — DISCHARGE INSTR - APPOINTMENTS
Dr. Jacoby Robertson, DO  [address], [address]  [place] [address]  [phone]    Appointment date and time:     June 7, 2022  @  2:45  pm

## 2022-03-03 LAB
LAB AP CASE REPORT: NORMAL
PATH REPORT.FINAL DX SPEC: NORMAL

## 2022-03-08 ENCOUNTER — OFFICE VISIT (OUTPATIENT)
Dept: PODIATRY | Facility: CLINIC | Age: 72
End: 2022-03-08

## 2022-03-08 VITALS — BODY MASS INDEX: 32.64 KG/M2 | HEIGHT: 72 IN | WEIGHT: 241 LBS | OXYGEN SATURATION: 99 % | HEART RATE: 102 BPM

## 2022-03-08 DIAGNOSIS — L97.828 VENOUS STASIS ULCER OF OTHER PART OF LEFT LOWER LEG WITH OTHER ULCER SEVERITY WITH VARICOSE VEINS: ICD-10-CM

## 2022-03-08 DIAGNOSIS — I83.028 VENOUS STASIS ULCER OF OTHER PART OF LEFT LOWER LEG WITH OTHER ULCER SEVERITY WITH VARICOSE VEINS: ICD-10-CM

## 2022-03-08 DIAGNOSIS — L03.116 CELLULITIS OF LEFT LOWER EXTREMITY: ICD-10-CM

## 2022-03-08 DIAGNOSIS — E11.42 DIABETIC POLYNEUROPATHY ASSOCIATED WITH TYPE 2 DIABETES MELLITUS: Primary | ICD-10-CM

## 2022-03-08 DIAGNOSIS — L97.421 SKIN ULCER OF LEFT HEEL, LIMITED TO BREAKDOWN OF SKIN: ICD-10-CM

## 2022-03-08 PROCEDURE — 99214 OFFICE O/P EST MOD 30 MIN: CPT | Performed by: PODIATRIST

## 2022-03-08 PROCEDURE — 29580 STRAPPING UNNA BOOT: CPT | Performed by: PODIATRIST

## 2022-03-08 RX ORDER — CLINDAMYCIN HYDROCHLORIDE 300 MG/1
300 CAPSULE ORAL 3 TIMES DAILY
Qty: 30 CAPSULE | Refills: 0 | Status: SHIPPED | OUTPATIENT
Start: 2022-03-08 | End: 2022-03-24

## 2022-03-08 NOTE — PROGRESS NOTES
Jadiel Dutton  1950  71 y.o. male   PCP- Shayy Banda , APRN  12/21/21  BS: 105 per pt    Patient presents today for pain on right hallux.  03/08/2022            Chief Complaint   Patient presents with   • Left Foot - Blister, Laceration   • Right Foot - Cracking skin         History of Present Illness     Jadiel Dutton is a 71 y.o. male with history of diabetes who presents for evaluation of newer onset of left lower extremity ulcerations.  Patient states a few weeks prior he cut his left lower leg on a piece of metal.  He also notes blister formation to his heel on that same side.  There has been some recent increased redness and swelling to the area.  Past Medical History:   Diagnosis Date   • Abdominal pain    • Abnormal weight loss    • Acute bronchitis    • Anxiety state    • Benign essential hypertension    • Chronic sinusitis    • Constipation    • Cystitis    • Degenerative joint disease involving multiple joints    • Diabetic neuropathy (HCC)      bilateral hands      • Diastolic dysfunction    • Diverticular disease of colon    • Dyspnea    • Epigastric pain    • Essential hypertension    • Foot ulcer (HCC)    • Gastroesophageal reflux disease    • Generalized anxiety disorder    • Hyperlipidemia    • Inflammatory bowel disease     Possible Inflammatory Bowel Disease      • Lumbar pain     Pain radiating to lumbar region of back   n      • Pleuritic pain    • Radiculopathy     site unspecified      • Type 2 diabetes mellitus (HCC)    • Vesicular eczema of hands and feet          Past Surgical History:   Procedure Laterality Date   • BACK SURGERY      LOWER BACK SURGERY   • CARPAL TUNNEL RELEASE Bilateral    • COLONOSCOPY  06/11/2012    Internal & external hemorrhoids found.   • COLONOSCOPY N/A 8/22/2017    Procedure: COLONOSCOPY;  Surgeon: Cesar Hollis MD;  Location: [facility] ENDOSCOPY;  Service:    • COLONOSCOPY N/A 3/1/2022    Procedure: COLONOSCOPY 8:30;  Surgeon: Jacoby Robertson  ;  Location: [facility] ENDOSCOPY;  Service: Gastroenterology;  Laterality: N/A;   • DEQUERVAIN RELEASE Bilateral    • ENDOSCOPY  06/11/2012    Slight stricture in the distal esophagus. Gastritis in stomach. Biopsy taken. Normal duodenum.   • ENDOSCOPY     • ENDOSCOPY N/A 8/22/2017    Procedure: ESOPHAGOGASTRODUODENOSCOPY possible dilation ;  Surgeon: Cesar Hollis MD;  Location: [facility] ENDOSCOPY;  Service:    • ENDOSCOPY N/A 3/1/2022    Procedure: ESOPHAGOGASTRODUODENOSCOPY 8:30;  Surgeon: Jacoby Robertson DO;  Location: [facility] ENDOSCOPY;  Service: Gastroenterology;  Laterality: N/A;   • EYE SURGERY Bilateral     cataracts removed with implants   • HAMMER TOE REPAIR Left 12/18/2019    Procedure: FIFTH METATARSAL EXOSTECTOMY, FOURTH HAMMER TOE CORRECTION, GASTROCNEMIUS RECESSION;  Surgeon: Jacoby Serrano DPM;  Location: [facility] OR;  Service: Podiatry   • SHOULDER ARTHROSCOPY Left 2/7/2020    Procedure: LEFT SHOULDER ARTHROSCOPY with rotator cuff repair, DEDRICK procedure, and Subacromial decompression;  Surgeon: Troy Barillas MD;  Location: [facility] OR;  Service: Orthopedics;  Laterality: Left;   • ULNAR TUNNEL RELEASE Bilateral          Family History   Problem Relation Age of Onset   • No Known Problems Other    • Hypertension Mother    • Heart attack Father    • No Known Problems Sister    • No Known Problems Brother    • No Known Problems Daughter    • No Known Problems Son    • No Known Problems Maternal Aunt    • No Known Problems Maternal Uncle    • No Known Problems Paternal Aunt    • No Known Problems Paternal Uncle    • Cancer Maternal Grandmother    • Hodgkin's lymphoma Maternal Grandmother    • No Known Problems Maternal Grandfather    • No Known Problems Paternal Grandmother    • No Known Problems Paternal Grandfather          Social History     Socioeconomic History   • Marital status: Single   Tobacco Use   • Smoking status: Never Smoker   • Smokeless tobacco: Never Used   Vaping  "Use   • Vaping Use: Never used   Substance and Sexual Activity   • Alcohol use: No   • Drug use: No   • Sexual activity: Defer         Current Outpatient Medications   Medication Sig Dispense Refill   • albuterol sulfate  (90 Base) MCG/ACT inhaler Inhale 2 puffs Every 4 (Four) Hours As Needed for Wheezing. 1 g 0   • azelastine (ASTELIN) 0.1 % nasal spray azelastine 137 mcg (0.1 %) nasal spray aerosol     • B-D UF III MINI PEN NEEDLES 31G X 5 MM misc USE FOUR TIMES A DAY WITH INSULIN PENS 360 each 2   • dexlansoprazole (DEXILANT) 60 MG capsule Take 60 mg by mouth Daily As Needed.     • docusate sodium (COLACE) 100 MG capsule Take 1 capsule by mouth 2 (Two) Times a Day As Needed for Constipation. 60 capsule 2   • gabapentin (NEURONTIN) 300 MG capsule Take 1 capsule by mouth 4 (Four) Times a Day As Needed.     • HYDROcodone-acetaminophen (NORCO)  MG per tablet Take 1 tablet by mouth Every 6 (Six) Hours As Needed.     • insulin NPH-insulin regular (humuLIN 70/30,novoLIN 70/30) (70-30) 100 UNIT/ML injection Inject 0-10 Units under the skin into the appropriate area as directed 2 (Two) Times a Day As Needed (according to blood sugar).     • Lancets (ONETOUCH DELICA PLUS [id number]) misc      • lisinopril-hydrochlorothiazide (PRINZIDE,ZESTORETIC) 20-25 MG per tablet Take 1 tablet by mouth Daily. 90 tablet 3   • ONETOUCH VERIO test strip      • rosuvastatin (CRESTOR) 20 MG tablet Take 1 tablet by mouth Every Night. 90 tablet 3   • clindamycin (CLEOCIN) 300 MG capsule Take 1 capsule by mouth 3 (Three) Times a Day. 30 capsule 0     No current facility-administered medications for this visit.         OBJECTIVE    Pulse 102   Ht 182.9 cm (72\")   Wt 109 kg (241 lb)   SpO2 99%   BMI 32.69 kg/m²       Review of Systems   Constitutional: Negative.    HENT: Negative.    Eyes: Negative.    Respiratory: Negative.    Cardiovascular: Negative.    Gastrointestinal: Negative.    Endocrine: Negative.    Genitourinary: " Negative.    Musculoskeletal: Positive for back pain.   Skin: Positive for wound.   Allergic/Immunologic: Negative.    Neurological: Negative.    Hematological: Negative.    Psychiatric/Behavioral: Negative.          Diabetic Foot Exam Performed and Monofilament Test Performed       Constitutional: he appears well-developed and well-nourished.   HEENT: Normocephalic. Atraumatic  CV: No tenderness. RRR  Resp: Non-labored respiration. No wheezes.   Psychiatric: he has a normal mood and affect. his   behavior is normal.      Lower Extremity Exam:  Vascular: DP/PT pulses palpable 2+.   Positive hair growth.   No perimalleolar edema  Neuro: Protective sensation Diminished, b/l.  Light touch sensation diminished, b/l  DTRs intact  Integument: Thickness venous leg ulcer and an L-shaped pattern measuring 3 x 2 cm to anterior lateral lower leg.  Moderate surrounding erythema.  No active drainage  Partial-thickness pressure ulceration to plantar medial left heel measuring 3.0 x 1.5 cm  No masses  Webspaces c/d/i  Musculoskeletal: LE muscle strength 4/5  Gait Normal  Mild hammertoe deformity toes 2 through 4 on right.  Ankle ROM full without pain or crepitus, b/l          ASSESSMENT AND PLAN    Diagnoses and all orders for this visit:    1. Diabetic polyneuropathy associated with type 2 diabetes mellitus (HCC) (Primary)    2. Venous stasis ulcer of other part of left lower leg with other ulcer severity with varicose veins (HCC)    3. Skin ulcer of left heel, limited to breakdown of skin (HCC)    4. Cellulitis of left lower extremity    Other orders  -     clindamycin (CLEOCIN) 300 MG capsule; Take 1 capsule by mouth 3 (Three) Times a Day.  Dispense: 30 capsule; Refill: 0      Comprehensive foot and ankle exam  -Rx clindamycin for local cellulitis  -Placed into Unna boot on the left.  Surgical shoe dispensed  -Recheck 1 week            This document has been electronically signed by Jacoby Serrano DPM on March 9, 2022 08:20  CST     EMR Dragon/Transcription disclaimer:   Much of this encounter note is an electronic transcription/translation of spoken language to printed text. The electronic translation of spoken language may permit erroneous, or at times, nonsensical words or phrases to be inadvertently transcribed; Although I have reviewed the note for such errors, some may still exist.    Jacoby Serrano DPM  3/9/2022  08:20 CST

## 2022-03-14 ENCOUNTER — OFFICE VISIT (OUTPATIENT)
Dept: PODIATRY | Facility: CLINIC | Age: 72
End: 2022-03-14

## 2022-03-14 VITALS — HEART RATE: 105 BPM | WEIGHT: 241 LBS | BODY MASS INDEX: 32.64 KG/M2 | OXYGEN SATURATION: 98 % | HEIGHT: 72 IN

## 2022-03-14 DIAGNOSIS — L97.421 SKIN ULCER OF LEFT HEEL, LIMITED TO BREAKDOWN OF SKIN: ICD-10-CM

## 2022-03-14 DIAGNOSIS — I83.028 VENOUS STASIS ULCER OF OTHER PART OF LEFT LOWER LEG WITH OTHER ULCER SEVERITY WITH VARICOSE VEINS: Primary | ICD-10-CM

## 2022-03-14 DIAGNOSIS — L03.116 CELLULITIS OF LEFT LOWER EXTREMITY: ICD-10-CM

## 2022-03-14 DIAGNOSIS — L97.828 VENOUS STASIS ULCER OF OTHER PART OF LEFT LOWER LEG WITH OTHER ULCER SEVERITY WITH VARICOSE VEINS: Primary | ICD-10-CM

## 2022-03-14 DIAGNOSIS — E11.42 DIABETIC POLYNEUROPATHY ASSOCIATED WITH TYPE 2 DIABETES MELLITUS: ICD-10-CM

## 2022-03-14 PROCEDURE — 29580 STRAPPING UNNA BOOT: CPT | Performed by: PODIATRIST

## 2022-03-14 PROCEDURE — 99213 OFFICE O/P EST LOW 20 MIN: CPT | Performed by: PODIATRIST

## 2022-03-14 NOTE — PROGRESS NOTES
Jadiel Dutton  1950  71 y.o. male   PCP- Shayy Banda , APRN  12/27/21  BS: 105 per pt    Patient presents today for a follow up on wounds on the left lower leg and left foot.    03/14/2022        Chief Complaint   Patient presents with   • Left Foot - Follow-up, Wound Check   • Left Lower Leg - Follow-up, Wound Check         History of Present Illness     Jadiel Dutton is a 71 y.o. male with history of diabetes who presents for f/u evaluation of newer onset of left lower extremity ulcerations.  Continues to take clindamycin without significant issue.  Past Medical History:   Diagnosis Date   • Abdominal pain    • Abnormal weight loss    • Acute bronchitis    • Anxiety state    • Benign essential hypertension    • Chronic sinusitis    • Constipation    • Cystitis    • Degenerative joint disease involving multiple joints    • Diabetic neuropathy (HCC)      bilateral hands      • Diastolic dysfunction    • Diverticular disease of colon    • Dyspnea    • Epigastric pain    • Essential hypertension    • Foot ulcer (HCC)    • Gastroesophageal reflux disease    • Generalized anxiety disorder    • Hyperlipidemia    • Inflammatory bowel disease     Possible Inflammatory Bowel Disease      • Lumbar pain     Pain radiating to lumbar region of back   n      • Pleuritic pain    • Radiculopathy     site unspecified      • Type 2 diabetes mellitus (HCC)    • Vesicular eczema of hands and feet          Past Surgical History:   Procedure Laterality Date   • BACK SURGERY      LOWER BACK SURGERY   • CARPAL TUNNEL RELEASE Bilateral    • COLONOSCOPY  06/11/2012    Internal & external hemorrhoids found.   • COLONOSCOPY N/A 8/22/2017    Procedure: COLONOSCOPY;  Surgeon: Cesar Hollis MD;  Location: [facility] ENDOSCOPY;  Service:    • COLONOSCOPY N/A 3/1/2022    Procedure: COLONOSCOPY 8:30;  Surgeon: Jacoby Robertson DO;  Location: [facility] ENDOSCOPY;  Service: Gastroenterology;  Laterality: N/A;   • DEQUERVAIN RELEASE  Bilateral    • ENDOSCOPY  06/11/2012    Slight stricture in the distal esophagus. Gastritis in stomach. Biopsy taken. Normal duodenum.   • ENDOSCOPY     • ENDOSCOPY N/A 8/22/2017    Procedure: ESOPHAGOGASTRODUODENOSCOPY possible dilation ;  Surgeon: Cesar Hollis MD;  Location: [facility] ENDOSCOPY;  Service:    • ENDOSCOPY N/A 3/1/2022    Procedure: ESOPHAGOGASTRODUODENOSCOPY 8:30;  Surgeon: Jacoby Robertson DO;  Location: [facility] ENDOSCOPY;  Service: Gastroenterology;  Laterality: N/A;   • EYE SURGERY Bilateral     cataracts removed with implants   • HAMMER TOE REPAIR Left 12/18/2019    Procedure: FIFTH METATARSAL EXOSTECTOMY, FOURTH HAMMER TOE CORRECTION, GASTROCNEMIUS RECESSION;  Surgeon: Jacoby Serrano DPM;  Location: [facility] OR;  Service: Podiatry   • SHOULDER ARTHROSCOPY Left 2/7/2020    Procedure: LEFT SHOULDER ARTHROSCOPY with rotator cuff repair, DEDRICK procedure, and Subacromial decompression;  Surgeon: Troy Barillas MD;  Location: [facility] OR;  Service: Orthopedics;  Laterality: Left;   • ULNAR TUNNEL RELEASE Bilateral          Family History   Problem Relation Age of Onset   • No Known Problems Other    • Hypertension Mother    • Heart attack Father    • No Known Problems Sister    • No Known Problems Brother    • No Known Problems Daughter    • No Known Problems Son    • No Known Problems Maternal Aunt    • No Known Problems Maternal Uncle    • No Known Problems Paternal Aunt    • No Known Problems Paternal Uncle    • Cancer Maternal Grandmother    • Hodgkin's lymphoma Maternal Grandmother    • No Known Problems Maternal Grandfather    • No Known Problems Paternal Grandmother    • No Known Problems Paternal Grandfather          Social History     Socioeconomic History   • Marital status: Single   Tobacco Use   • Smoking status: Never Smoker   • Smokeless tobacco: Never Used   Vaping Use   • Vaping Use: Never used   Substance and Sexual Activity   • Alcohol use: No   • Drug use: No   •  "Sexual activity: Defer         Current Outpatient Medications   Medication Sig Dispense Refill   • albuterol sulfate  (90 Base) MCG/ACT inhaler Inhale 2 puffs Every 4 (Four) Hours As Needed for Wheezing. 1 g 0   • azelastine (ASTELIN) 0.1 % nasal spray azelastine 137 mcg (0.1 %) nasal spray aerosol     • B-D UF III MINI PEN NEEDLES 31G X 5 MM misc USE FOUR TIMES A DAY WITH INSULIN PENS 360 each 2   • clindamycin (CLEOCIN) 300 MG capsule Take 1 capsule by mouth 3 (Three) Times a Day. 30 capsule 0   • dexlansoprazole (DEXILANT) 60 MG capsule Take 60 mg by mouth Daily As Needed.     • docusate sodium (COLACE) 100 MG capsule Take 1 capsule by mouth 2 (Two) Times a Day As Needed for Constipation. 60 capsule 2   • gabapentin (NEURONTIN) 300 MG capsule Take 1 capsule by mouth 4 (Four) Times a Day As Needed.     • HYDROcodone-acetaminophen (NORCO)  MG per tablet Take 1 tablet by mouth Every 6 (Six) Hours As Needed.     • insulin NPH-insulin regular (humuLIN 70/30,novoLIN 70/30) (70-30) 100 UNIT/ML injection Inject 0-10 Units under the skin into the appropriate area as directed 2 (Two) Times a Day As Needed (according to blood sugar).     • Lancets (ONETOUCH DELICA PLUS [id number]) misc      • lisinopril-hydrochlorothiazide (PRINZIDE,ZESTORETIC) 20-25 MG per tablet Take 1 tablet by mouth Daily. 90 tablet 3   • ONETOUCH VERIO test strip      • rosuvastatin (CRESTOR) 20 MG tablet Take 1 tablet by mouth Every Night. 90 tablet 3     No current facility-administered medications for this visit.         OBJECTIVE    Pulse 105   Ht 182.9 cm (72\")   Wt 109 kg (241 lb)   SpO2 98%   BMI 32.69 kg/m²       Review of Systems   Constitutional: Negative.    HENT: Negative.    Eyes: Negative.    Respiratory: Negative.    Cardiovascular: Negative.    Gastrointestinal: Negative.    Endocrine: Negative.    Genitourinary: Negative.    Musculoskeletal: Positive for back pain.   Skin: Positive for wound.   Allergic/Immunologic: " Negative.    Neurological: Negative.    Hematological: Negative.    Psychiatric/Behavioral: Negative.          Diabetic Foot Exam Performed and Monofilament Test Performed       Constitutional: he appears well-developed and well-nourished.   HEENT: Normocephalic. Atraumatic  CV: No tenderness. RRR  Resp: Non-labored respiration. No wheezes.   Psychiatric: he has a normal mood and affect. his   behavior is normal.      Lower Extremity Exam:  Vascular: DP/PT pulses palpable 2+.   Positive hair growth.   No perimalleolar edema  Neuro: Protective sensation Diminished, b/l.  Light touch sensation diminished, b/l  DTRs intact  Integument: Thickness venous leg ulcer and an L-shaped pattern measuring 2.9 x 1.8 cm to anterior lateral lower leg.  Moderate surrounding erythema.  No active drainage  Partial-thickness pressure ulceration to plantar medial left heel measuring 2.5 x 1.8 cm  No masses  Webspaces c/d/i  Musculoskeletal: LE muscle strength 4/5  Gait Normal  Mild hammertoe deformity toes 2 through 4 on right.  Ankle ROM full without pain or crepitus, b/l          ASSESSMENT AND PLAN    Diagnoses and all orders for this visit:    1. Venous stasis ulcer of other part of left lower leg with other ulcer severity with varicose veins (HCC) (Primary)    2. Diabetic polyneuropathy associated with type 2 diabetes mellitus (HCC)    3. Skin ulcer of left heel, limited to breakdown of skin (HCC)    4. Cellulitis of left lower extremity      -Comprehensive foot and ankle exam  -Complete clindamycin for local cellulitis  -Placed into Unna boot on the left.  Surgical shoe dispensed  -Recheck 1 week            This document has been electronically signed by Jacoby Serrano DPM on March 14, 2022 14:52 CDT     EMR Dragon/Transcription disclaimer:   Much of this encounter note is an electronic transcription/translation of spoken language to printed text. The electronic translation of spoken language may permit erroneous, or at times,  nonsensical words or phrases to be inadvertently transcribed; Although I have reviewed the note for such errors, some may still exist.    Jacoby Serrano DPM  3/14/2022  14:52 CDT

## 2022-03-17 DIAGNOSIS — I10 ESSENTIAL HYPERTENSION: Chronic | ICD-10-CM

## 2022-03-17 DIAGNOSIS — R60.0 FLUID RETENTION IN LEGS: ICD-10-CM

## 2022-03-17 DIAGNOSIS — E78.01 FAMILIAL HYPERCHOLESTEROLEMIA: ICD-10-CM

## 2022-03-17 RX ORDER — ROSUVASTATIN CALCIUM 20 MG/1
20 TABLET, COATED ORAL NIGHTLY
Qty: 90 TABLET | Refills: 0 | Status: SHIPPED | OUTPATIENT
Start: 2022-03-17 | End: 2022-04-18 | Stop reason: SDUPTHER

## 2022-03-17 RX ORDER — LISINOPRIL AND HYDROCHLOROTHIAZIDE 25; 20 MG/1; MG/1
1 TABLET ORAL DAILY
Qty: 90 TABLET | Refills: 0 | Status: SHIPPED | OUTPATIENT
Start: 2022-03-17 | End: 2022-04-18 | Stop reason: SDUPTHER

## 2022-03-17 RX ORDER — ROSUVASTATIN CALCIUM 20 MG/1
20 TABLET, COATED ORAL NIGHTLY
Qty: 90 TABLET | Refills: 0 | Status: SHIPPED | OUTPATIENT
Start: 2022-03-17 | End: 2022-03-17 | Stop reason: SDUPTHER

## 2022-03-17 RX ORDER — LISINOPRIL AND HYDROCHLOROTHIAZIDE 25; 20 MG/1; MG/1
1 TABLET ORAL DAILY
Qty: 90 TABLET | Refills: 0 | Status: SHIPPED | OUTPATIENT
Start: 2022-03-17 | End: 2022-03-17 | Stop reason: SDUPTHER

## 2022-03-21 ENCOUNTER — OFFICE VISIT (OUTPATIENT)
Dept: PODIATRY | Facility: CLINIC | Age: 72
End: 2022-03-21

## 2022-03-21 VITALS — HEART RATE: 99 BPM | BODY MASS INDEX: 32.64 KG/M2 | WEIGHT: 241 LBS | HEIGHT: 72 IN | OXYGEN SATURATION: 100 %

## 2022-03-21 DIAGNOSIS — I83.028 VENOUS STASIS ULCER OF OTHER PART OF LEFT LOWER LEG WITH OTHER ULCER SEVERITY WITH VARICOSE VEINS: Primary | ICD-10-CM

## 2022-03-21 DIAGNOSIS — L97.421 SKIN ULCER OF LEFT HEEL, LIMITED TO BREAKDOWN OF SKIN: ICD-10-CM

## 2022-03-21 DIAGNOSIS — E11.42 DIABETIC POLYNEUROPATHY ASSOCIATED WITH TYPE 2 DIABETES MELLITUS: ICD-10-CM

## 2022-03-21 DIAGNOSIS — L97.828 VENOUS STASIS ULCER OF OTHER PART OF LEFT LOWER LEG WITH OTHER ULCER SEVERITY WITH VARICOSE VEINS: Primary | ICD-10-CM

## 2022-03-21 PROCEDURE — 29580 STRAPPING UNNA BOOT: CPT | Performed by: PODIATRIST

## 2022-03-21 NOTE — PROGRESS NOTES
Jadiel Dutton  1950  71 y.o. male   PCP- Shayy Banda , APRN  12/27/21  BS:102 per pt    Patient returns today for a follow up on wounds on the left lower leg and left foot.    03/21/2022          Chief Complaint   Patient presents with   • Left Lower Leg - Skin Ulcer         History of Present Illness     Jadiel Dutton is a 71 y.o. male with history of diabetes who presents for f/u evaluation of newer onset of left lower extremity ulcerations.  Has completed clindamycin course.    Past Medical History:   Diagnosis Date   • Abdominal pain    • Abnormal weight loss    • Acute bronchitis    • Anxiety state    • Benign essential hypertension    • Chronic sinusitis    • Constipation    • Cystitis    • Degenerative joint disease involving multiple joints    • Diabetic neuropathy (HCC)      bilateral hands      • Diastolic dysfunction    • Diverticular disease of colon    • Dyspnea    • Epigastric pain    • Essential hypertension    • Foot ulcer (HCC)    • Gastroesophageal reflux disease    • Generalized anxiety disorder    • Hyperlipidemia    • Inflammatory bowel disease     Possible Inflammatory Bowel Disease      • Lumbar pain     Pain radiating to lumbar region of back   n      • Pleuritic pain    • Radiculopathy     site unspecified      • Type 2 diabetes mellitus (HCC)    • Vesicular eczema of hands and feet          Past Surgical History:   Procedure Laterality Date   • BACK SURGERY      LOWER BACK SURGERY   • CARPAL TUNNEL RELEASE Bilateral    • COLONOSCOPY  06/11/2012    Internal & external hemorrhoids found.   • COLONOSCOPY N/A 8/22/2017    Procedure: COLONOSCOPY;  Surgeon: Cesar Hollis MD;  Location: [facility] ENDOSCOPY;  Service:    • COLONOSCOPY N/A 3/1/2022    Procedure: COLONOSCOPY 8:30;  Surgeon: Jacoby Robertson DO;  Location: [facility] ENDOSCOPY;  Service: Gastroenterology;  Laterality: N/A;   • DEQUERVAIN RELEASE Bilateral    • ENDOSCOPY  06/11/2012    Slight stricture in the distal  esophagus. Gastritis in stomach. Biopsy taken. Normal duodenum.   • ENDOSCOPY     • ENDOSCOPY N/A 8/22/2017    Procedure: ESOPHAGOGASTRODUODENOSCOPY possible dilation ;  Surgeon: Cesar Hollis MD;  Location: [facility] ENDOSCOPY;  Service:    • ENDOSCOPY N/A 3/1/2022    Procedure: ESOPHAGOGASTRODUODENOSCOPY 8:30;  Surgeon: Jacoby Robertson DO;  Location: [facility] ENDOSCOPY;  Service: Gastroenterology;  Laterality: N/A;   • EYE SURGERY Bilateral     cataracts removed with implants   • HAMMER TOE REPAIR Left 12/18/2019    Procedure: FIFTH METATARSAL EXOSTECTOMY, FOURTH HAMMER TOE CORRECTION, GASTROCNEMIUS RECESSION;  Surgeon: Jacoby Serrano DPM;  Location: [facility] OR;  Service: Podiatry   • SHOULDER ARTHROSCOPY Left 2/7/2020    Procedure: LEFT SHOULDER ARTHROSCOPY with rotator cuff repair, DEDRICK procedure, and Subacromial decompression;  Surgeon: Troy Barillas MD;  Location: [facility] OR;  Service: Orthopedics;  Laterality: Left;   • ULNAR TUNNEL RELEASE Bilateral          Family History   Problem Relation Age of Onset   • No Known Problems Other    • Hypertension Mother    • Heart attack Father    • No Known Problems Sister    • No Known Problems Brother    • No Known Problems Daughter    • No Known Problems Son    • No Known Problems Maternal Aunt    • No Known Problems Maternal Uncle    • No Known Problems Paternal Aunt    • No Known Problems Paternal Uncle    • Cancer Maternal Grandmother    • Hodgkin's lymphoma Maternal Grandmother    • No Known Problems Maternal Grandfather    • No Known Problems Paternal Grandmother    • No Known Problems Paternal Grandfather          Social History     Socioeconomic History   • Marital status: Single   Tobacco Use   • Smoking status: Never Smoker   • Smokeless tobacco: Never Used   Vaping Use   • Vaping Use: Never used   Substance and Sexual Activity   • Alcohol use: No   • Drug use: No   • Sexual activity: Defer         Current Outpatient Medications  "  Medication Sig Dispense Refill   • albuterol sulfate  (90 Base) MCG/ACT inhaler Inhale 2 puffs Every 4 (Four) Hours As Needed for Wheezing. 1 g 0   • azelastine (ASTELIN) 0.1 % nasal spray azelastine 137 mcg (0.1 %) nasal spray aerosol     • B-D UF III MINI PEN NEEDLES 31G X 5 MM misc USE FOUR TIMES A DAY WITH INSULIN PENS 360 each 2   • dexlansoprazole (DEXILANT) 60 MG capsule Take 60 mg by mouth Daily As Needed.     • docusate sodium (COLACE) 100 MG capsule Take 1 capsule by mouth 2 (Two) Times a Day As Needed for Constipation. 60 capsule 2   • gabapentin (NEURONTIN) 300 MG capsule Take 1 capsule by mouth 4 (Four) Times a Day As Needed.     • HYDROcodone-acetaminophen (NORCO)  MG per tablet Take 1 tablet by mouth Every 6 (Six) Hours As Needed.     • insulin NPH-insulin regular (humuLIN 70/30,novoLIN 70/30) (70-30) 100 UNIT/ML injection Inject 0-10 Units under the skin into the appropriate area as directed 2 (Two) Times a Day As Needed (according to blood sugar).     • Lancets (ONETOUCH DELICA PLUS [id number]) misc      • lisinopril-hydrochlorothiazide (PRINZIDE,ZESTORETIC) 20-25 MG per tablet Take 1 tablet by mouth Daily. 90 tablet 0   • ONETOUCH VERIO test strip      • rosuvastatin (CRESTOR) 20 MG tablet Take 1 tablet by mouth Every Night. 90 tablet 0   • clindamycin (CLEOCIN) 300 MG capsule Take 1 capsule by mouth 3 (Three) Times a Day. 30 capsule 0     No current facility-administered medications for this visit.         OBJECTIVE    Pulse 99   Ht 182.9 cm (72\")   Wt 109 kg (241 lb)   SpO2 100%   BMI 32.69 kg/m²       Review of Systems   Constitutional: Negative.    HENT: Negative.    Eyes: Negative.    Respiratory: Negative.    Cardiovascular: Negative.    Gastrointestinal: Negative.    Endocrine: Negative.    Genitourinary: Negative.    Musculoskeletal: Positive for back pain.   Skin: Positive for wound.   Allergic/Immunologic: Negative.    Neurological: Negative.    Hematological: Negative.  "   Psychiatric/Behavioral: Negative.          Diabetic Foot Exam Performed and Monofilament Test Performed       Constitutional: he appears well-developed and well-nourished.   HEENT: Normocephalic. Atraumatic  CV: No tenderness. RRR  Resp: Non-labored respiration. No wheezes.   Psychiatric: he has a normal mood and affect. his   behavior is normal.      Lower Extremity Exam:  Vascular: DP/PT pulses palpable 2+.   Positive hair growth.   No perimalleolar edema  Neuro: Protective sensation Diminished, b/l.  Light touch sensation diminished, b/l  DTRs intact  Integument: Full Thickness venous leg ulcer  measuring 3.0 x 2.1 cm to anterior lateral lower leg.  Improved surrounding erythema.  No active drainage  Partial-thickness pressure ulceration to plantar medial left heel measuring 2.5 x 1.6 cm  No masses  Webspaces c/d/i  Musculoskeletal: LE muscle strength 4/5  Gait Normal  Mild hammertoe deformity toes 2 through 4 on right.  Ankle ROM full without pain or crepitus, b/l          ASSESSMENT AND PLAN    Diagnoses and all orders for this visit:    1. Venous stasis ulcer of other part of left lower leg with other ulcer severity with varicose veins (HCC) (Primary)    2. Diabetic polyneuropathy associated with type 2 diabetes mellitus (HCC)    3. Skin ulcer of left heel, limited to breakdown of skin (HCC)      -Comprehensive foot and ankle exam  -Placed into Unna boot on the left.  Continue Surgical shoe.  -Recheck 1 week            This document has been electronically signed by Jacoby Serrano DPM on March 23, 2022 16:12 CDT     EMR Dragon/Transcription disclaimer:   Much of this encounter note is an electronic transcription/translation of spoken language to printed text. The electronic translation of spoken language may permit erroneous, or at times, nonsensical words or phrases to be inadvertently transcribed; Although I have reviewed the note for such errors, some may still exist.    Jacoby Serrano  POLA  3/23/2022  16:12 CDT

## 2022-03-22 ENCOUNTER — LAB (OUTPATIENT)
Dept: LAB | Facility: OTHER | Age: 72
End: 2022-03-22

## 2022-03-22 DIAGNOSIS — D72.829 LEUKOCYTOSIS, UNSPECIFIED TYPE: ICD-10-CM

## 2022-03-22 DIAGNOSIS — D64.9 ANEMIA, UNSPECIFIED TYPE: ICD-10-CM

## 2022-03-22 LAB
BASOPHILS # BLD AUTO: 0.04 10*3/MM3 (ref 0–0.2)
BASOPHILS NFR BLD AUTO: 0.4 % (ref 0–1.5)
DEPRECATED RDW RBC AUTO: 58 FL (ref 37–54)
EOSINOPHIL # BLD AUTO: 0.37 10*3/MM3 (ref 0–0.4)
EOSINOPHIL NFR BLD AUTO: 3.7 % (ref 0.3–6.2)
ERYTHROCYTE [DISTWIDTH] IN BLOOD BY AUTOMATED COUNT: 17.8 % (ref 12.3–15.4)
FERRITIN SERPL-MCNC: 183.6 NG/ML (ref 30–400)
HCT VFR BLD AUTO: 45.5 % (ref 37.5–51)
HGB BLD-MCNC: 15.1 G/DL (ref 13–17.7)
IRON 24H UR-MRATE: 56 MCG/DL (ref 59–158)
IRON SATN MFR SERPL: 18 % (ref 20–50)
LYMPHOCYTES # BLD AUTO: 1.66 10*3/MM3 (ref 0.7–3.1)
LYMPHOCYTES NFR BLD AUTO: 16.7 % (ref 19.6–45.3)
MCH RBC QN AUTO: 30.4 PG (ref 26.6–33)
MCHC RBC AUTO-ENTMCNC: 33.2 G/DL (ref 31.5–35.7)
MCV RBC AUTO: 91.7 FL (ref 79–97)
MONOCYTES # BLD AUTO: 0.89 10*3/MM3 (ref 0.1–0.9)
MONOCYTES NFR BLD AUTO: 9 % (ref 5–12)
NEUTROPHILS NFR BLD AUTO: 6.97 10*3/MM3 (ref 1.7–7)
NEUTROPHILS NFR BLD AUTO: 70.2 % (ref 42.7–76)
PLATELET # BLD AUTO: 254 10*3/MM3 (ref 140–450)
PMV BLD AUTO: 9.7 FL (ref 6–12)
RBC # BLD AUTO: 4.96 10*6/MM3 (ref 4.14–5.8)
TIBC SERPL-MCNC: 307 MCG/DL (ref 298–536)
TRANSFERRIN SERPL-MCNC: 206 MG/DL (ref 200–360)
WBC NRBC COR # BLD: 9.93 10*3/MM3 (ref 3.4–10.8)

## 2022-03-22 PROCEDURE — 82728 ASSAY OF FERRITIN: CPT | Performed by: INTERNAL MEDICINE

## 2022-03-22 PROCEDURE — 85025 COMPLETE CBC W/AUTO DIFF WBC: CPT | Performed by: INTERNAL MEDICINE

## 2022-03-22 PROCEDURE — 84466 ASSAY OF TRANSFERRIN: CPT | Performed by: INTERNAL MEDICINE

## 2022-03-22 PROCEDURE — 82607 VITAMIN B-12: CPT | Performed by: INTERNAL MEDICINE

## 2022-03-22 PROCEDURE — 83540 ASSAY OF IRON: CPT | Performed by: INTERNAL MEDICINE

## 2022-03-22 PROCEDURE — 82746 ASSAY OF FOLIC ACID SERUM: CPT | Performed by: INTERNAL MEDICINE

## 2022-03-23 LAB
FOLATE SERPL-MCNC: 4.45 NG/ML (ref 4.78–24.2)
VIT B12 BLD-MCNC: 709 PG/ML (ref 211–946)

## 2022-03-23 NOTE — PROGRESS NOTES
"DATE OF VISIT: 3/24/2022      REASON FOR VISIT: Leukocytosis, iron deficiency anemia      HISTORY OF PRESENT ILLNESS:   71-year-old male with medical problem consisting of hypertension, dyslipidemia, diabetes mellitus with neuropathy affecting bilateral hand, arthritis affecting multiple joints, history of back surgery was initially seen in consultation on January 13, 2022 for leukocytosis and anemia with iron deficiency.  Patient was not able to tolerate iron by mouth secondary received 5 dose of Venofer in February 2022.  Patient is here for follow-up appointment today to discuss recently done blood work.  Denies any bleeding.  Complains of chronic back pain.  Denies any new lymph node enlargement.          Past Medical History, Past Surgical History, Social History, Family History have been reviewed and are without significant changes except as mentioned.    Review of Systems   A comprehensive 14 point review of systems was performed and was negative except as mentioned in HPI.    Medications:  The current medication list was reviewed in the EMR    ALLERGIES:    Allergies   Allergen Reactions   • Penicillins Shortness Of Breath   • Bactrim [Sulfamethoxazole-Trimethoprim] GI Intolerance   • Ceftin [Cefuroxime Axetil] GI Intolerance   • Sulfa Antibiotics GI Intolerance   • Ciprofloxacin Itching and Rash       Objective      Vitals:    03/24/22 1402   BP: 163/75   Pulse: 104   Resp: 18   Temp: 97.6 °F (36.4 °C)   Weight: 109 kg (241 lb)   Height: 182.9 cm (72\")   PainSc:   2   PainLoc: Foot     Current Status 2/16/2022   ECOG score 1       Physical Exam  Pulmonary:      Breath sounds: Normal breath sounds.   Neurological:      Mental Status: He is alert and oriented to person, place, and time.           RECENT LABS:  Glucose   Date Value Ref Range Status   12/21/2021 242 (H) 70 - 99 mg/dL Final     Sodium   Date Value Ref Range Status   12/21/2021 134 (L) 137 - 145 mmol/L Final     Potassium   Date Value Ref Range " Status   12/21/2021 4.4 3.4 - 5.0 mmol/L Final     CO2   Date Value Ref Range Status   12/21/2021 37.0 (H) 22.0 - 30.0 mmol/L Final     Chloride   Date Value Ref Range Status   12/21/2021 94 (L) 101 - 112 mmol/L Final     Anion Gap   Date Value Ref Range Status   12/21/2021 3.0 (L) 5.0 - 15.0 mmol/L Final     Creatinine   Date Value Ref Range Status   12/21/2021 1.36 (H) 0.70 - 1.30 mg/dL Final     BUN   Date Value Ref Range Status   12/21/2021 12 7 - 23 mg/dL Final     BUN/Creatinine Ratio   Date Value Ref Range Status   12/21/2021 8.8 7.0 - 25.0 Final     Calcium   Date Value Ref Range Status   12/21/2021 9.1 8.4 - 10.2 mg/dL Final     eGFR Non  Amer   Date Value Ref Range Status   12/21/2021 52 49 - 113 mL/min/1.73 Final     Alkaline Phosphatase   Date Value Ref Range Status   12/07/2021 179 (H) 38 - 126 U/L Final     Total Protein   Date Value Ref Range Status   12/07/2021 6.7 6.3 - 8.6 g/dL Final     ALT (SGPT)   Date Value Ref Range Status   12/07/2021 17 <=50 U/L Final     AST (SGOT)   Date Value Ref Range Status   12/07/2021 28 17 - 59 U/L Final     Total Bilirubin   Date Value Ref Range Status   12/07/2021 0.3 0.2 - 1.3 mg/dL Final     Albumin   Date Value Ref Range Status   12/07/2021 3.80 3.50 - 5.00 g/dL Final     Globulin   Date Value Ref Range Status   12/07/2021 2.9 2.3 - 3.5 gm/dL Final     Lab Results   Component Value Date    WBC 9.93 03/22/2022    HGB 15.1 03/22/2022    HCT 45.5 03/22/2022    MCV 91.7 03/22/2022     03/22/2022     Lab Results   Component Value Date    NEUTROABS 6.97 03/22/2022    IRON 56 (L) 03/22/2022    IRON 39 (L) 01/13/2022    TIBC 307 03/22/2022    TIBC 398 01/13/2022    LABIRON 18 (L) 03/22/2022    LABIRON 10 (L) 01/13/2022    FERRITIN 183.60 03/22/2022    FERRITIN 27.88 (L) 01/13/2022    SCBBPTCL46 709 03/22/2022    KRBLKJWA96 479 01/13/2022    GMFVCOQS50 >1000 (H) 03/28/2016    FOLATE 4.45 (L) 03/22/2022    FOLATE 5.51 01/13/2022     No results found for:  , LABCA2, AFPTM, HCGQUANT, , CHROMGRNA, [id number], CEA, REFLABREPO      PATHOLOGY:  * Cannot find OR log *         RADIOLOGY DATA :  No radiology results for the last 7 days        Assessment/Plan     1.  Iron deficiency anemia:  -Due to inability to tolerate iron by mouth patient received intravenous Venofer in February 2022  -Had EGD and colonoscopy done on March 1, 2022 by Dr. Robertson which did not show any active bleeding.  -Anemia work-up done on March 22, 2022 shows hemoglobin is 15.1.  Iron studies are showing improvement but there is still iron deficiency with iron saturation of only 18%.  Due to inability to tolerate iron by mouth recommend restarting intravenous Venofer starting next week.  Side effect of Venofer including allergic reaction were discussed with patient today  -Recommend continue with B12 p.o. daily  -We will have patient return to clinic in 2 months with repeat CBC, iron studies, ferritin, B12 and folate to be done prior to that  -If patient develops recurrent iron deficiency in future, next step in evaluation with a capsule endoscopy which was discussed with patient today.    2.  Folate deficiency:  -Folate level is decreased to 4.45.  -Patient states he has not been taking folic acid prescription regularly.  Recommend starting folic acid p.o. daily.    3.  Leukocytosis:  -Reactive in nature.  -White blood cell count is 9.93.  Will monitor with CBC    4.  Health maintenance: Patient does not smoke.  Had a colonoscopy in March 2022 by Dr. Robertson    5. Advance Care Planning: For now patient remains full code and is able to make decisions.  Patient has health care surrogate mentioned on chart.             PHQ-9 Total Score:       Jadiel Dutton reports a pain score of 2.  Given his pain assessment as noted, treatment options were discussed and the following options were decided upon as a follow-up plan to address the patient's pain: continuation of current treatment plan for  pain.         Norman Carbajal MD  3/24/2022  14:23 CDT        Part of this note may be an electronic transcription/translation of spoken language to printed text using the Dragon Dictation System.          CC:

## 2022-03-24 ENCOUNTER — OFFICE VISIT (OUTPATIENT)
Dept: HEMATOLOGY/ONCOLOGY | Facility: CLINIC | Age: 72
End: 2022-03-24

## 2022-03-24 VITALS
HEIGHT: 72 IN | DIASTOLIC BLOOD PRESSURE: 75 MMHG | WEIGHT: 241 LBS | TEMPERATURE: 97.6 F | BODY MASS INDEX: 32.64 KG/M2 | RESPIRATION RATE: 18 BRPM | HEART RATE: 104 BPM | SYSTOLIC BLOOD PRESSURE: 163 MMHG

## 2022-03-24 DIAGNOSIS — T45.4X5A ADVERSE EFFECT OF IRON, INITIAL ENCOUNTER: Chronic | ICD-10-CM

## 2022-03-24 DIAGNOSIS — E53.8 FOLATE DEFICIENCY: Chronic | ICD-10-CM

## 2022-03-24 DIAGNOSIS — D50.0 IRON DEFICIENCY ANEMIA DUE TO CHRONIC BLOOD LOSS: Primary | Chronic | ICD-10-CM

## 2022-03-24 PROCEDURE — 1123F ACP DISCUSS/DSCN MKR DOCD: CPT | Performed by: INTERNAL MEDICINE

## 2022-03-24 PROCEDURE — 99214 OFFICE O/P EST MOD 30 MIN: CPT | Performed by: INTERNAL MEDICINE

## 2022-03-24 PROCEDURE — 1125F AMNT PAIN NOTED PAIN PRSNT: CPT | Performed by: INTERNAL MEDICINE

## 2022-03-24 RX ORDER — SODIUM CHLORIDE 9 MG/ML
250 INJECTION, SOLUTION INTRAVENOUS ONCE
Status: CANCELLED | OUTPATIENT
Start: 2022-04-01

## 2022-03-24 RX ORDER — DIPHENHYDRAMINE HCL 25 MG
25 CAPSULE ORAL ONCE
Status: CANCELLED | OUTPATIENT
Start: 2022-04-01

## 2022-03-24 RX ORDER — ACETAMINOPHEN 325 MG/1
650 TABLET ORAL ONCE
Status: CANCELLED | OUTPATIENT
Start: 2022-04-01

## 2022-03-24 RX ORDER — FAMOTIDINE 20 MG/1
20 TABLET, FILM COATED ORAL ONCE
Status: CANCELLED | OUTPATIENT
Start: 2022-04-01

## 2022-03-29 ENCOUNTER — OFFICE VISIT (OUTPATIENT)
Dept: PODIATRY | Facility: CLINIC | Age: 72
End: 2022-03-29

## 2022-03-29 VITALS — HEART RATE: 119 BPM | BODY MASS INDEX: 32.64 KG/M2 | OXYGEN SATURATION: 99 % | WEIGHT: 241 LBS | HEIGHT: 72 IN

## 2022-03-29 DIAGNOSIS — L97.828 VENOUS STASIS ULCER OF OTHER PART OF LEFT LOWER LEG WITH OTHER ULCER SEVERITY WITH VARICOSE VEINS: Primary | ICD-10-CM

## 2022-03-29 DIAGNOSIS — L97.421 SKIN ULCER OF LEFT HEEL, LIMITED TO BREAKDOWN OF SKIN: ICD-10-CM

## 2022-03-29 DIAGNOSIS — E11.42 DIABETIC POLYNEUROPATHY ASSOCIATED WITH TYPE 2 DIABETES MELLITUS: ICD-10-CM

## 2022-03-29 DIAGNOSIS — I83.028 VENOUS STASIS ULCER OF OTHER PART OF LEFT LOWER LEG WITH OTHER ULCER SEVERITY WITH VARICOSE VEINS: Primary | ICD-10-CM

## 2022-03-29 PROCEDURE — 29580 STRAPPING UNNA BOOT: CPT | Performed by: PODIATRIST

## 2022-03-29 RX ORDER — CETIRIZINE HYDROCHLORIDE 10 MG/1
10 TABLET ORAL DAILY
COMMUNITY
End: 2023-03-29

## 2022-03-31 ENCOUNTER — TELEPHONE (OUTPATIENT)
Dept: ONCOLOGY | Facility: CLINIC | Age: 72
End: 2022-03-31

## 2022-03-31 ENCOUNTER — TELEPHONE (OUTPATIENT)
Dept: ONCOLOGY | Facility: HOSPITAL | Age: 72
End: 2022-03-31

## 2022-03-31 ENCOUNTER — APPOINTMENT (OUTPATIENT)
Dept: ONCOLOGY | Facility: HOSPITAL | Age: 72
End: 2022-03-31

## 2022-03-31 NOTE — TELEPHONE ENCOUNTER
Called and spoke with pt regarding iron being hard on his kidneys. I explained to him that it did not, not that I am aware of. He also wanted to know the type of iron he had ordered to get and this information was provided to him. V/u obtained.

## 2022-03-31 NOTE — TELEPHONE ENCOUNTER
----- Message from Bouchra Pelayo RN sent at 3/31/2022 12:27 PM CDT -----  [facility] pt called. Has 5 iron infusions scheduled. Wanting to make sure this could not cause kidney damage. Has first infusion today.

## 2022-03-31 NOTE — TELEPHONE ENCOUNTER
Spoke with pt and advised per Jennifer. PT verbalizes understanding and denies any further questions.

## 2022-03-31 NOTE — TELEPHONE ENCOUNTER
----- Message from MARY Rosales sent at 3/31/2022  1:48 PM CDT -----  No he will be fine    ----- Message -----  From: Bouchra Pelayo RN  Sent: 3/31/2022  12:28 PM CDT  To: MARY Rosales, Gina Jiménez RN    [facility] pt called. Has 5 iron infusions scheduled. Wanting to make sure this could not cause kidney damage. Has first infusion today.

## 2022-04-04 ENCOUNTER — OFFICE VISIT (OUTPATIENT)
Dept: PODIATRY | Facility: CLINIC | Age: 72
End: 2022-04-04

## 2022-04-04 ENCOUNTER — APPOINTMENT (OUTPATIENT)
Dept: ONCOLOGY | Facility: HOSPITAL | Age: 72
End: 2022-04-04

## 2022-04-04 VITALS — HEIGHT: 72 IN | BODY MASS INDEX: 32.64 KG/M2 | WEIGHT: 241 LBS | HEART RATE: 103 BPM | OXYGEN SATURATION: 99 %

## 2022-04-04 DIAGNOSIS — E11.42 DIABETIC POLYNEUROPATHY ASSOCIATED WITH TYPE 2 DIABETES MELLITUS: ICD-10-CM

## 2022-04-04 DIAGNOSIS — L97.421 SKIN ULCER OF LEFT HEEL, LIMITED TO BREAKDOWN OF SKIN: ICD-10-CM

## 2022-04-04 DIAGNOSIS — L97.828 VENOUS STASIS ULCER OF OTHER PART OF LEFT LOWER LEG WITH OTHER ULCER SEVERITY WITH VARICOSE VEINS: Primary | ICD-10-CM

## 2022-04-04 DIAGNOSIS — I83.028 VENOUS STASIS ULCER OF OTHER PART OF LEFT LOWER LEG WITH OTHER ULCER SEVERITY WITH VARICOSE VEINS: Primary | ICD-10-CM

## 2022-04-04 PROCEDURE — 29580 STRAPPING UNNA BOOT: CPT | Performed by: PODIATRIST

## 2022-04-04 NOTE — PROGRESS NOTES
Jadiel Dutton  1950  71 y.o. male   PCP- Shayy Banda , MARY  12/27/21  BS:48 per pt    Patient returns today for a follow up on wounds on the left lower leg and left foot.    04/04/2022          Chief Complaint   Patient presents with   • Left Lower Leg - Follow-up, Wound Check   • Left Foot - Follow-up, Wound Check         History of Present Illness     Jadiel Dutton is a 71 y.o. male with history of diabetes who presents for f/u evaluation of newer onset of left lower extremity ulcerations.      Past Medical History:   Diagnosis Date   • Abdominal pain    • Abnormal weight loss    • Acute bronchitis    • Anxiety state    • Benign essential hypertension    • Chronic sinusitis    • Constipation    • Cystitis    • Degenerative joint disease involving multiple joints    • Diabetic neuropathy (HCC)      bilateral hands      • Diastolic dysfunction    • Diverticular disease of colon    • Dyspnea    • Epigastric pain    • Essential hypertension    • Foot ulcer (HCC)    • Gastroesophageal reflux disease    • Generalized anxiety disorder    • Hyperlipidemia    • Inflammatory bowel disease     Possible Inflammatory Bowel Disease      • Lumbar pain     Pain radiating to lumbar region of back   n      • Pleuritic pain    • Radiculopathy     site unspecified      • Type 2 diabetes mellitus (HCC)    • Vesicular eczema of hands and feet          Past Surgical History:   Procedure Laterality Date   • BACK SURGERY      LOWER BACK SURGERY   • CARPAL TUNNEL RELEASE Bilateral    • COLONOSCOPY  06/11/2012    Internal & external hemorrhoids found.   • COLONOSCOPY N/A 8/22/2017    Procedure: COLONOSCOPY;  Surgeon: Cesar Hollis MD;  Location: [facility] ENDOSCOPY;  Service:    • COLONOSCOPY N/A 3/1/2022    Procedure: COLONOSCOPY 8:30;  Surgeon: Jacoby Robertson DO;  Location: [facility] ENDOSCOPY;  Service: Gastroenterology;  Laterality: N/A;   • DEQUERVAIN RELEASE Bilateral    • ENDOSCOPY  06/11/2012    Slight stricture  in the distal esophagus. Gastritis in stomach. Biopsy taken. Normal duodenum.   • ENDOSCOPY     • ENDOSCOPY N/A 8/22/2017    Procedure: ESOPHAGOGASTRODUODENOSCOPY possible dilation ;  Surgeon: Cesar Hollis MD;  Location: [facility] ENDOSCOPY;  Service:    • ENDOSCOPY N/A 3/1/2022    Procedure: ESOPHAGOGASTRODUODENOSCOPY 8:30;  Surgeon: Jacoby Robertson DO;  Location: [facility] ENDOSCOPY;  Service: Gastroenterology;  Laterality: N/A;   • EYE SURGERY Bilateral     cataracts removed with implants   • HAMMER TOE REPAIR Left 12/18/2019    Procedure: FIFTH METATARSAL EXOSTECTOMY, FOURTH HAMMER TOE CORRECTION, GASTROCNEMIUS RECESSION;  Surgeon: Jacoby Serrano DPM;  Location: [facility] OR;  Service: Podiatry   • SHOULDER ARTHROSCOPY Left 2/7/2020    Procedure: LEFT SHOULDER ARTHROSCOPY with rotator cuff repair, DEDRICK procedure, and Subacromial decompression;  Surgeon: Troy Barillas MD;  Location: [facility] OR;  Service: Orthopedics;  Laterality: Left;   • ULNAR TUNNEL RELEASE Bilateral          Family History   Problem Relation Age of Onset   • No Known Problems Other    • Hypertension Mother    • Heart attack Father    • No Known Problems Sister    • No Known Problems Brother    • No Known Problems Daughter    • No Known Problems Son    • No Known Problems Maternal Aunt    • No Known Problems Maternal Uncle    • No Known Problems Paternal Aunt    • No Known Problems Paternal Uncle    • Cancer Maternal Grandmother    • Hodgkin's lymphoma Maternal Grandmother    • No Known Problems Maternal Grandfather    • No Known Problems Paternal Grandmother    • No Known Problems Paternal Grandfather          Social History     Socioeconomic History   • Marital status: Single   Tobacco Use   • Smoking status: Never Smoker   • Smokeless tobacco: Never Used   Vaping Use   • Vaping Use: Never used   Substance and Sexual Activity   • Alcohol use: No   • Drug use: No   • Sexual activity: Defer         Current Outpatient  "Medications   Medication Sig Dispense Refill   • albuterol sulfate  (90 Base) MCG/ACT inhaler Inhale 2 puffs Every 4 (Four) Hours As Needed for Wheezing. 1 g 0   • azelastine (ASTELIN) 0.1 % nasal spray azelastine 137 mcg (0.1 %) nasal spray aerosol     • B-D UF III MINI PEN NEEDLES 31G X 5 MM misc USE FOUR TIMES A DAY WITH INSULIN PENS 360 each 2   • cetirizine (zyrTEC) 10 MG tablet Take 10 mg by mouth Daily.     • dexlansoprazole (DEXILANT) 60 MG capsule Take 60 mg by mouth Daily As Needed.     • docusate sodium (COLACE) 100 MG capsule Take 1 capsule by mouth 2 (Two) Times a Day As Needed for Constipation. 60 capsule 2   • gabapentin (NEURONTIN) 300 MG capsule Take 1 capsule by mouth 4 (Four) Times a Day As Needed.     • HYDROcodone-acetaminophen (NORCO)  MG per tablet Take 1 tablet by mouth Every 6 (Six) Hours As Needed.     • insulin NPH-insulin regular (humuLIN 70/30,novoLIN 70/30) (70-30) 100 UNIT/ML injection Inject 0-10 Units under the skin into the appropriate area as directed 2 (Two) Times a Day As Needed (according to blood sugar).     • Lancets (ONETOUCH DELICA PLUS [id number]) misc      • lisinopril-hydrochlorothiazide (PRINZIDE,ZESTORETIC) 20-25 MG per tablet Take 1 tablet by mouth Daily. 90 tablet 0   • ONETOUCH VERIO test strip      • rosuvastatin (CRESTOR) 20 MG tablet Take 1 tablet by mouth Every Night. 90 tablet 0     No current facility-administered medications for this visit.         OBJECTIVE    Pulse 103   Ht 182.9 cm (72\")   Wt 109 kg (241 lb)   SpO2 99%   BMI 32.69 kg/m²       Review of Systems   Constitutional: Negative.    HENT: Negative.    Eyes: Negative.    Respiratory: Negative.    Cardiovascular: Negative.    Gastrointestinal: Negative.    Endocrine: Negative.    Genitourinary: Negative.    Musculoskeletal: Positive for back pain.   Skin: Positive for wound.   Allergic/Immunologic: Negative.    Neurological: Negative.    Hematological: Negative.  "   Psychiatric/Behavioral: Negative.          Diabetic Foot Exam Performed and Monofilament Test Performed       Constitutional: he appears well-developed and well-nourished.   HEENT: Normocephalic. Atraumatic  CV: No tenderness. RRR  Resp: Non-labored respiration. No wheezes.   Psychiatric: he has a normal mood and affect. his   behavior is normal.      Lower Extremity Exam:  Vascular: DP/PT pulses palpable 2+.   Positive hair growth.   No perimalleolar edema  Neuro: Protective sensation Diminished, b/l.  Light touch sensation diminished, b/l  DTRs intact  Integument: Full Thickness venous leg ulcer  measuring 3.0 x 2.2 cm to anterior lateral lower leg.  Improved surrounding erythema.  No active drainage  Partial-thickness pressure ulceration to plantar medial left heel measuring 1.2 x 3.0 cm  No masses  Webspaces c/d/i  Musculoskeletal: LE muscle strength 4/5  Gait Normal  Mild hammertoe deformity toes 2 through 4 on right.  Ankle ROM full without pain or crepitus, b/l          ASSESSMENT AND PLAN    Diagnoses and all orders for this visit:    1. Venous stasis ulcer of other part of left lower leg with other ulcer severity with varicose veins (HCC) (Primary)    2. Diabetic polyneuropathy associated with type 2 diabetes mellitus (HCC)    3. Skin ulcer of left heel, limited to breakdown of skin (HCC)      -Comprehensive foot and ankle exam  -Placed into Unna boot on the left.  Continue Surgical shoe.  -Recheck 1 week            This document has been electronically signed by Jacoby Serrano DPM on April 4, 2022 15:24 CDT     EMR Dragon/Transcription disclaimer:   Much of this encounter note is an electronic transcription/translation of spoken language to printed text. The electronic translation of spoken language may permit erroneous, or at times, nonsensical words or phrases to be inadvertently transcribed; Although I have reviewed the note for such errors, some may still exist.    Jacoby Serrano  POLA  4/4/2022  15:24 CDT

## 2022-04-06 ENCOUNTER — APPOINTMENT (OUTPATIENT)
Dept: ONCOLOGY | Facility: HOSPITAL | Age: 72
End: 2022-04-06

## 2022-04-08 ENCOUNTER — APPOINTMENT (OUTPATIENT)
Dept: ONCOLOGY | Facility: HOSPITAL | Age: 72
End: 2022-04-08

## 2022-04-11 ENCOUNTER — INFUSION (OUTPATIENT)
Dept: ONCOLOGY | Facility: HOSPITAL | Age: 72
End: 2022-04-11

## 2022-04-11 VITALS
DIASTOLIC BLOOD PRESSURE: 70 MMHG | HEART RATE: 109 BPM | TEMPERATURE: 97.2 F | RESPIRATION RATE: 18 BRPM | SYSTOLIC BLOOD PRESSURE: 142 MMHG

## 2022-04-11 DIAGNOSIS — D50.0 IRON DEFICIENCY ANEMIA DUE TO CHRONIC BLOOD LOSS: Primary | ICD-10-CM

## 2022-04-11 DIAGNOSIS — T45.4X5A ADVERSE EFFECT OF IRON, INITIAL ENCOUNTER: ICD-10-CM

## 2022-04-11 PROCEDURE — 96365 THER/PROPH/DIAG IV INF INIT: CPT | Performed by: INTERNAL MEDICINE

## 2022-04-11 PROCEDURE — 25010000002 IRON SUCROSE PER 1 MG: Performed by: INTERNAL MEDICINE

## 2022-04-11 PROCEDURE — 63710000001 DIPHENHYDRAMINE PER 50 MG: Performed by: INTERNAL MEDICINE

## 2022-04-11 RX ORDER — DIPHENHYDRAMINE HCL 25 MG
25 CAPSULE ORAL ONCE
Status: CANCELLED | OUTPATIENT
Start: 2022-04-13

## 2022-04-11 RX ORDER — DIPHENHYDRAMINE HCL 25 MG
25 CAPSULE ORAL ONCE
Status: COMPLETED | OUTPATIENT
Start: 2022-04-11 | End: 2022-04-11

## 2022-04-11 RX ORDER — PHENTERMINE HYDROCHLORIDE 30 MG/1
30 CAPSULE ORAL EVERY MORNING
COMMUNITY
End: 2022-07-06

## 2022-04-11 RX ORDER — FAMOTIDINE 20 MG/1
20 TABLET, FILM COATED ORAL ONCE
Status: CANCELLED | OUTPATIENT
Start: 2022-04-13

## 2022-04-11 RX ORDER — ACETAMINOPHEN 325 MG/1
650 TABLET ORAL ONCE
Status: CANCELLED | OUTPATIENT
Start: 2022-04-13

## 2022-04-11 RX ORDER — SODIUM CHLORIDE 9 MG/ML
250 INJECTION, SOLUTION INTRAVENOUS ONCE
Status: CANCELLED | OUTPATIENT
Start: 2022-04-13

## 2022-04-11 RX ORDER — SODIUM CHLORIDE 9 MG/ML
250 INJECTION, SOLUTION INTRAVENOUS ONCE
Status: COMPLETED | OUTPATIENT
Start: 2022-04-11 | End: 2022-04-11

## 2022-04-11 RX ORDER — FAMOTIDINE 20 MG/1
20 TABLET, FILM COATED ORAL ONCE
Status: COMPLETED | OUTPATIENT
Start: 2022-04-11 | End: 2022-04-11

## 2022-04-11 RX ORDER — ACETAMINOPHEN 325 MG/1
650 TABLET ORAL ONCE
Status: COMPLETED | OUTPATIENT
Start: 2022-04-11 | End: 2022-04-11

## 2022-04-11 RX ADMIN — FAMOTIDINE 20 MG: 20 TABLET ORAL at 13:23

## 2022-04-11 RX ADMIN — SODIUM CHLORIDE 250 ML: 9 INJECTION, SOLUTION INTRAVENOUS at 13:35

## 2022-04-11 RX ADMIN — DIPHENHYDRAMINE HYDROCHLORIDE 25 MG: 25 CAPSULE ORAL at 13:23

## 2022-04-11 RX ADMIN — IRON SUCROSE 200 MG: 20 INJECTION, SOLUTION INTRAVENOUS at 13:59

## 2022-04-11 RX ADMIN — ACETAMINOPHEN 650 MG: 325 TABLET ORAL at 13:23

## 2022-04-12 ENCOUNTER — OFFICE VISIT (OUTPATIENT)
Dept: PODIATRY | Facility: CLINIC | Age: 72
End: 2022-04-12

## 2022-04-12 VITALS — BODY MASS INDEX: 32.64 KG/M2 | WEIGHT: 241 LBS | HEART RATE: 74 BPM | HEIGHT: 72 IN | OXYGEN SATURATION: 99 %

## 2022-04-12 DIAGNOSIS — L97.421 SKIN ULCER OF LEFT HEEL, LIMITED TO BREAKDOWN OF SKIN: ICD-10-CM

## 2022-04-12 DIAGNOSIS — E11.42 DIABETIC POLYNEUROPATHY ASSOCIATED WITH TYPE 2 DIABETES MELLITUS: ICD-10-CM

## 2022-04-12 DIAGNOSIS — I83.028 VENOUS STASIS ULCER OF OTHER PART OF LEFT LOWER LEG WITH OTHER ULCER SEVERITY WITH VARICOSE VEINS: Primary | ICD-10-CM

## 2022-04-12 DIAGNOSIS — L97.828 VENOUS STASIS ULCER OF OTHER PART OF LEFT LOWER LEG WITH OTHER ULCER SEVERITY WITH VARICOSE VEINS: Primary | ICD-10-CM

## 2022-04-12 PROCEDURE — 29580 STRAPPING UNNA BOOT: CPT | Performed by: PODIATRIST

## 2022-04-12 NOTE — PROGRESS NOTES
Jadiel Dutton  1950  71 y.o. male   PCP- Shayy Banda , MARY  12/27/21  BS: 74 per pt    Patient returns today for a follow up on wounds on the left lower leg and left foot.    4/12/2022    Chief Complaint   Patient presents with   • Left Foot - Skin Ulcer         History of Present Illness     Jadiel Dutton is a 71 y.o. male with history of diabetes who presents for f/u evaluation of newer onset of left lower extremity ulcerations.      Past Medical History:   Diagnosis Date   • Abdominal pain    • Abnormal weight loss    • Acute bronchitis    • Anxiety state    • Benign essential hypertension    • Chronic sinusitis    • Constipation    • Cystitis    • Degenerative joint disease involving multiple joints    • Diabetic neuropathy (HCC)      bilateral hands      • Diastolic dysfunction    • Diverticular disease of colon    • Dyspnea    • Epigastric pain    • Essential hypertension    • Foot ulcer (HCC)    • Gastroesophageal reflux disease    • Generalized anxiety disorder    • Hyperlipidemia    • Inflammatory bowel disease     Possible Inflammatory Bowel Disease      • Lumbar pain     Pain radiating to lumbar region of back   n      • Pleuritic pain    • Radiculopathy     site unspecified      • Type 2 diabetes mellitus (HCC)    • Vesicular eczema of hands and feet          Past Surgical History:   Procedure Laterality Date   • BACK SURGERY      LOWER BACK SURGERY   • CARPAL TUNNEL RELEASE Bilateral    • COLONOSCOPY  06/11/2012    Internal & external hemorrhoids found.   • COLONOSCOPY N/A 8/22/2017    Procedure: COLONOSCOPY;  Surgeon: Cesar Hollis MD;  Location: [facility] ENDOSCOPY;  Service:    • COLONOSCOPY N/A 3/1/2022    Procedure: COLONOSCOPY 8:30;  Surgeon: Jacoby Robertson DO;  Location: [facility] ENDOSCOPY;  Service: Gastroenterology;  Laterality: N/A;   • DEQUERVAIN RELEASE Bilateral    • ENDOSCOPY  06/11/2012    Slight stricture in the distal esophagus. Gastritis in stomach. Biopsy taken.  Normal duodenum.   • ENDOSCOPY     • ENDOSCOPY N/A 8/22/2017    Procedure: ESOPHAGOGASTRODUODENOSCOPY possible dilation ;  Surgeon: Cesar Hollis MD;  Location: [facility] ENDOSCOPY;  Service:    • ENDOSCOPY N/A 3/1/2022    Procedure: ESOPHAGOGASTRODUODENOSCOPY 8:30;  Surgeon: Jacoby Robertson DO;  Location: [facility] ENDOSCOPY;  Service: Gastroenterology;  Laterality: N/A;   • EYE SURGERY Bilateral     cataracts removed with implants   • HAMMER TOE REPAIR Left 12/18/2019    Procedure: FIFTH METATARSAL EXOSTECTOMY, FOURTH HAMMER TOE CORRECTION, GASTROCNEMIUS RECESSION;  Surgeon: Jacoby Serrano DPM;  Location: [facility] OR;  Service: Podiatry   • SHOULDER ARTHROSCOPY Left 2/7/2020    Procedure: LEFT SHOULDER ARTHROSCOPY with rotator cuff repair, DEDRICK procedure, and Subacromial decompression;  Surgeon: Troy Barillas MD;  Location: [facility] OR;  Service: Orthopedics;  Laterality: Left;   • ULNAR TUNNEL RELEASE Bilateral          Family History   Problem Relation Age of Onset   • No Known Problems Other    • Hypertension Mother    • Heart attack Father    • No Known Problems Sister    • No Known Problems Brother    • No Known Problems Daughter    • No Known Problems Son    • No Known Problems Maternal Aunt    • No Known Problems Maternal Uncle    • No Known Problems Paternal Aunt    • No Known Problems Paternal Uncle    • Cancer Maternal Grandmother    • Hodgkin's lymphoma Maternal Grandmother    • No Known Problems Maternal Grandfather    • No Known Problems Paternal Grandmother    • No Known Problems Paternal Grandfather          Social History     Socioeconomic History   • Marital status: Single   Tobacco Use   • Smoking status: Never Smoker   • Smokeless tobacco: Never Used   Vaping Use   • Vaping Use: Never used   Substance and Sexual Activity   • Alcohol use: No   • Drug use: No   • Sexual activity: Defer         Current Outpatient Medications   Medication Sig Dispense Refill   • albuterol sulfate  " (90 Base) MCG/ACT inhaler Inhale 2 puffs Every 4 (Four) Hours As Needed for Wheezing. 1 g 0   • azelastine (ASTELIN) 0.1 % nasal spray azelastine 137 mcg (0.1 %) nasal spray aerosol     • B-D UF III MINI PEN NEEDLES 31G X 5 MM misc USE FOUR TIMES A DAY WITH INSULIN PENS 360 each 2   • cetirizine (zyrTEC) 10 MG tablet Take 10 mg by mouth Daily.     • dexlansoprazole (DEXILANT) 60 MG capsule Take 60 mg by mouth Daily As Needed.     • docusate sodium (COLACE) 100 MG capsule Take 1 capsule by mouth 2 (Two) Times a Day As Needed for Constipation. 60 capsule 2   • gabapentin (NEURONTIN) 300 MG capsule Take 1 capsule by mouth 4 (Four) Times a Day As Needed.     • HYDROcodone-acetaminophen (NORCO)  MG per tablet Take 1 tablet by mouth Every 6 (Six) Hours As Needed.     • lisinopril-hydrochlorothiazide (PRINZIDE,ZESTORETIC) 20-25 MG per tablet Take 1 tablet by mouth Daily. 90 tablet 0   • ONETOUCH VERIO test strip      • phentermine 30 MG capsule Take 30 mg by mouth Every Morning. Take 1/2 tablet as needed every morning.     • rosuvastatin (CRESTOR) 20 MG tablet Take 1 tablet by mouth Every Night. 90 tablet 0   • azithromycin (Zithromax Z-Yonas) 250 MG tablet Take 2 po now and 1 po d2-5 6 tablet 0   • brompheniramine-pseudoephedrine-DM 30-2-10 MG/5ML syrup Take 5 mL by mouth 4 (Four) Times a Day As Needed for Congestion, Cough or Allergies. 118 mL 0   • furosemide (LASIX) 40 MG tablet TAKE 1 TABLET EVERY DAY 60 tablet 0     No current facility-administered medications for this visit.         OBJECTIVE    Pulse 74   Ht 182.9 cm (72\")   Wt 109 kg (241 lb)   SpO2 99%   BMI 32.69 kg/m²       Review of Systems   Constitutional: Negative.    HENT: Negative.    Eyes: Negative.    Respiratory: Negative.    Cardiovascular: Negative.    Gastrointestinal: Negative.    Endocrine: Negative.    Genitourinary: Negative.    Musculoskeletal: Positive for back pain.   Skin: Positive for wound.   Allergic/Immunologic: Negative. "    Neurological: Negative.    Hematological: Negative.    Psychiatric/Behavioral: Negative.          Diabetic Foot Exam Performed and Monofilament Test Performed       Constitutional: he appears well-developed and well-nourished.   HEENT: Normocephalic. Atraumatic  CV: No tenderness. RRR  Resp: Non-labored respiration. No wheezes.   Psychiatric: he has a normal mood and affect. his   behavior is normal.      Lower Extremity Exam:  Vascular: DP/PT pulses palpable 2+.   Positive hair growth.   No perimalleolar edema  Neuro: Protective sensation Diminished, b/l.  Light touch sensation diminished, b/l  DTRs intact  Integument: Full Thickness venous leg ulcer  measuring 2.5 x 1.9 cm to anterior lateral lower leg.  Improved surrounding erythema.  No active drainage  Partial-thickness pressure ulceration to plantar medial left heel measuring 1.6 x 2.5 cm  No masses  Webspaces c/d/i  Musculoskeletal: LE muscle strength 4/5  Gait Normal  Mild hammertoe deformity toes 2 through 4 on right.  Ankle ROM full without pain or crepitus, b/l          ASSESSMENT AND PLAN    Diagnoses and all orders for this visit:    1. Venous stasis ulcer of other part of left lower leg with other ulcer severity with varicose veins (HCC) (Primary)    2. Skin ulcer of left heel, limited to breakdown of skin (HCC)    3. Diabetic polyneuropathy associated with type 2 diabetes mellitus (HCC)      -Comprehensive foot and ankle exam  -Placed into Unna boot on the left.  Continue Surgical shoe.  -Recheck 1 week            This document has been electronically signed by Jacoby Serrano DPM on April 15, 2022 07:52 CDT     EMR Dragon/Transcription disclaimer:   Much of this encounter note is an electronic transcription/translation of spoken language to printed text. The electronic translation of spoken language may permit erroneous, or at times, nonsensical words or phrases to be inadvertently transcribed; Although I have reviewed the note for such errors,  some may still exist.    Jacoby Serrano DPM  4/15/2022  07:52 CDT

## 2022-04-13 ENCOUNTER — APPOINTMENT (OUTPATIENT)
Dept: ONCOLOGY | Facility: HOSPITAL | Age: 72
End: 2022-04-13

## 2022-04-13 RX ORDER — FUROSEMIDE 40 MG/1
TABLET ORAL
Qty: 60 TABLET | Refills: 0 | Status: SHIPPED | OUTPATIENT
Start: 2022-04-13

## 2022-04-14 ENCOUNTER — OFFICE VISIT (OUTPATIENT)
Dept: FAMILY MEDICINE CLINIC | Facility: CLINIC | Age: 72
End: 2022-04-14

## 2022-04-14 VITALS
HEIGHT: 72 IN | WEIGHT: 240 LBS | HEART RATE: 84 BPM | OXYGEN SATURATION: 100 % | TEMPERATURE: 97.8 F | SYSTOLIC BLOOD PRESSURE: 130 MMHG | DIASTOLIC BLOOD PRESSURE: 80 MMHG | BODY MASS INDEX: 32.51 KG/M2

## 2022-04-14 DIAGNOSIS — J01.10 ACUTE NON-RECURRENT FRONTAL SINUSITIS: ICD-10-CM

## 2022-04-14 DIAGNOSIS — J40 BRONCHITIS: Primary | ICD-10-CM

## 2022-04-14 PROCEDURE — 99213 OFFICE O/P EST LOW 20 MIN: CPT | Performed by: NURSE PRACTITIONER

## 2022-04-14 PROCEDURE — 96372 THER/PROPH/DIAG INJ SC/IM: CPT | Performed by: NURSE PRACTITIONER

## 2022-04-14 RX ORDER — TRIAMCINOLONE ACETONIDE 40 MG/ML
60 INJECTION, SUSPENSION INTRA-ARTICULAR; INTRAMUSCULAR ONCE
Status: COMPLETED | OUTPATIENT
Start: 2022-04-14 | End: 2022-04-14

## 2022-04-14 RX ORDER — AZITHROMYCIN 250 MG/1
TABLET, FILM COATED ORAL
Qty: 6 TABLET | Refills: 0 | Status: SHIPPED | OUTPATIENT
Start: 2022-04-14 | End: 2022-07-06

## 2022-04-14 RX ORDER — BROMPHENIRAMINE MALEATE, PSEUDOEPHEDRINE HYDROCHLORIDE, AND DEXTROMETHORPHAN HYDROBROMIDE 2; 30; 10 MG/5ML; MG/5ML; MG/5ML
5 SYRUP ORAL 4 TIMES DAILY PRN
Qty: 118 ML | Refills: 0 | Status: SHIPPED | OUTPATIENT
Start: 2022-04-14 | End: 2022-07-06

## 2022-04-14 RX ADMIN — TRIAMCINOLONE ACETONIDE 60 MG: 40 INJECTION, SUSPENSION INTRA-ARTICULAR; INTRAMUSCULAR at 10:00

## 2022-04-14 NOTE — PROGRESS NOTES
"Chief Complaint  Sinus Problem (Sinus drainage, mild sore throat, bilateral ear pressure, sinus pressure x approx 2 weeks. Pt was prescribed a zpack with some relief but is requesting a shot. )    Subjective          Jadiel Dutton presents to [facility] [place]  History of Present Illness    The patient presents today with complaints of a headache.    The patient reports that he has been experiencing a sinus headache, sinus drainage, and a productive cough for approximately 2 weeks. The patient reports that he has been taking Benadryl, Zyrtec, Flonase, Astelin nasal spray. The patient reports that he has been experiencing soreness across his forehead. He states that he has noticed some wheezing when he wakes up in the morning. The patient reports that he has been using an albuterol inhaler for the last 2 weeks. He states that he has not had any steroid injections since he was given Kenalog approximately 2 months ago.      Objective   Vital Signs:   /80   Pulse 84   Temp 97.8 °F (36.6 °C)   Ht 182.9 cm (72\")   Wt 109 kg (240 lb)   SpO2 100%   BMI 32.55 kg/m²     BMI is above normal parameters. Recommendations: exercise counseling/recommendations and nutrition counseling/recommendations       Physical Exam  Vitals and nursing note reviewed.   Constitutional:       General: He is not in acute distress.     Appearance: Normal appearance. He is obese. He is not ill-appearing, toxic-appearing or diaphoretic.   HENT:      Head: Normocephalic and atraumatic.      Right Ear: Hearing, ear canal and external ear normal. A middle ear effusion is present.      Left Ear: Hearing, ear canal and external ear normal. A middle ear effusion is present.      Nose: Congestion and rhinorrhea present.      Right Sinus: Maxillary sinus tenderness and frontal sinus tenderness present.      Left Sinus: Maxillary sinus tenderness and frontal sinus tenderness present. "   Cardiovascular:      Rate and Rhythm: Normal rate and regular rhythm.      Heart sounds: Normal heart sounds. No murmur heard.    No friction rub. No gallop.   Pulmonary:      Effort: Pulmonary effort is normal. No respiratory distress.      Breath sounds: No stridor. Examination of the left-upper field reveals wheezing. Wheezing present. No rhonchi or rales.   Lymphadenopathy:      Cervical: No cervical adenopathy.   Skin:     General: Skin is warm and dry.      Coloration: Skin is not jaundiced or pale.      Findings: No bruising, erythema, lesion or rash.   Neurological:      Mental Status: He is alert and oriented to person, place, and time.   Psychiatric:         Mood and Affect: Mood normal.         Behavior: Behavior normal.         Thought Content: Thought content normal.         Judgment: Judgment normal.          TMs with effusions bilaterally, not injected.  Posterior pharynx with erythema, cobblestoning appearance is noted.  Neck is supple without any adenopathy.  He is tender to palpation of the frontal and maxillary sinuses.  Lung fields with inspiratory wheezes to the left upper lobe, pulse oximetry in room air, 100 % No rales, no rhonchi auscultated. No respiratory distress is noted.      Result Review :                 Assessment and Plan    Diagnoses and all orders for this visit:    1. Bronchitis (Primary)  -     triamcinolone acetonide (KENALOG-40) injection 60 mg    2. Acute non-recurrent frontal sinusitis  -     triamcinolone acetonide (KENALOG-40) injection 60 mg    Other orders  -     azithromycin (Zithromax Z-Yonas) 250 MG tablet; Take 2 po now and 1 po d2-5  Dispense: 6 tablet; Refill: 0  -     brompheniramine-pseudoephedrine-DM 30-2-10 MG/5ML syrup; Take 5 mL by mouth 4 (Four) Times a Day As Needed for Congestion, Cough or Allergies.  Dispense: 118 mL; Refill: 0    He is given a Kenalog injection 60 mg IM in office today, tolerated well. I will also prescribe him Bromfed and asked him to  hold any over-the-counter anti-inflammatory decongestant or cough suppressant. He will be given a Z-Yonas and if his symptoms should persist or worsen, he will return to clinic for follow-up. Otherwise, he will RTC as scheduled for chronic conditions.     Transcribed from ambient dictation for MARY Escobar by Terrance Rivera.  04/14/22   11:04 CDT    Patient verbalized consent to the visit recording.  I have personally performed the services described in this document as transcribed by the above individual, and it is both accurate and complete.  MARY Escobar  4/14/2022  11:35 CDT    I spent 33 minutes caring for Jadiel on this date of service. This time includes time spent by me in the following activities:preparing for the visit, performing a medically appropriate examination and/or evaluation , counseling and educating the patient/family/caregiver, ordering medications, tests, or procedures and documenting information in the medical record  Follow Up   Return if symptoms worsen or fail to improve, for Recheck, or sooner as needed.  Patient was given instructions and counseling regarding his condition or for health maintenance advice. Please see specific information pulled into the AVS if appropriate.

## 2022-04-15 ENCOUNTER — INFUSION (OUTPATIENT)
Dept: ONCOLOGY | Facility: HOSPITAL | Age: 72
End: 2022-04-15

## 2022-04-15 ENCOUNTER — APPOINTMENT (OUTPATIENT)
Dept: ONCOLOGY | Facility: HOSPITAL | Age: 72
End: 2022-04-15

## 2022-04-15 VITALS
RESPIRATION RATE: 18 BRPM | TEMPERATURE: 96.9 F | DIASTOLIC BLOOD PRESSURE: 82 MMHG | HEART RATE: 103 BPM | SYSTOLIC BLOOD PRESSURE: 158 MMHG

## 2022-04-15 DIAGNOSIS — D50.0 IRON DEFICIENCY ANEMIA DUE TO CHRONIC BLOOD LOSS: Primary | ICD-10-CM

## 2022-04-15 DIAGNOSIS — T45.4X5A ADVERSE EFFECT OF IRON, INITIAL ENCOUNTER: ICD-10-CM

## 2022-04-15 PROCEDURE — 63710000001 DIPHENHYDRAMINE PER 50 MG: Performed by: INTERNAL MEDICINE

## 2022-04-15 PROCEDURE — 25010000002 IRON SUCROSE PER 1 MG: Performed by: INTERNAL MEDICINE

## 2022-04-15 PROCEDURE — 96365 THER/PROPH/DIAG IV INF INIT: CPT | Performed by: INTERNAL MEDICINE

## 2022-04-15 RX ORDER — ACETAMINOPHEN 325 MG/1
650 TABLET ORAL ONCE
Status: COMPLETED | OUTPATIENT
Start: 2022-04-15 | End: 2022-04-15

## 2022-04-15 RX ORDER — FAMOTIDINE 20 MG/1
20 TABLET, FILM COATED ORAL ONCE
Status: CANCELLED | OUTPATIENT
Start: 2022-04-17

## 2022-04-15 RX ORDER — ACETAMINOPHEN 325 MG/1
650 TABLET ORAL ONCE
Status: CANCELLED | OUTPATIENT
Start: 2022-04-17

## 2022-04-15 RX ORDER — DIPHENHYDRAMINE HCL 25 MG
25 CAPSULE ORAL ONCE
Status: CANCELLED | OUTPATIENT
Start: 2022-04-17

## 2022-04-15 RX ORDER — FAMOTIDINE 20 MG/1
20 TABLET, FILM COATED ORAL ONCE
Status: COMPLETED | OUTPATIENT
Start: 2022-04-15 | End: 2022-04-15

## 2022-04-15 RX ORDER — SODIUM CHLORIDE 9 MG/ML
250 INJECTION, SOLUTION INTRAVENOUS ONCE
Status: CANCELLED | OUTPATIENT
Start: 2022-04-17

## 2022-04-15 RX ORDER — DIPHENHYDRAMINE HCL 25 MG
25 CAPSULE ORAL ONCE
Status: COMPLETED | OUTPATIENT
Start: 2022-04-15 | End: 2022-04-15

## 2022-04-15 RX ORDER — SODIUM CHLORIDE 9 MG/ML
250 INJECTION, SOLUTION INTRAVENOUS ONCE
Status: COMPLETED | OUTPATIENT
Start: 2022-04-15 | End: 2022-04-15

## 2022-04-15 RX ADMIN — DIPHENHYDRAMINE HYDROCHLORIDE 25 MG: 25 CAPSULE ORAL at 13:26

## 2022-04-15 RX ADMIN — ACETAMINOPHEN 650 MG: 325 TABLET ORAL at 13:26

## 2022-04-15 RX ADMIN — FAMOTIDINE 20 MG: 20 TABLET ORAL at 13:26

## 2022-04-15 RX ADMIN — IRON SUCROSE 200 MG: 20 INJECTION, SOLUTION INTRAVENOUS at 13:56

## 2022-04-15 RX ADMIN — SODIUM CHLORIDE 250 ML: 9 INJECTION, SOLUTION INTRAVENOUS at 13:25

## 2022-04-18 ENCOUNTER — INFUSION (OUTPATIENT)
Dept: ONCOLOGY | Facility: HOSPITAL | Age: 72
End: 2022-04-18

## 2022-04-18 VITALS
HEART RATE: 103 BPM | SYSTOLIC BLOOD PRESSURE: 163 MMHG | DIASTOLIC BLOOD PRESSURE: 90 MMHG | TEMPERATURE: 96.9 F | RESPIRATION RATE: 20 BRPM

## 2022-04-18 DIAGNOSIS — T45.4X5A ADVERSE EFFECT OF IRON, INITIAL ENCOUNTER: ICD-10-CM

## 2022-04-18 DIAGNOSIS — D50.0 IRON DEFICIENCY ANEMIA DUE TO CHRONIC BLOOD LOSS: Primary | ICD-10-CM

## 2022-04-18 DIAGNOSIS — R60.0 FLUID RETENTION IN LEGS: ICD-10-CM

## 2022-04-18 DIAGNOSIS — I10 ESSENTIAL HYPERTENSION: Chronic | ICD-10-CM

## 2022-04-18 DIAGNOSIS — E78.01 FAMILIAL HYPERCHOLESTEROLEMIA: ICD-10-CM

## 2022-04-18 PROCEDURE — 96365 THER/PROPH/DIAG IV INF INIT: CPT | Performed by: INTERNAL MEDICINE

## 2022-04-18 PROCEDURE — 25010000002 IRON SUCROSE PER 1 MG: Performed by: INTERNAL MEDICINE

## 2022-04-18 PROCEDURE — 63710000001 DIPHENHYDRAMINE PER 50 MG: Performed by: INTERNAL MEDICINE

## 2022-04-18 RX ORDER — ROSUVASTATIN CALCIUM 20 MG/1
20 TABLET, COATED ORAL NIGHTLY
Qty: 90 TABLET | Refills: 1 | Status: SHIPPED | OUTPATIENT
Start: 2022-04-18 | End: 2023-03-08

## 2022-04-18 RX ORDER — ACETAMINOPHEN 325 MG/1
650 TABLET ORAL ONCE
Status: COMPLETED | OUTPATIENT
Start: 2022-04-18 | End: 2022-04-18

## 2022-04-18 RX ORDER — LISINOPRIL AND HYDROCHLOROTHIAZIDE 25; 20 MG/1; MG/1
1 TABLET ORAL DAILY
Qty: 90 TABLET | Refills: 1 | Status: SHIPPED | OUTPATIENT
Start: 2022-04-18 | End: 2023-03-08

## 2022-04-18 RX ORDER — FAMOTIDINE 20 MG/1
20 TABLET, FILM COATED ORAL ONCE
Status: CANCELLED | OUTPATIENT
Start: 2022-04-19

## 2022-04-18 RX ORDER — DIPHENHYDRAMINE HCL 25 MG
25 CAPSULE ORAL ONCE
Status: CANCELLED | OUTPATIENT
Start: 2022-04-19

## 2022-04-18 RX ORDER — FAMOTIDINE 20 MG/1
20 TABLET, FILM COATED ORAL ONCE
Status: COMPLETED | OUTPATIENT
Start: 2022-04-18 | End: 2022-04-18

## 2022-04-18 RX ORDER — ACETAMINOPHEN 325 MG/1
650 TABLET ORAL ONCE
Status: CANCELLED | OUTPATIENT
Start: 2022-04-19

## 2022-04-18 RX ORDER — SODIUM CHLORIDE 9 MG/ML
250 INJECTION, SOLUTION INTRAVENOUS ONCE
Status: COMPLETED | OUTPATIENT
Start: 2022-04-18 | End: 2022-04-18

## 2022-04-18 RX ORDER — SODIUM CHLORIDE 9 MG/ML
250 INJECTION, SOLUTION INTRAVENOUS ONCE
Status: CANCELLED | OUTPATIENT
Start: 2022-04-19

## 2022-04-18 RX ORDER — DIPHENHYDRAMINE HCL 25 MG
25 CAPSULE ORAL ONCE
Status: COMPLETED | OUTPATIENT
Start: 2022-04-18 | End: 2022-04-18

## 2022-04-18 RX ADMIN — DIPHENHYDRAMINE HYDROCHLORIDE 25 MG: 25 CAPSULE ORAL at 13:51

## 2022-04-18 RX ADMIN — SODIUM CHLORIDE 250 ML: 9 INJECTION, SOLUTION INTRAVENOUS at 14:27

## 2022-04-18 RX ADMIN — ACETAMINOPHEN 650 MG: 325 TABLET ORAL at 13:52

## 2022-04-18 RX ADMIN — IRON SUCROSE 200 MG: 20 INJECTION, SOLUTION INTRAVENOUS at 14:26

## 2022-04-18 RX ADMIN — FAMOTIDINE 20 MG: 20 TABLET ORAL at 13:55

## 2022-04-19 ENCOUNTER — OFFICE VISIT (OUTPATIENT)
Dept: PODIATRY | Facility: CLINIC | Age: 72
End: 2022-04-19

## 2022-04-19 VITALS — BODY MASS INDEX: 32.51 KG/M2 | HEIGHT: 72 IN | WEIGHT: 240 LBS | HEART RATE: 82 BPM | OXYGEN SATURATION: 100 %

## 2022-04-19 DIAGNOSIS — E11.42 DIABETIC POLYNEUROPATHY ASSOCIATED WITH TYPE 2 DIABETES MELLITUS: ICD-10-CM

## 2022-04-19 DIAGNOSIS — L97.421 SKIN ULCER OF LEFT HEEL, LIMITED TO BREAKDOWN OF SKIN: ICD-10-CM

## 2022-04-19 DIAGNOSIS — I83.028 VENOUS STASIS ULCER OF OTHER PART OF LEFT LOWER LEG WITH OTHER ULCER SEVERITY WITH VARICOSE VEINS: Primary | ICD-10-CM

## 2022-04-19 DIAGNOSIS — L97.828 VENOUS STASIS ULCER OF OTHER PART OF LEFT LOWER LEG WITH OTHER ULCER SEVERITY WITH VARICOSE VEINS: Primary | ICD-10-CM

## 2022-04-19 PROCEDURE — 29580 STRAPPING UNNA BOOT: CPT | Performed by: PODIATRIST

## 2022-04-19 NOTE — PROGRESS NOTES
Jadiel Dutton  1950  71 y.o. male   PCP- Shayy Banda , APRN  12/27/21  BS: 78 per pt    Patient returns today for a follow up on wounds on the left lower leg and left foot.    4/19/2022    Chief Complaint   Patient presents with   • Left Lower Leg - Skin Ulcer   • Left Foot - Skin Ulcer         History of Present Illness     Jadiel Dutton is a 71 y.o. male with history of diabetes who presents for f/u evaluation of left lower extremity ulcerations.      Past Medical History:   Diagnosis Date   • Abdominal pain    • Abnormal weight loss    • Acute bronchitis    • Anxiety state    • Benign essential hypertension    • Chronic sinusitis    • Constipation    • Cystitis    • Degenerative joint disease involving multiple joints    • Diabetic neuropathy (HCC)      bilateral hands      • Diastolic dysfunction    • Diverticular disease of colon    • Dyspnea    • Epigastric pain    • Essential hypertension    • Foot ulcer (HCC)    • Gastroesophageal reflux disease    • Generalized anxiety disorder    • Hyperlipidemia    • Inflammatory bowel disease     Possible Inflammatory Bowel Disease      • Lumbar pain     Pain radiating to lumbar region of back   n      • Pleuritic pain    • Radiculopathy     site unspecified      • Type 2 diabetes mellitus (HCC)    • Vesicular eczema of hands and feet          Past Surgical History:   Procedure Laterality Date   • BACK SURGERY      LOWER BACK SURGERY   • CARPAL TUNNEL RELEASE Bilateral    • COLONOSCOPY  06/11/2012    Internal & external hemorrhoids found.   • COLONOSCOPY N/A 8/22/2017    Procedure: COLONOSCOPY;  Surgeon: Cesar Hollis MD;  Location: [facility] ENDOSCOPY;  Service:    • COLONOSCOPY N/A 3/1/2022    Procedure: COLONOSCOPY 8:30;  Surgeon: Jacoby Robertson DO;  Location: [facility] ENDOSCOPY;  Service: Gastroenterology;  Laterality: N/A;   • DEQUERVAIN RELEASE Bilateral    • ENDOSCOPY  06/11/2012    Slight stricture in the distal esophagus. Gastritis in  stomach. Biopsy taken. Normal duodenum.   • ENDOSCOPY     • ENDOSCOPY N/A 8/22/2017    Procedure: ESOPHAGOGASTRODUODENOSCOPY possible dilation ;  Surgeon: Cesar Hollis MD;  Location: [facility] ENDOSCOPY;  Service:    • ENDOSCOPY N/A 3/1/2022    Procedure: ESOPHAGOGASTRODUODENOSCOPY 8:30;  Surgeon: Jacoby Robertson DO;  Location: [facility] ENDOSCOPY;  Service: Gastroenterology;  Laterality: N/A;   • EYE SURGERY Bilateral     cataracts removed with implants   • HAMMER TOE REPAIR Left 12/18/2019    Procedure: FIFTH METATARSAL EXOSTECTOMY, FOURTH HAMMER TOE CORRECTION, GASTROCNEMIUS RECESSION;  Surgeon: Jacoby Serrano DPM;  Location: [facility] OR;  Service: Podiatry   • SHOULDER ARTHROSCOPY Left 2/7/2020    Procedure: LEFT SHOULDER ARTHROSCOPY with rotator cuff repair, DEDRICK procedure, and Subacromial decompression;  Surgeon: Troy Barillas MD;  Location: [facility] OR;  Service: Orthopedics;  Laterality: Left;   • ULNAR TUNNEL RELEASE Bilateral          Family History   Problem Relation Age of Onset   • No Known Problems Other    • Hypertension Mother    • Heart attack Father    • No Known Problems Sister    • No Known Problems Brother    • No Known Problems Daughter    • No Known Problems Son    • No Known Problems Maternal Aunt    • No Known Problems Maternal Uncle    • No Known Problems Paternal Aunt    • No Known Problems Paternal Uncle    • Cancer Maternal Grandmother    • Hodgkin's lymphoma Maternal Grandmother    • No Known Problems Maternal Grandfather    • No Known Problems Paternal Grandmother    • No Known Problems Paternal Grandfather          Social History     Socioeconomic History   • Marital status: Single   Tobacco Use   • Smoking status: Never Smoker   • Smokeless tobacco: Never Used   Vaping Use   • Vaping Use: Never used   Substance and Sexual Activity   • Alcohol use: No   • Drug use: No   • Sexual activity: Defer         Current Outpatient Medications   Medication Sig Dispense Refill  "  • albuterol sulfate  (90 Base) MCG/ACT inhaler Inhale 2 puffs Every 4 (Four) Hours As Needed for Wheezing. 1 g 0   • azelastine (ASTELIN) 0.1 % nasal spray azelastine 137 mcg (0.1 %) nasal spray aerosol     • cetirizine (zyrTEC) 10 MG tablet Take 10 mg by mouth Daily.     • dexlansoprazole (DEXILANT) 60 MG capsule Take 60 mg by mouth Daily As Needed.     • docusate sodium (COLACE) 100 MG capsule Take 1 capsule by mouth 2 (Two) Times a Day As Needed for Constipation. 60 capsule 2   • furosemide (LASIX) 40 MG tablet TAKE 1 TABLET EVERY DAY 60 tablet 0   • gabapentin (NEURONTIN) 300 MG capsule Take 1 capsule by mouth 4 (Four) Times a Day As Needed.     • HYDROcodone-acetaminophen (NORCO)  MG per tablet Take 1 tablet by mouth Every 6 (Six) Hours As Needed.     • lisinopril-hydrochlorothiazide (PRINZIDE,ZESTORETIC) 20-25 MG per tablet Take 1 tablet by mouth Daily. 90 tablet 1   • ONETOUCH VERIO test strip      • rosuvastatin (CRESTOR) 20 MG tablet Take 1 tablet by mouth Every Night. 90 tablet 1   • azithromycin (Zithromax Z-Yonas) 250 MG tablet Take 2 po now and 1 po d2-5 6 tablet 0   • B-D UF III MINI PEN NEEDLES 31G X 5 MM misc USE FOUR TIMES A DAY WITH INSULIN PENS 360 each 2   • brompheniramine-pseudoephedrine-DM 30-2-10 MG/5ML syrup Take 5 mL by mouth 4 (Four) Times a Day As Needed for Congestion, Cough or Allergies. 118 mL 0   • phentermine 30 MG capsule Take 30 mg by mouth Every Morning. Take 1/2 tablet as needed every morning.       No current facility-administered medications for this visit.         OBJECTIVE    Pulse 82   Ht 182.9 cm (72\")   Wt 109 kg (240 lb)   SpO2 100%   BMI 32.55 kg/m²       Review of Systems   Constitutional: Negative.    HENT: Negative.    Eyes: Negative.    Respiratory: Negative.    Cardiovascular: Negative.    Gastrointestinal: Negative.    Endocrine: Negative.    Genitourinary: Negative.    Musculoskeletal: Positive for back pain.   Skin: Positive for wound. "   Allergic/Immunologic: Negative.    Neurological: Negative.    Hematological: Negative.    Psychiatric/Behavioral: Negative.          Diabetic Foot Exam Performed and Monofilament Test Performed       Constitutional: he appears well-developed and well-nourished.   HEENT: Normocephalic. Atraumatic  CV: No tenderness. RRR  Resp: Non-labored respiration. No wheezes.   Psychiatric: he has a normal mood and affect. his   behavior is normal.      Lower Extremity Exam:  Vascular: DP/PT pulses palpable 2+.   Positive hair growth.   No perimalleolar edema  Neuro: Protective sensation Diminished, b/l.  Light touch sensation diminished, b/l  DTRs intact  Integument: Full Thickness venous leg ulcer  measuring 2.3 x 1.6 cm to anterior lateral lower leg.  Improved surrounding erythema.  No active drainage  Partial-thickness pressure ulceration to plantar medial left heel measuring 2.0 x 2.5 cm  No masses  Webspaces c/d/i  Musculoskeletal: LE muscle strength 4/5  Gait Normal  Mild hammertoe deformity toes 2 through 4 on right.  Ankle ROM full without pain or crepitus, b/l          ASSESSMENT AND PLAN    Diagnoses and all orders for this visit:    1. Venous stasis ulcer of other part of left lower leg with other ulcer severity with varicose veins (HCC) (Primary)    2. Skin ulcer of left heel, limited to breakdown of skin (HCC)    3. Diabetic polyneuropathy associated with type 2 diabetes mellitus (HCC)      -Comprehensive foot and ankle exam  -Placed into Unna boot on the left.  Continue Surgical shoe.  -Recheck 1 week            This document has been electronically signed by Jacoby Serrano DPM on April 20, 2022 14:29 CDT     EMR Dragon/Transcription disclaimer:   Much of this encounter note is an electronic transcription/translation of spoken language to printed text. The electronic translation of spoken language may permit erroneous, or at times, nonsensical words or phrases to be inadvertently transcribed; Although I have  reviewed the note for such errors, some may still exist.    Jacoby Serrano DPM  4/20/2022  14:29 CDT

## 2022-04-20 ENCOUNTER — INFUSION (OUTPATIENT)
Dept: ONCOLOGY | Facility: HOSPITAL | Age: 72
End: 2022-04-20

## 2022-04-20 VITALS
HEART RATE: 95 BPM | RESPIRATION RATE: 18 BRPM | DIASTOLIC BLOOD PRESSURE: 82 MMHG | SYSTOLIC BLOOD PRESSURE: 157 MMHG | TEMPERATURE: 96.4 F

## 2022-04-20 DIAGNOSIS — D50.0 IRON DEFICIENCY ANEMIA DUE TO CHRONIC BLOOD LOSS: Primary | ICD-10-CM

## 2022-04-20 DIAGNOSIS — T45.4X5A ADVERSE EFFECT OF IRON, INITIAL ENCOUNTER: ICD-10-CM

## 2022-04-20 PROCEDURE — 25010000002 IRON SUCROSE PER 1 MG: Performed by: INTERNAL MEDICINE

## 2022-04-20 PROCEDURE — 63710000001 DIPHENHYDRAMINE PER 50 MG: Performed by: INTERNAL MEDICINE

## 2022-04-20 PROCEDURE — 96365 THER/PROPH/DIAG IV INF INIT: CPT | Performed by: INTERNAL MEDICINE

## 2022-04-20 RX ORDER — ACETAMINOPHEN 325 MG/1
650 TABLET ORAL ONCE
Status: COMPLETED | OUTPATIENT
Start: 2022-04-20 | End: 2022-04-20

## 2022-04-20 RX ORDER — FAMOTIDINE 20 MG/1
20 TABLET, FILM COATED ORAL ONCE
Status: CANCELLED | OUTPATIENT
Start: 2022-04-22

## 2022-04-20 RX ORDER — FAMOTIDINE 20 MG/1
20 TABLET, FILM COATED ORAL ONCE
Status: COMPLETED | OUTPATIENT
Start: 2022-04-20 | End: 2022-04-20

## 2022-04-20 RX ORDER — ACETAMINOPHEN 325 MG/1
650 TABLET ORAL ONCE
Status: CANCELLED | OUTPATIENT
Start: 2022-04-22

## 2022-04-20 RX ORDER — SODIUM CHLORIDE 9 MG/ML
250 INJECTION, SOLUTION INTRAVENOUS ONCE
Status: COMPLETED | OUTPATIENT
Start: 2022-04-20 | End: 2022-04-20

## 2022-04-20 RX ORDER — SODIUM CHLORIDE 9 MG/ML
250 INJECTION, SOLUTION INTRAVENOUS ONCE
Status: CANCELLED | OUTPATIENT
Start: 2022-04-22

## 2022-04-20 RX ORDER — DIPHENHYDRAMINE HCL 25 MG
25 CAPSULE ORAL ONCE
Status: COMPLETED | OUTPATIENT
Start: 2022-04-20 | End: 2022-04-20

## 2022-04-20 RX ORDER — DIPHENHYDRAMINE HCL 25 MG
25 CAPSULE ORAL ONCE
Status: CANCELLED | OUTPATIENT
Start: 2022-04-22

## 2022-04-20 RX ADMIN — DIPHENHYDRAMINE HYDROCHLORIDE 25 MG: 25 CAPSULE ORAL at 13:31

## 2022-04-20 RX ADMIN — IRON SUCROSE 200 MG: 20 INJECTION, SOLUTION INTRAVENOUS at 14:08

## 2022-04-20 RX ADMIN — ACETAMINOPHEN 650 MG: 325 TABLET ORAL at 13:31

## 2022-04-20 RX ADMIN — SODIUM CHLORIDE 250 ML: 9 INJECTION, SOLUTION INTRAVENOUS at 14:08

## 2022-04-20 RX ADMIN — FAMOTIDINE 20 MG: 20 TABLET ORAL at 13:31

## 2022-04-22 ENCOUNTER — INFUSION (OUTPATIENT)
Dept: ONCOLOGY | Facility: HOSPITAL | Age: 72
End: 2022-04-22

## 2022-04-22 VITALS
TEMPERATURE: 98.4 F | DIASTOLIC BLOOD PRESSURE: 83 MMHG | SYSTOLIC BLOOD PRESSURE: 154 MMHG | HEART RATE: 95 BPM | RESPIRATION RATE: 20 BRPM

## 2022-04-22 DIAGNOSIS — D50.0 IRON DEFICIENCY ANEMIA DUE TO CHRONIC BLOOD LOSS: Primary | ICD-10-CM

## 2022-04-22 DIAGNOSIS — T45.4X5A ADVERSE EFFECT OF IRON, INITIAL ENCOUNTER: ICD-10-CM

## 2022-04-22 PROCEDURE — 96365 THER/PROPH/DIAG IV INF INIT: CPT | Performed by: INTERNAL MEDICINE

## 2022-04-22 PROCEDURE — 63710000001 DIPHENHYDRAMINE PER 50 MG: Performed by: INTERNAL MEDICINE

## 2022-04-22 PROCEDURE — 25010000002 IRON SUCROSE PER 1 MG: Performed by: INTERNAL MEDICINE

## 2022-04-22 RX ORDER — SODIUM CHLORIDE 9 MG/ML
250 INJECTION, SOLUTION INTRAVENOUS ONCE
Status: CANCELLED | OUTPATIENT
Start: 2022-04-22

## 2022-04-22 RX ORDER — ACETAMINOPHEN 325 MG/1
650 TABLET ORAL ONCE
Status: COMPLETED | OUTPATIENT
Start: 2022-04-22 | End: 2022-04-22

## 2022-04-22 RX ORDER — SODIUM CHLORIDE 9 MG/ML
250 INJECTION, SOLUTION INTRAVENOUS ONCE
Status: COMPLETED | OUTPATIENT
Start: 2022-04-22 | End: 2022-04-22

## 2022-04-22 RX ORDER — FAMOTIDINE 20 MG/1
20 TABLET, FILM COATED ORAL ONCE
Status: COMPLETED | OUTPATIENT
Start: 2022-04-22 | End: 2022-04-22

## 2022-04-22 RX ORDER — DIPHENHYDRAMINE HCL 25 MG
25 CAPSULE ORAL ONCE
Status: COMPLETED | OUTPATIENT
Start: 2022-04-22 | End: 2022-04-22

## 2022-04-22 RX ORDER — ACETAMINOPHEN 325 MG/1
650 TABLET ORAL ONCE
Status: CANCELLED | OUTPATIENT
Start: 2022-04-22

## 2022-04-22 RX ORDER — DIPHENHYDRAMINE HCL 25 MG
25 CAPSULE ORAL ONCE
Status: CANCELLED | OUTPATIENT
Start: 2022-04-22

## 2022-04-22 RX ORDER — FAMOTIDINE 20 MG/1
20 TABLET, FILM COATED ORAL ONCE
Status: CANCELLED | OUTPATIENT
Start: 2022-04-22

## 2022-04-22 RX ADMIN — ACETAMINOPHEN 650 MG: 325 TABLET ORAL at 13:43

## 2022-04-22 RX ADMIN — SODIUM CHLORIDE 250 ML: 9 INJECTION, SOLUTION INTRAVENOUS at 13:51

## 2022-04-22 RX ADMIN — IRON SUCROSE 200 MG: 20 INJECTION, SOLUTION INTRAVENOUS at 14:16

## 2022-04-22 RX ADMIN — FAMOTIDINE 20 MG: 20 TABLET ORAL at 13:43

## 2022-04-22 RX ADMIN — DIPHENHYDRAMINE HYDROCHLORIDE 25 MG: 25 CAPSULE ORAL at 13:43

## 2022-04-25 ENCOUNTER — OFFICE VISIT (OUTPATIENT)
Dept: PODIATRY | Facility: CLINIC | Age: 72
End: 2022-04-25

## 2022-04-25 VITALS — HEIGHT: 72 IN | WEIGHT: 240 LBS | HEART RATE: 116 BPM | BODY MASS INDEX: 32.51 KG/M2 | OXYGEN SATURATION: 97 %

## 2022-04-25 DIAGNOSIS — L97.828 VENOUS STASIS ULCER OF OTHER PART OF LEFT LOWER LEG WITH OTHER ULCER SEVERITY WITH VARICOSE VEINS: Primary | ICD-10-CM

## 2022-04-25 DIAGNOSIS — L03.116 CELLULITIS OF LEFT LOWER EXTREMITY: ICD-10-CM

## 2022-04-25 DIAGNOSIS — I83.028 VENOUS STASIS ULCER OF OTHER PART OF LEFT LOWER LEG WITH OTHER ULCER SEVERITY WITH VARICOSE VEINS: Primary | ICD-10-CM

## 2022-04-25 DIAGNOSIS — L97.421 SKIN ULCER OF LEFT HEEL, LIMITED TO BREAKDOWN OF SKIN: ICD-10-CM

## 2022-04-25 PROCEDURE — 29580 STRAPPING UNNA BOOT: CPT | Performed by: PODIATRIST

## 2022-04-25 RX ORDER — CLINDAMYCIN HYDROCHLORIDE 300 MG/1
300 CAPSULE ORAL 3 TIMES DAILY
Qty: 30 CAPSULE | Refills: 0 | Status: SHIPPED | OUTPATIENT
Start: 2022-04-25 | End: 2022-05-02

## 2022-04-25 NOTE — PROGRESS NOTES
Jadiel Dutton  1950  71 y.o. male   PCP- Shayy Banda , APRN  12/27/21  BS: 109 per pt    Patient returns today for a follow up on wounds on the left lower leg and left foot.    4/25/2022    Chief Complaint   Patient presents with   • Left Lower Leg - Skin Ulcer   • Left Foot - Skin Ulcer         History of Present Illness     Jadiel Dutton is a 71 y.o. male with history of diabetes who presents for f/u evaluation of left lower extremity ulcerations.      Past Medical History:   Diagnosis Date   • Abdominal pain    • Abnormal weight loss    • Acute bronchitis    • Anxiety state    • Benign essential hypertension    • Chronic sinusitis    • Constipation    • Cystitis    • Degenerative joint disease involving multiple joints    • Diabetic neuropathy (HCC)      bilateral hands      • Diastolic dysfunction    • Diverticular disease of colon    • Dyspnea    • Epigastric pain    • Essential hypertension    • Foot ulcer (HCC)    • Gastroesophageal reflux disease    • Generalized anxiety disorder    • Hyperlipidemia    • Inflammatory bowel disease     Possible Inflammatory Bowel Disease      • Lumbar pain     Pain radiating to lumbar region of back   n      • Pleuritic pain    • Radiculopathy     site unspecified      • Type 2 diabetes mellitus (HCC)    • Vesicular eczema of hands and feet          Past Surgical History:   Procedure Laterality Date   • BACK SURGERY      LOWER BACK SURGERY   • CARPAL TUNNEL RELEASE Bilateral    • COLONOSCOPY  06/11/2012    Internal & external hemorrhoids found.   • COLONOSCOPY N/A 8/22/2017    Procedure: COLONOSCOPY;  Surgeon: Cesar Hollis MD;  Location: [facility] ENDOSCOPY;  Service:    • COLONOSCOPY N/A 3/1/2022    Procedure: COLONOSCOPY 8:30;  Surgeon: Jacoby Robertson DO;  Location: [facility] ENDOSCOPY;  Service: Gastroenterology;  Laterality: N/A;   • DEQUERVAIN RELEASE Bilateral    • ENDOSCOPY  06/11/2012    Slight stricture in the distal esophagus. Gastritis in  stomach. Biopsy taken. Normal duodenum.   • ENDOSCOPY     • ENDOSCOPY N/A 8/22/2017    Procedure: ESOPHAGOGASTRODUODENOSCOPY possible dilation ;  Surgeon: Cesar Hollis MD;  Location: [facility] ENDOSCOPY;  Service:    • ENDOSCOPY N/A 3/1/2022    Procedure: ESOPHAGOGASTRODUODENOSCOPY 8:30;  Surgeon: Jacoby Robertson DO;  Location: [facility] ENDOSCOPY;  Service: Gastroenterology;  Laterality: N/A;   • EYE SURGERY Bilateral     cataracts removed with implants   • HAMMER TOE REPAIR Left 12/18/2019    Procedure: FIFTH METATARSAL EXOSTECTOMY, FOURTH HAMMER TOE CORRECTION, GASTROCNEMIUS RECESSION;  Surgeon: Jacoby Serrano DPM;  Location: [facility] OR;  Service: Podiatry   • SHOULDER ARTHROSCOPY Left 2/7/2020    Procedure: LEFT SHOULDER ARTHROSCOPY with rotator cuff repair, DEDRICK procedure, and Subacromial decompression;  Surgeon: Troy Barillas MD;  Location: [facility] OR;  Service: Orthopedics;  Laterality: Left;   • ULNAR TUNNEL RELEASE Bilateral          Family History   Problem Relation Age of Onset   • No Known Problems Other    • Hypertension Mother    • Heart attack Father    • No Known Problems Sister    • No Known Problems Brother    • No Known Problems Daughter    • No Known Problems Son    • No Known Problems Maternal Aunt    • No Known Problems Maternal Uncle    • No Known Problems Paternal Aunt    • No Known Problems Paternal Uncle    • Cancer Maternal Grandmother    • Hodgkin's lymphoma Maternal Grandmother    • No Known Problems Maternal Grandfather    • No Known Problems Paternal Grandmother    • No Known Problems Paternal Grandfather          Social History     Socioeconomic History   • Marital status: Single   Tobacco Use   • Smoking status: Never Smoker   • Smokeless tobacco: Never Used   Vaping Use   • Vaping Use: Never used   Substance and Sexual Activity   • Alcohol use: No   • Drug use: No   • Sexual activity: Defer         Current Outpatient Medications   Medication Sig Dispense Refill  "  • albuterol sulfate  (90 Base) MCG/ACT inhaler Inhale 2 puffs Every 4 (Four) Hours As Needed for Wheezing. 1 g 0   • azelastine (ASTELIN) 0.1 % nasal spray azelastine 137 mcg (0.1 %) nasal spray aerosol     • azithromycin (Zithromax Z-Yonas) 250 MG tablet Take 2 po now and 1 po d2-5 6 tablet 0   • B-D UF III MINI PEN NEEDLES 31G X 5 MM misc USE FOUR TIMES A DAY WITH INSULIN PENS 360 each 2   • brompheniramine-pseudoephedrine-DM 30-2-10 MG/5ML syrup Take 5 mL by mouth 4 (Four) Times a Day As Needed for Congestion, Cough or Allergies. 118 mL 0   • cetirizine (zyrTEC) 10 MG tablet Take 10 mg by mouth Daily.     • dexlansoprazole (DEXILANT) 60 MG capsule Take 60 mg by mouth Daily As Needed.     • docusate sodium (COLACE) 100 MG capsule Take 1 capsule by mouth 2 (Two) Times a Day As Needed for Constipation. 60 capsule 2   • furosemide (LASIX) 40 MG tablet TAKE 1 TABLET EVERY DAY 60 tablet 0   • gabapentin (NEURONTIN) 300 MG capsule Take 1 capsule by mouth 4 (Four) Times a Day As Needed.     • HYDROcodone-acetaminophen (NORCO)  MG per tablet Take 1 tablet by mouth Every 6 (Six) Hours As Needed.     • lisinopril-hydrochlorothiazide (PRINZIDE,ZESTORETIC) 20-25 MG per tablet Take 1 tablet by mouth Daily. 90 tablet 1   • ONETOUCH VERIO test strip      • phentermine 30 MG capsule Take 30 mg by mouth Every Morning. Take 1/2 tablet as needed every morning.     • rosuvastatin (CRESTOR) 20 MG tablet Take 1 tablet by mouth Every Night. 90 tablet 1   • clindamycin (CLEOCIN) 300 MG capsule Take 1 capsule by mouth 3 (Three) Times a Day. 30 capsule 0     No current facility-administered medications for this visit.         OBJECTIVE    Pulse 116   Ht 182.9 cm (72\")   Wt 109 kg (240 lb)   SpO2 97%   BMI 32.55 kg/m²       Review of Systems   Constitutional: Negative.    HENT: Negative.    Eyes: Negative.    Respiratory: Negative.    Cardiovascular: Negative.    Gastrointestinal: Negative.    Endocrine: Negative.  "   Genitourinary: Negative.    Musculoskeletal: Positive for back pain.   Skin: Positive for wound.   Allergic/Immunologic: Negative.    Neurological: Negative.    Hematological: Negative.    Psychiatric/Behavioral: Negative.          Diabetic Foot Exam Performed and Monofilament Test Performed       Constitutional: he appears well-developed and well-nourished.   HEENT: Normocephalic. Atraumatic  CV: No tenderness. RRR  Resp: Non-labored respiration. No wheezes.   Psychiatric: he has a normal mood and affect. his   behavior is normal.      Lower Extremity Exam:  Vascular: DP/PT pulses palpable 2+.   Positive hair growth.   No perimalleolar edema  Neuro: Protective sensation Diminished, b/l.  Light touch sensation diminished, b/l  DTRs intact  Integument: Full Thickness venous leg ulcer  measuring 1.8 x 1.8 cm to anterior lateral lower leg.  Improved surrounding erythema.  No active drainage  Partial-thickness pressure ulceration to plantar medial left heel measuring 1.6 x 2.6 cm  No masses  Webspaces c/d/i  Musculoskeletal: LE muscle strength 4/5  Gait Normal  Mild hammertoe deformity toes 2 through 4 on right.  Ankle ROM full without pain or crepitus, b/l          ASSESSMENT AND PLAN    Diagnoses and all orders for this visit:    1. Venous stasis ulcer of other part of left lower leg with other ulcer severity with varicose veins (HCC) (Primary)    2. Skin ulcer of left heel, limited to breakdown of skin (HCC)    3. Cellulitis of left lower extremity    Other orders  -     clindamycin (CLEOCIN) 300 MG capsule; Take 1 capsule by mouth 3 (Three) Times a Day.  Dispense: 30 capsule; Refill: 0        -Placed into Unna boot on the left.  Continue Surgical shoe.  -Started on clindamycin due to mild increase in medial heel erythema.  -Recheck 1 week            This document has been electronically signed by Jacoby Serrano DPM on April 25, 2022 13:03 CDT     EMR Dragon/Transcription disclaimer:   Much of this encounter  note is an electronic transcription/translation of spoken language to printed text. The electronic translation of spoken language may permit erroneous, or at times, nonsensical words or phrases to be inadvertently transcribed; Although I have reviewed the note for such errors, some may still exist.    Jacoby Serrano DPM  4/25/2022  13:03 CDT

## 2022-05-02 ENCOUNTER — OFFICE VISIT (OUTPATIENT)
Dept: PODIATRY | Facility: CLINIC | Age: 72
End: 2022-05-02

## 2022-05-02 VITALS — HEART RATE: 110 BPM | WEIGHT: 240 LBS | HEIGHT: 72 IN | OXYGEN SATURATION: 98 % | BODY MASS INDEX: 32.51 KG/M2

## 2022-05-02 DIAGNOSIS — E11.42 DIABETIC POLYNEUROPATHY ASSOCIATED WITH TYPE 2 DIABETES MELLITUS: ICD-10-CM

## 2022-05-02 DIAGNOSIS — L97.422 SKIN ULCER OF LEFT HEEL WITH FAT LAYER EXPOSED: ICD-10-CM

## 2022-05-02 DIAGNOSIS — L97.828 VENOUS STASIS ULCER OF OTHER PART OF LEFT LOWER LEG WITH OTHER ULCER SEVERITY WITH VARICOSE VEINS: Primary | ICD-10-CM

## 2022-05-02 DIAGNOSIS — I83.028 VENOUS STASIS ULCER OF OTHER PART OF LEFT LOWER LEG WITH OTHER ULCER SEVERITY WITH VARICOSE VEINS: Primary | ICD-10-CM

## 2022-05-02 PROCEDURE — 11042 DBRDMT SUBQ TIS 1ST 20SQCM/<: CPT | Performed by: PODIATRIST

## 2022-05-02 RX ORDER — DOXYCYCLINE HYCLATE 100 MG/1
100 CAPSULE ORAL 2 TIMES DAILY
Qty: 20 CAPSULE | Refills: 0 | Status: SHIPPED | OUTPATIENT
Start: 2022-05-02 | End: 2022-05-23 | Stop reason: SDUPTHER

## 2022-05-02 NOTE — PROGRESS NOTES
Jadiel Dutton  1950  71 y.o. male   PCP- Shayy Banda , APRN  4/14/22  BS: 92 per pt    Patient returns today for a follow up on wounds on the left lower leg and left foot.    05/02/2022      Chief Complaint   Patient presents with   • Left Foot - Follow-up, Foot Ulcer         History of Present Illness     Jadiel Dutton is a 71 y.o. male with history of diabetes who presents for f/u evaluation of left lower extremity ulcerations.      Past Medical History:   Diagnosis Date   • Abdominal pain    • Abnormal weight loss    • Acute bronchitis    • Anxiety state    • Benign essential hypertension    • Chronic sinusitis    • Constipation    • Cystitis    • Degenerative joint disease involving multiple joints    • Diabetic neuropathy (HCC)      bilateral hands      • Diastolic dysfunction    • Diverticular disease of colon    • Dyspnea    • Epigastric pain    • Essential hypertension    • Foot ulcer (HCC)    • Gastroesophageal reflux disease    • Generalized anxiety disorder    • Hyperlipidemia    • Inflammatory bowel disease     Possible Inflammatory Bowel Disease      • Lumbar pain     Pain radiating to lumbar region of back   n      • Pleuritic pain    • Radiculopathy     site unspecified      • Type 2 diabetes mellitus (HCC)    • Vesicular eczema of hands and feet          Past Surgical History:   Procedure Laterality Date   • BACK SURGERY      LOWER BACK SURGERY   • CARPAL TUNNEL RELEASE Bilateral    • COLONOSCOPY  06/11/2012    Internal & external hemorrhoids found.   • COLONOSCOPY N/A 8/22/2017    Procedure: COLONOSCOPY;  Surgeon: Cesar Hollis MD;  Location: [facility] ENDOSCOPY;  Service:    • COLONOSCOPY N/A 3/1/2022    Procedure: COLONOSCOPY 8:30;  Surgeon: Jacoby Robertson DO;  Location: [facility] ENDOSCOPY;  Service: Gastroenterology;  Laterality: N/A;   • DEQUERVAIN RELEASE Bilateral    • ENDOSCOPY  06/11/2012    Slight stricture in the distal esophagus. Gastritis in stomach. Biopsy taken.  Normal duodenum.   • ENDOSCOPY     • ENDOSCOPY N/A 8/22/2017    Procedure: ESOPHAGOGASTRODUODENOSCOPY possible dilation ;  Surgeon: Cesar Hollis MD;  Location: [facility] ENDOSCOPY;  Service:    • ENDOSCOPY N/A 3/1/2022    Procedure: ESOPHAGOGASTRODUODENOSCOPY 8:30;  Surgeon: Jacoby Robertson DO;  Location: [facility] ENDOSCOPY;  Service: Gastroenterology;  Laterality: N/A;   • EYE SURGERY Bilateral     cataracts removed with implants   • HAMMER TOE REPAIR Left 12/18/2019    Procedure: FIFTH METATARSAL EXOSTECTOMY, FOURTH HAMMER TOE CORRECTION, GASTROCNEMIUS RECESSION;  Surgeon: Jacoby Serrano DPM;  Location: [facility] OR;  Service: Podiatry   • SHOULDER ARTHROSCOPY Left 2/7/2020    Procedure: LEFT SHOULDER ARTHROSCOPY with rotator cuff repair, DEDRICK procedure, and Subacromial decompression;  Surgeon: Troy Barillas MD;  Location: [facility] OR;  Service: Orthopedics;  Laterality: Left;   • ULNAR TUNNEL RELEASE Bilateral          Family History   Problem Relation Age of Onset   • No Known Problems Other    • Hypertension Mother    • Heart attack Father    • No Known Problems Sister    • No Known Problems Brother    • No Known Problems Daughter    • No Known Problems Son    • No Known Problems Maternal Aunt    • No Known Problems Maternal Uncle    • No Known Problems Paternal Aunt    • No Known Problems Paternal Uncle    • Cancer Maternal Grandmother    • Hodgkin's lymphoma Maternal Grandmother    • No Known Problems Maternal Grandfather    • No Known Problems Paternal Grandmother    • No Known Problems Paternal Grandfather          Social History     Socioeconomic History   • Marital status: Single   Tobacco Use   • Smoking status: Never Smoker   • Smokeless tobacco: Never Used   Vaping Use   • Vaping Use: Never used   Substance and Sexual Activity   • Alcohol use: No   • Drug use: No   • Sexual activity: Defer         Current Outpatient Medications   Medication Sig Dispense Refill   • albuterol sulfate  " (90 Base) MCG/ACT inhaler Inhale 2 puffs Every 4 (Four) Hours As Needed for Wheezing. 1 g 0   • azelastine (ASTELIN) 0.1 % nasal spray azelastine 137 mcg (0.1 %) nasal spray aerosol     • B-D UF III MINI PEN NEEDLES 31G X 5 MM misc USE FOUR TIMES A DAY WITH INSULIN PENS 360 each 2   • cetirizine (zyrTEC) 10 MG tablet Take 10 mg by mouth Daily.     • dexlansoprazole (DEXILANT) 60 MG capsule Take 60 mg by mouth Daily As Needed.     • docusate sodium (COLACE) 100 MG capsule Take 1 capsule by mouth 2 (Two) Times a Day As Needed for Constipation. 60 capsule 2   • furosemide (LASIX) 40 MG tablet TAKE 1 TABLET EVERY DAY 60 tablet 0   • gabapentin (NEURONTIN) 300 MG capsule Take 1 capsule by mouth 4 (Four) Times a Day As Needed.     • HYDROcodone-acetaminophen (NORCO)  MG per tablet Take 1 tablet by mouth Every 6 (Six) Hours As Needed.     • lisinopril-hydrochlorothiazide (PRINZIDE,ZESTORETIC) 20-25 MG per tablet Take 1 tablet by mouth Daily. 90 tablet 1   • ONETOUCH VERIO test strip      • phentermine 30 MG capsule Take 30 mg by mouth Every Morning. Take 1/2 tablet as needed every morning.     • rosuvastatin (CRESTOR) 20 MG tablet Take 1 tablet by mouth Every Night. 90 tablet 1   • azithromycin (Zithromax Z-Yonas) 250 MG tablet Take 2 po now and 1 po d2-5 6 tablet 0   • brompheniramine-pseudoephedrine-DM 30-2-10 MG/5ML syrup Take 5 mL by mouth 4 (Four) Times a Day As Needed for Congestion, Cough or Allergies. 118 mL 0   • doxycycline (VIBRAMYCIN) 100 MG capsule Take 1 capsule by mouth 2 (Two) Times a Day. 20 capsule 0     No current facility-administered medications for this visit.         OBJECTIVE    Pulse 110   Ht 182.9 cm (72\")   Wt 109 kg (240 lb)   SpO2 98%   BMI 32.55 kg/m²       Review of Systems   Constitutional: Negative.    HENT: Negative.    Eyes: Negative.    Respiratory: Negative.    Cardiovascular: Negative.    Gastrointestinal: Negative.    Endocrine: Negative.    Genitourinary: Negative.  "   Musculoskeletal: Positive for back pain.   Skin: Positive for wound.   Allergic/Immunologic: Negative.    Neurological: Negative.    Hematological: Negative.    Psychiatric/Behavioral: Negative.          Diabetic Foot Exam Performed and Monofilament Test Performed       Constitutional: he appears well-developed and well-nourished.   HEENT: Normocephalic. Atraumatic  CV: No tenderness. RRR  Resp: Non-labored respiration. No wheezes.   Psychiatric: he has a normal mood and affect. his   behavior is normal.      Lower Extremity Exam:  Vascular: DP/PT pulses palpable 2+.   Positive hair growth.   No perimalleolar edema  Neuro: Protective sensation Diminished, b/l.  Light touch sensation diminished, b/l  DTRs intact  Integument: Full Thickness venous leg ulcer  measuring 1.5 x 1.8 cm to anterior lateral lower leg.  Improved surrounding erythema.  No active drainage  Ulceration to plantar medial left heel now full-thickness with increased surrounding ecchymotic change.  Following debridement total wound surface measures 2.4 x 2.6 x 0.4 cm.  No masses  Webspaces c/d/i  Musculoskeletal: LE muscle strength 4/5  Gait Normal  Mild hammertoe deformity toes 2 through 4 on right.  Ankle ROM full without pain or crepitus, b/l      Left heel wound debridement:  Risks and benefits discussed.  Left heel ulcer debrided of surrounding hyperkeratosis and devitalized tissue with iris scissors to level of subq  Pressure hemostasis obtained  Medihoney ointment and dsd applied.      ASSESSMENT AND PLAN    Diagnoses and all orders for this visit:    1. Venous stasis ulcer of other part of left lower leg with other ulcer severity with varicose veins (HCC) (Primary)    2. Skin ulcer of left heel with fat layer exposed (HCC)    3. Diabetic polyneuropathy associated with type 2 diabetes mellitus (HCC)    Other orders  -     doxycycline (VIBRAMYCIN) 100 MG capsule; Take 1 capsule by mouth 2 (Two) Times a Day.  Dispense: 20 capsule; Refill:  0        -Patient feels in the boot was constricting and rubbing in his heel area.  Will transition to Medihoney and well-padded dry dressing.  -Patient not tolerating oral clindamycin well, causing stomach upset.  Transition to doxycycline 100 mg twice daily  -Continue partial weightbearing.  -Recheck 1 week for wound check            This document has been electronically signed by Jacoby Serrano DPM on May 2, 2022 16:12 CDT     EMR Dragon/Transcription disclaimer:   Much of this encounter note is an electronic transcription/translation of spoken language to printed text. The electronic translation of spoken language may permit erroneous, or at times, nonsensical words or phrases to be inadvertently transcribed; Although I have reviewed the note for such errors, some may still exist.    Jacoby Serrano DPM  5/2/2022  16:12 CDT

## 2022-05-06 ENCOUNTER — TELEPHONE (OUTPATIENT)
Dept: PODIATRY | Facility: CLINIC | Age: 72
End: 2022-05-06

## 2022-05-09 ENCOUNTER — LAB (OUTPATIENT)
Dept: LAB | Facility: HOSPITAL | Age: 72
End: 2022-05-09

## 2022-05-09 ENCOUNTER — OFFICE VISIT (OUTPATIENT)
Dept: PODIATRY | Facility: CLINIC | Age: 72
End: 2022-05-09

## 2022-05-09 ENCOUNTER — HOME HEALTH ADMISSION (OUTPATIENT)
Dept: HOME HEALTH SERVICES | Facility: HOME HEALTHCARE | Age: 72
End: 2022-05-09

## 2022-05-09 ENCOUNTER — TELEPHONE (OUTPATIENT)
Dept: PODIATRY | Facility: CLINIC | Age: 72
End: 2022-05-09

## 2022-05-09 VITALS — BODY MASS INDEX: 32.51 KG/M2 | HEART RATE: 102 BPM | OXYGEN SATURATION: 99 % | WEIGHT: 240 LBS | HEIGHT: 72 IN

## 2022-05-09 DIAGNOSIS — L97.828 VENOUS STASIS ULCER OF OTHER PART OF LEFT LOWER LEG WITH OTHER ULCER SEVERITY WITH VARICOSE VEINS: Primary | ICD-10-CM

## 2022-05-09 DIAGNOSIS — I83.028 VENOUS STASIS ULCER OF OTHER PART OF LEFT LOWER LEG WITH OTHER ULCER SEVERITY WITH VARICOSE VEINS: Primary | ICD-10-CM

## 2022-05-09 DIAGNOSIS — E11.42 DIABETIC POLYNEUROPATHY ASSOCIATED WITH TYPE 2 DIABETES MELLITUS: ICD-10-CM

## 2022-05-09 DIAGNOSIS — L97.422 SKIN ULCER OF LEFT HEEL WITH FAT LAYER EXPOSED: ICD-10-CM

## 2022-05-09 PROCEDURE — 87070 CULTURE OTHR SPECIMN AEROBIC: CPT | Performed by: PODIATRIST

## 2022-05-09 PROCEDURE — 87205 SMEAR GRAM STAIN: CPT | Performed by: PODIATRIST

## 2022-05-09 PROCEDURE — 87077 CULTURE AEROBIC IDENTIFY: CPT | Performed by: PODIATRIST

## 2022-05-09 PROCEDURE — 87186 SC STD MICRODIL/AGAR DIL: CPT | Performed by: PODIATRIST

## 2022-05-09 PROCEDURE — 11042 DBRDMT SUBQ TIS 1ST 20SQCM/<: CPT | Performed by: PODIATRIST

## 2022-05-09 NOTE — TELEPHONE ENCOUNTER
VIANNEY JACKSON, [facility], REQUESTS A CALL BACK RE PT HAS 2 WOUNDS BUT SHE ONLY RECEIVED ORDERS FOR 1 WOUND. CALL BACK # [phone].  THANK YOU.

## 2022-05-09 NOTE — PROGRESS NOTES
Jadiel Dutton  1950  71 y.o. male   PCP- Shayy Banda , APRN  4/14/22  BS: 91 per pt    Patient returns today for a follow up on wounds on the left lower leg and left foot.    05/02/2022      Chief Complaint   Patient presents with   • Left Lower Leg - Skin Ulcer, Wound Check, Follow-up   • Left Foot - Skin Ulcer, Wound Check, Follow-up         History of Present Illness     Jadiel Dutton is a 71 y.o. male with history of diabetes who presents for f/u evaluation of left lower extremity ulcerations.      Past Medical History:   Diagnosis Date   • Abdominal pain    • Abnormal weight loss    • Acute bronchitis    • Anxiety state    • Benign essential hypertension    • Chronic sinusitis    • Constipation    • Cystitis    • Degenerative joint disease involving multiple joints    • Diabetic neuropathy (HCC)      bilateral hands      • Diastolic dysfunction    • Diverticular disease of colon    • Dyspnea    • Epigastric pain    • Essential hypertension    • Foot ulcer (HCC)    • Gastroesophageal reflux disease    • Generalized anxiety disorder    • Hyperlipidemia    • Inflammatory bowel disease     Possible Inflammatory Bowel Disease      • Lumbar pain     Pain radiating to lumbar region of back   n      • Pleuritic pain    • Radiculopathy     site unspecified      • Type 2 diabetes mellitus (HCC)    • Vesicular eczema of hands and feet          Past Surgical History:   Procedure Laterality Date   • BACK SURGERY      LOWER BACK SURGERY   • CARPAL TUNNEL RELEASE Bilateral    • COLONOSCOPY  06/11/2012    Internal & external hemorrhoids found.   • COLONOSCOPY N/A 8/22/2017    Procedure: COLONOSCOPY;  Surgeon: Cesar Hollis MD;  Location: [facility] ENDOSCOPY;  Service:    • COLONOSCOPY N/A 3/1/2022    Procedure: COLONOSCOPY 8:30;  Surgeon: Jacoby Robertson DO;  Location: [facility] ENDOSCOPY;  Service: Gastroenterology;  Laterality: N/A;   • DEQUERVAIN RELEASE Bilateral    • ENDOSCOPY  06/11/2012    Slight  stricture in the distal esophagus. Gastritis in stomach. Biopsy taken. Normal duodenum.   • ENDOSCOPY     • ENDOSCOPY N/A 8/22/2017    Procedure: ESOPHAGOGASTRODUODENOSCOPY possible dilation ;  Surgeon: Cesar Hollis MD;  Location: [facility] ENDOSCOPY;  Service:    • ENDOSCOPY N/A 3/1/2022    Procedure: ESOPHAGOGASTRODUODENOSCOPY 8:30;  Surgeon: Jacoby Robertson DO;  Location: [facility] ENDOSCOPY;  Service: Gastroenterology;  Laterality: N/A;   • EYE SURGERY Bilateral     cataracts removed with implants   • HAMMER TOE REPAIR Left 12/18/2019    Procedure: FIFTH METATARSAL EXOSTECTOMY, FOURTH HAMMER TOE CORRECTION, GASTROCNEMIUS RECESSION;  Surgeon: Jacoby Serrano DPM;  Location: [facility] OR;  Service: Podiatry   • SHOULDER ARTHROSCOPY Left 2/7/2020    Procedure: LEFT SHOULDER ARTHROSCOPY with rotator cuff repair, DEDRICK procedure, and Subacromial decompression;  Surgeon: Troy Barillas MD;  Location: [facility] OR;  Service: Orthopedics;  Laterality: Left;   • ULNAR TUNNEL RELEASE Bilateral          Family History   Problem Relation Age of Onset   • No Known Problems Other    • Hypertension Mother    • Heart attack Father    • No Known Problems Sister    • No Known Problems Brother    • No Known Problems Daughter    • No Known Problems Son    • No Known Problems Maternal Aunt    • No Known Problems Maternal Uncle    • No Known Problems Paternal Aunt    • No Known Problems Paternal Uncle    • Cancer Maternal Grandmother    • Hodgkin's lymphoma Maternal Grandmother    • No Known Problems Maternal Grandfather    • No Known Problems Paternal Grandmother    • No Known Problems Paternal Grandfather          Social History     Socioeconomic History   • Marital status: Single   Tobacco Use   • Smoking status: Never Smoker   • Smokeless tobacco: Never Used   Vaping Use   • Vaping Use: Never used   Substance and Sexual Activity   • Alcohol use: No   • Drug use: No   • Sexual activity: Defer         Current  "Outpatient Medications   Medication Sig Dispense Refill   • albuterol sulfate  (90 Base) MCG/ACT inhaler Inhale 2 puffs Every 4 (Four) Hours As Needed for Wheezing. 1 g 0   • azelastine (ASTELIN) 0.1 % nasal spray azelastine 137 mcg (0.1 %) nasal spray aerosol     • B-D UF III MINI PEN NEEDLES 31G X 5 MM misc USE FOUR TIMES A DAY WITH INSULIN PENS 360 each 2   • cetirizine (zyrTEC) 10 MG tablet Take 10 mg by mouth Daily.     • dexlansoprazole (DEXILANT) 60 MG capsule Take 60 mg by mouth Daily As Needed.     • docusate sodium (COLACE) 100 MG capsule Take 1 capsule by mouth 2 (Two) Times a Day As Needed for Constipation. 60 capsule 2   • doxycycline (VIBRAMYCIN) 100 MG capsule Take 1 capsule by mouth 2 (Two) Times a Day. 20 capsule 0   • furosemide (LASIX) 40 MG tablet TAKE 1 TABLET EVERY DAY 60 tablet 0   • gabapentin (NEURONTIN) 300 MG capsule Take 1 capsule by mouth 4 (Four) Times a Day As Needed.     • HYDROcodone-acetaminophen (NORCO)  MG per tablet Take 1 tablet by mouth Every 6 (Six) Hours As Needed.     • lisinopril-hydrochlorothiazide (PRINZIDE,ZESTORETIC) 20-25 MG per tablet Take 1 tablet by mouth Daily. 90 tablet 1   • ONETOUCH VERIO test strip      • phentermine 30 MG capsule Take 30 mg by mouth Every Morning. Take 1/2 tablet as needed every morning.     • rosuvastatin (CRESTOR) 20 MG tablet Take 1 tablet by mouth Every Night. 90 tablet 1   • azithromycin (Zithromax Z-Yonas) 250 MG tablet Take 2 po now and 1 po d2-5 6 tablet 0   • brompheniramine-pseudoephedrine-DM 30-2-10 MG/5ML syrup Take 5 mL by mouth 4 (Four) Times a Day As Needed for Congestion, Cough or Allergies. 118 mL 0     No current facility-administered medications for this visit.         OBJECTIVE    Pulse 102   Ht 182.9 cm (72\")   Wt 109 kg (240 lb)   SpO2 99%   BMI 32.55 kg/m²       Review of Systems   Constitutional: Negative.    HENT: Negative.    Eyes: Negative.    Respiratory: Negative.    Cardiovascular: Negative.  "   Gastrointestinal: Negative.    Endocrine: Negative.    Genitourinary: Negative.    Musculoskeletal: Positive for back pain.   Skin: Positive for wound.   Allergic/Immunologic: Negative.    Neurological: Negative.    Hematological: Negative.    Psychiatric/Behavioral: Negative.          Diabetic Foot Exam Performed and Monofilament Test Performed       Constitutional: he appears well-developed and well-nourished.   HEENT: Normocephalic. Atraumatic  CV: No tenderness. RRR  Resp: Non-labored respiration. No wheezes.   Psychiatric: he has a normal mood and affect. his   behavior is normal.      Lower Extremity Exam:  Vascular: DP/PT pulses palpable 2+.   Positive hair growth.   No perimalleolar edema  Neuro: Protective sensation Diminished, b/l.  Light touch sensation diminished, b/l  DTRs intact  Integument: Full Thickness venous leg ulcer  measuring 1.5 x 1.8 cm to anterior lateral lower leg.  Improved surrounding erythema.  No active drainage  Ulceration to plantar medial left heel, full-thickness with increased surrounding ecchymotic change.  Following debridement total wound surface measures 4.0 x 4.0 x 0.4 cm.  No masses  Webspaces c/d/i  Musculoskeletal: LE muscle strength 4/5  Gait Normal  Mild hammertoe deformity toes 2 through 4 on right.  Ankle ROM full without pain or crepitus, b/l      Left heel wound debridement:  Risks and benefits discussed.  Left heel ulcer debrided of surrounding hyperkeratosis and devitalized tissue with iris scissors to level of subq  Pressure hemostasis obtained  Medihoney ointment and dsd applied.      ASSESSMENT AND PLAN    Diagnoses and all orders for this visit:    1. Venous stasis ulcer of other part of left lower leg with other ulcer severity with varicose veins (HCC) (Primary)  -     Ambulatory Referral to Home Health (Outpatient)    2. Skin ulcer of left heel with fat layer exposed ([facility])  -     Ambulatory Referral to Home Health (Outpatient)    3. Diabetic polyneuropathy  associated with type 2 diabetes mellitus (HCC)  -     Ambulatory Referral to Home Health (Outpatient)        -Wound debridement as above  -Continue partial weightbearing.   -Wound culture taken today.  Will adjust antibiotics as necessary.  -Will place home health consult for dressing change in between office visits.  -Recheck 1 week for wound check            This document has been electronically signed by Jacoby Serrano DPM on May 9, 2022 11:05 CDT     EMR Dragon/Transcription disclaimer:   Much of this encounter note is an electronic transcription/translation of spoken language to printed text. The electronic translation of spoken language may permit erroneous, or at times, nonsensical words or phrases to be inadvertently transcribed; Although I have reviewed the note for such errors, some may still exist.    Jacoby Serrano DPM  5/9/2022  11:05 CDT

## 2022-05-11 LAB
BACTERIA SPEC AEROBE CULT: ABNORMAL
BACTERIA SPEC AEROBE CULT: ABNORMAL
GRAM STN SPEC: ABNORMAL

## 2022-05-14 ENCOUNTER — HOME CARE VISIT (OUTPATIENT)
Dept: HOME HEALTH SERVICES | Facility: CLINIC | Age: 72
End: 2022-05-14

## 2022-05-14 PROCEDURE — G0299 HHS/HOSPICE OF RN EA 15 MIN: HCPCS

## 2022-05-15 VITALS
HEART RATE: 89 BPM | SYSTOLIC BLOOD PRESSURE: 138 MMHG | OXYGEN SATURATION: 99 % | TEMPERATURE: 97.5 F | DIASTOLIC BLOOD PRESSURE: 96 MMHG

## 2022-05-16 ENCOUNTER — OFFICE VISIT (OUTPATIENT)
Dept: PODIATRY | Facility: CLINIC | Age: 72
End: 2022-05-16

## 2022-05-16 VITALS — HEART RATE: 114 BPM | HEIGHT: 72 IN | BODY MASS INDEX: 32.51 KG/M2 | WEIGHT: 240 LBS | OXYGEN SATURATION: 98 %

## 2022-05-16 DIAGNOSIS — L97.828 VENOUS STASIS ULCER OF OTHER PART OF LEFT LOWER LEG WITH OTHER ULCER SEVERITY WITH VARICOSE VEINS: ICD-10-CM

## 2022-05-16 DIAGNOSIS — L97.422 SKIN ULCER OF LEFT HEEL WITH FAT LAYER EXPOSED: Primary | ICD-10-CM

## 2022-05-16 DIAGNOSIS — I83.028 VENOUS STASIS ULCER OF OTHER PART OF LEFT LOWER LEG WITH OTHER ULCER SEVERITY WITH VARICOSE VEINS: ICD-10-CM

## 2022-05-16 DIAGNOSIS — L03.116 CELLULITIS OF LEFT LOWER EXTREMITY: ICD-10-CM

## 2022-05-16 DIAGNOSIS — E11.42 DIABETIC POLYNEUROPATHY ASSOCIATED WITH TYPE 2 DIABETES MELLITUS: ICD-10-CM

## 2022-05-16 PROCEDURE — 11042 DBRDMT SUBQ TIS 1ST 20SQCM/<: CPT | Performed by: PODIATRIST

## 2022-05-16 NOTE — PROGRESS NOTES
Jadiel Dutton  1950  71 y.o. male   PCP- Shayy Banda , APRN  4/14/22  BS: 109  per pt    Patient returns today for a follow up on wounds on the left lower leg and left foot.    5/16/2022      Chief Complaint   Patient presents with   • Left Foot - Skin Ulcer         History of Present Illness     Jadiel Dutton is a 71 y.o. male with history of diabetes who presents for f/u evaluation of left lower extremity ulcerations.  Has completed doxycycline course.  Feeling better overall.    Past Medical History:   Diagnosis Date   • Abdominal pain    • Abnormal weight loss    • Acute bronchitis    • Anxiety state    • Benign essential hypertension    • Chronic sinusitis    • Constipation    • Cystitis    • Degenerative joint disease involving multiple joints    • Diabetic neuropathy (HCC)      bilateral hands      • Diastolic dysfunction    • Diverticular disease of colon    • Dyspnea    • Epigastric pain    • Essential hypertension    • Foot ulcer (HCC)    • Gastroesophageal reflux disease    • Generalized anxiety disorder    • Hyperlipidemia    • Inflammatory bowel disease     Possible Inflammatory Bowel Disease      • Lumbar pain     Pain radiating to lumbar region of back   n      • Pleuritic pain    • Radiculopathy     site unspecified      • Type 2 diabetes mellitus (HCC)    • Vesicular eczema of hands and feet          Past Surgical History:   Procedure Laterality Date   • BACK SURGERY      LOWER BACK SURGERY   • CARPAL TUNNEL RELEASE Bilateral    • COLONOSCOPY  06/11/2012    Internal & external hemorrhoids found.   • COLONOSCOPY N/A 8/22/2017    Procedure: COLONOSCOPY;  Surgeon: Cesar Hollis MD;  Location: [facility] ENDOSCOPY;  Service:    • COLONOSCOPY N/A 3/1/2022    Procedure: COLONOSCOPY 8:30;  Surgeon: Jacoby Robertson DO;  Location: [facility] ENDOSCOPY;  Service: Gastroenterology;  Laterality: N/A;   • DEQUERVAIN RELEASE Bilateral    • ENDOSCOPY  06/11/2012    Slight stricture in the distal  esophagus. Gastritis in stomach. Biopsy taken. Normal duodenum.   • ENDOSCOPY     • ENDOSCOPY N/A 8/22/2017    Procedure: ESOPHAGOGASTRODUODENOSCOPY possible dilation ;  Surgeon: Cesar Hollis MD;  Location: [facility] ENDOSCOPY;  Service:    • ENDOSCOPY N/A 3/1/2022    Procedure: ESOPHAGOGASTRODUODENOSCOPY 8:30;  Surgeon: Jacoby Robertson DO;  Location: [facility] ENDOSCOPY;  Service: Gastroenterology;  Laterality: N/A;   • EYE SURGERY Bilateral     cataracts removed with implants   • HAMMER TOE REPAIR Left 12/18/2019    Procedure: FIFTH METATARSAL EXOSTECTOMY, FOURTH HAMMER TOE CORRECTION, GASTROCNEMIUS RECESSION;  Surgeon: Jacoby Serrano DPM;  Location: [facility] OR;  Service: Podiatry   • SHOULDER ARTHROSCOPY Left 2/7/2020    Procedure: LEFT SHOULDER ARTHROSCOPY with rotator cuff repair, DEDRICK procedure, and Subacromial decompression;  Surgeon: Troy Barillas MD;  Location: [facility] OR;  Service: Orthopedics;  Laterality: Left;   • ULNAR TUNNEL RELEASE Bilateral          Family History   Problem Relation Age of Onset   • No Known Problems Other    • Hypertension Mother    • Heart attack Father    • No Known Problems Sister    • No Known Problems Brother    • No Known Problems Daughter    • No Known Problems Son    • No Known Problems Maternal Aunt    • No Known Problems Maternal Uncle    • No Known Problems Paternal Aunt    • No Known Problems Paternal Uncle    • Cancer Maternal Grandmother    • Hodgkin's lymphoma Maternal Grandmother    • No Known Problems Maternal Grandfather    • No Known Problems Paternal Grandmother    • No Known Problems Paternal Grandfather          Social History     Socioeconomic History   • Marital status: Single   Tobacco Use   • Smoking status: Never Smoker   • Smokeless tobacco: Never Used   Vaping Use   • Vaping Use: Never used   Substance and Sexual Activity   • Alcohol use: No   • Drug use: No   • Sexual activity: Defer         Current Outpatient Medications  "  Medication Sig Dispense Refill   • albuterol sulfate  (90 Base) MCG/ACT inhaler Inhale 2 puffs Every 4 (Four) Hours As Needed for Wheezing. 1 g 0   • azelastine (ASTELIN) 0.1 % nasal spray azelastine 137 mcg (0.1 %) nasal spray aerosol     • azithromycin (Zithromax Z-Yonas) 250 MG tablet Take 2 po now and 1 po d2-5 6 tablet 0   • B-D UF III MINI PEN NEEDLES 31G X 5 MM misc USE FOUR TIMES A DAY WITH INSULIN PENS 360 each 2   • brompheniramine-pseudoephedrine-DM 30-2-10 MG/5ML syrup Take 5 mL by mouth 4 (Four) Times a Day As Needed for Congestion, Cough or Allergies. 118 mL 0   • cetirizine (zyrTEC) 10 MG tablet Take 10 mg by mouth Daily.     • dexlansoprazole (DEXILANT) 60 MG capsule Take 60 mg by mouth Daily As Needed.     • docusate sodium (COLACE) 100 MG capsule Take 1 capsule by mouth 2 (Two) Times a Day As Needed for Constipation. 60 capsule 2   • famotidine (PEPCID) 20 MG tablet Take 20 mg by mouth Daily.     • furosemide (LASIX) 40 MG tablet TAKE 1 TABLET EVERY DAY 60 tablet 0   • gabapentin (NEURONTIN) 300 MG capsule Take 1 capsule by mouth 4 (Four) Times a Day As Needed.     • HYDROcodone-acetaminophen (NORCO)  MG per tablet Take 1 tablet by mouth Every 6 (Six) Hours As Needed.     • lisinopril-hydrochlorothiazide (PRINZIDE,ZESTORETIC) 20-25 MG per tablet Take 1 tablet by mouth Daily. 90 tablet 1   • ONETOUCH VERIO test strip      • phentermine 30 MG capsule Take 30 mg by mouth Every Morning. Take 1/2 tablet as needed every morning.     • rosuvastatin (CRESTOR) 20 MG tablet Take 1 tablet by mouth Every Night. 90 tablet 1   • doxycycline (VIBRAMYCIN) 100 MG capsule Take 1 capsule by mouth 2 (Two) Times a Day. 20 capsule 0     No current facility-administered medications for this visit.         OBJECTIVE    Pulse 114   Ht 182.9 cm (72\")   Wt 109 kg (240 lb)   SpO2 98%   BMI 32.55 kg/m²       Review of Systems   Constitutional: Negative.    HENT: Negative.    Eyes: Negative.    Respiratory: " Negative.    Cardiovascular: Negative.    Gastrointestinal: Negative.    Endocrine: Negative.    Genitourinary: Negative.    Musculoskeletal: Positive for back pain.   Skin: Positive for wound.   Allergic/Immunologic: Negative.    Neurological: Negative.    Hematological: Negative.    Psychiatric/Behavioral: Negative.          Diabetic Foot Exam Performed and Monofilament Test Performed       Constitutional: he appears well-developed and well-nourished.   HEENT: Normocephalic. Atraumatic  CV: No tenderness. RRR  Resp: Non-labored respiration. No wheezes.   Psychiatric: he has a normal mood and affect. his   behavior is normal.      Lower Extremity Exam:  Vascular: DP/PT pulses palpable 2+.   Positive hair growth.   No perimalleolar edema  Neuro: Protective sensation Diminished, b/l.  Light touch sensation diminished, b/l  DTRs intact  Integument: Anterior venous leg ulcer predominantly healed, scabbed over  Ulceration to plantar medial left heel, full-thickness   Following debridement total wound surface measures 4.7 x 5.0 x 0.4 cm.  No masses  Webspaces c/d/i  Musculoskeletal: LE muscle strength 4/5  Gait Normal  Mild hammertoe deformity toes 2 through 4 on right.  Ankle ROM full without pain or crepitus, b/l      Left heel wound debridement:  Risks and benefits discussed.  Left heel ulcer debrided of surrounding hyperkeratosis and devitalized tissue with iris scissors to level of subq  Pressure hemostasis obtained  Medihoney ointment and dsd applied.      ASSESSMENT AND PLAN    Diagnoses and all orders for this visit:    1. Skin ulcer of left heel with fat layer exposed (HCC) (Primary)    2. Venous stasis ulcer of other part of left lower leg with other ulcer severity with varicose veins (HCC)    3. Cellulitis of left lower extremity    4. Diabetic polyneuropathy associated with type 2 diabetes mellitus (HCC)        -Wound debridement as above  -Continue partial weightbearing and offloading cam boot  -Recheck 1  week for wound check            This document has been electronically signed by Jacoby Serrano DPM on May 18, 2022 12:36 CDT     EMR Dragon/Transcription disclaimer:   Much of this encounter note is an electronic transcription/translation of spoken language to printed text. The electronic translation of spoken language may permit erroneous, or at times, nonsensical words or phrases to be inadvertently transcribed; Although I have reviewed the note for such errors, some may still exist.    Jacoby Serrano DPM  5/18/2022  12:36 CDT

## 2022-05-17 ENCOUNTER — HOME CARE VISIT (OUTPATIENT)
Dept: HOME HEALTH SERVICES | Facility: CLINIC | Age: 72
End: 2022-05-17

## 2022-05-19 ENCOUNTER — HOME CARE VISIT (OUTPATIENT)
Dept: HOME HEALTH SERVICES | Facility: CLINIC | Age: 72
End: 2022-05-19

## 2022-05-19 PROCEDURE — G0300 HHS/HOSPICE OF LPN EA 15 MIN: HCPCS

## 2022-05-20 VITALS
SYSTOLIC BLOOD PRESSURE: 122 MMHG | RESPIRATION RATE: 18 BRPM | DIASTOLIC BLOOD PRESSURE: 68 MMHG | HEART RATE: 90 BPM | TEMPERATURE: 98 F

## 2022-05-23 ENCOUNTER — OFFICE VISIT (OUTPATIENT)
Dept: PODIATRY | Facility: CLINIC | Age: 72
End: 2022-05-23

## 2022-05-23 VITALS — HEIGHT: 72 IN | WEIGHT: 240 LBS | HEART RATE: 112 BPM | BODY MASS INDEX: 32.51 KG/M2 | OXYGEN SATURATION: 97 %

## 2022-05-23 DIAGNOSIS — E11.42 DIABETIC POLYNEUROPATHY ASSOCIATED WITH TYPE 2 DIABETES MELLITUS: ICD-10-CM

## 2022-05-23 DIAGNOSIS — L03.116 CELLULITIS OF LEFT LOWER EXTREMITY: ICD-10-CM

## 2022-05-23 DIAGNOSIS — L97.828 VENOUS STASIS ULCER OF OTHER PART OF LEFT LOWER LEG WITH OTHER ULCER SEVERITY WITH VARICOSE VEINS: ICD-10-CM

## 2022-05-23 DIAGNOSIS — I83.028 VENOUS STASIS ULCER OF OTHER PART OF LEFT LOWER LEG WITH OTHER ULCER SEVERITY WITH VARICOSE VEINS: ICD-10-CM

## 2022-05-23 DIAGNOSIS — L97.422 SKIN ULCER OF LEFT HEEL WITH FAT LAYER EXPOSED: Primary | ICD-10-CM

## 2022-05-23 PROCEDURE — 11045 DBRDMT SUBQ TISS EACH ADDL: CPT | Performed by: PODIATRIST

## 2022-05-23 PROCEDURE — G0180 MD CERTIFICATION HHA PATIENT: HCPCS | Performed by: PODIATRIST

## 2022-05-23 PROCEDURE — 11042 DBRDMT SUBQ TIS 1ST 20SQCM/<: CPT | Performed by: PODIATRIST

## 2022-05-23 RX ORDER — DOXYCYCLINE HYCLATE 100 MG/1
100 CAPSULE ORAL 2 TIMES DAILY
Qty: 20 CAPSULE | Refills: 0 | Status: SHIPPED | OUTPATIENT
Start: 2022-05-23 | End: 2022-07-06

## 2022-05-23 NOTE — CASE COMMUNICATION
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  Caregiver Status:  FRIEND HELPS PATIENT WILKATERYNA ADLS AND DR. TABOR  Patient's goal(s): \"GET IT COMPLETELY HEALED\"  GG Discharge Goal:  INDEPENDENT SIT TO STAND   Medication [place]: NONE   Environmental/ Physical issues: FRIENDLY CAT IN HOME. HOME CLEAN.  Any additional nee ds:  NONE    Based upon record review and collaboration conference, the recommended frequency for this patient is   SN-----1D1, 2W4  PT-----  OT----  ST-----  HHA---  MSW---  DR. COVINGTON agrees with POC     Please verify your eval  scores: (Answer the scores  that pertain to your area of focus remove the others)     5    3    2     1    0      4    0    7    "

## 2022-05-23 NOTE — PROGRESS NOTES
Jadiel Dutton  1950  71 y.o. male   PCP- Shayy Banda , MARY  4/14/22  BS: 78 per pt    Patient returns today for a follow up on wounds on the left lower leg and left foot.    5/23/2022      Chief Complaint   Patient presents with   • Left Foot - Wound Check         History of Present Illness     Jadiel Dutton is a 71 y.o. male with history of diabetes who presents for f/u evaluation of left lower extremity ulcerations.    Past Medical History:   Diagnosis Date   • Abdominal pain    • Abnormal weight loss    • Acute bronchitis    • Anxiety state    • Benign essential hypertension    • Chronic sinusitis    • Constipation    • Cystitis    • Degenerative joint disease involving multiple joints    • Diabetic neuropathy (HCC)      bilateral hands      • Diastolic dysfunction    • Diverticular disease of colon    • Dyspnea    • Epigastric pain    • Essential hypertension    • Foot ulcer (HCC)    • Gastroesophageal reflux disease    • Generalized anxiety disorder    • Hyperlipidemia    • Inflammatory bowel disease     Possible Inflammatory Bowel Disease      • Lumbar pain     Pain radiating to lumbar region of back   n      • Pleuritic pain    • Radiculopathy     site unspecified      • Type 2 diabetes mellitus (HCC)    • Vesicular eczema of hands and feet          Past Surgical History:   Procedure Laterality Date   • BACK SURGERY      LOWER BACK SURGERY   • CARPAL TUNNEL RELEASE Bilateral    • COLONOSCOPY  06/11/2012    Internal & external hemorrhoids found.   • COLONOSCOPY N/A 8/22/2017    Procedure: COLONOSCOPY;  Surgeon: Cesar Hollis MD;  Location: [facility] ENDOSCOPY;  Service:    • COLONOSCOPY N/A 3/1/2022    Procedure: COLONOSCOPY 8:30;  Surgeon: Jacoby Robertson DO;  Location: [facility] ENDOSCOPY;  Service: Gastroenterology;  Laterality: N/A;   • DEQUERVAIN RELEASE Bilateral    • ENDOSCOPY  06/11/2012    Slight stricture in the distal esophagus. Gastritis in stomach. Biopsy taken. Normal  duodenum.   • ENDOSCOPY     • ENDOSCOPY N/A 8/22/2017    Procedure: ESOPHAGOGASTRODUODENOSCOPY possible dilation ;  Surgeon: Cesar Hollis MD;  Location: [facility] ENDOSCOPY;  Service:    • ENDOSCOPY N/A 3/1/2022    Procedure: ESOPHAGOGASTRODUODENOSCOPY 8:30;  Surgeon: Jacoby Robertson DO;  Location: [facility] ENDOSCOPY;  Service: Gastroenterology;  Laterality: N/A;   • EYE SURGERY Bilateral     cataracts removed with implants   • HAMMER TOE REPAIR Left 12/18/2019    Procedure: FIFTH METATARSAL EXOSTECTOMY, FOURTH HAMMER TOE CORRECTION, GASTROCNEMIUS RECESSION;  Surgeon: Jacoby Serrano DPM;  Location: [facility] OR;  Service: Podiatry   • SHOULDER ARTHROSCOPY Left 2/7/2020    Procedure: LEFT SHOULDER ARTHROSCOPY with rotator cuff repair, DEDRICK procedure, and Subacromial decompression;  Surgeon: Troy Barillas MD;  Location: [facility] OR;  Service: Orthopedics;  Laterality: Left;   • ULNAR TUNNEL RELEASE Bilateral          Family History   Problem Relation Age of Onset   • No Known Problems Other    • Hypertension Mother    • Heart attack Father    • No Known Problems Sister    • No Known Problems Brother    • No Known Problems Daughter    • No Known Problems Son    • No Known Problems Maternal Aunt    • No Known Problems Maternal Uncle    • No Known Problems Paternal Aunt    • No Known Problems Paternal Uncle    • Cancer Maternal Grandmother    • Hodgkin's lymphoma Maternal Grandmother    • No Known Problems Maternal Grandfather    • No Known Problems Paternal Grandmother    • No Known Problems Paternal Grandfather          Social History     Socioeconomic History   • Marital status: Single   Tobacco Use   • Smoking status: Never Smoker   • Smokeless tobacco: Never Used   Vaping Use   • Vaping Use: Never used   Substance and Sexual Activity   • Alcohol use: No   • Drug use: No   • Sexual activity: Defer         Current Outpatient Medications   Medication Sig Dispense Refill   • albuterol sulfate   "(90 Base) MCG/ACT inhaler Inhale 2 puffs Every 4 (Four) Hours As Needed for Wheezing. 1 g 0   • azelastine (ASTELIN) 0.1 % nasal spray azelastine 137 mcg (0.1 %) nasal spray aerosol     • azithromycin (Zithromax Z-Yonas) 250 MG tablet Take 2 po now and 1 po d2-5 6 tablet 0   • B-D UF III MINI PEN NEEDLES 31G X 5 MM misc USE FOUR TIMES A DAY WITH INSULIN PENS 360 each 2   • brompheniramine-pseudoephedrine-DM 30-2-10 MG/5ML syrup Take 5 mL by mouth 4 (Four) Times a Day As Needed for Congestion, Cough or Allergies. 118 mL 0   • cetirizine (zyrTEC) 10 MG tablet Take 10 mg by mouth Daily.     • dexlansoprazole (DEXILANT) 60 MG capsule Take 60 mg by mouth Daily As Needed.     • docusate sodium (COLACE) 100 MG capsule Take 1 capsule by mouth 2 (Two) Times a Day As Needed for Constipation. 60 capsule 2   • doxycycline (VIBRAMYCIN) 100 MG capsule Take 1 capsule by mouth 2 (Two) Times a Day. 20 capsule 0   • famotidine (PEPCID) 20 MG tablet Take 20 mg by mouth Daily.     • furosemide (LASIX) 40 MG tablet TAKE 1 TABLET EVERY DAY 60 tablet 0   • gabapentin (NEURONTIN) 300 MG capsule Take 1 capsule by mouth 4 (Four) Times a Day As Needed.     • HYDROcodone-acetaminophen (NORCO)  MG per tablet Take 1 tablet by mouth Every 6 (Six) Hours As Needed.     • lisinopril-hydrochlorothiazide (PRINZIDE,ZESTORETIC) 20-25 MG per tablet Take 1 tablet by mouth Daily. 90 tablet 1   • ONETOUCH VERIO test strip      • phentermine 30 MG capsule Take 30 mg by mouth Every Morning. Take 1/2 tablet as needed every morning.     • rosuvastatin (CRESTOR) 20 MG tablet Take 1 tablet by mouth Every Night. 90 tablet 1     No current facility-administered medications for this visit.         OBJECTIVE    Pulse 112   Ht 182.9 cm (72\")   Wt 109 kg (240 lb)   SpO2 97%   BMI 32.55 kg/m²       Review of Systems   Constitutional: Negative.    HENT: Negative.    Eyes: Negative.    Respiratory: Negative.    Cardiovascular: Negative.    Gastrointestinal: " Negative.    Endocrine: Negative.    Genitourinary: Negative.    Musculoskeletal: Positive for back pain.   Skin: Positive for wound.   Allergic/Immunologic: Negative.    Neurological: Negative.    Hematological: Negative.    Psychiatric/Behavioral: Negative.          Diabetic Foot Exam Performed and Monofilament Test Performed       Constitutional: he appears well-developed and well-nourished.   HEENT: Normocephalic. Atraumatic  CV: No tenderness. RRR  Resp: Non-labored respiration. No wheezes.   Psychiatric: he has a normal mood and affect. his   behavior is normal.      Lower Extremity Exam:  Vascular: DP/PT pulses palpable 2+.   Positive hair growth.   No perimalleolar edema  Neuro: Protective sensation Diminished, b/l.  Light touch sensation diminished, b/l  DTRs intact  Integument: Anterior venous leg ulcer predominantly healed, scabbed over  Ulceration to plantar medial left heel, full-thickness   Following debridement total wound surface measures 6.0 x 5.0 x 1.0 cm.  No masses  Webspaces c/d/i  Musculoskeletal: LE muscle strength 4/5  Gait Normal  Mild hammertoe deformity toes 2 through 4 on right.  Ankle ROM full without pain or crepitus, b/l      Left heel wound debridement:  Risks and benefits discussed.  Left heel ulcer debrided of surrounding hyperkeratosis and devitalized tissue with iris scissors to level of subq  Pressure hemostasis obtained  Medihoney ointment and dsd applied.      ASSESSMENT AND PLAN    Diagnoses and all orders for this visit:    1. Skin ulcer of left heel with fat layer exposed (HCC) (Primary)    2. Venous stasis ulcer of other part of left lower leg with other ulcer severity with varicose veins (HCC)    3. Cellulitis of left lower extremity    4. Diabetic polyneuropathy associated with type 2 diabetes mellitus (HCC)    Other orders  -     doxycycline (VIBRAMYCIN) 100 MG capsule; Take 1 capsule by mouth 2 (Two) Times a Day.  Dispense: 20 capsule; Refill: 0        -Wound  debridement as above  -Continue partial weightbearing and offloading cam boot.  Stressed importance of offloading, walker use.  Dispensed vascular boot for additional offloading while at rest.  -Refill doxycycline for mild periwound erythema.  -Patient has experienced increase in wound depth over the past few weeks.  Discussed possible transition to wound VAC therapy if wound does not improve.  -Recheck 1 week for wound check            This document has been electronically signed by Jacoby Serrano DPM on May 23, 2022 13:25 CDT     EMR Dragon/Transcription disclaimer:   Much of this encounter note is an electronic transcription/translation of spoken language to printed text. The electronic translation of spoken language may permit erroneous, or at times, nonsensical words or phrases to be inadvertently transcribed; Although I have reviewed the note for such errors, some may still exist.    Jacoby Serrano DPM  5/23/2022  13:25 CDT

## 2022-05-24 ENCOUNTER — LAB (OUTPATIENT)
Dept: LAB | Facility: OTHER | Age: 72
End: 2022-05-24

## 2022-05-24 DIAGNOSIS — D50.0 IRON DEFICIENCY ANEMIA DUE TO CHRONIC BLOOD LOSS: ICD-10-CM

## 2022-05-24 DIAGNOSIS — E53.8 FOLATE DEFICIENCY: ICD-10-CM

## 2022-05-24 DIAGNOSIS — T45.4X5A ADVERSE EFFECT OF IRON, INITIAL ENCOUNTER: ICD-10-CM

## 2022-05-24 LAB
BASOPHILS # BLD AUTO: 0.07 10*3/MM3 (ref 0–0.2)
BASOPHILS NFR BLD AUTO: 0.5 % (ref 0–1.5)
DEPRECATED RDW RBC AUTO: 45.8 FL (ref 37–54)
EOSINOPHIL # BLD AUTO: 0.25 10*3/MM3 (ref 0–0.4)
EOSINOPHIL NFR BLD AUTO: 1.7 % (ref 0.3–6.2)
ERYTHROCYTE [DISTWIDTH] IN BLOOD BY AUTOMATED COUNT: 13.8 % (ref 12.3–15.4)
FERRITIN SERPL-MCNC: 879.5 NG/ML (ref 30–400)
HCT VFR BLD AUTO: 44.4 % (ref 37.5–51)
HGB BLD-MCNC: 15 G/DL (ref 13–17.7)
IRON 24H UR-MRATE: 67 MCG/DL (ref 59–158)
IRON SATN MFR SERPL: 30 % (ref 20–50)
LYMPHOCYTES # BLD AUTO: 1.63 10*3/MM3 (ref 0.7–3.1)
LYMPHOCYTES NFR BLD AUTO: 11.2 % (ref 19.6–45.3)
MCH RBC QN AUTO: 31.8 PG (ref 26.6–33)
MCHC RBC AUTO-ENTMCNC: 33.8 G/DL (ref 31.5–35.7)
MCV RBC AUTO: 94.3 FL (ref 79–97)
MONOCYTES # BLD AUTO: 1.04 10*3/MM3 (ref 0.1–0.9)
MONOCYTES NFR BLD AUTO: 7.1 % (ref 5–12)
NEUTROPHILS NFR BLD AUTO: 11.6 10*3/MM3 (ref 1.7–7)
NEUTROPHILS NFR BLD AUTO: 79.5 % (ref 42.7–76)
PLATELET # BLD AUTO: 330 10*3/MM3 (ref 140–450)
PMV BLD AUTO: 9.4 FL (ref 6–12)
RBC # BLD AUTO: 4.71 10*6/MM3 (ref 4.14–5.8)
TIBC SERPL-MCNC: 226 MCG/DL (ref 298–536)
TRANSFERRIN SERPL-MCNC: 152 MG/DL (ref 200–360)
WBC NRBC COR # BLD: 14.59 10*3/MM3 (ref 3.4–10.8)

## 2022-05-24 PROCEDURE — 82728 ASSAY OF FERRITIN: CPT | Performed by: INTERNAL MEDICINE

## 2022-05-24 PROCEDURE — 85025 COMPLETE CBC W/AUTO DIFF WBC: CPT | Performed by: INTERNAL MEDICINE

## 2022-05-24 PROCEDURE — 82607 VITAMIN B-12: CPT | Performed by: INTERNAL MEDICINE

## 2022-05-24 PROCEDURE — 36415 COLL VENOUS BLD VENIPUNCTURE: CPT | Performed by: INTERNAL MEDICINE

## 2022-05-24 PROCEDURE — 83540 ASSAY OF IRON: CPT | Performed by: INTERNAL MEDICINE

## 2022-05-24 PROCEDURE — 82746 ASSAY OF FOLIC ACID SERUM: CPT | Performed by: INTERNAL MEDICINE

## 2022-05-24 PROCEDURE — 84466 ASSAY OF TRANSFERRIN: CPT | Performed by: INTERNAL MEDICINE

## 2022-05-25 ENCOUNTER — HOME CARE VISIT (OUTPATIENT)
Dept: HOME HEALTH SERVICES | Facility: CLINIC | Age: 72
End: 2022-05-25

## 2022-05-25 LAB
FOLATE SERPL-MCNC: >20 NG/ML (ref 4.78–24.2)
VIT B12 BLD-MCNC: 648 PG/ML (ref 211–946)

## 2022-05-25 PROCEDURE — G0299 HHS/HOSPICE OF RN EA 15 MIN: HCPCS

## 2022-05-26 ENCOUNTER — OFFICE VISIT (OUTPATIENT)
Dept: HEMATOLOGY/ONCOLOGY | Facility: CLINIC | Age: 72
End: 2022-05-26

## 2022-05-26 VITALS
HEART RATE: 64 BPM | RESPIRATION RATE: 24 BRPM | OXYGEN SATURATION: 100 % | TEMPERATURE: 98.2 F | DIASTOLIC BLOOD PRESSURE: 88 MMHG | SYSTOLIC BLOOD PRESSURE: 132 MMHG

## 2022-05-26 VITALS
SYSTOLIC BLOOD PRESSURE: 124 MMHG | TEMPERATURE: 98.2 F | DIASTOLIC BLOOD PRESSURE: 77 MMHG | BODY MASS INDEX: 31.56 KG/M2 | WEIGHT: 233 LBS | HEART RATE: 97 BPM | HEIGHT: 72 IN | RESPIRATION RATE: 18 BRPM

## 2022-05-26 DIAGNOSIS — T45.4X5A ADVERSE EFFECT OF IRON, INITIAL ENCOUNTER: Chronic | ICD-10-CM

## 2022-05-26 DIAGNOSIS — E53.8 FOLATE DEFICIENCY: Chronic | ICD-10-CM

## 2022-05-26 DIAGNOSIS — D50.0 IRON DEFICIENCY ANEMIA DUE TO CHRONIC BLOOD LOSS: Primary | Chronic | ICD-10-CM

## 2022-05-26 DIAGNOSIS — D72.829 LEUKOCYTOSIS, UNSPECIFIED TYPE: ICD-10-CM

## 2022-05-26 PROCEDURE — 1125F AMNT PAIN NOTED PAIN PRSNT: CPT | Performed by: INTERNAL MEDICINE

## 2022-05-26 PROCEDURE — 99214 OFFICE O/P EST MOD 30 MIN: CPT | Performed by: INTERNAL MEDICINE

## 2022-05-26 PROCEDURE — 1157F ADVNC CARE PLAN IN RCRD: CPT | Performed by: INTERNAL MEDICINE

## 2022-05-26 RX ORDER — FOLIC ACID 1 MG/1
1 TABLET ORAL DAILY
Qty: 90 TABLET | Refills: 1 | Status: SHIPPED | OUTPATIENT
Start: 2022-05-26

## 2022-05-26 RX ORDER — LANOLIN ALCOHOL/MO/W.PET/CERES
1000 CREAM (GRAM) TOPICAL DAILY
Qty: 90 TABLET | Refills: 1 | Status: SHIPPED | OUTPATIENT
Start: 2022-05-26

## 2022-05-26 NOTE — PROGRESS NOTES
"DATE OF VISIT: 5/26/2022      REASON FOR VISIT: Leukocytosis, iron deficiency anemia      HISTORY OF PRESENT ILLNESS:   71-year-old male with medical problems significant for hypertension, dyslipidemia, diabetes mellitus with neuropathy affecting bilateral hand, arthritis pain affecting multiple joints, history of back surgery who was initially seen in consultation on January 13, 2022 for leukocytosis and anemia with iron deficiency.  Patient received intravenous Venofer due to inability to tolerate iron by mouth in February 2022 and April 2022.  Patient is here for follow-up appointment today to discuss recently done blood work.  Complains of recent injury to left leg left foot requiring intervention by podiatry team.  Denies any bleeding.  Complains of chronic back pain.  Denies any new lymph node enlargement.      Past Medical History, Past Surgical History, Social History, Family History have been reviewed and are without significant changes except as mentioned.    Review of Systems   A comprehensive 14 point review of systems was performed and was negative except as mentioned in HPI.    Medications:  The current medication list was reviewed in the EMR    ALLERGIES:    Allergies   Allergen Reactions   • Penicillins Shortness Of Breath   • Bactrim [Sulfamethoxazole-Trimethoprim] GI Intolerance   • Ceftin [Cefuroxime Axetil] GI Intolerance   • Sulfa Antibiotics GI Intolerance   • Ciprofloxacin Itching and Rash       Objective      Vitals:    05/26/22 1541   BP: 124/77   Pulse: 97   Resp: 18   Temp: 98.2 °F (36.8 °C)   Weight: 106 kg (233 lb)   Height: 182.9 cm (72\")   PainSc:   4   PainLoc: Foot  Comment: left     Current Status 4/22/2022   ECOG score 1       Physical Exam  Pulmonary:      Breath sounds: Normal breath sounds.   Neurological:      Mental Status: He is alert and oriented to person, place, and time.           RECENT LABS:  Glucose   Date Value Ref Range Status   12/21/2021 242 (H) 70 - 99 mg/dL Final "     Sodium   Date Value Ref Range Status   12/21/2021 134 (L) 137 - 145 mmol/L Final     Potassium   Date Value Ref Range Status   12/21/2021 4.4 3.4 - 5.0 mmol/L Final     CO2   Date Value Ref Range Status   12/21/2021 37.0 (H) 22.0 - 30.0 mmol/L Final     Chloride   Date Value Ref Range Status   12/21/2021 94 (L) 101 - 112 mmol/L Final     Anion Gap   Date Value Ref Range Status   12/21/2021 3.0 (L) 5.0 - 15.0 mmol/L Final     Creatinine   Date Value Ref Range Status   12/21/2021 1.36 (H) 0.70 - 1.30 mg/dL Final     BUN   Date Value Ref Range Status   12/21/2021 12 7 - 23 mg/dL Final     BUN/Creatinine Ratio   Date Value Ref Range Status   12/21/2021 8.8 7.0 - 25.0 Final     Calcium   Date Value Ref Range Status   12/21/2021 9.1 8.4 - 10.2 mg/dL Final     eGFR Non  Amer   Date Value Ref Range Status   12/21/2021 52 49 - 113 mL/min/1.73 Final     Alkaline Phosphatase   Date Value Ref Range Status   12/07/2021 179 (H) 38 - 126 U/L Final     Total Protein   Date Value Ref Range Status   12/07/2021 6.7 6.3 - 8.6 g/dL Final     ALT (SGPT)   Date Value Ref Range Status   12/07/2021 17 <=50 U/L Final     AST (SGOT)   Date Value Ref Range Status   12/07/2021 28 17 - 59 U/L Final     Total Bilirubin   Date Value Ref Range Status   12/07/2021 0.3 0.2 - 1.3 mg/dL Final     Albumin   Date Value Ref Range Status   12/07/2021 3.80 3.50 - 5.00 g/dL Final     Globulin   Date Value Ref Range Status   12/07/2021 2.9 2.3 - 3.5 gm/dL Final     Lab Results   Component Value Date    WBC 14.59 (H) 05/24/2022    HGB 15.0 05/24/2022    HCT 44.4 05/24/2022    MCV 94.3 05/24/2022     05/24/2022     Lab Results   Component Value Date    NEUTROABS 11.60 (H) 05/24/2022    IRON 67 05/24/2022    IRON 56 (L) 03/22/2022    IRON 39 (L) 01/13/2022    TIBC 226 (L) 05/24/2022    TIBC 307 03/22/2022    TIBC 398 01/13/2022    LABIRON 30 05/24/2022    LABIRON 18 (L) 03/22/2022    LABIRON 10 (L) 01/13/2022    FERRITIN 879.50 (H) 05/24/2022     FERRITIN 183.60 03/22/2022    FERRITIN 27.88 (L) 01/13/2022    [id number] 648 05/24/2022    NUKTMMQO44 709 03/22/2022    [id number] 479 01/13/2022    FOLATE >20.00 05/24/2022    FOLATE 4.45 (L) 03/22/2022    FOLATE 5.51 01/13/2022     No results found for: , LABCA2, AFPTM, HCGQUANT, , CHROMGRNA, 3VPQR52BJH, CEA, REFLABREPO      PATHOLOGY:  * Cannot find OR log *         RADIOLOGY DATA :  No radiology results for the last 7 days        Assessment & Plan     1.  Iron deficiency anemia:  - Due to inability to tolerate iron by mouth patient received intravenous Venofer in February 2022 as well as April 2022  - EGD and colonoscopy by Dr. Robertson on March 1, 2022 did not show any active bleeding  - Anemia work-up done on May 24, 2022 shows hemoglobin is 15.  Iron studies are showing significant improvement no need for any intravenous iron replacement at present.  - Recommend continue with B12 p.o. daily.  - We will have patient return to clinic in 3 months with repeat CBC, iron studies, ferritin, B12 and folate to be done prior to that  - If patient has recurrent iron deficiency in future, he will need capsule endoscopy which was discussed with patient.    2.  Folate deficiency  - Folate level is showing improvement.  Recommend continue with folic acid p.o. daily    3.  Leukocytosis:  - Reactive in nature  - White blood cell count is 14,000 which is again predominantly increase in neutrophil.  Will monitor with CBC    4.  Health maintenance: Patient does not smoke.  Had a colonoscopy in March 2022 by Dr. Robertson    5. Advance Care Planning   ACP discussion was held with the patient during this visit. Patient has an advance directive in EMR which is still valid.        6.  Prescriptions: Prescription for B12 and folic acid has been sent to his pharmacy today             PHQ-9 Total Score:       Jadiel Dutton reports a pain score of 4.  Given his pain assessment as noted, treatment options were discussed and  the following options were decided upon as a follow-up plan to address the patient's pain: continuation of current treatment plan for pain.         Norman Carbajal MD  5/26/2022  15:45 CDT        Part of this note may be an electronic transcription/translation of spoken language to printed text using the Dragon Dictation System.          CC:

## 2022-05-27 ENCOUNTER — HOME CARE VISIT (OUTPATIENT)
Dept: HOME HEALTH SERVICES | Facility: CLINIC | Age: 72
End: 2022-05-27

## 2022-05-31 ENCOUNTER — OFFICE VISIT (OUTPATIENT)
Dept: PODIATRY | Facility: CLINIC | Age: 72
End: 2022-05-31

## 2022-05-31 ENCOUNTER — HOME CARE VISIT (OUTPATIENT)
Dept: HOME HEALTH SERVICES | Facility: CLINIC | Age: 72
End: 2022-05-31

## 2022-05-31 VITALS — WEIGHT: 233 LBS | BODY MASS INDEX: 31.56 KG/M2 | OXYGEN SATURATION: 98 % | HEIGHT: 72 IN | HEART RATE: 64 BPM

## 2022-05-31 DIAGNOSIS — L97.422 SKIN ULCER OF LEFT HEEL WITH FAT LAYER EXPOSED: Primary | ICD-10-CM

## 2022-05-31 DIAGNOSIS — E11.42 DIABETIC POLYNEUROPATHY ASSOCIATED WITH TYPE 2 DIABETES MELLITUS: ICD-10-CM

## 2022-05-31 PROCEDURE — 11042 DBRDMT SUBQ TIS 1ST 20SQCM/<: CPT | Performed by: PODIATRIST

## 2022-05-31 PROCEDURE — 11045 DBRDMT SUBQ TISS EACH ADDL: CPT | Performed by: PODIATRIST

## 2022-05-31 NOTE — PROGRESS NOTES
Jadiel Dutton  1950  71 y.o. male   PCP- Shayy Banda , MARY  4/18/22  BS: 93 per pt    Patient returns today for a follow up on wounds on the left lower leg and left foot.    05/31/2022      Chief Complaint   Patient presents with   • Left Foot - Follow-up, Wound Check         History of Present Illness     Jadiel Dutton is a 71 y.o. male with history of diabetes who presents for f/u evaluation of left heel ulceration.  Past Medical History:   Diagnosis Date   • Abdominal pain    • Abnormal weight loss    • Acute bronchitis    • Anxiety state    • Benign essential hypertension    • Chronic sinusitis    • Constipation    • Cystitis    • Degenerative joint disease involving multiple joints    • Diabetic neuropathy (HCC)      bilateral hands      • Diastolic dysfunction    • Diverticular disease of colon    • Dyspnea    • Epigastric pain    • Essential hypertension    • Foot ulcer (HCC)    • Gastroesophageal reflux disease    • Generalized anxiety disorder    • Hyperlipidemia    • Inflammatory bowel disease     Possible Inflammatory Bowel Disease      • Lumbar pain     Pain radiating to lumbar region of back   n      • Pleuritic pain    • Radiculopathy     site unspecified      • Type 2 diabetes mellitus (HCC)    • Vesicular eczema of hands and feet          Past Surgical History:   Procedure Laterality Date   • BACK SURGERY      LOWER BACK SURGERY   • CARPAL TUNNEL RELEASE Bilateral    • COLONOSCOPY  06/11/2012    Internal & external hemorrhoids found.   • COLONOSCOPY N/A 8/22/2017    Procedure: COLONOSCOPY;  Surgeon: Cesar Hollis MD;  Location: [facility] ENDOSCOPY;  Service:    • COLONOSCOPY N/A 3/1/2022    Procedure: COLONOSCOPY 8:30;  Surgeon: Jacoby Robertson DO;  Location: [facility] ENDOSCOPY;  Service: Gastroenterology;  Laterality: N/A;   • DEQUERVAIN RELEASE Bilateral    • ENDOSCOPY  06/11/2012    Slight stricture in the distal esophagus. Gastritis in stomach. Biopsy taken. Normal duodenum.    • ENDOSCOPY     • ENDOSCOPY N/A 8/22/2017    Procedure: ESOPHAGOGASTRODUODENOSCOPY possible dilation ;  Surgeon: Cesar Hollis MD;  Location: [facility] ENDOSCOPY;  Service:    • ENDOSCOPY N/A 3/1/2022    Procedure: ESOPHAGOGASTRODUODENOSCOPY 8:30;  Surgeon: Jacoby Robertson DO;  Location: [facility] ENDOSCOPY;  Service: Gastroenterology;  Laterality: N/A;   • EYE SURGERY Bilateral     cataracts removed with implants   • HAMMER TOE REPAIR Left 12/18/2019    Procedure: FIFTH METATARSAL EXOSTECTOMY, FOURTH HAMMER TOE CORRECTION, GASTROCNEMIUS RECESSION;  Surgeon: Jacoby Serrano DPM;  Location: [facility] OR;  Service: Podiatry   • SHOULDER ARTHROSCOPY Left 2/7/2020    Procedure: LEFT SHOULDER ARTHROSCOPY with rotator cuff repair, DEDRICK procedure, and Subacromial decompression;  Surgeon: Troy Barillas MD;  Location: [facility] OR;  Service: Orthopedics;  Laterality: Left;   • ULNAR TUNNEL RELEASE Bilateral          Family History   Problem Relation Age of Onset   • No Known Problems Other    • Hypertension Mother    • Heart attack Father    • No Known Problems Sister    • No Known Problems Brother    • No Known Problems Daughter    • No Known Problems Son    • No Known Problems Maternal Aunt    • No Known Problems Maternal Uncle    • No Known Problems Paternal Aunt    • No Known Problems Paternal Uncle    • Cancer Maternal Grandmother    • Hodgkin's lymphoma Maternal Grandmother    • No Known Problems Maternal Grandfather    • No Known Problems Paternal Grandmother    • No Known Problems Paternal Grandfather          Social History     Socioeconomic History   • Marital status: Single   Tobacco Use   • Smoking status: Never Smoker   • Smokeless tobacco: Never Used   Vaping Use   • Vaping Use: Never used   Substance and Sexual Activity   • Alcohol use: No   • Drug use: No   • Sexual activity: Defer         Current Outpatient Medications   Medication Sig Dispense Refill   • albuterol sulfate  (90 Base)  "MCG/ACT inhaler Inhale 2 puffs Every 4 (Four) Hours As Needed for Wheezing. 1 g 0   • azelastine (ASTELIN) 0.1 % nasal spray azelastine 137 mcg (0.1 %) nasal spray aerosol     • azithromycin (Zithromax Z-Yonas) 250 MG tablet Take 2 po now and 1 po d2-5 6 tablet 0   • B-D UF III MINI PEN NEEDLES 31G X 5 MM misc USE FOUR TIMES A DAY WITH INSULIN PENS 360 each 2   • brompheniramine-pseudoephedrine-DM 30-2-10 MG/5ML syrup Take 5 mL by mouth 4 (Four) Times a Day As Needed for Congestion, Cough or Allergies. 118 mL 0   • cetirizine (zyrTEC) 10 MG tablet Take 10 mg by mouth Daily.     • dexlansoprazole (DEXILANT) 60 MG capsule Take 60 mg by mouth Daily As Needed.     • docusate sodium (COLACE) 100 MG capsule Take 1 capsule by mouth 2 (Two) Times a Day As Needed for Constipation. 60 capsule 2   • doxycycline (VIBRAMYCIN) 100 MG capsule Take 1 capsule by mouth 2 (Two) Times a Day. 20 capsule 0   • famotidine (PEPCID) 20 MG tablet Take 20 mg by mouth Daily.     • folic acid (FOLVITE) 1 MG tablet Take 1 tablet by mouth Daily. 90 tablet 1   • furosemide (LASIX) 40 MG tablet TAKE 1 TABLET EVERY DAY 60 tablet 0   • gabapentin (NEURONTIN) 300 MG capsule Take 1 capsule by mouth 4 (Four) Times a Day As Needed.     • HYDROcodone-acetaminophen (NORCO)  MG per tablet Take 1 tablet by mouth Every 6 (Six) Hours As Needed.     • lisinopril-hydrochlorothiazide (PRINZIDE,ZESTORETIC) 20-25 MG per tablet Take 1 tablet by mouth Daily. 90 tablet 1   • ONETOUCH VERIO test strip      • phentermine 30 MG capsule Take 30 mg by mouth Every Morning. Take 1/2 tablet as needed every morning.     • rosuvastatin (CRESTOR) 20 MG tablet Take 1 tablet by mouth Every Night. 90 tablet 1   • vitamin B-12 (CYANOCOBALAMIN) 1000 MCG tablet Take 1 tablet by mouth Daily. 90 tablet 1     No current facility-administered medications for this visit.         OBJECTIVE    Pulse 64   Ht 182.9 cm (72\")   Wt 106 kg (233 lb)   SpO2 98%   BMI 31.60 kg/m²       Review " of Systems   Constitutional: Negative.    HENT: Negative.    Eyes: Negative.    Respiratory: Negative.    Cardiovascular: Negative.    Gastrointestinal: Negative.    Endocrine: Negative.    Genitourinary: Negative.    Musculoskeletal: Positive for back pain.   Skin: Positive for wound.   Allergic/Immunologic: Negative.    Neurological: Negative.    Hematological: Negative.    Psychiatric/Behavioral: Negative.          Diabetic Foot Exam Performed and Monofilament Test Performed       Constitutional: he appears well-developed and well-nourished.   HEENT: Normocephalic. Atraumatic  CV: No tenderness. RRR  Resp: Non-labored respiration. No wheezes.   Psychiatric: he has a normal mood and affect. his   behavior is normal.      Lower Extremity Exam:  Vascular: DP/PT pulses palpable 2+.   Positive hair growth.   No perimalleolar edema  Neuro: Protective sensation Diminished, b/l.  Light touch sensation diminished, b/l  DTRs intact  Integument: Anterior venous leg ulcer predominantly healed, scabbed over  Ulceration to plantar medial left heel, full-thickness   Following debridement total wound surface measures 5.7 x 5.0 x 1.0 cm.  No masses  Webspaces c/d/i  Musculoskeletal: LE muscle strength 4/5  Gait Normal  Mild hammertoe deformity toes 2 through 4 on right.  Ankle ROM full without pain or crepitus, b/l      Left heel wound debridement:  Risks and benefits discussed.  Left heel ulcer debrided of surrounding hyperkeratosis and devitalized tissue with iris scissors to level of subq  Pressure hemostasis obtained  Medihoney ointment and dsd applied.      ASSESSMENT AND PLAN    Diagnoses and all orders for this visit:    1. Skin ulcer of left heel with fat layer exposed (HCC) (Primary)    2. Diabetic polyneuropathy associated with type 2 diabetes mellitus (HCC)        -Wound debridement as above  -Continue partial weightbearing and offloading cam boot.  Stressed importance of offloading, walker use.  Dispensed vascular  boot for additional offloading while at rest.  -Periwound erythema improved.  Complete doxycycline course.  -Patient has experienced increase in wound depth over the past few weeks.  Discussed possible transition to wound VAC therapy if wound does not improve.  -Recheck 1 week for wound check            This document has been electronically signed by Jacoby Serrano DPM on May 31, 2022 16:12 CDT     EMR Dragon/Transcription disclaimer:   Much of this encounter note is an electronic transcription/translation of spoken language to printed text. The electronic translation of spoken language may permit erroneous, or at times, nonsensical words or phrases to be inadvertently transcribed; Although I have reviewed the note for such errors, some may still exist.    Jacoby Serrano DPM  5/31/2022  16:12 CDT

## 2022-06-03 ENCOUNTER — HOME CARE VISIT (OUTPATIENT)
Dept: HOME HEALTH SERVICES | Facility: CLINIC | Age: 72
End: 2022-06-03

## 2022-06-03 PROCEDURE — G0299 HHS/HOSPICE OF RN EA 15 MIN: HCPCS

## 2022-06-06 VITALS
RESPIRATION RATE: 18 BRPM | TEMPERATURE: 97.9 F | DIASTOLIC BLOOD PRESSURE: 84 MMHG | HEART RATE: 106 BPM | SYSTOLIC BLOOD PRESSURE: 130 MMHG | OXYGEN SATURATION: 99 %

## 2022-06-07 ENCOUNTER — OFFICE VISIT (OUTPATIENT)
Dept: PODIATRY | Facility: CLINIC | Age: 72
End: 2022-06-07

## 2022-06-07 VITALS — BODY MASS INDEX: 31.56 KG/M2 | HEART RATE: 96 BPM | OXYGEN SATURATION: 96 % | WEIGHT: 233 LBS | HEIGHT: 72 IN

## 2022-06-07 DIAGNOSIS — L97.422 SKIN ULCER OF LEFT HEEL WITH FAT LAYER EXPOSED: Primary | ICD-10-CM

## 2022-06-07 DIAGNOSIS — E11.42 DIABETIC POLYNEUROPATHY ASSOCIATED WITH TYPE 2 DIABETES MELLITUS: ICD-10-CM

## 2022-06-07 PROCEDURE — 11042 DBRDMT SUBQ TIS 1ST 20SQCM/<: CPT | Performed by: PODIATRIST

## 2022-06-07 PROCEDURE — 11045 DBRDMT SUBQ TISS EACH ADDL: CPT | Performed by: PODIATRIST

## 2022-06-07 NOTE — PROGRESS NOTES
Jadiel Dutton  1950  71 y.o. male   PCP- Shayy Banda , MARY  4/18/22  BS: 83 per pt    Patient returns today for a follow up on wounds on the left lower leg and left foot.    6/7/2022      Chief Complaint   Patient presents with   • Left Foot - Follow-up, Wound Check         History of Present Illness     Jadiel Dutton is a 71 y.o. male with history of diabetes who presents for f/u evaluation of left heel ulceration.  Past Medical History:   Diagnosis Date   • Abdominal pain    • Abnormal weight loss    • Acute bronchitis    • Anxiety state    • Benign essential hypertension    • Chronic sinusitis    • Constipation    • Cystitis    • Degenerative joint disease involving multiple joints    • Diabetic neuropathy (HCC)      bilateral hands      • Diastolic dysfunction    • Diverticular disease of colon    • Dyspnea    • Epigastric pain    • Essential hypertension    • Foot ulcer (HCC)    • Gastroesophageal reflux disease    • Generalized anxiety disorder    • Hyperlipidemia    • Inflammatory bowel disease     Possible Inflammatory Bowel Disease      • Lumbar pain     Pain radiating to lumbar region of back   n      • Pleuritic pain    • Radiculopathy     site unspecified      • Type 2 diabetes mellitus (HCC)    • Vesicular eczema of hands and feet          Past Surgical History:   Procedure Laterality Date   • BACK SURGERY      LOWER BACK SURGERY   • CARPAL TUNNEL RELEASE Bilateral    • COLONOSCOPY  06/11/2012    Internal & external hemorrhoids found.   • COLONOSCOPY N/A 8/22/2017    Procedure: COLONOSCOPY;  Surgeon: Cesar Hollis MD;  Location: [facility] ENDOSCOPY;  Service:    • COLONOSCOPY N/A 3/1/2022    Procedure: COLONOSCOPY 8:30;  Surgeon: Jacoby Robertson DO;  Location: [facility] ENDOSCOPY;  Service: Gastroenterology;  Laterality: N/A;   • DEQUERVAIN RELEASE Bilateral    • ENDOSCOPY  06/11/2012    Slight stricture in the distal esophagus. Gastritis in stomach. Biopsy taken. Normal duodenum.    • ENDOSCOPY     • ENDOSCOPY N/A 8/22/2017    Procedure: ESOPHAGOGASTRODUODENOSCOPY possible dilation ;  Surgeon: Cesar Hollis MD;  Location: [facility] ENDOSCOPY;  Service:    • ENDOSCOPY N/A 3/1/2022    Procedure: ESOPHAGOGASTRODUODENOSCOPY 8:30;  Surgeon: Jacoby Robertson DO;  Location: [facility] ENDOSCOPY;  Service: Gastroenterology;  Laterality: N/A;   • EYE SURGERY Bilateral     cataracts removed with implants   • HAMMER TOE REPAIR Left 12/18/2019    Procedure: FIFTH METATARSAL EXOSTECTOMY, FOURTH HAMMER TOE CORRECTION, GASTROCNEMIUS RECESSION;  Surgeon: Jacoby Serrano DPM;  Location: [facility] OR;  Service: Podiatry   • SHOULDER ARTHROSCOPY Left 2/7/2020    Procedure: LEFT SHOULDER ARTHROSCOPY with rotator cuff repair, DEDRICK procedure, and Subacromial decompression;  Surgeon: Troy Barillas MD;  Location: [facility] OR;  Service: Orthopedics;  Laterality: Left;   • ULNAR TUNNEL RELEASE Bilateral          Family History   Problem Relation Age of Onset   • No Known Problems Other    • Hypertension Mother    • Heart attack Father    • No Known Problems Sister    • No Known Problems Brother    • No Known Problems Daughter    • No Known Problems Son    • No Known Problems Maternal Aunt    • No Known Problems Maternal Uncle    • No Known Problems Paternal Aunt    • No Known Problems Paternal Uncle    • Cancer Maternal Grandmother    • Hodgkin's lymphoma Maternal Grandmother    • No Known Problems Maternal Grandfather    • No Known Problems Paternal Grandmother    • No Known Problems Paternal Grandfather          Social History     Socioeconomic History   • Marital status: Single   Tobacco Use   • Smoking status: Never Smoker   • Smokeless tobacco: Never Used   Vaping Use   • Vaping Use: Never used   Substance and Sexual Activity   • Alcohol use: No   • Drug use: No   • Sexual activity: Defer         Current Outpatient Medications   Medication Sig Dispense Refill   • albuterol sulfate  (90 Base)  "MCG/ACT inhaler Inhale 2 puffs Every 4 (Four) Hours As Needed for Wheezing. 1 g 0   • azelastine (ASTELIN) 0.1 % nasal spray azelastine 137 mcg (0.1 %) nasal spray aerosol     • azithromycin (Zithromax Z-Yonas) 250 MG tablet Take 2 po now and 1 po d2-5 6 tablet 0   • B-D UF III MINI PEN NEEDLES 31G X 5 MM misc USE FOUR TIMES A DAY WITH INSULIN PENS 360 each 2   • brompheniramine-pseudoephedrine-DM 30-2-10 MG/5ML syrup Take 5 mL by mouth 4 (Four) Times a Day As Needed for Congestion, Cough or Allergies. 118 mL 0   • cetirizine (zyrTEC) 10 MG tablet Take 10 mg by mouth Daily.     • dexlansoprazole (DEXILANT) 60 MG capsule Take 60 mg by mouth Daily As Needed.     • docusate sodium (COLACE) 100 MG capsule Take 1 capsule by mouth 2 (Two) Times a Day As Needed for Constipation. 60 capsule 2   • doxycycline (VIBRAMYCIN) 100 MG capsule Take 1 capsule by mouth 2 (Two) Times a Day. 20 capsule 0   • famotidine (PEPCID) 20 MG tablet Take 20 mg by mouth Daily.     • folic acid (FOLVITE) 1 MG tablet Take 1 tablet by mouth Daily. 90 tablet 1   • furosemide (LASIX) 40 MG tablet TAKE 1 TABLET EVERY DAY 60 tablet 0   • gabapentin (NEURONTIN) 300 MG capsule Take 1 capsule by mouth 4 (Four) Times a Day As Needed.     • HYDROcodone-acetaminophen (NORCO)  MG per tablet Take 1 tablet by mouth Every 6 (Six) Hours As Needed.     • lisinopril-hydrochlorothiazide (PRINZIDE,ZESTORETIC) 20-25 MG per tablet Take 1 tablet by mouth Daily. 90 tablet 1   • ONETOUCH VERIO test strip      • phentermine 30 MG capsule Take 30 mg by mouth Every Morning. Take 1/2 tablet as needed every morning.     • rosuvastatin (CRESTOR) 20 MG tablet Take 1 tablet by mouth Every Night. 90 tablet 1   • vitamin B-12 (CYANOCOBALAMIN) 1000 MCG tablet Take 1 tablet by mouth Daily. 90 tablet 1     No current facility-administered medications for this visit.         OBJECTIVE    Pulse 96   Ht 182.9 cm (72\")   Wt 106 kg (233 lb)   SpO2 96%   BMI 31.60 kg/m²       Review " of Systems   Constitutional: Negative.    HENT: Negative.    Eyes: Negative.    Respiratory: Negative.    Cardiovascular: Negative.    Gastrointestinal: Negative.    Endocrine: Negative.    Genitourinary: Negative.    Musculoskeletal: Positive for back pain.   Skin: Positive for wound.   Allergic/Immunologic: Negative.    Neurological: Negative.    Hematological: Negative.    Psychiatric/Behavioral: Negative.          Diabetic Foot Exam Performed and Monofilament Test Performed       Constitutional: he appears well-developed and well-nourished.   HEENT: Normocephalic. Atraumatic  CV: No tenderness. RRR  Resp: Non-labored respiration. No wheezes.   Psychiatric: he has a normal mood and affect. his   behavior is normal.      Lower Extremity Exam:  Vascular: DP/PT pulses palpable 2+.   Positive hair growth.   No perimalleolar edema  Neuro: Protective sensation Diminished, b/l.  Light touch sensation diminished, b/l  DTRs intact  Integument: Anterior venous leg ulcer predominantly healed, scabbed over  Ulceration to plantar medial left heel, full-thickness   Following debridement total wound surface measures 6.5 x 4.0 x 1.0 cm.  Increasing granular base.  No masses  Webspaces c/d/i  Musculoskeletal: LE muscle strength 4/5  Gait Normal  Mild hammertoe deformity toes 2 through 4 on right.  Ankle ROM full without pain or crepitus, b/l      Left heel wound debridement:  Risks and benefits discussed.  Left heel ulcer debrided of surrounding hyperkeratosis and devitalized tissue with iris scissors to level of subq  Pressure hemostasis obtained  Medihoney ointment and dsd applied.      ASSESSMENT AND PLAN    Diagnoses and all orders for this visit:    1. Skin ulcer of left heel with fat layer exposed (HCC) (Primary)    2. Diabetic polyneuropathy associated with type 2 diabetes mellitus (HCC)        -Wound debridement as above  -Continue partial weightbearing and offloading cam boot.  Stressed importance of offloading, walker  use.  Continue vascular boot for additional offloading while at rest.  -Again discussed possible transition to wound VAC therapy, however patient would like to hold off for now.  -Recheck 1 week for wound check            This document has been electronically signed by Jacoby Serrano DPM on June 7, 2022 12:54 CDT     EMR Dragon/Transcription disclaimer:   Much of this encounter note is an electronic transcription/translation of spoken language to printed text. The electronic translation of spoken language may permit erroneous, or at times, nonsensical words or phrases to be inadvertently transcribed; Although I have reviewed the note for such errors, some may still exist.    Jacoby Serrano DPM  6/7/2022  12:54 CDT

## 2022-06-08 ENCOUNTER — HOME CARE VISIT (OUTPATIENT)
Dept: HOME HEALTH SERVICES | Facility: CLINIC | Age: 72
End: 2022-06-08

## 2022-06-08 VITALS
RESPIRATION RATE: 18 BRPM | TEMPERATURE: 98 F | HEART RATE: 106 BPM | SYSTOLIC BLOOD PRESSURE: 142 MMHG | OXYGEN SATURATION: 99 % | DIASTOLIC BLOOD PRESSURE: 88 MMHG

## 2022-06-08 PROCEDURE — G0299 HHS/HOSPICE OF RN EA 15 MIN: HCPCS

## 2022-06-10 ENCOUNTER — HOME CARE VISIT (OUTPATIENT)
Dept: HOME HEALTH SERVICES | Facility: CLINIC | Age: 72
End: 2022-06-10

## 2022-06-10 PROCEDURE — G0299 HHS/HOSPICE OF RN EA 15 MIN: HCPCS

## 2022-06-13 VITALS
OXYGEN SATURATION: 99 % | RESPIRATION RATE: 18 BRPM | SYSTOLIC BLOOD PRESSURE: 138 MMHG | TEMPERATURE: 98.2 F | DIASTOLIC BLOOD PRESSURE: 82 MMHG | HEART RATE: 102 BPM

## 2022-06-14 ENCOUNTER — OFFICE VISIT (OUTPATIENT)
Dept: PODIATRY | Facility: CLINIC | Age: 72
End: 2022-06-14

## 2022-06-14 ENCOUNTER — TELEPHONE (OUTPATIENT)
Dept: PODIATRY | Facility: CLINIC | Age: 72
End: 2022-06-14

## 2022-06-14 VITALS — OXYGEN SATURATION: 99 % | HEIGHT: 72 IN | WEIGHT: 233 LBS | BODY MASS INDEX: 31.56 KG/M2 | HEART RATE: 103 BPM

## 2022-06-14 DIAGNOSIS — L97.422 SKIN ULCER OF LEFT HEEL WITH FAT LAYER EXPOSED: Primary | ICD-10-CM

## 2022-06-14 DIAGNOSIS — E11.42 DIABETIC POLYNEUROPATHY ASSOCIATED WITH TYPE 2 DIABETES MELLITUS: ICD-10-CM

## 2022-06-14 PROCEDURE — 11042 DBRDMT SUBQ TIS 1ST 20SQCM/<: CPT | Performed by: PODIATRIST

## 2022-06-14 PROCEDURE — 11045 DBRDMT SUBQ TISS EACH ADDL: CPT | Performed by: PODIATRIST

## 2022-06-14 NOTE — TELEPHONE ENCOUNTER
DEISY CAVAZOS, [facility], REQUESTS A CALL BACK RE NEEDS HOME CARE ORDERS FOR PT.  CALL BACK # [phone].  THANK YOU.

## 2022-06-14 NOTE — PROGRESS NOTES
Jadiel Dutton  1950  71 y.o. male   PCP- Shayy Banda , MARY  4/18/22  BS: 99 per pt    Patient returns today for a follow up on wounds on the left lower leg and left foot.    6/14/2022    Chief Complaint   Patient presents with   • Left Foot - Follow-up, Wound Check         History of Present Illness     Jadiel Dutton is a 71 y.o. male with history of diabetes who presents for f/u evaluation of left heel ulceration.  Past Medical History:   Diagnosis Date   • Abdominal pain    • Abnormal weight loss    • Acute bronchitis    • Anxiety state    • Benign essential hypertension    • Chronic sinusitis    • Constipation    • Cystitis    • Degenerative joint disease involving multiple joints    • Diabetic neuropathy (HCC)      bilateral hands      • Diastolic dysfunction    • Diverticular disease of colon    • Dyspnea    • Epigastric pain    • Essential hypertension    • Foot ulcer (HCC)    • Gastroesophageal reflux disease    • Generalized anxiety disorder    • Hyperlipidemia    • Inflammatory bowel disease     Possible Inflammatory Bowel Disease      • Lumbar pain     Pain radiating to lumbar region of back   n      • Pleuritic pain    • Radiculopathy     site unspecified      • Type 2 diabetes mellitus (HCC)    • Vesicular eczema of hands and feet          Past Surgical History:   Procedure Laterality Date   • BACK SURGERY      LOWER BACK SURGERY   • CARPAL TUNNEL RELEASE Bilateral    • COLONOSCOPY  06/11/2012    Internal & external hemorrhoids found.   • COLONOSCOPY N/A 8/22/2017    Procedure: COLONOSCOPY;  Surgeon: Cesar Hollis MD;  Location: [facility] ENDOSCOPY;  Service:    • COLONOSCOPY N/A 3/1/2022    Procedure: COLONOSCOPY 8:30;  Surgeon: Jacoby Robertson DO;  Location: [facility] ENDOSCOPY;  Service: Gastroenterology;  Laterality: N/A;   • DEQUERVAIN RELEASE Bilateral    • ENDOSCOPY  06/11/2012    Slight stricture in the distal esophagus. Gastritis in stomach. Biopsy taken. Normal duodenum.   •  ENDOSCOPY     • ENDOSCOPY N/A 8/22/2017    Procedure: ESOPHAGOGASTRODUODENOSCOPY possible dilation ;  Surgeon: Cesar Hollis MD;  Location: [facility] ENDOSCOPY;  Service:    • ENDOSCOPY N/A 3/1/2022    Procedure: ESOPHAGOGASTRODUODENOSCOPY 8:30;  Surgeon: Jacoby Robertson DO;  Location: [facility] ENDOSCOPY;  Service: Gastroenterology;  Laterality: N/A;   • EYE SURGERY Bilateral     cataracts removed with implants   • HAMMER TOE REPAIR Left 12/18/2019    Procedure: FIFTH METATARSAL EXOSTECTOMY, FOURTH HAMMER TOE CORRECTION, GASTROCNEMIUS RECESSION;  Surgeon: Jacoby Serarno DPM;  Location: [facility] OR;  Service: Podiatry   • SHOULDER ARTHROSCOPY Left 2/7/2020    Procedure: LEFT SHOULDER ARTHROSCOPY with rotator cuff repair, DEDRICK procedure, and Subacromial decompression;  Surgeon: Troy Barillas MD;  Location: [facility] OR;  Service: Orthopedics;  Laterality: Left;   • ULNAR TUNNEL RELEASE Bilateral          Family History   Problem Relation Age of Onset   • No Known Problems Other    • Hypertension Mother    • Heart attack Father    • No Known Problems Sister    • No Known Problems Brother    • No Known Problems Daughter    • No Known Problems Son    • No Known Problems Maternal Aunt    • No Known Problems Maternal Uncle    • No Known Problems Paternal Aunt    • No Known Problems Paternal Uncle    • Cancer Maternal Grandmother    • Hodgkin's lymphoma Maternal Grandmother    • No Known Problems Maternal Grandfather    • No Known Problems Paternal Grandmother    • No Known Problems Paternal Grandfather          Social History     Socioeconomic History   • Marital status: Single   Tobacco Use   • Smoking status: Never Smoker   • Smokeless tobacco: Never Used   Vaping Use   • Vaping Use: Never used   Substance and Sexual Activity   • Alcohol use: No   • Drug use: No   • Sexual activity: Defer         Current Outpatient Medications   Medication Sig Dispense Refill   • albuterol sulfate  (90 Base)  "MCG/ACT inhaler Inhale 2 puffs Every 4 (Four) Hours As Needed for Wheezing. 1 g 0   • azelastine (ASTELIN) 0.1 % nasal spray azelastine 137 mcg (0.1 %) nasal spray aerosol     • azithromycin (Zithromax Z-Yonas) 250 MG tablet Take 2 po now and 1 po d2-5 6 tablet 0   • B-D UF III MINI PEN NEEDLES 31G X 5 MM misc USE FOUR TIMES A DAY WITH INSULIN PENS 360 each 2   • brompheniramine-pseudoephedrine-DM 30-2-10 MG/5ML syrup Take 5 mL by mouth 4 (Four) Times a Day As Needed for Congestion, Cough or Allergies. 118 mL 0   • cetirizine (zyrTEC) 10 MG tablet Take 10 mg by mouth Daily.     • dexlansoprazole (DEXILANT) 60 MG capsule Take 60 mg by mouth Daily As Needed.     • docusate sodium (COLACE) 100 MG capsule Take 1 capsule by mouth 2 (Two) Times a Day As Needed for Constipation. 60 capsule 2   • doxycycline (VIBRAMYCIN) 100 MG capsule Take 1 capsule by mouth 2 (Two) Times a Day. 20 capsule 0   • famotidine (PEPCID) 20 MG tablet Take 20 mg by mouth Daily.     • folic acid (FOLVITE) 1 MG tablet Take 1 tablet by mouth Daily. 90 tablet 1   • furosemide (LASIX) 40 MG tablet TAKE 1 TABLET EVERY DAY 60 tablet 0   • gabapentin (NEURONTIN) 300 MG capsule Take 1 capsule by mouth 4 (Four) Times a Day As Needed.     • HYDROcodone-acetaminophen (NORCO)  MG per tablet Take 1 tablet by mouth Every 6 (Six) Hours As Needed.     • lisinopril-hydrochlorothiazide (PRINZIDE,ZESTORETIC) 20-25 MG per tablet Take 1 tablet by mouth Daily. 90 tablet 1   • ONETOUCH VERIO test strip      • phentermine 30 MG capsule Take 30 mg by mouth Every Morning. Take 1/2 tablet as needed every morning.     • rosuvastatin (CRESTOR) 20 MG tablet Take 1 tablet by mouth Every Night. 90 tablet 1   • vitamin B-12 (CYANOCOBALAMIN) 1000 MCG tablet Take 1 tablet by mouth Daily. 90 tablet 1     No current facility-administered medications for this visit.         OBJECTIVE    Pulse 103   Ht 182.9 cm (72\")   Wt 106 kg (233 lb)   SpO2 99%   BMI 31.60 kg/m² "       Review of Systems   Constitutional: Negative.    HENT: Negative.    Eyes: Negative.    Respiratory: Negative.    Cardiovascular: Negative.    Gastrointestinal: Negative.    Endocrine: Negative.    Genitourinary: Negative.    Musculoskeletal: Positive for back pain.   Skin: Positive for wound.   Allergic/Immunologic: Negative.    Neurological: Negative.    Hematological: Negative.    Psychiatric/Behavioral: Negative.          Diabetic Foot Exam Performed and Monofilament Test Performed       Constitutional: he appears well-developed and well-nourished.   HEENT: Normocephalic. Atraumatic  CV: No tenderness. RRR  Resp: Non-labored respiration. No wheezes.   Psychiatric: he has a normal mood and affect. his   behavior is normal.      Lower Extremity Exam:  Vascular: DP/PT pulses palpable 2+.   Positive hair growth.   No perimalleolar edema  Neuro: Protective sensation Diminished, b/l.  Light touch sensation diminished, b/l  DTRs intact  Integument: Anterior venous leg ulcer predominantly healed, scabbed over  Ulceration to plantar medial left heel, full-thickness   Following debridement total wound surface measures 5.5 x 5.0 x 1.0 cm.  Increasing granular base.  No masses  Webspaces c/d/i  Musculoskeletal: LE muscle strength 4/5  Gait Normal  Mild hammertoe deformity toes 2 through 4 on right.  Ankle ROM full without pain or crepitus, b/l      Left heel wound debridement:  Risks and benefits discussed.  Left heel ulcer debrided of surrounding hyperkeratosis and devitalized tissue with iris scissors to level of subq  Pressure hemostasis obtained  Medihoney ointment and dsd applied.      ASSESSMENT AND PLAN    Diagnoses and all orders for this visit:    1. Skin ulcer of left heel with fat layer exposed (HCC) (Primary)    2. Diabetic polyneuropathy associated with type 2 diabetes mellitus (HCC)        -Wound debridement as above  -Continue partial weightbearing and offloading cam boot.  Stressed importance of  offloading, walker use.  Continue vascular boot for additional offloading while at rest.  -Again discussed possible transition to wound VAC therapy.  Patient more agreeable today.  Will complete paperwork for insurance coverage.  -Recheck 1 week for wound check            This document has been electronically signed by Jacoby Serrano DPM on June 14, 2022 13:00 CDT     EMR Dragon/Transcription disclaimer:   Much of this encounter note is an electronic transcription/translation of spoken language to printed text. The electronic translation of spoken language may permit erroneous, or at times, nonsensical words or phrases to be inadvertently transcribed; Although I have reviewed the note for such errors, some may still exist.    Jacoby Serrano DPM  6/14/2022  13:00 CDT

## 2022-06-14 NOTE — TELEPHONE ENCOUNTER
Informed Shirley Snow that Jadiel Dutton orders have stayed the same.  That we are working on getting him into a wound vac but as for now dry dressing and off loading.

## 2022-06-15 ENCOUNTER — HOME CARE VISIT (OUTPATIENT)
Dept: HOME HEALTH SERVICES | Facility: CLINIC | Age: 72
End: 2022-06-15

## 2022-06-15 VITALS
HEART RATE: 98 BPM | SYSTOLIC BLOOD PRESSURE: 140 MMHG | RESPIRATION RATE: 18 BRPM | DIASTOLIC BLOOD PRESSURE: 90 MMHG | OXYGEN SATURATION: 99 % | TEMPERATURE: 97.9 F

## 2022-06-15 PROCEDURE — G0299 HHS/HOSPICE OF RN EA 15 MIN: HCPCS

## 2022-06-17 ENCOUNTER — HOME CARE VISIT (OUTPATIENT)
Dept: HOME HEALTH SERVICES | Facility: CLINIC | Age: 72
End: 2022-06-17

## 2022-06-17 PROCEDURE — G0299 HHS/HOSPICE OF RN EA 15 MIN: HCPCS

## 2022-06-20 VITALS
DIASTOLIC BLOOD PRESSURE: 80 MMHG | TEMPERATURE: 98.6 F | HEART RATE: 90 BPM | OXYGEN SATURATION: 99 % | SYSTOLIC BLOOD PRESSURE: 136 MMHG | RESPIRATION RATE: 18 BRPM

## 2022-06-21 ENCOUNTER — OFFICE VISIT (OUTPATIENT)
Dept: PODIATRY | Facility: CLINIC | Age: 72
End: 2022-06-21

## 2022-06-21 VITALS — HEIGHT: 72 IN | WEIGHT: 233 LBS | HEART RATE: 82 BPM | BODY MASS INDEX: 31.56 KG/M2 | OXYGEN SATURATION: 99 %

## 2022-06-21 DIAGNOSIS — E11.42 DIABETIC POLYNEUROPATHY ASSOCIATED WITH TYPE 2 DIABETES MELLITUS: ICD-10-CM

## 2022-06-21 DIAGNOSIS — L97.422 SKIN ULCER OF LEFT HEEL WITH FAT LAYER EXPOSED: Primary | ICD-10-CM

## 2022-06-21 PROCEDURE — 11042 DBRDMT SUBQ TIS 1ST 20SQCM/<: CPT | Performed by: PODIATRIST

## 2022-06-21 PROCEDURE — 11045 DBRDMT SUBQ TISS EACH ADDL: CPT | Performed by: PODIATRIST

## 2022-06-21 NOTE — PROGRESS NOTES
Jadiel Dutton  1950  71 y.o. male   PCP- Shayy Banda , MARY  4/18/22  BS: 97 per pt    Patient returns today for a follow up on wounds on the left lower leg and left foot.    6/21/2022    Chief Complaint   Patient presents with   • Left Foot - Follow-up, Wound Check   • Left Lower Leg - Wound Check         History of Present Illness     Jadiel Dutton is a 71 y.o. male with history of diabetes who presents for f/u evaluation of left heel ulceration.  Past Medical History:   Diagnosis Date   • Abdominal pain    • Abnormal weight loss    • Acute bronchitis    • Anxiety state    • Benign essential hypertension    • Chronic sinusitis    • Constipation    • Cystitis    • Degenerative joint disease involving multiple joints    • Diabetic neuropathy (HCC)      bilateral hands      • Diastolic dysfunction    • Diverticular disease of colon    • Dyspnea    • Epigastric pain    • Essential hypertension    • Foot ulcer (HCC)    • Gastroesophageal reflux disease    • Generalized anxiety disorder    • Hyperlipidemia    • Inflammatory bowel disease     Possible Inflammatory Bowel Disease      • Lumbar pain     Pain radiating to lumbar region of back   n      • Pleuritic pain    • Radiculopathy     site unspecified      • Type 2 diabetes mellitus (HCC)    • Vesicular eczema of hands and feet          Past Surgical History:   Procedure Laterality Date   • BACK SURGERY      LOWER BACK SURGERY   • CARPAL TUNNEL RELEASE Bilateral    • COLONOSCOPY  06/11/2012    Internal & external hemorrhoids found.   • COLONOSCOPY N/A 8/22/2017    Procedure: COLONOSCOPY;  Surgeon: Cesar Hollis MD;  Location: [facility] ENDOSCOPY;  Service:    • COLONOSCOPY N/A 3/1/2022    Procedure: COLONOSCOPY 8:30;  Surgeon: Jacoby Robertson DO;  Location: [facility] ENDOSCOPY;  Service: Gastroenterology;  Laterality: N/A;   • DEQUERVAIN RELEASE Bilateral    • ENDOSCOPY  06/11/2012    Slight stricture in the distal esophagus. Gastritis in stomach.  Biopsy taken. Normal duodenum.   • ENDOSCOPY     • ENDOSCOPY N/A 8/22/2017    Procedure: ESOPHAGOGASTRODUODENOSCOPY possible dilation ;  Surgeon: Cesar Hollis MD;  Location: [facility] ENDOSCOPY;  Service:    • ENDOSCOPY N/A 3/1/2022    Procedure: ESOPHAGOGASTRODUODENOSCOPY 8:30;  Surgeon: Jacoby Robertson DO;  Location: [facility] ENDOSCOPY;  Service: Gastroenterology;  Laterality: N/A;   • EYE SURGERY Bilateral     cataracts removed with implants   • HAMMER TOE REPAIR Left 12/18/2019    Procedure: FIFTH METATARSAL EXOSTECTOMY, FOURTH HAMMER TOE CORRECTION, GASTROCNEMIUS RECESSION;  Surgeon: Jacoby Serrano DPM;  Location: [facility] OR;  Service: Podiatry   • SHOULDER ARTHROSCOPY Left 2/7/2020    Procedure: LEFT SHOULDER ARTHROSCOPY with rotator cuff repair, DEDRICK procedure, and Subacromial decompression;  Surgeon: Troy Barillas MD;  Location: [facility] OR;  Service: Orthopedics;  Laterality: Left;   • ULNAR TUNNEL RELEASE Bilateral          Family History   Problem Relation Age of Onset   • No Known Problems Other    • Hypertension Mother    • Heart attack Father    • No Known Problems Sister    • No Known Problems Brother    • No Known Problems Daughter    • No Known Problems Son    • No Known Problems Maternal Aunt    • No Known Problems Maternal Uncle    • No Known Problems Paternal Aunt    • No Known Problems Paternal Uncle    • Cancer Maternal Grandmother    • Hodgkin's lymphoma Maternal Grandmother    • No Known Problems Maternal Grandfather    • No Known Problems Paternal Grandmother    • No Known Problems Paternal Grandfather          Social History     Socioeconomic History   • Marital status: Single   Tobacco Use   • Smoking status: Never Smoker   • Smokeless tobacco: Never Used   Vaping Use   • Vaping Use: Never used   Substance and Sexual Activity   • Alcohol use: No   • Drug use: No   • Sexual activity: Defer         Current Outpatient Medications   Medication Sig Dispense Refill   •  "albuterol sulfate  (90 Base) MCG/ACT inhaler Inhale 2 puffs Every 4 (Four) Hours As Needed for Wheezing. 1 g 0   • azelastine (ASTELIN) 0.1 % nasal spray azelastine 137 mcg (0.1 %) nasal spray aerosol     • azithromycin (Zithromax Z-Yonas) 250 MG tablet Take 2 po now and 1 po d2-5 6 tablet 0   • B-D UF III MINI PEN NEEDLES 31G X 5 MM misc USE FOUR TIMES A DAY WITH INSULIN PENS 360 each 2   • brompheniramine-pseudoephedrine-DM 30-2-10 MG/5ML syrup Take 5 mL by mouth 4 (Four) Times a Day As Needed for Congestion, Cough or Allergies. 118 mL 0   • cetirizine (zyrTEC) 10 MG tablet Take 10 mg by mouth Daily.     • dexlansoprazole (DEXILANT) 60 MG capsule Take 60 mg by mouth Daily As Needed.     • docusate sodium (COLACE) 100 MG capsule Take 1 capsule by mouth 2 (Two) Times a Day As Needed for Constipation. 60 capsule 2   • doxycycline (VIBRAMYCIN) 100 MG capsule Take 1 capsule by mouth 2 (Two) Times a Day. 20 capsule 0   • famotidine (PEPCID) 20 MG tablet Take 20 mg by mouth Daily.     • folic acid (FOLVITE) 1 MG tablet Take 1 tablet by mouth Daily. 90 tablet 1   • furosemide (LASIX) 40 MG tablet TAKE 1 TABLET EVERY DAY 60 tablet 0   • gabapentin (NEURONTIN) 300 MG capsule Take 1 capsule by mouth 4 (Four) Times a Day As Needed.     • HYDROcodone-acetaminophen (NORCO)  MG per tablet Take 1 tablet by mouth Every 6 (Six) Hours As Needed.     • lisinopril-hydrochlorothiazide (PRINZIDE,ZESTORETIC) 20-25 MG per tablet Take 1 tablet by mouth Daily. 90 tablet 1   • ONETOUCH VERIO test strip      • phentermine 30 MG capsule Take 30 mg by mouth Every Morning. Take 1/2 tablet as needed every morning.     • rosuvastatin (CRESTOR) 20 MG tablet Take 1 tablet by mouth Every Night. 90 tablet 1   • vitamin B-12 (CYANOCOBALAMIN) 1000 MCG tablet Take 1 tablet by mouth Daily. 90 tablet 1     No current facility-administered medications for this visit.         OBJECTIVE    Pulse 82   Ht 182.9 cm (72\")   Wt 106 kg (233 lb)   SpO2 " 99%   BMI 31.60 kg/m²       Review of Systems   Constitutional: Negative.    HENT: Negative.    Eyes: Negative.    Respiratory: Negative.    Cardiovascular: Negative.    Gastrointestinal: Negative.    Endocrine: Negative.    Genitourinary: Negative.    Musculoskeletal: Positive for back pain.   Skin: Positive for wound.   Allergic/Immunologic: Negative.    Neurological: Negative.    Hematological: Negative.    Psychiatric/Behavioral: Negative.          Diabetic Foot Exam Performed and Monofilament Test Performed       Constitutional: he appears well-developed and well-nourished.   HEENT: Normocephalic. Atraumatic  CV: No tenderness. RRR  Resp: Non-labored respiration. No wheezes.   Psychiatric: he has a normal mood and affect. his   behavior is normal.      Lower Extremity Exam:  Vascular: DP/PT pulses palpable 2+.   Positive hair growth.   No perimalleolar edema  Neuro: Protective sensation Diminished, b/l.  Light touch sensation diminished, b/l  DTRs intact  Integument: Anterior venous leg ulcer predominantly healed, scabbed over  Ulceration to plantar medial left heel, full-thickness   Following debridement total wound surface measures 5.5 x 5.0 x 1.0 cm.  Increasing granular base.  No masses  Webspaces c/d/i  Musculoskeletal: LE muscle strength 4/5  Gait Normal  Mild hammertoe deformity toes 2 through 4 on right.  Ankle ROM full without pain or crepitus, b/l      Left heel wound debridement:  Risks and benefits discussed.  Left heel ulcer debrided of surrounding hyperkeratosis and devitalized tissue with iris scissors to level of subq  Pressure hemostasis obtained        ASSESSMENT AND PLAN    Diagnoses and all orders for this visit:    1. Skin ulcer of left heel with fat layer exposed (HCC) (Primary)    2. Diabetic polyneuropathy associated with type 2 diabetes mellitus (HCC)        -Wound debridement as above.  Wound more granular today.  -[employer] black granular foam wound VAC applied  -Continue partial  weightbearing and offloading cam boot.  Stressed importance of offloading, walker use.  Continue vascular boot for additional offloading while at rest.  -Home health in place for twice weekly wound VAC changes  -Recheck 1 week for wound check            This document has been electronically signed by Jacoby Serrano DPM on June 22, 2022 10:46 CDT     EMR Dragon/Transcription disclaimer:   Much of this encounter note is an electronic transcription/translation of spoken language to printed text. The electronic translation of spoken language may permit erroneous, or at times, nonsensical words or phrases to be inadvertently transcribed; Although I have reviewed the note for such errors, some may still exist.    Jacoby Serrano DPM  6/22/2022  10:46 CDT

## 2022-06-24 ENCOUNTER — HOME CARE VISIT (OUTPATIENT)
Dept: HOME HEALTH SERVICES | Facility: HOME HEALTHCARE | Age: 72
End: 2022-06-24

## 2022-06-25 ENCOUNTER — HOME CARE VISIT (OUTPATIENT)
Dept: HOME HEALTH SERVICES | Facility: CLINIC | Age: 72
End: 2022-06-25

## 2022-06-25 VITALS
SYSTOLIC BLOOD PRESSURE: 146 MMHG | OXYGEN SATURATION: 98 % | TEMPERATURE: 97.9 F | DIASTOLIC BLOOD PRESSURE: 82 MMHG | HEART RATE: 80 BPM | RESPIRATION RATE: 18 BRPM

## 2022-06-25 PROCEDURE — G0299 HHS/HOSPICE OF RN EA 15 MIN: HCPCS

## 2022-06-27 ENCOUNTER — HOME CARE VISIT (OUTPATIENT)
Dept: HOME HEALTH SERVICES | Facility: CLINIC | Age: 72
End: 2022-06-27

## 2022-06-27 PROCEDURE — G0299 HHS/HOSPICE OF RN EA 15 MIN: HCPCS

## 2022-06-28 ENCOUNTER — OFFICE VISIT (OUTPATIENT)
Dept: PODIATRY | Facility: CLINIC | Age: 72
End: 2022-06-28

## 2022-06-28 VITALS — BODY MASS INDEX: 31.56 KG/M2 | WEIGHT: 233 LBS | OXYGEN SATURATION: 98 % | HEART RATE: 91 BPM | HEIGHT: 72 IN

## 2022-06-28 DIAGNOSIS — E11.42 DIABETIC POLYNEUROPATHY ASSOCIATED WITH TYPE 2 DIABETES MELLITUS: ICD-10-CM

## 2022-06-28 DIAGNOSIS — L97.422 SKIN ULCER OF LEFT HEEL WITH FAT LAYER EXPOSED: Primary | ICD-10-CM

## 2022-06-28 PROCEDURE — 11042 DBRDMT SUBQ TIS 1ST 20SQCM/<: CPT | Performed by: PODIATRIST

## 2022-06-28 PROCEDURE — 11045 DBRDMT SUBQ TISS EACH ADDL: CPT | Performed by: PODIATRIST

## 2022-06-28 NOTE — PROGRESS NOTES
Jadiel Dutton  1950  71 y.o. male   PCP- Shayy Banda , MARY  4/18/22  BS: 97 per pt    Patient return to clinic for left heel wound. Wound Vac change.   6/28/2022      Chief Complaint   Patient presents with   • Left Foot - Wound Check         History of Present Illness     Jadiel Dutton is a 71 y.o. male with history of diabetes who presents for f/u evaluation of left heel ulceration.  Has wound VAC in place.  Past Medical History:   Diagnosis Date   • Abdominal pain    • Abnormal weight loss    • Acute bronchitis    • Anxiety state    • Benign essential hypertension    • Chronic sinusitis    • Constipation    • Cystitis    • Degenerative joint disease involving multiple joints    • Diabetic neuropathy (HCC)      bilateral hands      • Diastolic dysfunction    • Diverticular disease of colon    • Dyspnea    • Epigastric pain    • Essential hypertension    • Foot ulcer (HCC)    • Gastroesophageal reflux disease    • Generalized anxiety disorder    • Hyperlipidemia    • Inflammatory bowel disease     Possible Inflammatory Bowel Disease      • Lumbar pain     Pain radiating to lumbar region of back   n      • Pleuritic pain    • Radiculopathy     site unspecified      • Type 2 diabetes mellitus (HCC)    • Vesicular eczema of hands and feet          Past Surgical History:   Procedure Laterality Date   • BACK SURGERY      LOWER BACK SURGERY   • CARPAL TUNNEL RELEASE Bilateral    • COLONOSCOPY  06/11/2012    Internal & external hemorrhoids found.   • COLONOSCOPY N/A 8/22/2017    Procedure: COLONOSCOPY;  Surgeon: Cesar Hollis MD;  Location: [facility] ENDOSCOPY;  Service:    • COLONOSCOPY N/A 3/1/2022    Procedure: COLONOSCOPY 8:30;  Surgeon: Jacoby Robertson DO;  Location: [facility] ENDOSCOPY;  Service: Gastroenterology;  Laterality: N/A;   • DEQUERVAIN RELEASE Bilateral    • ENDOSCOPY  06/11/2012    Slight stricture in the distal esophagus. Gastritis in stomach. Biopsy taken. Normal duodenum.   •  ENDOSCOPY     • ENDOSCOPY N/A 8/22/2017    Procedure: ESOPHAGOGASTRODUODENOSCOPY possible dilation ;  Surgeon: Cesar Hollis MD;  Location: [facility] ENDOSCOPY;  Service:    • ENDOSCOPY N/A 3/1/2022    Procedure: ESOPHAGOGASTRODUODENOSCOPY 8:30;  Surgeon: Jacoby Robertson DO;  Location: [facility] ENDOSCOPY;  Service: Gastroenterology;  Laterality: N/A;   • EYE SURGERY Bilateral     cataracts removed with implants   • HAMMER TOE REPAIR Left 12/18/2019    Procedure: FIFTH METATARSAL EXOSTECTOMY, FOURTH HAMMER TOE CORRECTION, GASTROCNEMIUS RECESSION;  Surgeon: Jacoby Serrano DPM;  Location: [facility] OR;  Service: Podiatry   • SHOULDER ARTHROSCOPY Left 2/7/2020    Procedure: LEFT SHOULDER ARTHROSCOPY with rotator cuff repair, DEDRICK procedure, and Subacromial decompression;  Surgeon: Troy Barillas MD;  Location: [facility] OR;  Service: Orthopedics;  Laterality: Left;   • ULNAR TUNNEL RELEASE Bilateral          Family History   Problem Relation Age of Onset   • No Known Problems Other    • Hypertension Mother    • Heart attack Father    • No Known Problems Sister    • No Known Problems Brother    • No Known Problems Daughter    • No Known Problems Son    • No Known Problems Maternal Aunt    • No Known Problems Maternal Uncle    • No Known Problems Paternal Aunt    • No Known Problems Paternal Uncle    • Cancer Maternal Grandmother    • Hodgkin's lymphoma Maternal Grandmother    • No Known Problems Maternal Grandfather    • No Known Problems Paternal Grandmother    • No Known Problems Paternal Grandfather          Social History     Socioeconomic History   • Marital status: Single   Tobacco Use   • Smoking status: Never Smoker   • Smokeless tobacco: Never Used   Vaping Use   • Vaping Use: Never used   Substance and Sexual Activity   • Alcohol use: No   • Drug use: No   • Sexual activity: Defer         Current Outpatient Medications   Medication Sig Dispense Refill   • albuterol sulfate  (90 Base)  "MCG/ACT inhaler Inhale 2 puffs Every 4 (Four) Hours As Needed for Wheezing. 1 g 0   • azelastine (ASTELIN) 0.1 % nasal spray azelastine 137 mcg (0.1 %) nasal spray aerosol     • azithromycin (Zithromax Z-Yonas) 250 MG tablet Take 2 po now and 1 po d2-5 6 tablet 0   • B-D UF III MINI PEN NEEDLES 31G X 5 MM misc USE FOUR TIMES A DAY WITH INSULIN PENS 360 each 2   • brompheniramine-pseudoephedrine-DM 30-2-10 MG/5ML syrup Take 5 mL by mouth 4 (Four) Times a Day As Needed for Congestion, Cough or Allergies. 118 mL 0   • cetirizine (zyrTEC) 10 MG tablet Take 10 mg by mouth Daily.     • dexlansoprazole (DEXILANT) 60 MG capsule Take 60 mg by mouth Daily As Needed.     • docusate sodium (COLACE) 100 MG capsule Take 1 capsule by mouth 2 (Two) Times a Day As Needed for Constipation. 60 capsule 2   • doxycycline (VIBRAMYCIN) 100 MG capsule Take 1 capsule by mouth 2 (Two) Times a Day. 20 capsule 0   • famotidine (PEPCID) 20 MG tablet Take 20 mg by mouth Daily.     • folic acid (FOLVITE) 1 MG tablet Take 1 tablet by mouth Daily. 90 tablet 1   • furosemide (LASIX) 40 MG tablet TAKE 1 TABLET EVERY DAY 60 tablet 0   • gabapentin (NEURONTIN) 300 MG capsule Take 1 capsule by mouth 4 (Four) Times a Day As Needed.     • HYDROcodone-acetaminophen (NORCO)  MG per tablet Take 1 tablet by mouth Every 6 (Six) Hours As Needed.     • lisinopril-hydrochlorothiazide (PRINZIDE,ZESTORETIC) 20-25 MG per tablet Take 1 tablet by mouth Daily. 90 tablet 1   • ONETOUCH VERIO test strip      • phentermine 30 MG capsule Take 30 mg by mouth Every Morning. Take 1/2 tablet as needed every morning.     • rosuvastatin (CRESTOR) 20 MG tablet Take 1 tablet by mouth Every Night. 90 tablet 1   • vitamin B-12 (CYANOCOBALAMIN) 1000 MCG tablet Take 1 tablet by mouth Daily. 90 tablet 1     No current facility-administered medications for this visit.         OBJECTIVE    Pulse 91   Ht 182.9 cm (72\")   Wt 106 kg (233 lb)   SpO2 98%   BMI 31.60 kg/m²       Review " of Systems   Constitutional: Negative.    HENT: Negative.    Eyes: Negative.    Respiratory: Negative.    Cardiovascular: Negative.    Gastrointestinal: Negative.    Endocrine: Negative.    Genitourinary: Negative.    Musculoskeletal: Positive for back pain.   Skin: Positive for wound.   Allergic/Immunologic: Negative.    Neurological: Negative.    Hematological: Negative.    Psychiatric/Behavioral: Negative.          Diabetic Foot Exam Performed and Monofilament Test Performed       Constitutional: he appears well-developed and well-nourished.   HEENT: Normocephalic. Atraumatic  CV: No tenderness. RRR  Resp: Non-labored respiration. No wheezes.   Psychiatric: he has a normal mood and affect. his   behavior is normal.      Lower Extremity Exam:  Vascular: DP/PT pulses palpable 2+.   Positive hair growth.   No perimalleolar edema  Neuro: Protective sensation Diminished, b/l.  Light touch sensation diminished, b/l  DTRs intact  Integument: Anterior venous leg ulcer predominantly healed, scabbed over  Ulceration to plantar medial left heel, full-thickness   Following debridement total wound surface measures 6.8 x 4.0 x 0.8 cm.  Increasing granular base.  No masses  Webspaces c/d/i  Musculoskeletal: LE muscle strength 4/5  Gait Normal  Mild hammertoe deformity toes 2 through 4 on right.  Ankle ROM full without pain or crepitus, b/l      Left heel wound debridement:  Risks and benefits discussed.  Left heel ulcer debrided of surrounding hyperkeratosis and devitalized tissue with iris scissors to level of subq  Pressure hemostasis obtained        ASSESSMENT AND PLAN    Diagnoses and all orders for this visit:    1. Skin ulcer of left heel with fat layer exposed (HCC) (Primary)    2. Diabetic polyneuropathy associated with type 2 diabetes mellitus (HCC)        -Wound debridement as above.  Wound more granular today.  -KCI black granular foam wound VAC applied  -Continue partial weightbearing and offloading cam boot.   Stressed importance of offloading, walker use.  Continue vascular boot for additional offloading while at rest.  -Home health in place for twice weekly wound VAC changes  -Recheck 1 week for wound check            This document has been electronically signed by Jacoby Serrano DPM on June 28, 2022 13:14 CDT     EMR Dragon/Transcription disclaimer:   Much of this encounter note is an electronic transcription/translation of spoken language to printed text. The electronic translation of spoken language may permit erroneous, or at times, nonsensical words or phrases to be inadvertently transcribed; Although I have reviewed the note for such errors, some may still exist.    Jacoby Serrano DPM  6/28/2022  13:14 CDT

## 2022-06-29 VITALS
RESPIRATION RATE: 18 BRPM | TEMPERATURE: 98.1 F | OXYGEN SATURATION: 99 % | DIASTOLIC BLOOD PRESSURE: 80 MMHG | SYSTOLIC BLOOD PRESSURE: 136 MMHG | HEART RATE: 88 BPM

## 2022-06-30 ENCOUNTER — HOME CARE VISIT (OUTPATIENT)
Dept: HOME HEALTH SERVICES | Facility: HOME HEALTHCARE | Age: 72
End: 2022-06-30

## 2022-06-30 ENCOUNTER — TELEPHONE (OUTPATIENT)
Dept: PODIATRY | Facility: CLINIC | Age: 72
End: 2022-06-30

## 2022-06-30 ENCOUNTER — HOME CARE VISIT (OUTPATIENT)
Dept: HOME HEALTH SERVICES | Facility: CLINIC | Age: 72
End: 2022-06-30

## 2022-06-30 VITALS
HEART RATE: 82 BPM | DIASTOLIC BLOOD PRESSURE: 78 MMHG | TEMPERATURE: 97.9 F | OXYGEN SATURATION: 99 % | RESPIRATION RATE: 18 BRPM | SYSTOLIC BLOOD PRESSURE: 144 MMHG

## 2022-06-30 PROCEDURE — G0299 HHS/HOSPICE OF RN EA 15 MIN: HCPCS

## 2022-06-30 NOTE — CASE COMMUNICATION
Spoke with Bety in Dr Schrader office to ensure photos of wound taken 06/30/22 were reviewd. Discussed maceration to periwound as well as impression of trac pad to heel.  Bety states that wound bed looks good and she would make sure Dr Serrano views pictures

## 2022-07-02 ENCOUNTER — HOME CARE VISIT (OUTPATIENT)
Dept: HOME HEALTH SERVICES | Facility: CLINIC | Age: 72
End: 2022-07-02

## 2022-07-02 VITALS
RESPIRATION RATE: 18 BRPM | SYSTOLIC BLOOD PRESSURE: 130 MMHG | HEART RATE: 100 BPM | DIASTOLIC BLOOD PRESSURE: 82 MMHG | OXYGEN SATURATION: 99 % | TEMPERATURE: 97.9 F

## 2022-07-02 PROCEDURE — G0299 HHS/HOSPICE OF RN EA 15 MIN: HCPCS

## 2022-07-05 ENCOUNTER — OFFICE VISIT (OUTPATIENT)
Dept: PODIATRY | Facility: CLINIC | Age: 72
End: 2022-07-05

## 2022-07-05 VITALS — WEIGHT: 233 LBS | OXYGEN SATURATION: 98 % | BODY MASS INDEX: 31.56 KG/M2 | HEART RATE: 119 BPM | HEIGHT: 72 IN

## 2022-07-05 DIAGNOSIS — E11.42 DIABETIC POLYNEUROPATHY ASSOCIATED WITH TYPE 2 DIABETES MELLITUS: ICD-10-CM

## 2022-07-05 DIAGNOSIS — L97.422 SKIN ULCER OF LEFT HEEL WITH FAT LAYER EXPOSED: Primary | ICD-10-CM

## 2022-07-05 PROCEDURE — 11042 DBRDMT SUBQ TIS 1ST 20SQCM/<: CPT | Performed by: PODIATRIST

## 2022-07-05 NOTE — PROGRESS NOTES
Jadiel Dutton  1950  71 y.o. male   PCP- Shayy Banda , APRN  4/18/22  BS: 92 per pt    Patient return to clinic for left heel wound.    07/05/2022        Chief Complaint   Patient presents with   • Left Foot - Follow-up, Wound Check         History of Present Illness     Jadiel Dutton is a 71 y.o. male with history of diabetes who presents for f/u evaluation of left heel ulceration.  Wound VAC removed over the weekend due to skin maceration.  Past Medical History:   Diagnosis Date   • Abdominal pain    • Abnormal weight loss    • Acute bronchitis    • Anxiety state    • Benign essential hypertension    • Chronic sinusitis    • Constipation    • Cystitis    • Degenerative joint disease involving multiple joints    • Diabetic neuropathy (HCC)      bilateral hands      • Diastolic dysfunction    • Diverticular disease of colon    • Dyspnea    • Epigastric pain    • Essential hypertension    • Foot ulcer (HCC)    • Gastroesophageal reflux disease    • Generalized anxiety disorder    • Hyperlipidemia    • Inflammatory bowel disease     Possible Inflammatory Bowel Disease      • Lumbar pain     Pain radiating to lumbar region of back   n      • Pleuritic pain    • Radiculopathy     site unspecified      • Type 2 diabetes mellitus (HCC)    • Vesicular eczema of hands and feet          Past Surgical History:   Procedure Laterality Date   • BACK SURGERY      LOWER BACK SURGERY   • CARPAL TUNNEL RELEASE Bilateral    • COLONOSCOPY  06/11/2012    Internal & external hemorrhoids found.   • COLONOSCOPY N/A 8/22/2017    Procedure: COLONOSCOPY;  Surgeon: Cesar Hollis MD;  Location: [facility] ENDOSCOPY;  Service:    • COLONOSCOPY N/A 3/1/2022    Procedure: COLONOSCOPY 8:30;  Surgeon: Jacoby Robertson DO;  Location: [facility] ENDOSCOPY;  Service: Gastroenterology;  Laterality: N/A;   • DEQUERVAIN RELEASE Bilateral    • ENDOSCOPY  06/11/2012    Slight stricture in the distal esophagus. Gastritis in stomach. Biopsy  taken. Normal duodenum.   • ENDOSCOPY     • ENDOSCOPY N/A 8/22/2017    Procedure: ESOPHAGOGASTRODUODENOSCOPY possible dilation ;  Surgeon: Cesar Hollis MD;  Location: [facility] ENDOSCOPY;  Service:    • ENDOSCOPY N/A 3/1/2022    Procedure: ESOPHAGOGASTRODUODENOSCOPY 8:30;  Surgeon: Jacoby Robertson DO;  Location: [facility] ENDOSCOPY;  Service: Gastroenterology;  Laterality: N/A;   • EYE SURGERY Bilateral     cataracts removed with implants   • HAMMER TOE REPAIR Left 12/18/2019    Procedure: FIFTH METATARSAL EXOSTECTOMY, FOURTH HAMMER TOE CORRECTION, GASTROCNEMIUS RECESSION;  Surgeon: Jacoby Serrano DPM;  Location: [facility] OR;  Service: Podiatry   • SHOULDER ARTHROSCOPY Left 2/7/2020    Procedure: LEFT SHOULDER ARTHROSCOPY with rotator cuff repair, DEDRICK procedure, and Subacromial decompression;  Surgeon: Troy Barillas MD;  Location: [facility] OR;  Service: Orthopedics;  Laterality: Left;   • ULNAR TUNNEL RELEASE Bilateral          Family History   Problem Relation Age of Onset   • No Known Problems Other    • Hypertension Mother    • Heart attack Father    • No Known Problems Sister    • No Known Problems Brother    • No Known Problems Daughter    • No Known Problems Son    • No Known Problems Maternal Aunt    • No Known Problems Maternal Uncle    • No Known Problems Paternal Aunt    • No Known Problems Paternal Uncle    • Cancer Maternal Grandmother    • Hodgkin's lymphoma Maternal Grandmother    • No Known Problems Maternal Grandfather    • No Known Problems Paternal Grandmother    • No Known Problems Paternal Grandfather          Social History     Socioeconomic History   • Marital status: Single   Tobacco Use   • Smoking status: Never Smoker   • Smokeless tobacco: Never Used   Vaping Use   • Vaping Use: Never used   Substance and Sexual Activity   • Alcohol use: No   • Drug use: No   • Sexual activity: Defer         Current Outpatient Medications   Medication Sig Dispense Refill   • albuterol  "sulfate  (90 Base) MCG/ACT inhaler Inhale 2 puffs Every 4 (Four) Hours As Needed for Wheezing. 1 g 0   • azelastine (ASTELIN) 0.1 % nasal spray azelastine 137 mcg (0.1 %) nasal spray aerosol     • B-D UF III MINI PEN NEEDLES 31G X 5 MM misc USE FOUR TIMES A DAY WITH INSULIN PENS 360 each 2   • cetirizine (zyrTEC) 10 MG tablet Take 10 mg by mouth Daily.     • dexlansoprazole (DEXILANT) 60 MG capsule Take 60 mg by mouth Daily As Needed.     • docusate sodium (COLACE) 100 MG capsule Take 1 capsule by mouth 2 (Two) Times a Day As Needed for Constipation. 60 capsule 2   • famotidine (PEPCID) 20 MG tablet Take 20 mg by mouth Daily.     • folic acid (FOLVITE) 1 MG tablet Take 1 tablet by mouth Daily. 90 tablet 1   • furosemide (LASIX) 40 MG tablet TAKE 1 TABLET EVERY DAY 60 tablet 0   • gabapentin (NEURONTIN) 300 MG capsule Take 1 capsule by mouth 4 (Four) Times a Day As Needed.     • HYDROcodone-acetaminophen (NORCO)  MG per tablet Take 1 tablet by mouth Every 6 (Six) Hours As Needed.     • lisinopril-hydrochlorothiazide (PRINZIDE,ZESTORETIC) 20-25 MG per tablet Take 1 tablet by mouth Daily. 90 tablet 1   • ONETOUCH VERIO test strip      • rosuvastatin (CRESTOR) 20 MG tablet Take 1 tablet by mouth Every Night. 90 tablet 1   • vitamin B-12 (CYANOCOBALAMIN) 1000 MCG tablet Take 1 tablet by mouth Daily. 90 tablet 1   • meclizine (ANTIVERT) 25 MG tablet Take 1 tablet by mouth 3 (Three) Times a Day As Needed for Dizziness. 30 tablet 0   • ofloxacin (Floxin Otic) 0.3 % otic solution Administer 10 drops into both ears Daily. 10 mL 0     No current facility-administered medications for this visit.         OBJECTIVE    Pulse 119   Ht 182.9 cm (72\")   Wt 106 kg (233 lb)   SpO2 98%   BMI 31.60 kg/m²       Review of Systems   Constitutional: Negative.    HENT: Negative.    Eyes: Negative.    Respiratory: Negative.    Cardiovascular: Negative.    Gastrointestinal: Negative.    Endocrine: Negative.    Genitourinary: " Negative.    Musculoskeletal: Positive for back pain.   Skin: Positive for wound.   Allergic/Immunologic: Negative.    Neurological: Negative.    Hematological: Negative.    Psychiatric/Behavioral: Negative.          Diabetic Foot Exam Performed and Monofilament Test Performed       Constitutional: he appears well-developed and well-nourished.   HEENT: Normocephalic. Atraumatic  CV: No tenderness. RRR  Resp: Non-labored respiration. No wheezes.   Psychiatric: he has a normal mood and affect. his   behavior is normal.      Lower Extremity Exam:  Vascular: DP/PT pulses palpable 2+.   Positive hair growth.   No perimalleolar edema  Neuro: Protective sensation Diminished, b/l.  Light touch sensation diminished, b/l  DTRs intact  Integument: Anterior venous leg ulcer predominantly healed, scabbed over  Ulceration to plantar medial left heel, full-thickness   Following debridement total wound surface measures 6.8 x 4.0 x 0.6 cm.  Increasing granular base.  No masses  Webspaces c/d/i  Musculoskeletal: LE muscle strength 4/5  Gait Normal  Mild hammertoe deformity toes 2 through 4 on right.  Ankle ROM full without pain or crepitus, b/l      Left heel wound debridement:  Risks and benefits discussed.  Left heel ulcer debrided of surrounding hyperkeratosis and devitalized tissue with iris scissors to level of subq  Pressure hemostasis obtained        ASSESSMENT AND PLAN    Diagnoses and all orders for this visit:    1. Skin ulcer of left heel with fat layer exposed (HCC) (Primary)    2. Diabetic polyneuropathy associated with type 2 diabetes mellitus (HCC)        -Wound debridement as above.  Wound more granular today.  -KCI black granular foam wound VAC applied  -Continue partial weightbearing and offloading cam boot.  Stressed importance of offloading, walker use.  Continue vascular boot for additional offloading while at rest.  -Home health in place for twice weekly wound VAC changes  -Recheck 1 week for wound  check            This document has been electronically signed by Jacoby Serrano DPM on July 11, 2022 07:48 CDT     EMR Dragon/Transcription disclaimer:   Much of this encounter note is an electronic transcription/translation of spoken language to printed text. The electronic translation of spoken language may permit erroneous, or at times, nonsensical words or phrases to be inadvertently transcribed; Although I have reviewed the note for such errors, some may still exist.    Jacoby Serrano DPM  7/11/2022  07:48 CDT

## 2022-07-06 ENCOUNTER — OFFICE VISIT (OUTPATIENT)
Dept: FAMILY MEDICINE CLINIC | Facility: CLINIC | Age: 72
End: 2022-07-06

## 2022-07-06 VITALS
SYSTOLIC BLOOD PRESSURE: 120 MMHG | WEIGHT: 210 LBS | BODY MASS INDEX: 28.44 KG/M2 | DIASTOLIC BLOOD PRESSURE: 64 MMHG | HEART RATE: 111 BPM | OXYGEN SATURATION: 96 % | HEIGHT: 72 IN

## 2022-07-06 DIAGNOSIS — R42 DIZZINESS: ICD-10-CM

## 2022-07-06 DIAGNOSIS — H92.03 ACUTE EAR PAIN, BILATERAL: Primary | ICD-10-CM

## 2022-07-06 PROCEDURE — 99213 OFFICE O/P EST LOW 20 MIN: CPT | Performed by: NURSE PRACTITIONER

## 2022-07-06 RX ORDER — MECLIZINE HYDROCHLORIDE 25 MG/1
25 TABLET ORAL 3 TIMES DAILY PRN
Qty: 30 TABLET | Refills: 0 | Status: SHIPPED | OUTPATIENT
Start: 2022-07-06

## 2022-07-06 RX ORDER — OFLOXACIN 3 MG/ML
10 SOLUTION AURICULAR (OTIC) DAILY
Qty: 10 ML | Refills: 0 | Status: SHIPPED | OUTPATIENT
Start: 2022-07-06

## 2022-07-08 ENCOUNTER — HOME CARE VISIT (OUTPATIENT)
Dept: HOME HEALTH SERVICES | Facility: CLINIC | Age: 72
End: 2022-07-08

## 2022-07-08 PROCEDURE — G0299 HHS/HOSPICE OF RN EA 15 MIN: HCPCS

## 2022-07-11 ENCOUNTER — OFFICE VISIT (OUTPATIENT)
Dept: PODIATRY | Facility: CLINIC | Age: 72
End: 2022-07-11

## 2022-07-11 VITALS
HEART RATE: 90 BPM | DIASTOLIC BLOOD PRESSURE: 82 MMHG | TEMPERATURE: 97.7 F | OXYGEN SATURATION: 99 % | SYSTOLIC BLOOD PRESSURE: 124 MMHG | RESPIRATION RATE: 18 BRPM

## 2022-07-11 VITALS — HEART RATE: 109 BPM | OXYGEN SATURATION: 95 % | WEIGHT: 210 LBS | HEIGHT: 72 IN | BODY MASS INDEX: 28.44 KG/M2

## 2022-07-11 DIAGNOSIS — L97.422 SKIN ULCER OF LEFT HEEL WITH FAT LAYER EXPOSED: Primary | ICD-10-CM

## 2022-07-11 DIAGNOSIS — E11.42 DIABETIC POLYNEUROPATHY ASSOCIATED WITH TYPE 2 DIABETES MELLITUS: ICD-10-CM

## 2022-07-11 PROCEDURE — 11042 DBRDMT SUBQ TIS 1ST 20SQCM/<: CPT | Performed by: PODIATRIST

## 2022-07-11 NOTE — CASE COMMUNICATION
60 day summary of care:  SN continues to provide wound care to wound to left heel per MD Serrano's orders.  Patient has now transitioned to wound vac therapy vs prior dressing of medihoney/adaptic/dry gauze/kerlix/ace wrap.  Patient is compliant with wearing protective walking boot per his report and patient noted to always have it on at time of nurses arrival to home for dressing change/wound care.  Patient's wound has not yet healed b ut has improved so goals of wound healing in first certification period were not met requiring the extension of care into a new certification period.  Patient see's MD Serrano again on 7/11/22.

## 2022-07-11 NOTE — PROGRESS NOTES
Jadiel Dutton  1950  71 y.o. male   PCP- Shayy Banda , MARY  4/18/22  BS: 78 per pt    Patient return to clinic for left heel wound.    07/11/2022        Chief Complaint   Patient presents with   • Left Foot - Follow-up, Skin Ulcer         History of Present Illness     Jadiel Dutton is a 71 y.o. male with history of diabetes who presents for f/u evaluation of left heel ulceration.  Wound VAC intact.  Past Medical History:   Diagnosis Date   • Abdominal pain    • Abnormal weight loss    • Acute bronchitis    • Anxiety state    • Benign essential hypertension    • Chronic sinusitis    • Constipation    • Cystitis    • Degenerative joint disease involving multiple joints    • Diabetic neuropathy (HCC)      bilateral hands      • Diastolic dysfunction    • Diverticular disease of colon    • Dyspnea    • Epigastric pain    • Essential hypertension    • Foot ulcer (HCC)    • Gastroesophageal reflux disease    • Generalized anxiety disorder    • Hyperlipidemia    • Inflammatory bowel disease     Possible Inflammatory Bowel Disease      • Lumbar pain     Pain radiating to lumbar region of back   n      • Pleuritic pain    • Radiculopathy     site unspecified      • Type 2 diabetes mellitus (HCC)    • Vesicular eczema of hands and feet          Past Surgical History:   Procedure Laterality Date   • BACK SURGERY      LOWER BACK SURGERY   • CARPAL TUNNEL RELEASE Bilateral    • COLONOSCOPY  06/11/2012    Internal & external hemorrhoids found.   • COLONOSCOPY N/A 8/22/2017    Procedure: COLONOSCOPY;  Surgeon: Cesar Hollis MD;  Location: [facility] ENDOSCOPY;  Service:    • COLONOSCOPY N/A 3/1/2022    Procedure: COLONOSCOPY 8:30;  Surgeon: Jacoby Robertson DO;  Location: [facility] ENDOSCOPY;  Service: Gastroenterology;  Laterality: N/A;   • DEQUERVAIN RELEASE Bilateral    • ENDOSCOPY  06/11/2012    Slight stricture in the distal esophagus. Gastritis in stomach. Biopsy taken. Normal duodenum.   • ENDOSCOPY     •  ENDOSCOPY N/A 8/22/2017    Procedure: ESOPHAGOGASTRODUODENOSCOPY possible dilation ;  Surgeon: Cesar Hollis MD;  Location: [facility] ENDOSCOPY;  Service:    • ENDOSCOPY N/A 3/1/2022    Procedure: ESOPHAGOGASTRODUODENOSCOPY 8:30;  Surgeon: Jacoby Robertson DO;  Location: [facility] ENDOSCOPY;  Service: Gastroenterology;  Laterality: N/A;   • EYE SURGERY Bilateral     cataracts removed with implants   • HAMMER TOE REPAIR Left 12/18/2019    Procedure: FIFTH METATARSAL EXOSTECTOMY, FOURTH HAMMER TOE CORRECTION, GASTROCNEMIUS RECESSION;  Surgeon: Jacoby Serrano DPM;  Location: [facility] OR;  Service: Podiatry   • SHOULDER ARTHROSCOPY Left 2/7/2020    Procedure: LEFT SHOULDER ARTHROSCOPY with rotator cuff repair, DEDRICK procedure, and Subacromial decompression;  Surgeon: Troy Barillas MD;  Location: [facility] OR;  Service: Orthopedics;  Laterality: Left;   • ULNAR TUNNEL RELEASE Bilateral          Family History   Problem Relation Age of Onset   • No Known Problems Other    • Hypertension Mother    • Heart attack Father    • No Known Problems Sister    • No Known Problems Brother    • No Known Problems Daughter    • No Known Problems Son    • No Known Problems Maternal Aunt    • No Known Problems Maternal Uncle    • No Known Problems Paternal Aunt    • No Known Problems Paternal Uncle    • Cancer Maternal Grandmother    • Hodgkin's lymphoma Maternal Grandmother    • No Known Problems Maternal Grandfather    • No Known Problems Paternal Grandmother    • No Known Problems Paternal Grandfather          Social History     Socioeconomic History   • Marital status: Single   Tobacco Use   • Smoking status: Never Smoker   • Smokeless tobacco: Never Used   Vaping Use   • Vaping Use: Never used   Substance and Sexual Activity   • Alcohol use: No   • Drug use: No   • Sexual activity: Defer         Current Outpatient Medications   Medication Sig Dispense Refill   • albuterol sulfate  (90 Base) MCG/ACT inhaler  "Inhale 2 puffs Every 4 (Four) Hours As Needed for Wheezing. 1 g 0   • azelastine (ASTELIN) 0.1 % nasal spray azelastine 137 mcg (0.1 %) nasal spray aerosol     • B-D UF III MINI PEN NEEDLES 31G X 5 MM misc USE FOUR TIMES A DAY WITH INSULIN PENS 360 each 2   • cetirizine (zyrTEC) 10 MG tablet Take 10 mg by mouth Daily.     • dexlansoprazole (DEXILANT) 60 MG capsule Take 60 mg by mouth Daily As Needed.     • docusate sodium (COLACE) 100 MG capsule Take 1 capsule by mouth 2 (Two) Times a Day As Needed for Constipation. 60 capsule 2   • famotidine (PEPCID) 20 MG tablet Take 20 mg by mouth Daily.     • folic acid (FOLVITE) 1 MG tablet Take 1 tablet by mouth Daily. 90 tablet 1   • furosemide (LASIX) 40 MG tablet TAKE 1 TABLET EVERY DAY 60 tablet 0   • gabapentin (NEURONTIN) 300 MG capsule Take 1 capsule by mouth 4 (Four) Times a Day As Needed.     • HYDROcodone-acetaminophen (NORCO)  MG per tablet Take 1 tablet by mouth Every 6 (Six) Hours As Needed.     • lisinopril-hydrochlorothiazide (PRINZIDE,ZESTORETIC) 20-25 MG per tablet Take 1 tablet by mouth Daily. 90 tablet 1   • meclizine (ANTIVERT) 25 MG tablet Take 1 tablet by mouth 3 (Three) Times a Day As Needed for Dizziness. 30 tablet 0   • ofloxacin (Floxin Otic) 0.3 % otic solution Administer 10 drops into both ears Daily. 10 mL 0   • ONETOUCH VERIO test strip      • rosuvastatin (CRESTOR) 20 MG tablet Take 1 tablet by mouth Every Night. 90 tablet 1   • vitamin B-12 (CYANOCOBALAMIN) 1000 MCG tablet Take 1 tablet by mouth Daily. 90 tablet 1     No current facility-administered medications for this visit.         OBJECTIVE    Pulse 109   Ht 182.9 cm (72\")   Wt 95.3 kg (210 lb)   SpO2 95%   BMI 28.48 kg/m²       Review of Systems   Constitutional: Negative.    HENT: Negative.    Eyes: Negative.    Respiratory: Negative.    Cardiovascular: Negative.    Gastrointestinal: Negative.    Endocrine: Negative.    Genitourinary: Negative.    Musculoskeletal: Positive for " back pain.   Skin: Positive for wound.   Allergic/Immunologic: Negative.    Neurological: Negative.    Hematological: Negative.    Psychiatric/Behavioral: Negative.          Diabetic Foot Exam Performed and Monofilament Test Performed       Constitutional: he appears well-developed and well-nourished.   HEENT: Normocephalic. Atraumatic  CV: No tenderness. RRR  Resp: Non-labored respiration. No wheezes.   Psychiatric: he has a normal mood and affect. his   behavior is normal.      Lower Extremity Exam:  Vascular: DP/PT pulses palpable 2+.   Positive hair growth.   No perimalleolar edema  Neuro: Protective sensation Diminished, b/l.  Light touch sensation diminished, b/l  DTRs intact  Integument: Anterior venous leg ulcer predominantly healed, scabbed over  Ulceration to plantar medial left heel, full-thickness   Following debridement total wound surface measures 6.0 x 4.0 x 0.5 cm.  Increasing granular base.  No masses  Webspaces c/d/i  Musculoskeletal: LE muscle strength 4/5  Gait Normal  Mild hammertoe deformity toes 2 through 4 on right.  Ankle ROM full without pain or crepitus, b/l      Left heel wound debridement:  Risks and benefits discussed.  Left heel ulcer debrided of surrounding hyperkeratosis and devitalized tissue with iris scissors to level of subq  Pressure hemostasis obtained        ASSESSMENT AND PLAN    Diagnoses and all orders for this visit:    1. Skin ulcer of left heel with fat layer exposed (HCC) (Primary)    2. Diabetic polyneuropathy associated with type 2 diabetes mellitus (HCC)        -Wound debridement as above.  Wound more granular today.  -KCI black granular foam wound VAC applied  -Continue partial weightbearing and offloading cam boot.  Stressed importance of offloading, walker use.  Continue vascular boot for additional offloading while at rest.  -Home health in place for twice weekly wound VAC changes  -Recheck 1 week for wound check            This document has been electronically  signed by Jacoby Serrano DPM on July 11, 2022 16:51 CDT     EMR Dragon/Transcription disclaimer:   Much of this encounter note is an electronic transcription/translation of spoken language to printed text. The electronic translation of spoken language may permit erroneous, or at times, nonsensical words or phrases to be inadvertently transcribed; Although I have reviewed the note for such errors, some may still exist.    Jacoby Serrano DPM  7/11/2022  16:51 CDT

## 2022-07-12 PROCEDURE — G0179 MD RECERTIFICATION HHA PT: HCPCS | Performed by: PODIATRIST

## 2022-07-13 ENCOUNTER — HOME CARE VISIT (OUTPATIENT)
Dept: HOME HEALTH SERVICES | Facility: CLINIC | Age: 72
End: 2022-07-13

## 2022-07-13 ENCOUNTER — TELEPHONE (OUTPATIENT)
Dept: PODIATRY | Facility: CLINIC | Age: 72
End: 2022-07-13

## 2022-07-13 PROCEDURE — G0299 HHS/HOSPICE OF RN EA 15 MIN: HCPCS

## 2022-07-13 NOTE — TELEPHONE ENCOUNTER
Talked to Jadiel and informed him that I Spoke with Salome with Home Health. Salome said that a Nurse will be out today and he has two more Visits before his Home Health . Told jadiel that we will see him on his appointment Monday on the .

## 2022-07-13 NOTE — TELEPHONE ENCOUNTER
PT REQUESTS A CALL BACK RE PT RECEIVED A LETTER FROM [facility] STATING THAT THE LAST DAY OF THE HOME VISITS WAS 7-12-22.  PT STATES NOONE HAS COME THIS WEEK TO CHECK HIS VAC AND WANTS TO KNOW IF HOME VISITS ARE GOING TO BE CONTINUED FOR HIS TREATMENT.  CALL BACK # [phone].  THANK YOU.

## 2022-07-16 ENCOUNTER — HOME CARE VISIT (OUTPATIENT)
Dept: HOME HEALTH SERVICES | Facility: CLINIC | Age: 72
End: 2022-07-16

## 2022-07-16 VITALS
OXYGEN SATURATION: 98 % | RESPIRATION RATE: 18 BRPM | DIASTOLIC BLOOD PRESSURE: 82 MMHG | TEMPERATURE: 98.2 F | SYSTOLIC BLOOD PRESSURE: 140 MMHG | HEART RATE: 105 BPM

## 2022-07-16 PROCEDURE — G0299 HHS/HOSPICE OF RN EA 15 MIN: HCPCS

## 2022-07-17 VITALS
HEART RATE: 98 BPM | SYSTOLIC BLOOD PRESSURE: 128 MMHG | DIASTOLIC BLOOD PRESSURE: 78 MMHG | TEMPERATURE: 97.9 F | OXYGEN SATURATION: 99 % | RESPIRATION RATE: 18 BRPM

## 2022-07-18 ENCOUNTER — OFFICE VISIT (OUTPATIENT)
Dept: PODIATRY | Facility: CLINIC | Age: 72
End: 2022-07-18

## 2022-07-18 VITALS — HEIGHT: 72 IN | WEIGHT: 210 LBS | HEART RATE: 119 BPM | BODY MASS INDEX: 28.44 KG/M2 | OXYGEN SATURATION: 97 %

## 2022-07-18 DIAGNOSIS — E11.42 DIABETIC POLYNEUROPATHY ASSOCIATED WITH TYPE 2 DIABETES MELLITUS: ICD-10-CM

## 2022-07-18 DIAGNOSIS — L97.422 SKIN ULCER OF LEFT HEEL WITH FAT LAYER EXPOSED: Primary | ICD-10-CM

## 2022-07-18 PROCEDURE — 11042 DBRDMT SUBQ TIS 1ST 20SQCM/<: CPT | Performed by: PODIATRIST

## 2022-07-18 NOTE — PROGRESS NOTES
Jadiel Dutton  1950  71 y.o. male   PCP- Shayy Banda , MARY  4/18/22  BS: 87 per pt    Patient return to clinic for left heel wound.    07/18/2022      Chief Complaint   Patient presents with   • Left Foot - Follow-up, Wound Check     Wound Vac change         History of Present Illness     Jadiel Dutton is a 71 y.o. male with history of diabetes who presents for f/u evaluation of left heel ulceration.  Wound VAC intact.  Past Medical History:   Diagnosis Date   • Abdominal pain    • Abnormal weight loss    • Acute bronchitis    • Anxiety state    • Benign essential hypertension    • Chronic sinusitis    • Constipation    • Cystitis    • Degenerative joint disease involving multiple joints    • Diabetic neuropathy (HCC)      bilateral hands      • Diastolic dysfunction    • Diverticular disease of colon    • Dyspnea    • Epigastric pain    • Essential hypertension    • Foot ulcer (HCC)    • Gastroesophageal reflux disease    • Generalized anxiety disorder    • Hyperlipidemia    • Inflammatory bowel disease     Possible Inflammatory Bowel Disease      • Lumbar pain     Pain radiating to lumbar region of back   n      • Pleuritic pain    • Radiculopathy     site unspecified      • Type 2 diabetes mellitus (HCC)    • Vesicular eczema of hands and feet          Past Surgical History:   Procedure Laterality Date   • BACK SURGERY      LOWER BACK SURGERY   • CARPAL TUNNEL RELEASE Bilateral    • COLONOSCOPY  06/11/2012    Internal & external hemorrhoids found.   • COLONOSCOPY N/A 8/22/2017    Procedure: COLONOSCOPY;  Surgeon: Cesar Hollis MD;  Location: [facility] ENDOSCOPY;  Service:    • COLONOSCOPY N/A 3/1/2022    Procedure: COLONOSCOPY 8:30;  Surgeon: Jacoby Robertson DO;  Location: [facility] ENDOSCOPY;  Service: Gastroenterology;  Laterality: N/A;   • DEQUERVAIN RELEASE Bilateral    • ENDOSCOPY  06/11/2012    Slight stricture in the distal esophagus. Gastritis in stomach. Biopsy taken. Normal duodenum.    • ENDOSCOPY     • ENDOSCOPY N/A 8/22/2017    Procedure: ESOPHAGOGASTRODUODENOSCOPY possible dilation ;  Surgeon: Cesar Hollis MD;  Location: [facility] ENDOSCOPY;  Service:    • ENDOSCOPY N/A 3/1/2022    Procedure: ESOPHAGOGASTRODUODENOSCOPY 8:30;  Surgeon: Jacoby Robertson DO;  Location: [facility] ENDOSCOPY;  Service: Gastroenterology;  Laterality: N/A;   • EYE SURGERY Bilateral     cataracts removed with implants   • HAMMER TOE REPAIR Left 12/18/2019    Procedure: FIFTH METATARSAL EXOSTECTOMY, FOURTH HAMMER TOE CORRECTION, GASTROCNEMIUS RECESSION;  Surgeon: Jacoby Serrano DPM;  Location: [facility] OR;  Service: Podiatry   • SHOULDER ARTHROSCOPY Left 2/7/2020    Procedure: LEFT SHOULDER ARTHROSCOPY with rotator cuff repair, DEDRICK procedure, and Subacromial decompression;  Surgeon: Troy Barillas MD;  Location: [facility] OR;  Service: Orthopedics;  Laterality: Left;   • ULNAR TUNNEL RELEASE Bilateral          Family History   Problem Relation Age of Onset   • No Known Problems Other    • Hypertension Mother    • Heart attack Father    • No Known Problems Sister    • No Known Problems Brother    • No Known Problems Daughter    • No Known Problems Son    • No Known Problems Maternal Aunt    • No Known Problems Maternal Uncle    • No Known Problems Paternal Aunt    • No Known Problems Paternal Uncle    • Cancer Maternal Grandmother    • Hodgkin's lymphoma Maternal Grandmother    • No Known Problems Maternal Grandfather    • No Known Problems Paternal Grandmother    • No Known Problems Paternal Grandfather          Social History     Socioeconomic History   • Marital status: Single   Tobacco Use   • Smoking status: Never Smoker   • Smokeless tobacco: Never Used   Vaping Use   • Vaping Use: Never used   Substance and Sexual Activity   • Alcohol use: No   • Drug use: No   • Sexual activity: Defer         Current Outpatient Medications   Medication Sig Dispense Refill   • albuterol sulfate  (90 Base)  "MCG/ACT inhaler Inhale 2 puffs Every 4 (Four) Hours As Needed for Wheezing. 1 g 0   • azelastine (ASTELIN) 0.1 % nasal spray azelastine 137 mcg (0.1 %) nasal spray aerosol     • B-D UF III MINI PEN NEEDLES 31G X 5 MM misc USE FOUR TIMES A DAY WITH INSULIN PENS 360 each 2   • cetirizine (zyrTEC) 10 MG tablet Take 10 mg by mouth Daily.     • dexlansoprazole (DEXILANT) 60 MG capsule Take 60 mg by mouth Daily As Needed.     • docusate sodium (COLACE) 100 MG capsule Take 1 capsule by mouth 2 (Two) Times a Day As Needed for Constipation. 60 capsule 2   • famotidine (PEPCID) 20 MG tablet Take 20 mg by mouth Daily.     • folic acid (FOLVITE) 1 MG tablet Take 1 tablet by mouth Daily. 90 tablet 1   • furosemide (LASIX) 40 MG tablet TAKE 1 TABLET EVERY DAY 60 tablet 0   • gabapentin (NEURONTIN) 300 MG capsule Take 1 capsule by mouth 4 (Four) Times a Day As Needed.     • HYDROcodone-acetaminophen (NORCO)  MG per tablet Take 1 tablet by mouth Every 6 (Six) Hours As Needed.     • lisinopril-hydrochlorothiazide (PRINZIDE,ZESTORETIC) 20-25 MG per tablet Take 1 tablet by mouth Daily. 90 tablet 1   • meclizine (ANTIVERT) 25 MG tablet Take 1 tablet by mouth 3 (Three) Times a Day As Needed for Dizziness. 30 tablet 0   • ofloxacin (Floxin Otic) 0.3 % otic solution Administer 10 drops into both ears Daily. 10 mL 0   • ONETOUCH VERIO test strip      • rosuvastatin (CRESTOR) 20 MG tablet Take 1 tablet by mouth Every Night. 90 tablet 1   • vitamin B-12 (CYANOCOBALAMIN) 1000 MCG tablet Take 1 tablet by mouth Daily. 90 tablet 1     No current facility-administered medications for this visit.         OBJECTIVE    Pulse 119   Ht 182.9 cm (72\")   Wt 95.3 kg (210 lb)   SpO2 97%   BMI 28.48 kg/m²       Review of Systems   Constitutional: Negative.    HENT: Negative.    Eyes: Negative.    Respiratory: Negative.    Cardiovascular: Negative.    Gastrointestinal: Negative.    Endocrine: Negative.    Genitourinary: Negative.  "   Musculoskeletal: Positive for back pain.   Skin: Positive for wound.   Allergic/Immunologic: Negative.    Neurological: Negative.    Hematological: Negative.    Psychiatric/Behavioral: Negative.          Diabetic Foot Exam Performed and Monofilament Test Performed       Constitutional: he appears well-developed and well-nourished.   HEENT: Normocephalic. Atraumatic  CV: No tenderness. RRR  Resp: Non-labored respiration. No wheezes.   Psychiatric: he has a normal mood and affect. his   behavior is normal.      Lower Extremity Exam:  Vascular: DP/PT pulses palpable 2+.   Positive hair growth.   No perimalleolar edema  Neuro: Protective sensation Diminished, b/l.  Light touch sensation diminished, b/l  DTRs intact  Integument: Anterior venous leg ulcer predominantly healed, scabbed over  Ulceration to plantar medial left heel, full-thickness   Following debridement total wound surface measures 6.3 x 3.8 x 0.4 cm.  Increasing granular base.  No masses  Webspaces c/d/i  Musculoskeletal: LE muscle strength 4/5  Gait Normal  Mild hammertoe deformity toes 2 through 4 on right.  Ankle ROM full without pain or crepitus, b/l      Left heel wound debridement:  Risks and benefits discussed.  Left heel ulcer debrided of surrounding hyperkeratosis and devitalized tissue with iris scissors to level of subq  Pressure hemostasis obtained        ASSESSMENT AND PLAN    Diagnoses and all orders for this visit:    1. Skin ulcer of left heel with fat layer exposed (HCC) (Primary)    2. Diabetic polyneuropathy associated with type 2 diabetes mellitus (HCC)        -Wound debridement as above.  Wound more granular today.  -Patient reports that he has home health is being discontinued due to insurance issues.  We will hold the wound VAC today while evaluate these issues.  -Collagen applied to wound bed and sterile dressing applied  -Continue modified weightbearing  -Recheck 1 week            This document has been electronically signed by  Jacoby Serrano DPM on July 18, 2022 15:10 CDT     EMR Dragon/Transcription disclaimer:   Much of this encounter note is an electronic transcription/translation of spoken language to printed text. The electronic translation of spoken language may permit erroneous, or at times, nonsensical words or phrases to be inadvertently transcribed; Although I have reviewed the note for such errors, some may still exist.    Jacoby Serrano DPM  7/18/2022  15:10 CDT

## 2022-07-20 ENCOUNTER — OFFICE VISIT (OUTPATIENT)
Dept: FAMILY MEDICINE CLINIC | Facility: CLINIC | Age: 72
End: 2022-07-20

## 2022-07-20 ENCOUNTER — HOME CARE VISIT (OUTPATIENT)
Dept: HOME HEALTH SERVICES | Facility: CLINIC | Age: 72
End: 2022-07-20

## 2022-07-20 VITALS
SYSTOLIC BLOOD PRESSURE: 122 MMHG | OXYGEN SATURATION: 100 % | HEART RATE: 83 BPM | DIASTOLIC BLOOD PRESSURE: 70 MMHG | BODY MASS INDEX: 28.44 KG/M2 | WEIGHT: 210 LBS | HEIGHT: 72 IN

## 2022-07-20 DIAGNOSIS — S71.001A OPEN WOUND OF RIGHT HIP, INITIAL ENCOUNTER: ICD-10-CM

## 2022-07-20 DIAGNOSIS — Z00.00 MEDICARE ANNUAL WELLNESS VISIT, SUBSEQUENT: Primary | ICD-10-CM

## 2022-07-20 DIAGNOSIS — E11.8 TYPE 2 DIABETES MELLITUS WITH COMPLICATION, WITH LONG-TERM CURRENT USE OF INSULIN: ICD-10-CM

## 2022-07-20 DIAGNOSIS — Z79.4 TYPE 2 DIABETES MELLITUS WITH COMPLICATION, WITH LONG-TERM CURRENT USE OF INSULIN: ICD-10-CM

## 2022-07-20 PROCEDURE — G0299 HHS/HOSPICE OF RN EA 15 MIN: HCPCS

## 2022-07-20 PROCEDURE — 1125F AMNT PAIN NOTED PAIN PRSNT: CPT | Performed by: NURSE PRACTITIONER

## 2022-07-20 PROCEDURE — 1159F MED LIST DOCD IN RCRD: CPT | Performed by: NURSE PRACTITIONER

## 2022-07-20 PROCEDURE — 1170F FXNL STATUS ASSESSED: CPT | Performed by: NURSE PRACTITIONER

## 2022-07-20 PROCEDURE — 96160 PT-FOCUSED HLTH RISK ASSMT: CPT | Performed by: NURSE PRACTITIONER

## 2022-07-20 PROCEDURE — G0439 PPPS, SUBSEQ VISIT: HCPCS | Performed by: NURSE PRACTITIONER

## 2022-07-20 PROCEDURE — 99214 OFFICE O/P EST MOD 30 MIN: CPT | Performed by: NURSE PRACTITIONER

## 2022-07-20 RX ORDER — SULFAMETHOXAZOLE AND TRIMETHOPRIM 800; 160 MG/1; MG/1
1 TABLET ORAL 2 TIMES DAILY
Qty: 20 TABLET | Refills: 0 | Status: SHIPPED | OUTPATIENT
Start: 2022-07-20 | End: 2022-07-30

## 2022-07-20 RX ORDER — LANCETS 33 GAUGE
EACH MISCELLANEOUS
COMMUNITY
Start: 2022-07-04

## 2022-07-20 NOTE — PROGRESS NOTES
"Chief Complaint  Bleeding/Bruising, Hip Pain (Patient complains of spot on right hip. Along with hip pain after falling a few week ago and hitting hip arm of chair.), and Medicare Wellness-subsequent    Subjective        Jadiel Dutton presents to [facility]  History of Present Illness     Present today with complaints of a new wound to his right buttock. He states he injured his right buttock on the armrest of a chair 3.5 weeks ago. He states the chair slid as he was trying to sit down and he came down on the armrest with a lot of force. Area has been sore ever since. It initially appeared as a red \"whelp\" with slightly bruising. Significant other states she saw it 2 days after he hit it but she though it looked like an insect bite/sting. Approximately 1 week ago, the wound changed in appearance and started to look scabbed/ black in the middle. Patient and significant other concerned regarding wounds change in appearance and thought it was odd since initially there was no break in the skin.     Objective   Vital Signs:  /70   Pulse 83   Ht 182.9 cm (72\")   Wt 95.3 kg (210 lb)   SpO2 100%   BMI 28.48 kg/m²   Estimated body mass index is 28.48 kg/m² as calculated from the following:    Height as of this encounter: 182.9 cm (72\").    Weight as of this encounter: 95.3 kg (210 lb).    BMI is >= 25 and <30. (Overweight) The following options were offered after discussion;: nutrition counseling/recommendations      Physical Exam  Vitals and nursing note reviewed.   Constitutional:       General: He is not in acute distress.     Appearance: Normal appearance. He is obese. He is not ill-appearing, toxic-appearing or diaphoretic.   HENT:      Head: Normocephalic and atraumatic.      Right Ear: Tympanic membrane normal.      Left Ear: Tympanic membrane normal.      Nose: Nose normal.      Mouth/Throat:      Mouth: Mucous membranes are moist.      Pharynx: " Oropharynx is clear.   Eyes:      Pupils: Pupils are equal, round, and reactive to light.   Cardiovascular:      Rate and Rhythm: Normal rate and regular rhythm.      Pulses: Normal pulses.      Heart sounds: Normal heart sounds. No murmur heard.    No friction rub. No gallop.   Pulmonary:      Effort: Pulmonary effort is normal. No respiratory distress.      Breath sounds: Normal breath sounds. No stridor. No wheezing, rhonchi or rales.   Chest:      Chest wall: No tenderness.   Abdominal:      General: Bowel sounds are normal.      Palpations: Abdomen is soft.   Musculoskeletal:      Cervical back: Normal range of motion and neck supple.   Skin:     General: Skin is warm and dry.      Capillary Refill: Capillary refill takes 2 to 3 seconds.      Findings: Bruising present.             Comments: Wound right buttock. Approx 6 cm diameter area of erythema with black eschar noted to wound center.   Neurological:      General: No focal deficit present.      Mental Status: He is alert and oriented to person, place, and time. Mental status is at baseline.      GCS: GCS eye subscore is 4. GCS verbal subscore is 5. GCS motor subscore is 6.      Cranial Nerves: Cranial nerves are intact.      Sensory: Sensation is intact.      Motor: Motor function is intact.      Coordination: Coordination is intact.      Gait: Gait abnormal.      Comments: Antalgic gait d/t LLE injury   Psychiatric:         Attention and Perception: Attention and perception normal.         Mood and Affect: Mood normal.         Speech: Speech normal.         Behavior: Behavior normal.         Thought Content: Thought content normal.         Cognition and Memory: Cognition and memory normal.         Judgment: Judgment normal.        Result Review :       Assessment and Plan   Diagnoses and all orders for this visit:    1. Medicare annual wellness visit, subsequent (Primary)    2. Type 2 diabetes mellitus with complication, with long-term current use of  insulin (HCC)  -     ORDER: Hemoglobin A1c; Future    3. Open wound of right hip, initial encounter  -     sulfamethoxazole-trimethoprim (Bactrim DS) 800-160 MG per tablet; Take 1 tablet by mouth 2 (Two) Times a Day for 10 days.  Dispense: 20 tablet; Refill: 0    Patient and significant other educated to monitor appearance of wound and STRONGLY consider seeking further recommendation from Wound Clinic. Pt declined referral to wound care at this time. Also declined Home Health wound care at this time because he already has a referral from podiatry to  pending at this time for the injury to his left foot. Advised to keep pressure off of wound. Origin of partial thickness wound unclear- suspected pressure ulcer vs spider bite. Follow-up in 1 week for re-evaluation.  All questions and concerns addressed with understanding noted. They are aware and are in agreement to this plan. Medicare wellness completed today as well, health maintenance reviewed, updated.      I spent 33 minutes caring for Jadiel on this date of service. This time includes time spent by me in the following activities:performing a medically appropriate examination and/or evaluation , ordering medications, tests, or procedures and documenting information in the medical record  Follow Up   Return in about 1 week (around 7/27/2022), or if symptoms worsen or fail to improve, for Recheck, or sooner as needed.  Patient was given instructions and counseling regarding his condition or for health maintenance advice. Please see specific information pulled into the AVS if appropriate.

## 2022-07-20 NOTE — PROGRESS NOTES
The ABCs of the Annual Wellness Visit  Subsequent Medicare Wellness Visit    Chief Complaint   Patient presents with   • Bleeding/Bruising   • Hip Pain     Patient complains of spot on right hip. Along with hip pain after falling a few week ago and hitting hip arm of chair.   • Medicare Wellness-subsequent      Subjective    History of Present Illness:  Jadiel Dutton is a 71 y.o. male who presents for a Subsequent Medicare Wellness Visit.    The following portions of the patient's history were reviewed and   updated as appropriate: allergies, current medications, past family history, past medical history, past social history, past surgical history and problem list.    Compared to one year ago, the patient feels his physical   health is better.    Compared to one year ago, the patient feels his mental   health is better.    Recent Hospitalizations:  He was not admitted to the hospital during the last year.       Current Medical Providers:  Patient Care Team:  Shayy Banda APRN as PCP - General (Family Medicine)  Jacoby Serrano DPM as Consulting Physician (Podiatry)  Norman Carbajal MD as Consulting Physician (Hematology and Oncology)    Outpatient Medications Prior to Visit   Medication Sig Dispense Refill   • albuterol sulfate  (90 Base) MCG/ACT inhaler Inhale 2 puffs Every 4 (Four) Hours As Needed for Wheezing. 1 g 0   • azelastine (ASTELIN) 0.1 % nasal spray azelastine 137 mcg (0.1 %) nasal spray aerosol     • B-D UF III MINI PEN NEEDLES 31G X 5 MM misc USE FOUR TIMES A DAY WITH INSULIN PENS 360 each 2   • cetirizine (zyrTEC) 10 MG tablet Take 10 mg by mouth Daily.     • dexlansoprazole (DEXILANT) 60 MG capsule Take 60 mg by mouth Daily As Needed.     • docusate sodium (COLACE) 100 MG capsule Take 1 capsule by mouth 2 (Two) Times a Day As Needed for Constipation. 60 capsule 2   • famotidine (PEPCID) 20 MG tablet Take 20 mg by mouth Daily.     • folic acid (FOLVITE) 1 MG tablet Take 1 tablet by  mouth Daily. 90 tablet 1   • furosemide (LASIX) 40 MG tablet TAKE 1 TABLET EVERY DAY 60 tablet 0   • gabapentin (NEURONTIN) 300 MG capsule Take 1 capsule by mouth 4 (Four) Times a Day As Needed.     • HYDROcodone-acetaminophen (NORCO)  MG per tablet Take 1 tablet by mouth Every 6 (Six) Hours As Needed.     • Lancets (OneTouch Delica Plus [id number]) misc USE 1 TO CHECK GLUCOSE 4 TIMES DAILY FOR 25 DAYS     • lisinopril-hydrochlorothiazide (PRINZIDE,ZESTORETIC) 20-25 MG per tablet Take 1 tablet by mouth Daily. 90 tablet 1   • meclizine (ANTIVERT) 25 MG tablet Take 1 tablet by mouth 3 (Three) Times a Day As Needed for Dizziness. 30 tablet 0   • ofloxacin (Floxin Otic) 0.3 % otic solution Administer 10 drops into both ears Daily. 10 mL 0   • ONETOUCH VERIO test strip      • rosuvastatin (CRESTOR) 20 MG tablet Take 1 tablet by mouth Every Night. 90 tablet 1   • vitamin B-12 (CYANOCOBALAMIN) 1000 MCG tablet Take 1 tablet by mouth Daily. 90 tablet 1     No facility-administered medications prior to visit.       Opioid medication/s are on active medication list.  and I have evaluated his active treatment plan and pain score trends (see table).  Vitals:    07/20/22 1018   PainSc:   7   PainLoc: Hip     I have reviewed the chart for potential of high risk medication and harmful drug interactions in the elderly.            Aspirin is not on active medication list.  Aspirin use is not indicated based on review of current medical condition/s. Risk of harm outweighs potential benefits.  .    Patient Active Problem List   Diagnosis   • Essential hypertension   • Familial hypercholesterolemia   • Type 2 diabetes mellitus with complication, with long-term current use of insulin ([facility])   • DDD (degenerative disc disease), thoracolumbar   • Gastroesophageal reflux disease   • Left lower quadrant pain   • Injury of kidney   • Cellulitis   • Spinal stenosis of cervical region   • Degeneration of thoracic or thoracolumbar  "intervertebral disc   • Abscess of foot   • Hypokalemia   • Other specified mononeuropathies   • Leukocytosis   • Neuropathic pain syndrome (non-herpetic)   • Systemic infection (HCC)   • Spinal stenosis of lumbar region   • Tachycardia   • Spondylolisthesis   • Drug-induced constipation   • Dysphagia   • Nausea   • Generalized abdominal cramping   • Chronic pansinusitis   • Anxiety state   • Bilateral hand pain   • Ulnar neuropathy of both upper extremities   • Dupuytren's contracture of both hands   • Bilateral carpal tunnel syndrome   • Skin ulcer of toe of left foot with fat layer exposed (HCC)   • Diabetic polyneuropathy associated with type 2 diabetes mellitus (HCC)   • Hammer toe of left foot   • Equinus contracture of ankle   • Exostosis of bone of foot   • Pain of left clavicle   • Traumatic incomplete tear of left rotator cuff   • Strain of acromioclavicular joint   • Post-operative state   • Hypercholesterolemia   • Status post arthroscopy of left shoulder   • Status post left rotator cuff repair   • Chronic left shoulder pain   • Trigger thumb of right hand   • Dupuytren's contracture of right hand   • Atrophy of muscle of multiple sites   • Iron deficiency anemia due to chronic blood loss   • Adverse effect of iron   • Personal history of colonic polyps   • Folate deficiency     Advance Care Planning  Advance Directive is on file.  ACP discussion was held with the patient during this visit. Patient has an advance directive in EMR which is still valid.           Objective    Vitals:    07/20/22 1018   BP: 122/70   Pulse: 83   SpO2: 100%   Weight: 95.3 kg (210 lb)   Height: 182.9 cm (72\")   PainSc:   7   PainLoc: Hip     Estimated body mass index is 28.48 kg/m² as calculated from the following:    Height as of this encounter: 182.9 cm (72\").    Weight as of this encounter: 95.3 kg (210 lb).    BMI is >= 25 and <30. (Overweight) The following options were offered after discussion;: nutrition " counseling/recommendations      Does the patient have evidence of cognitive impairment? No    Physical Exam            HEALTH RISK ASSESSMENT    Smoking Status:  Social History     Tobacco Use   Smoking Status Never Smoker   Smokeless Tobacco Never Used     Alcohol Consumption:  Social History     Substance and Sexual Activity   Alcohol Use No     Fall Risk Screen:    APRIL Fall Risk Assessment has not been completed.    Depression Screening:  PHQ-2/PHQ-9 Depression Screening 7/20/2022   Retired PHQ-9 Total Score -   Retired Total Score -   Little Interest or Pleasure in Doing Things 0-->not at all   Feeling Down, Depressed or Hopeless 0-->not at all   PHQ-9: Brief Depression Severity Measure Score 0       Health Habits and Functional and Cognitive Screening:  Functional & Cognitive Status 7/20/2022   Do you have difficulty preparing food and eating? No   Do you have difficulty bathing yourself, getting dressed or grooming yourself? No   Do you have difficulty using the toilet? No   Do you have difficulty moving around from place to place? No   Do you have trouble with steps or getting out of a bed or a chair? No   Current Diet Well Balanced Diet   Dental Exam Up to date   Eye Exam Up to date   Exercise (times per week) 7 times per week   Current Exercises Include Walking   Current Exercise Activities Include -   Do you need help using the phone?  No   Are you deaf or do you have serious difficulty hearing?  No   Do you need help with transportation? No   Do you need help shopping? No   Do you need help preparing meals?  No   Do you need help with housework?  No   Do you need help with laundry? No   Do you need help taking your medications? No   Do you need help managing money? No   Do you ever drive or ride in a car without wearing a seat belt? No   Have you felt unusual stress, anger or loneliness in the last month? No   Who do you live with? Alone   If you need help, do you have trouble finding someone available  to you? No   Have you been bothered in the last four weeks by sexual problems? No   Do you have difficulty concentrating, remembering or making decisions? No       Age-appropriate Screening Schedule:  Refer to the list below for future screening recommendations based on patient's age, sex and/or medical conditions. Orders for these recommended tests are listed in the plan section. The patient has been provided with a written plan.    Health Maintenance   Topic Date Due   • DIABETIC EYE EXAM  07/08/2020   • HEMOGLOBIN A1C  07/20/2022   • ZOSTER VACCINE (2 of 2) 07/20/2022 (Originally 7/11/2017)   • INFLUENZA VACCINE  10/01/2022   • LIPID PANEL  01/20/2023   • URINE MICROALBUMIN  01/20/2023   • DIABETIC FOOT EXAM  07/18/2023   • TDAP/TD VACCINES (3 - Td or Tdap) 11/05/2029              Assessment & Plan   CMS Preventative Services Quick Reference  Risk Factors Identified During Encounter  Fall Risk-High or Moderate  Immunizations Discussed/Encouraged (specific Immunizations; Pneumococcal 23, Shingrix and COVID19  Inactivity/Sedentary  Obesity/Overweight   Polypharmacy  The above risks/problems have been discussed with the patient.  Follow up actions/plans if indicated are seen below in the Assessment/Plan Section.  Pertinent information has been shared with the patient in the After Visit Summary.    There are no diagnoses linked to this encounter.    Follow Up:   No follow-ups on file.     An After Visit Summary and PPPS were made available to the patient.          I spent 33 minutes caring for Jadiel on this date of service. This time includes time spent by me in the following activities:preparing for the visit, performing a medically appropriate examination and/or evaluation , counseling and educating the patient/family/caregiver, ordering medications, tests, or procedures and documenting information in the medical record

## 2022-07-22 ENCOUNTER — HOME CARE VISIT (OUTPATIENT)
Dept: HOME HEALTH SERVICES | Facility: CLINIC | Age: 72
End: 2022-07-22

## 2022-07-22 PROCEDURE — G0299 HHS/HOSPICE OF RN EA 15 MIN: HCPCS

## 2022-07-25 VITALS
OXYGEN SATURATION: 98 % | TEMPERATURE: 98 F | RESPIRATION RATE: 18 BRPM | RESPIRATION RATE: 18 BRPM | DIASTOLIC BLOOD PRESSURE: 78 MMHG | DIASTOLIC BLOOD PRESSURE: 86 MMHG | HEART RATE: 96 BPM | SYSTOLIC BLOOD PRESSURE: 136 MMHG | OXYGEN SATURATION: 99 % | TEMPERATURE: 98 F | HEART RATE: 84 BPM | SYSTOLIC BLOOD PRESSURE: 144 MMHG

## 2022-07-26 ENCOUNTER — OFFICE VISIT (OUTPATIENT)
Dept: PODIATRY | Facility: CLINIC | Age: 72
End: 2022-07-26

## 2022-07-26 VITALS — HEIGHT: 72 IN | OXYGEN SATURATION: 98 % | BODY MASS INDEX: 28.44 KG/M2 | HEART RATE: 80 BPM | WEIGHT: 210 LBS

## 2022-07-26 DIAGNOSIS — L97.422 SKIN ULCER OF LEFT HEEL WITH FAT LAYER EXPOSED: Primary | ICD-10-CM

## 2022-07-26 DIAGNOSIS — E11.42 DIABETIC POLYNEUROPATHY ASSOCIATED WITH TYPE 2 DIABETES MELLITUS: ICD-10-CM

## 2022-07-26 PROCEDURE — 11042 DBRDMT SUBQ TIS 1ST 20SQCM/<: CPT | Performed by: PODIATRIST

## 2022-07-26 NOTE — PROGRESS NOTES
Jadiel Dutton  1950  71 y.o. male   PCP- Shayy Banda , MARY  7/6/2022  BS: 105 per pt    Patient return to clinic for follow up     07/27/2022      Chief Complaint   Patient presents with   • Left Foot - Follow-up, Pain         History of Present Illness     Jadiel Dutton is a 71 y.o. male with history of diabetes who presents for f/u evaluation of left heel ulceration.    Past Medical History:   Diagnosis Date   • Abdominal pain    • Abnormal weight loss    • Acute bronchitis    • Anxiety state    • Benign essential hypertension    • Chronic sinusitis    • Constipation    • Cystitis    • Degenerative joint disease involving multiple joints    • Diabetic neuropathy (HCC)      bilateral hands      • Diastolic dysfunction    • Diverticular disease of colon    • Dyspnea    • Epigastric pain    • Essential hypertension    • Foot ulcer (HCC)    • Gastroesophageal reflux disease    • Generalized anxiety disorder    • Hyperlipidemia    • Inflammatory bowel disease     Possible Inflammatory Bowel Disease      • Lumbar pain     Pain radiating to lumbar region of back   n      • Pleuritic pain    • Radiculopathy     site unspecified      • Type 2 diabetes mellitus (HCC)    • Vesicular eczema of hands and feet          Past Surgical History:   Procedure Laterality Date   • BACK SURGERY      LOWER BACK SURGERY   • CARPAL TUNNEL RELEASE Bilateral    • COLONOSCOPY  06/11/2012    Internal & external hemorrhoids found.   • COLONOSCOPY N/A 8/22/2017    Procedure: COLONOSCOPY;  Surgeon: Cesar Hollis MD;  Location: [facility] ENDOSCOPY;  Service:    • COLONOSCOPY N/A 3/1/2022    Procedure: COLONOSCOPY 8:30;  Surgeon: Jacoby Robertson DO;  Location: [facility] ENDOSCOPY;  Service: Gastroenterology;  Laterality: N/A;   • DEQUERVAIN RELEASE Bilateral    • ENDOSCOPY  06/11/2012    Slight stricture in the distal esophagus. Gastritis in stomach. Biopsy taken. Normal duodenum.   • ENDOSCOPY     • ENDOSCOPY N/A 8/22/2017     Procedure: ESOPHAGOGASTRODUODENOSCOPY possible dilation ;  Surgeon: Cesar Hollis MD;  Location: [facility] ENDOSCOPY;  Service:    • ENDOSCOPY N/A 3/1/2022    Procedure: ESOPHAGOGASTRODUODENOSCOPY 8:30;  Surgeon: Jacoby Robertson DO;  Location: [facility] ENDOSCOPY;  Service: Gastroenterology;  Laterality: N/A;   • EYE SURGERY Bilateral     cataracts removed with implants   • HAMMER TOE REPAIR Left 12/18/2019    Procedure: FIFTH METATARSAL EXOSTECTOMY, FOURTH HAMMER TOE CORRECTION, GASTROCNEMIUS RECESSION;  Surgeon: Jacoby Serrano DPM;  Location: [facility] OR;  Service: Podiatry   • SHOULDER ARTHROSCOPY Left 2/7/2020    Procedure: LEFT SHOULDER ARTHROSCOPY with rotator cuff repair, DEDRICK procedure, and Subacromial decompression;  Surgeon: Troy Barillas MD;  Location: [facility] OR;  Service: Orthopedics;  Laterality: Left;   • ULNAR TUNNEL RELEASE Bilateral          Family History   Problem Relation Age of Onset   • No Known Problems Other    • Hypertension Mother    • Heart attack Father    • No Known Problems Sister    • No Known Problems Brother    • No Known Problems Daughter    • No Known Problems Son    • No Known Problems Maternal Aunt    • No Known Problems Maternal Uncle    • No Known Problems Paternal Aunt    • No Known Problems Paternal Uncle    • Cancer Maternal Grandmother    • Hodgkin's lymphoma Maternal Grandmother    • No Known Problems Maternal Grandfather    • No Known Problems Paternal Grandmother    • No Known Problems Paternal Grandfather          Social History     Socioeconomic History   • Marital status: Single   Tobacco Use   • Smoking status: Never Smoker   • Smokeless tobacco: Never Used   Vaping Use   • Vaping Use: Never used   Substance and Sexual Activity   • Alcohol use: No   • Drug use: No   • Sexual activity: Defer         Current Outpatient Medications   Medication Sig Dispense Refill   • albuterol sulfate  (90 Base) MCG/ACT inhaler Inhale 2 puffs Every 4 (Four)  "Hours As Needed for Wheezing. 1 g 0   • azelastine (ASTELIN) 0.1 % nasal spray azelastine 137 mcg (0.1 %) nasal spray aerosol     • B-D UF III MINI PEN NEEDLES 31G X 5 MM misc USE FOUR TIMES A DAY WITH INSULIN PENS 360 each 2   • cetirizine (zyrTEC) 10 MG tablet Take 10 mg by mouth Daily.     • dexlansoprazole (DEXILANT) 60 MG capsule Take 60 mg by mouth Daily As Needed.     • docusate sodium (COLACE) 100 MG capsule Take 1 capsule by mouth 2 (Two) Times a Day As Needed for Constipation. 60 capsule 2   • famotidine (PEPCID) 20 MG tablet Take 20 mg by mouth Daily.     • folic acid (FOLVITE) 1 MG tablet Take 1 tablet by mouth Daily. 90 tablet 1   • furosemide (LASIX) 40 MG tablet TAKE 1 TABLET EVERY DAY 60 tablet 0   • gabapentin (NEURONTIN) 300 MG capsule Take 1 capsule by mouth 4 (Four) Times a Day As Needed.     • HYDROcodone-acetaminophen (NORCO)  MG per tablet Take 1 tablet by mouth Every 6 (Six) Hours As Needed.     • Lancets (OneTouch Delica Plus [id number]) misc USE 1 TO CHECK GLUCOSE 4 TIMES DAILY FOR 25 DAYS     • lisinopril-hydrochlorothiazide (PRINZIDE,ZESTORETIC) 20-25 MG per tablet Take 1 tablet by mouth Daily. 90 tablet 1   • meclizine (ANTIVERT) 25 MG tablet Take 1 tablet by mouth 3 (Three) Times a Day As Needed for Dizziness. 30 tablet 0   • ofloxacin (Floxin Otic) 0.3 % otic solution Administer 10 drops into both ears Daily. 10 mL 0   • ONETOUCH VERIO test strip      • rosuvastatin (CRESTOR) 20 MG tablet Take 1 tablet by mouth Every Night. 90 tablet 1   • sulfamethoxazole-trimethoprim (Bactrim DS) 800-160 MG per tablet Take 1 tablet by mouth 2 (Two) Times a Day for 10 days. 20 tablet 0   • vitamin B-12 (CYANOCOBALAMIN) 1000 MCG tablet Take 1 tablet by mouth Daily. 90 tablet 1     No current facility-administered medications for this visit.         OBJECTIVE    Pulse 80   Ht 182.9 cm (72\")   Wt 95.3 kg (210 lb)   SpO2 98%   BMI 28.48 kg/m²       Review of Systems   Constitutional: Negative.  "   HENT: Negative.    Eyes: Negative.    Respiratory: Negative.    Cardiovascular: Negative.    Gastrointestinal: Negative.    Endocrine: Negative.    Genitourinary: Negative.    Musculoskeletal: Positive for back pain.   Skin: Positive for wound.   Allergic/Immunologic: Negative.    Neurological: Negative.    Hematological: Negative.    Psychiatric/Behavioral: Negative.          Diabetic Foot Exam Performed and Monofilament Test Performed       Constitutional: he appears well-developed and well-nourished.   HEENT: Normocephalic. Atraumatic  CV: No tenderness. RRR  Resp: Non-labored respiration. No wheezes.   Psychiatric: he has a normal mood and affect. his   behavior is normal.      Lower Extremity Exam:  Vascular: DP/PT pulses palpable 2+.   Positive hair growth.   No perimalleolar edema  Neuro: Protective sensation Diminished, b/l.  Light touch sensation diminished, b/l  DTRs intact  Integument: Anterior venous leg ulcer predominantly healed, scabbed over  Ulceration to plantar medial left heel, full-thickness   Following debridement total wound surface measures 5.5 x 3.5 x 0.4 cm.  Increasing granular base.  No masses  Webspaces c/d/i  Musculoskeletal: LE muscle strength 4/5  Gait Normal  Mild hammertoe deformity toes 2 through 4 on right.  Ankle ROM full without pain or crepitus, b/l      Left heel wound debridement:  Risks and benefits discussed.  Left heel ulcer debrided of surrounding hyperkeratosis and devitalized tissue with iris scissors to level of subq  Pressure hemostasis obtained        ASSESSMENT AND PLAN    Diagnoses and all orders for this visit:    1. Skin ulcer of left heel with fat layer exposed (HCC) (Primary)    2. Diabetic polyneuropathy associated with type 2 diabetes mellitus (HCC)        -Wound debridement as above.  Wound more granular today.  -Health in place.  We will continue to hold wound VAC this week.  -Collagen applied to wound bed and sterile dressing applied  -Continue modified  weightbearing  -Recheck 1 week            This document has been electronically signed by Jacoby Serrano DPM on July 26, 2022 12:50 CDT     EMR Dragon/Transcription disclaimer:   Much of this encounter note is an electronic transcription/translation of spoken language to printed text. The electronic translation of spoken language may permit erroneous, or at times, nonsensical words or phrases to be inadvertently transcribed; Although I have reviewed the note for such errors, some may still exist.    Jacoby Serrano DPM  7/26/2022  12:50 CDT

## 2022-07-29 ENCOUNTER — HOME CARE VISIT (OUTPATIENT)
Dept: HOME HEALTH SERVICES | Facility: CLINIC | Age: 72
End: 2022-07-29

## 2022-08-01 ENCOUNTER — OFFICE VISIT (OUTPATIENT)
Dept: PODIATRY | Facility: CLINIC | Age: 72
End: 2022-08-01

## 2022-08-01 ENCOUNTER — HOME CARE VISIT (OUTPATIENT)
Dept: HOME HEALTH SERVICES | Facility: HOME HEALTHCARE | Age: 72
End: 2022-08-01

## 2022-08-01 VITALS — OXYGEN SATURATION: 98 % | HEIGHT: 72 IN | BODY MASS INDEX: 28.44 KG/M2 | WEIGHT: 210 LBS | HEART RATE: 93 BPM

## 2022-08-01 DIAGNOSIS — E11.42 DIABETIC POLYNEUROPATHY ASSOCIATED WITH TYPE 2 DIABETES MELLITUS: ICD-10-CM

## 2022-08-01 DIAGNOSIS — L97.422 SKIN ULCER OF LEFT HEEL WITH FAT LAYER EXPOSED: Primary | ICD-10-CM

## 2022-08-01 PROCEDURE — 11042 DBRDMT SUBQ TIS 1ST 20SQCM/<: CPT | Performed by: PODIATRIST

## 2022-08-01 NOTE — PROGRESS NOTES
Jadiel Dutton  1950  71 y.o. male   PCP- Shayy Banda , MARY  7/6/2022  BS: 52 per pt    Patient return to clinic for follow up     08/01/2022        Chief Complaint   Patient presents with   • Left Foot - Follow-up, Wound Check         History of Present Illness     Jadiel Dutton is a 71 y.o. male with history of diabetes who presents for f/u evaluation of left heel ulceration.    Past Medical History:   Diagnosis Date   • Abdominal pain    • Abnormal weight loss    • Acute bronchitis    • Anxiety state    • Benign essential hypertension    • Chronic sinusitis    • Constipation    • Cystitis    • Degenerative joint disease involving multiple joints    • Diabetic neuropathy (HCC)      bilateral hands      • Diastolic dysfunction    • Diverticular disease of colon    • Dyspnea    • Epigastric pain    • Essential hypertension    • Foot ulcer (HCC)    • Gastroesophageal reflux disease    • Generalized anxiety disorder    • Hyperlipidemia    • Inflammatory bowel disease     Possible Inflammatory Bowel Disease      • Lumbar pain     Pain radiating to lumbar region of back   n      • Pleuritic pain    • Radiculopathy     site unspecified      • Type 2 diabetes mellitus (HCC)    • Vesicular eczema of hands and feet          Past Surgical History:   Procedure Laterality Date   • BACK SURGERY      LOWER BACK SURGERY   • CARPAL TUNNEL RELEASE Bilateral    • COLONOSCOPY  06/11/2012    Internal & external hemorrhoids found.   • COLONOSCOPY N/A 8/22/2017    Procedure: COLONOSCOPY;  Surgeon: Cesar Hollis MD;  Location: [facility] ENDOSCOPY;  Service:    • COLONOSCOPY N/A 3/1/2022    Procedure: COLONOSCOPY 8:30;  Surgeon: Jacoby Robertson DO;  Location: [facility] ENDOSCOPY;  Service: Gastroenterology;  Laterality: N/A;   • DEQUERVAIN RELEASE Bilateral    • ENDOSCOPY  06/11/2012    Slight stricture in the distal esophagus. Gastritis in stomach. Biopsy taken. Normal duodenum.   • ENDOSCOPY     • ENDOSCOPY N/A  8/22/2017    Procedure: ESOPHAGOGASTRODUODENOSCOPY possible dilation ;  Surgeon: Cesar Hollis MD;  Location: [facility] ENDOSCOPY;  Service:    • ENDOSCOPY N/A 3/1/2022    Procedure: ESOPHAGOGASTRODUODENOSCOPY 8:30;  Surgeon: Jacoby Robertson DO;  Location: [facility] ENDOSCOPY;  Service: Gastroenterology;  Laterality: N/A;   • EYE SURGERY Bilateral     cataracts removed with implants   • HAMMER TOE REPAIR Left 12/18/2019    Procedure: FIFTH METATARSAL EXOSTECTOMY, FOURTH HAMMER TOE CORRECTION, GASTROCNEMIUS RECESSION;  Surgeon: Jacoby Serrano DPM;  Location: [facility] OR;  Service: Podiatry   • SHOULDER ARTHROSCOPY Left 2/7/2020    Procedure: LEFT SHOULDER ARTHROSCOPY with rotator cuff repair, DEDRICK procedure, and Subacromial decompression;  Surgeon: Troy Barillas MD;  Location: [facility] OR;  Service: Orthopedics;  Laterality: Left;   • ULNAR TUNNEL RELEASE Bilateral          Family History   Problem Relation Age of Onset   • No Known Problems Other    • Hypertension Mother    • Heart attack Father    • No Known Problems Sister    • No Known Problems Brother    • No Known Problems Daughter    • No Known Problems Son    • No Known Problems Maternal Aunt    • No Known Problems Maternal Uncle    • No Known Problems Paternal Aunt    • No Known Problems Paternal Uncle    • Cancer Maternal Grandmother    • Hodgkin's lymphoma Maternal Grandmother    • No Known Problems Maternal Grandfather    • No Known Problems Paternal Grandmother    • No Known Problems Paternal Grandfather          Social History     Socioeconomic History   • Marital status: Single   Tobacco Use   • Smoking status: Never Smoker   • Smokeless tobacco: Never Used   Vaping Use   • Vaping Use: Never used   Substance and Sexual Activity   • Alcohol use: No   • Drug use: No   • Sexual activity: Defer         Current Outpatient Medications   Medication Sig Dispense Refill   • albuterol sulfate  (90 Base) MCG/ACT inhaler Inhale 2 puffs  "Every 4 (Four) Hours As Needed for Wheezing. 1 g 0   • azelastine (ASTELIN) 0.1 % nasal spray azelastine 137 mcg (0.1 %) nasal spray aerosol     • B-D UF III MINI PEN NEEDLES 31G X 5 MM misc USE FOUR TIMES A DAY WITH INSULIN PENS 360 each 2   • cetirizine (zyrTEC) 10 MG tablet Take 10 mg by mouth Daily.     • dexlansoprazole (DEXILANT) 60 MG capsule Take 60 mg by mouth Daily As Needed.     • docusate sodium (COLACE) 100 MG capsule Take 1 capsule by mouth 2 (Two) Times a Day As Needed for Constipation. 60 capsule 2   • famotidine (PEPCID) 20 MG tablet Take 20 mg by mouth Daily.     • folic acid (FOLVITE) 1 MG tablet Take 1 tablet by mouth Daily. 90 tablet 1   • furosemide (LASIX) 40 MG tablet TAKE 1 TABLET EVERY DAY 60 tablet 0   • gabapentin (NEURONTIN) 300 MG capsule Take 1 capsule by mouth 4 (Four) Times a Day As Needed.     • HYDROcodone-acetaminophen (NORCO)  MG per tablet Take 1 tablet by mouth Every 6 (Six) Hours As Needed.     • Lancets (OneTouch Delica Plus [id number]) misc USE 1 TO CHECK GLUCOSE 4 TIMES DAILY FOR 25 DAYS     • lisinopril-hydrochlorothiazide (PRINZIDE,ZESTORETIC) 20-25 MG per tablet Take 1 tablet by mouth Daily. 90 tablet 1   • meclizine (ANTIVERT) 25 MG tablet Take 1 tablet by mouth 3 (Three) Times a Day As Needed for Dizziness. 30 tablet 0   • ofloxacin (Floxin Otic) 0.3 % otic solution Administer 10 drops into both ears Daily. 10 mL 0   • ONETOUCH VERIO test strip      • rosuvastatin (CRESTOR) 20 MG tablet Take 1 tablet by mouth Every Night. 90 tablet 1   • vitamin B-12 (CYANOCOBALAMIN) 1000 MCG tablet Take 1 tablet by mouth Daily. 90 tablet 1     No current facility-administered medications for this visit.         OBJECTIVE    Pulse 93   Ht 182.9 cm (72\")   Wt 95.3 kg (210 lb)   SpO2 98%   BMI 28.48 kg/m²       Review of Systems   Constitutional: Negative.    HENT: Negative.    Eyes: Negative.    Respiratory: Negative.    Cardiovascular: Negative.    Gastrointestinal: Negative.  "   Endocrine: Negative.    Genitourinary: Negative.    Musculoskeletal: Positive for back pain.   Skin: Positive for wound.   Allergic/Immunologic: Negative.    Neurological: Negative.    Hematological: Negative.    Psychiatric/Behavioral: Negative.          Diabetic Foot Exam Performed and Monofilament Test Performed       Constitutional: he appears well-developed and well-nourished.   HEENT: Normocephalic. Atraumatic  CV: No tenderness. RRR  Resp: Non-labored respiration. No wheezes.   Psychiatric: he has a normal mood and affect. his   behavior is normal.      Lower Extremity Exam:  Vascular: DP/PT pulses palpable 2+.   Positive hair growth.   No perimalleolar edema  Neuro: Protective sensation Diminished, b/l.  Light touch sensation diminished, b/l  DTRs intact  Integument: Anterior venous leg ulcer predominantly healed, scabbed over  Ulceration to plantar medial left heel, full-thickness   Following debridement total wound surface measures 5.0 x 3.5 x 0.4 cm.  Increasing granular base.  No masses  Webspaces c/d/i  Musculoskeletal: LE muscle strength 4/5  Gait Normal  Mild hammertoe deformity toes 2 through 4 on right.  Ankle ROM full without pain or crepitus, b/l      Left heel wound debridement:  Risks and benefits discussed.  Left heel ulcer debrided of surrounding hyperkeratosis and devitalized tissue with iris scissors to level of subq  Pressure hemostasis obtained        ASSESSMENT AND PLAN    Diagnoses and all orders for this visit:    1. Skin ulcer of left heel with fat layer exposed (HCC) (Primary)    2. Diabetic polyneuropathy associated with type 2 diabetes mellitus (HCC)        -Wound debridement as above.  Wound more granular today.  -Home Health in place.    -Reapplication of [employer] wound VAC today.  -Continue modified weightbearing  -Recheck 1 week            This document has been electronically signed by Jacoby Serrano DPM on August 1, 2022 12:38 CDT     EMR Dragon/Transcription disclaimer:    Much of this encounter note is an electronic transcription/translation of spoken language to printed text. The electronic translation of spoken language may permit erroneous, or at times, nonsensical words or phrases to be inadvertently transcribed; Although I have reviewed the note for such errors, some may still exist.    Jacoby Serrano DPM  8/1/2022  12:38 CDT

## 2022-08-01 NOTE — CASE COMMUNICATION
Patient missed visit with Home Health SN, multiple phone calls made to patient this date to set up time to see him but patient did not answer or return calls.

## 2022-08-04 ENCOUNTER — OFFICE VISIT (OUTPATIENT)
Dept: PODIATRY | Facility: CLINIC | Age: 72
End: 2022-08-04

## 2022-08-04 VITALS — BODY MASS INDEX: 28.44 KG/M2 | WEIGHT: 210 LBS | HEART RATE: 110 BPM | OXYGEN SATURATION: 98 % | HEIGHT: 72 IN

## 2022-08-04 DIAGNOSIS — E11.42 DIABETIC POLYNEUROPATHY ASSOCIATED WITH TYPE 2 DIABETES MELLITUS: ICD-10-CM

## 2022-08-04 DIAGNOSIS — L97.422 SKIN ULCER OF LEFT HEEL WITH FAT LAYER EXPOSED: Primary | ICD-10-CM

## 2022-08-04 PROCEDURE — 11042 DBRDMT SUBQ TIS 1ST 20SQCM/<: CPT | Performed by: PODIATRIST

## 2022-08-04 NOTE — PROGRESS NOTES
Jadiel Dutton  1950  71 y.o. male   PCP- Shayy Banda , MARY  7/6/2022  BS: 108  per pt    Patient return to clinic for follow up on left heel wound.        08/04/2022          Chief Complaint   Patient presents with   • Left Foot - Follow-up, Skin Ulcer         History of Present Illness     Jadiel Dutton is a 71 y.o. male with history of diabetes who presents for f/u evaluation of left heel ulceration.    Past Medical History:   Diagnosis Date   • Abdominal pain    • Abnormal weight loss    • Acute bronchitis    • Anxiety state    • Benign essential hypertension    • Chronic sinusitis    • Constipation    • Cystitis    • Degenerative joint disease involving multiple joints    • Diabetic neuropathy (HCC)      bilateral hands      • Diastolic dysfunction    • Diverticular disease of colon    • Dyspnea    • Epigastric pain    • Essential hypertension    • Foot ulcer (HCC)    • Gastroesophageal reflux disease    • Generalized anxiety disorder    • Hyperlipidemia    • Inflammatory bowel disease     Possible Inflammatory Bowel Disease      • Lumbar pain     Pain radiating to lumbar region of back   n      • Pleuritic pain    • Radiculopathy     site unspecified      • Type 2 diabetes mellitus (HCC)    • Vesicular eczema of hands and feet          Past Surgical History:   Procedure Laterality Date   • BACK SURGERY      LOWER BACK SURGERY   • CARPAL TUNNEL RELEASE Bilateral    • COLONOSCOPY  06/11/2012    Internal & external hemorrhoids found.   • COLONOSCOPY N/A 8/22/2017    Procedure: COLONOSCOPY;  Surgeon: Cesar Hollis MD;  Location: [facility] ENDOSCOPY;  Service:    • COLONOSCOPY N/A 3/1/2022    Procedure: COLONOSCOPY 8:30;  Surgeon: Jacoby Robertson DO;  Location: [facility] ENDOSCOPY;  Service: Gastroenterology;  Laterality: N/A;   • DEQUERVAIN RELEASE Bilateral    • ENDOSCOPY  06/11/2012    Slight stricture in the distal esophagus. Gastritis in stomach. Biopsy taken. Normal duodenum.   • ENDOSCOPY      • ENDOSCOPY N/A 8/22/2017    Procedure: ESOPHAGOGASTRODUODENOSCOPY possible dilation ;  Surgeon: Cesar Hollis MD;  Location: [facility] ENDOSCOPY;  Service:    • ENDOSCOPY N/A 3/1/2022    Procedure: ESOPHAGOGASTRODUODENOSCOPY 8:30;  Surgeon: Jacoby Robertson DO;  Location: [facility] ENDOSCOPY;  Service: Gastroenterology;  Laterality: N/A;   • EYE SURGERY Bilateral     cataracts removed with implants   • HAMMER TOE REPAIR Left 12/18/2019    Procedure: FIFTH METATARSAL EXOSTECTOMY, FOURTH HAMMER TOE CORRECTION, GASTROCNEMIUS RECESSION;  Surgeon: Jacoby Serrano DPM;  Location: [facility] OR;  Service: Podiatry   • SHOULDER ARTHROSCOPY Left 2/7/2020    Procedure: LEFT SHOULDER ARTHROSCOPY with rotator cuff repair, DEDRICK procedure, and Subacromial decompression;  Surgeon: Troy Barillas MD;  Location: [facility] OR;  Service: Orthopedics;  Laterality: Left;   • ULNAR TUNNEL RELEASE Bilateral          Family History   Problem Relation Age of Onset   • No Known Problems Other    • Hypertension Mother    • Heart attack Father    • No Known Problems Sister    • No Known Problems Brother    • No Known Problems Daughter    • No Known Problems Son    • No Known Problems Maternal Aunt    • No Known Problems Maternal Uncle    • No Known Problems Paternal Aunt    • No Known Problems Paternal Uncle    • Cancer Maternal Grandmother    • Hodgkin's lymphoma Maternal Grandmother    • No Known Problems Maternal Grandfather    • No Known Problems Paternal Grandmother    • No Known Problems Paternal Grandfather          Social History     Socioeconomic History   • Marital status: Single   Tobacco Use   • Smoking status: Never Smoker   • Smokeless tobacco: Never Used   Vaping Use   • Vaping Use: Never used   Substance and Sexual Activity   • Alcohol use: No   • Drug use: No   • Sexual activity: Defer         Current Outpatient Medications   Medication Sig Dispense Refill   • albuterol sulfate  (90 Base) MCG/ACT inhaler  "Inhale 2 puffs Every 4 (Four) Hours As Needed for Wheezing. 1 g 0   • azelastine (ASTELIN) 0.1 % nasal spray azelastine 137 mcg (0.1 %) nasal spray aerosol     • B-D UF III MINI PEN NEEDLES 31G X 5 MM misc USE FOUR TIMES A DAY WITH INSULIN PENS 360 each 2   • cetirizine (zyrTEC) 10 MG tablet Take 10 mg by mouth Daily.     • dexlansoprazole (DEXILANT) 60 MG capsule Take 60 mg by mouth Daily As Needed.     • docusate sodium (COLACE) 100 MG capsule Take 1 capsule by mouth 2 (Two) Times a Day As Needed for Constipation. 60 capsule 2   • famotidine (PEPCID) 20 MG tablet Take 20 mg by mouth Daily.     • folic acid (FOLVITE) 1 MG tablet Take 1 tablet by mouth Daily. 90 tablet 1   • furosemide (LASIX) 40 MG tablet TAKE 1 TABLET EVERY DAY 60 tablet 0   • gabapentin (NEURONTIN) 300 MG capsule Take 1 capsule by mouth 4 (Four) Times a Day As Needed.     • HYDROcodone-acetaminophen (NORCO)  MG per tablet Take 1 tablet by mouth Every 6 (Six) Hours As Needed.     • Lancets (OneTouch Delica Plus [id number]) misc USE 1 TO CHECK GLUCOSE 4 TIMES DAILY FOR 25 DAYS     • lisinopril-hydrochlorothiazide (PRINZIDE,ZESTORETIC) 20-25 MG per tablet Take 1 tablet by mouth Daily. 90 tablet 1   • meclizine (ANTIVERT) 25 MG tablet Take 1 tablet by mouth 3 (Three) Times a Day As Needed for Dizziness. 30 tablet 0   • ofloxacin (Floxin Otic) 0.3 % otic solution Administer 10 drops into both ears Daily. 10 mL 0   • ONETOUCH VERIO test strip      • rosuvastatin (CRESTOR) 20 MG tablet Take 1 tablet by mouth Every Night. 90 tablet 1   • vitamin B-12 (CYANOCOBALAMIN) 1000 MCG tablet Take 1 tablet by mouth Daily. 90 tablet 1     No current facility-administered medications for this visit.         OBJECTIVE    Pulse 110   Ht 182.9 cm (72\")   Wt 95.3 kg (210 lb)   SpO2 98%   BMI 28.48 kg/m²       Review of Systems   Constitutional: Negative.    HENT: Negative.    Eyes: Negative.    Respiratory: Negative.    Cardiovascular: Negative.  "   Gastrointestinal: Negative.    Endocrine: Negative.    Genitourinary: Negative.    Musculoskeletal: Positive for back pain.   Skin: Positive for wound.   Allergic/Immunologic: Negative.    Neurological: Negative.    Hematological: Negative.    Psychiatric/Behavioral: Negative.          Diabetic Foot Exam Performed and Monofilament Test Performed       Constitutional: he appears well-developed and well-nourished.   HEENT: Normocephalic. Atraumatic  CV: No tenderness. RRR  Resp: Non-labored respiration. No wheezes.   Psychiatric: he has a normal mood and affect. his   behavior is normal.      Lower Extremity Exam:  Vascular: DP/PT pulses palpable 2+.   Positive hair growth.   No perimalleolar edema  Neuro: Protective sensation Diminished, b/l.  Light touch sensation diminished, b/l  DTRs intact  Integument: Anterior venous leg ulcer predominantly healed, scabbed over  Ulceration to plantar medial left heel, full-thickness   Following debridement total wound surface measures 6.0 x 3.5 x 0.4 cm.  Increasing granular base.  No masses  Webspaces c/d/i  Musculoskeletal: LE muscle strength 4/5  Gait Normal  Mild hammertoe deformity toes 2 through 4 on right.  Ankle ROM full without pain or crepitus, b/l      Left heel wound debridement:  Risks and benefits discussed.  Left heel ulcer debrided of surrounding hyperkeratosis and devitalized tissue with iris scissors to level of subq  Pressure hemostasis obtained        ASSESSMENT AND PLAN    Diagnoses and all orders for this visit:    1. Skin ulcer of left heel with fat layer exposed (HCC) (Primary)    2. Diabetic polyneuropathy associated with type 2 diabetes mellitus (HCC)        -Wound debridement as above.  Wound more granular today.  -Home Health in place.    -Will discontinue wound VAC.  Return to collagen dressings.  -Continue modified weightbearing  -Recheck 1 week            This document has been electronically signed by Jacoby Serrano DPM on August 4, 2022  15:24 CDT     EMR Dragon/Transcription disclaimer:   Much of this encounter note is an electronic transcription/translation of spoken language to printed text. The electronic translation of spoken language may permit erroneous, or at times, nonsensical words or phrases to be inadvertently transcribed; Although I have reviewed the note for such errors, some may still exist.    Jacoby Serrano DPM  8/4/2022  15:24 CDT

## 2022-08-08 ENCOUNTER — OFFICE VISIT (OUTPATIENT)
Dept: PODIATRY | Facility: CLINIC | Age: 72
End: 2022-08-08

## 2022-08-08 ENCOUNTER — TELEPHONE (OUTPATIENT)
Dept: PODIATRY | Facility: CLINIC | Age: 72
End: 2022-08-08

## 2022-08-08 ENCOUNTER — LAB (OUTPATIENT)
Dept: LAB | Facility: HOSPITAL | Age: 72
End: 2022-08-08

## 2022-08-08 VITALS — BODY MASS INDEX: 28.44 KG/M2 | WEIGHT: 210 LBS | HEIGHT: 72 IN | OXYGEN SATURATION: 98 % | HEART RATE: 93 BPM

## 2022-08-08 DIAGNOSIS — L97.422 SKIN ULCER OF LEFT HEEL WITH FAT LAYER EXPOSED: Primary | ICD-10-CM

## 2022-08-08 DIAGNOSIS — L97.422 SKIN ULCER OF LEFT HEEL WITH FAT LAYER EXPOSED: ICD-10-CM

## 2022-08-08 DIAGNOSIS — E11.42 DIABETIC POLYNEUROPATHY ASSOCIATED WITH TYPE 2 DIABETES MELLITUS: ICD-10-CM

## 2022-08-08 LAB
BASOPHILS # BLD AUTO: 0.07 10*3/MM3 (ref 0–0.2)
BASOPHILS NFR BLD AUTO: 0.6 % (ref 0–1.5)
CRP SERPL-MCNC: 5.7 MG/DL (ref 0–0.5)
DEPRECATED RDW RBC AUTO: 43.5 FL (ref 37–54)
EOSINOPHIL # BLD AUTO: 0.26 10*3/MM3 (ref 0–0.4)
EOSINOPHIL NFR BLD AUTO: 2.2 % (ref 0.3–6.2)
ERYTHROCYTE [DISTWIDTH] IN BLOOD BY AUTOMATED COUNT: 12.9 % (ref 12.3–15.4)
ERYTHROCYTE [SEDIMENTATION RATE] IN BLOOD: 31 MM/HR (ref 0–20)
HCT VFR BLD AUTO: 41.5 % (ref 37.5–51)
HGB BLD-MCNC: 14.2 G/DL (ref 13–17.7)
IMM GRANULOCYTES # BLD AUTO: 0.07 10*3/MM3 (ref 0–0.05)
IMM GRANULOCYTES NFR BLD AUTO: 0.6 % (ref 0–0.5)
LYMPHOCYTES # BLD AUTO: 1.6 10*3/MM3 (ref 0.7–3.1)
LYMPHOCYTES NFR BLD AUTO: 13.3 % (ref 19.6–45.3)
MCH RBC QN AUTO: 32 PG (ref 26.6–33)
MCHC RBC AUTO-ENTMCNC: 34.2 G/DL (ref 31.5–35.7)
MCV RBC AUTO: 93.5 FL (ref 79–97)
MONOCYTES # BLD AUTO: 0.94 10*3/MM3 (ref 0.1–0.9)
MONOCYTES NFR BLD AUTO: 7.8 % (ref 5–12)
NEUTROPHILS NFR BLD AUTO: 75.5 % (ref 42.7–76)
NEUTROPHILS NFR BLD AUTO: 9.06 10*3/MM3 (ref 1.7–7)
NRBC BLD AUTO-RTO: 0 /100 WBC (ref 0–0.2)
PLATELET # BLD AUTO: 293 10*3/MM3 (ref 140–450)
PMV BLD AUTO: 10.2 FL (ref 6–12)
RBC # BLD AUTO: 4.44 10*6/MM3 (ref 4.14–5.8)
WBC NRBC COR # BLD: 12 10*3/MM3 (ref 3.4–10.8)

## 2022-08-08 PROCEDURE — 86140 C-REACTIVE PROTEIN: CPT

## 2022-08-08 PROCEDURE — 85652 RBC SED RATE AUTOMATED: CPT

## 2022-08-08 PROCEDURE — 36415 COLL VENOUS BLD VENIPUNCTURE: CPT

## 2022-08-08 PROCEDURE — 11042 DBRDMT SUBQ TIS 1ST 20SQCM/<: CPT | Performed by: PODIATRIST

## 2022-08-08 PROCEDURE — 85025 COMPLETE CBC W/AUTO DIFF WBC: CPT

## 2022-08-08 RX ORDER — DOXYCYCLINE HYCLATE 100 MG/1
100 CAPSULE ORAL 2 TIMES DAILY
Qty: 20 CAPSULE | Refills: 0 | Status: SHIPPED | OUTPATIENT
Start: 2022-08-08 | End: 2022-10-12

## 2022-08-08 NOTE — TELEPHONE ENCOUNTER
DEISY, [place], REQUESTS A CALL BACK RE NEEDS HOME WOUND CARE ORDERS FOR PT.  CALL BACK # [phone]. SHE ASKS WE LEAVE A MESSAGE IF YOU GET HER VOICEMAIL.  THANK YOU.

## 2022-08-08 NOTE — TELEPHONE ENCOUNTER
Informed Shirley that we place collagen and dry dressing. Patient was seen today and not Tuesday. He will follow up with us on next Tuesday.

## 2022-08-08 NOTE — PROGRESS NOTES
Jadiel Dutton  1950  71 y.o. male   PCP- Shayy Banda , APRN  7/6/2022  BS: 83  per pt    Patient return to clinic for follow up on left heel wound.        08/08/2022      Chief Complaint   Patient presents with   • Left Foot - Pain         History of Present Illness     Jadiel Dutton is a 71 y.o. male with history of diabetes who presents for f/u evaluation of left heel ulceration.  Does note he had a couple of episodes of feeling weak over the weekend.     Past Medical History:   Diagnosis Date   • Abdominal pain    • Abnormal weight loss    • Acute bronchitis    • Anxiety state    • Benign essential hypertension    • Chronic sinusitis    • Constipation    • Cystitis    • Degenerative joint disease involving multiple joints    • Diabetic neuropathy (HCC)      bilateral hands      • Diastolic dysfunction    • Diverticular disease of colon    • Dyspnea    • Epigastric pain    • Essential hypertension    • Foot ulcer (HCC)    • Gastroesophageal reflux disease    • Generalized anxiety disorder    • Hyperlipidemia    • Inflammatory bowel disease     Possible Inflammatory Bowel Disease      • Lumbar pain     Pain radiating to lumbar region of back   n      • Pleuritic pain    • Radiculopathy     site unspecified      • Type 2 diabetes mellitus (HCC)    • Vesicular eczema of hands and feet          Past Surgical History:   Procedure Laterality Date   • BACK SURGERY      LOWER BACK SURGERY   • CARPAL TUNNEL RELEASE Bilateral    • COLONOSCOPY  06/11/2012    Internal & external hemorrhoids found.   • COLONOSCOPY N/A 8/22/2017    Procedure: COLONOSCOPY;  Surgeon: Cesar Hollis MD;  Location: [facility] ENDOSCOPY;  Service:    • COLONOSCOPY N/A 3/1/2022    Procedure: COLONOSCOPY 8:30;  Surgeon: Jacoby Robertson DO;  Location: [facility] ENDOSCOPY;  Service: Gastroenterology;  Laterality: N/A;   • DEQUERVAIN RELEASE Bilateral    • ENDOSCOPY  06/11/2012    Slight stricture in the distal esophagus. Gastritis in  stomach. Biopsy taken. Normal duodenum.   • ENDOSCOPY     • ENDOSCOPY N/A 8/22/2017    Procedure: ESOPHAGOGASTRODUODENOSCOPY possible dilation ;  Surgeon: Cesar Hollis MD;  Location: [facility] ENDOSCOPY;  Service:    • ENDOSCOPY N/A 3/1/2022    Procedure: ESOPHAGOGASTRODUODENOSCOPY 8:30;  Surgeon: Jacoby Robertson DO;  Location: [facility] ENDOSCOPY;  Service: Gastroenterology;  Laterality: N/A;   • EYE SURGERY Bilateral     cataracts removed with implants   • HAMMER TOE REPAIR Left 12/18/2019    Procedure: FIFTH METATARSAL EXOSTECTOMY, FOURTH HAMMER TOE CORRECTION, GASTROCNEMIUS RECESSION;  Surgeon: Jacoby Serrano DPM;  Location: [facility] OR;  Service: Podiatry   • SHOULDER ARTHROSCOPY Left 2/7/2020    Procedure: LEFT SHOULDER ARTHROSCOPY with rotator cuff repair, DEDRICK procedure, and Subacromial decompression;  Surgeon: Troy Barillas MD;  Location: [facility] OR;  Service: Orthopedics;  Laterality: Left;   • ULNAR TUNNEL RELEASE Bilateral          Family History   Problem Relation Age of Onset   • No Known Problems Other    • Hypertension Mother    • Heart attack Father    • No Known Problems Sister    • No Known Problems Brother    • No Known Problems Daughter    • No Known Problems Son    • No Known Problems Maternal Aunt    • No Known Problems Maternal Uncle    • No Known Problems Paternal Aunt    • No Known Problems Paternal Uncle    • Cancer Maternal Grandmother    • Hodgkin's lymphoma Maternal Grandmother    • No Known Problems Maternal Grandfather    • No Known Problems Paternal Grandmother    • No Known Problems Paternal Grandfather          Social History     Socioeconomic History   • Marital status: Single   Tobacco Use   • Smoking status: Never Smoker   • Smokeless tobacco: Never Used   Vaping Use   • Vaping Use: Never used   Substance and Sexual Activity   • Alcohol use: No   • Drug use: No   • Sexual activity: Defer         Current Outpatient Medications   Medication Sig Dispense Refill  "  • albuterol sulfate  (90 Base) MCG/ACT inhaler Inhale 2 puffs Every 4 (Four) Hours As Needed for Wheezing. 1 g 0   • azelastine (ASTELIN) 0.1 % nasal spray azelastine 137 mcg (0.1 %) nasal spray aerosol     • B-D UF III MINI PEN NEEDLES 31G X 5 MM misc USE FOUR TIMES A DAY WITH INSULIN PENS 360 each 2   • cetirizine (zyrTEC) 10 MG tablet Take 10 mg by mouth Daily.     • dexlansoprazole (DEXILANT) 60 MG capsule Take 60 mg by mouth Daily As Needed.     • docusate sodium (COLACE) 100 MG capsule Take 1 capsule by mouth 2 (Two) Times a Day As Needed for Constipation. 60 capsule 2   • famotidine (PEPCID) 20 MG tablet Take 20 mg by mouth Daily.     • folic acid (FOLVITE) 1 MG tablet Take 1 tablet by mouth Daily. 90 tablet 1   • furosemide (LASIX) 40 MG tablet TAKE 1 TABLET EVERY DAY 60 tablet 0   • gabapentin (NEURONTIN) 300 MG capsule Take 1 capsule by mouth 4 (Four) Times a Day As Needed.     • HYDROcodone-acetaminophen (NORCO)  MG per tablet Take 1 tablet by mouth Every 6 (Six) Hours As Needed.     • Lancets (OneTouch Delica Plus [id number]) misc USE 1 TO CHECK GLUCOSE 4 TIMES DAILY FOR 25 DAYS     • lisinopril-hydrochlorothiazide (PRINZIDE,ZESTORETIC) 20-25 MG per tablet Take 1 tablet by mouth Daily. 90 tablet 1   • meclizine (ANTIVERT) 25 MG tablet Take 1 tablet by mouth 3 (Three) Times a Day As Needed for Dizziness. 30 tablet 0   • ofloxacin (Floxin Otic) 0.3 % otic solution Administer 10 drops into both ears Daily. 10 mL 0   • ONETOUCH VERIO test strip      • rosuvastatin (CRESTOR) 20 MG tablet Take 1 tablet by mouth Every Night. 90 tablet 1   • vitamin B-12 (CYANOCOBALAMIN) 1000 MCG tablet Take 1 tablet by mouth Daily. 90 tablet 1   • doxycycline (VIBRAMYCIN) 100 MG capsule Take 1 capsule by mouth 2 (Two) Times a Day. 20 capsule 0     No current facility-administered medications for this visit.         OBJECTIVE    Pulse 93   Ht 182.9 cm (72\")   Wt 95.3 kg (210 lb)   SpO2 98%   BMI 28.48 kg/m² "       Review of Systems   Constitutional: Negative.    HENT: Negative.    Eyes: Negative.    Respiratory: Negative.    Cardiovascular: Negative.    Gastrointestinal: Negative.    Endocrine: Negative.    Genitourinary: Negative.    Musculoskeletal: Positive for back pain.   Skin: Positive for wound.   Allergic/Immunologic: Negative.    Neurological: Negative.    Hematological: Negative.    Psychiatric/Behavioral: Negative.          Diabetic Foot Exam Performed and Monofilament Test Performed       Constitutional: he appears well-developed and well-nourished.   HEENT: Normocephalic. Atraumatic  CV: No tenderness. RRR  Resp: Non-labored respiration. No wheezes.   Psychiatric: he has a normal mood and affect. his   behavior is normal.      Lower Extremity Exam:  Vascular: DP/PT pulses palpable 2+.   Positive hair growth.   No perimalleolar edema  Neuro: Protective sensation Diminished, b/l.  Light touch sensation diminished, b/l  DTRs intact  Integument: Anterior venous leg ulcer predominantly healed, scabbed over  Ulceration to plantar medial left heel, full-thickness   Following debridement total wound surface measures 5.0 x 3.2 x 0.4 cm.  Increasing granular base.  No masses  Webspaces c/d/i  Musculoskeletal: LE muscle strength 4/5  Gait Normal  Mild hammertoe deformity toes 2 through 4 on right.  Ankle ROM full without pain or crepitus, b/l      Left heel wound debridement:  Risks and benefits discussed.  Left heel ulcer debrided of surrounding hyperkeratosis and devitalized tissue with iris scissors to level of subq  Pressure hemostasis obtained        ASSESSMENT AND PLAN    Diagnoses and all orders for this visit:    1. Skin ulcer of left heel with fat layer exposed (HCC) (Primary)  -     Sedimentation Rate; Future  -     C-reactive Protein; Future  -     CBC & Differential; Future    2. Diabetic polyneuropathy associated with type 2 diabetes mellitus (HCC)    Other orders  -     doxycycline (VIBRAMYCIN) 100 MG  capsule; Take 1 capsule by mouth 2 (Two) Times a Day.  Dispense: 20 capsule; Refill: 0        -Wound debridement as above.  Wound more granular today.  -Home Health in place.    -Continue collagen dressing changes.  Will obtain insurance benefits for TheraSkin allograft application  -Continue modified weightbearing  -Recheck 1 week            This document has been electronically signed by Jacoby Serrano DPM on August 8, 2022 10:11 CDT     EMR Dragon/Transcription disclaimer:   Much of this encounter note is an electronic transcription/translation of spoken language to printed text. The electronic translation of spoken language may permit erroneous, or at times, nonsensical words or phrases to be inadvertently transcribed; Although I have reviewed the note for such errors, some may still exist.    Jacoby Serrano DPM  8/8/2022  10:11 CDT

## 2022-08-09 ENCOUNTER — TELEPHONE (OUTPATIENT)
Dept: PODIATRY | Facility: CLINIC | Age: 72
End: 2022-08-09

## 2022-08-09 NOTE — TELEPHONE ENCOUNTER
Talked to Jadiel and he was just informing us that Home Health was going to come by on Friday. Jadiel was wanting Home Health to come by twice a week. I spoke with Shirley and she informed me that since he was in a dry dressing they can only make it out once a week since he has someone that can change his dressings.

## 2022-08-09 NOTE — TELEPHONE ENCOUNTER
MARCELA TARIQ,  [facility], REQUESTS Located within [facility] GIVE HER A CALL BACK RE PT [place] [facility] [facility].  CALL BACK # [phone].  THANK YOU.

## 2022-08-12 ENCOUNTER — HOME CARE VISIT (OUTPATIENT)
Dept: HOME HEALTH SERVICES | Facility: CLINIC | Age: 72
End: 2022-08-12

## 2022-08-12 VITALS
SYSTOLIC BLOOD PRESSURE: 130 MMHG | DIASTOLIC BLOOD PRESSURE: 82 MMHG | OXYGEN SATURATION: 97 % | TEMPERATURE: 97.4 F | HEART RATE: 94 BPM | RESPIRATION RATE: 18 BRPM

## 2022-08-12 PROCEDURE — G0299 HHS/HOSPICE OF RN EA 15 MIN: HCPCS

## 2022-08-16 ENCOUNTER — OFFICE VISIT (OUTPATIENT)
Dept: PODIATRY | Facility: CLINIC | Age: 72
End: 2022-08-16

## 2022-08-16 VITALS — OXYGEN SATURATION: 98 % | HEART RATE: 106 BPM | BODY MASS INDEX: 28.44 KG/M2 | HEIGHT: 72 IN | WEIGHT: 210 LBS

## 2022-08-16 DIAGNOSIS — L97.422 SKIN ULCER OF LEFT HEEL WITH FAT LAYER EXPOSED: Primary | ICD-10-CM

## 2022-08-16 DIAGNOSIS — E11.42 DIABETIC POLYNEUROPATHY ASSOCIATED WITH TYPE 2 DIABETES MELLITUS: ICD-10-CM

## 2022-08-16 DIAGNOSIS — L03.116 CELLULITIS OF LEFT LOWER EXTREMITY: ICD-10-CM

## 2022-08-16 PROCEDURE — 11042 DBRDMT SUBQ TIS 1ST 20SQCM/<: CPT | Performed by: PODIATRIST

## 2022-08-16 PROCEDURE — 99213 OFFICE O/P EST LOW 20 MIN: CPT | Performed by: PODIATRIST

## 2022-08-16 NOTE — PROGRESS NOTES
Jadiel Dutton  1950  71 y.o. male   PCP- Shayy Banda , MARY  7/6/2022  BS: 98  per pt    Patient return to clinic for follow up on left heel wound.        08/16/2022    Chief Complaint   Patient presents with   • Left Foot - Follow-up     1 week recheck          History of Present Illness     Jadiel Dutton is a 71 y.o. male with history of diabetes who presents for f/u evaluation of left heel ulceration.  Has been feeling better since started on doxycycline last visit.    Past Medical History:   Diagnosis Date   • Abdominal pain    • Abnormal weight loss    • Acute bronchitis    • Anxiety state    • Benign essential hypertension    • Chronic sinusitis    • Constipation    • Cystitis    • Degenerative joint disease involving multiple joints    • Diabetic neuropathy (HCC)      bilateral hands      • Diastolic dysfunction    • Diverticular disease of colon    • Dyspnea    • Epigastric pain    • Essential hypertension    • Foot ulcer (HCC)    • Gastroesophageal reflux disease    • Generalized anxiety disorder    • Hyperlipidemia    • Inflammatory bowel disease     Possible Inflammatory Bowel Disease      • Lumbar pain     Pain radiating to lumbar region of back   n      • Pleuritic pain    • Radiculopathy     site unspecified      • Type 2 diabetes mellitus (HCC)    • Vesicular eczema of hands and feet          Past Surgical History:   Procedure Laterality Date   • BACK SURGERY      LOWER BACK SURGERY   • CARPAL TUNNEL RELEASE Bilateral    • COLONOSCOPY  06/11/2012    Internal & external hemorrhoids found.   • COLONOSCOPY N/A 8/22/2017    Procedure: COLONOSCOPY;  Surgeon: Cesar Hollis MD;  Location: [facility] ENDOSCOPY;  Service:    • COLONOSCOPY N/A 3/1/2022    Procedure: COLONOSCOPY 8:30;  Surgeon: Jacoby Robertson DO;  Location: [facility] ENDOSCOPY;  Service: Gastroenterology;  Laterality: N/A;   • DEQUERVAIN RELEASE Bilateral    • ENDOSCOPY  06/11/2012    Slight stricture in the distal esophagus.  Gastritis in stomach. Biopsy taken. Normal duodenum.   • ENDOSCOPY     • ENDOSCOPY N/A 8/22/2017    Procedure: ESOPHAGOGASTRODUODENOSCOPY possible dilation ;  Surgeon: Cesar Hollis MD;  Location: [facility] ENDOSCOPY;  Service:    • ENDOSCOPY N/A 3/1/2022    Procedure: ESOPHAGOGASTRODUODENOSCOPY 8:30;  Surgeon: Jacoby Robertson DO;  Location: [facility] ENDOSCOPY;  Service: Gastroenterology;  Laterality: N/A;   • EYE SURGERY Bilateral     cataracts removed with implants   • HAMMER TOE REPAIR Left 12/18/2019    Procedure: FIFTH METATARSAL EXOSTECTOMY, FOURTH HAMMER TOE CORRECTION, GASTROCNEMIUS RECESSION;  Surgeon: Jacoby Serrano DPM;  Location: [facility] OR;  Service: Podiatry   • SHOULDER ARTHROSCOPY Left 2/7/2020    Procedure: LEFT SHOULDER ARTHROSCOPY with rotator cuff repair, DEDRICK procedure, and Subacromial decompression;  Surgeon: Troy Barillas MD;  Location: [facility] OR;  Service: Orthopedics;  Laterality: Left;   • ULNAR TUNNEL RELEASE Bilateral          Family History   Problem Relation Age of Onset   • No Known Problems Other    • Hypertension Mother    • Heart attack Father    • No Known Problems Sister    • No Known Problems Brother    • No Known Problems Daughter    • No Known Problems Son    • No Known Problems Maternal Aunt    • No Known Problems Maternal Uncle    • No Known Problems Paternal Aunt    • No Known Problems Paternal Uncle    • Cancer Maternal Grandmother    • Hodgkin's lymphoma Maternal Grandmother    • No Known Problems Maternal Grandfather    • No Known Problems Paternal Grandmother    • No Known Problems Paternal Grandfather          Social History     Socioeconomic History   • Marital status: Single   Tobacco Use   • Smoking status: Never Smoker   • Smokeless tobacco: Never Used   Vaping Use   • Vaping Use: Never used   Substance and Sexual Activity   • Alcohol use: No   • Drug use: No   • Sexual activity: Defer         Current Outpatient Medications   Medication Sig  "Dispense Refill   • albuterol sulfate  (90 Base) MCG/ACT inhaler Inhale 2 puffs Every 4 (Four) Hours As Needed for Wheezing. 1 g 0   • azelastine (ASTELIN) 0.1 % nasal spray azelastine 137 mcg (0.1 %) nasal spray aerosol     • B-D UF III MINI PEN NEEDLES 31G X 5 MM misc USE FOUR TIMES A DAY WITH INSULIN PENS 360 each 2   • cetirizine (zyrTEC) 10 MG tablet Take 10 mg by mouth Daily.     • dexlansoprazole (DEXILANT) 60 MG capsule Take 60 mg by mouth Daily As Needed.     • docusate sodium (COLACE) 100 MG capsule Take 1 capsule by mouth 2 (Two) Times a Day As Needed for Constipation. 60 capsule 2   • doxycycline (VIBRAMYCIN) 100 MG capsule Take 1 capsule by mouth 2 (Two) Times a Day. 20 capsule 0   • famotidine (PEPCID) 20 MG tablet Take 20 mg by mouth Daily.     • folic acid (FOLVITE) 1 MG tablet Take 1 tablet by mouth Daily. 90 tablet 1   • furosemide (LASIX) 40 MG tablet TAKE 1 TABLET EVERY DAY 60 tablet 0   • gabapentin (NEURONTIN) 300 MG capsule Take 1 capsule by mouth 4 (Four) Times a Day As Needed.     • HYDROcodone-acetaminophen (NORCO)  MG per tablet Take 1 tablet by mouth Every 6 (Six) Hours As Needed.     • Lancets (OneTouch Delica Plus [id number]) misc USE 1 TO CHECK GLUCOSE 4 TIMES DAILY FOR 25 DAYS     • lisinopril-hydrochlorothiazide (PRINZIDE,ZESTORETIC) 20-25 MG per tablet Take 1 tablet by mouth Daily. 90 tablet 1   • meclizine (ANTIVERT) 25 MG tablet Take 1 tablet by mouth 3 (Three) Times a Day As Needed for Dizziness. 30 tablet 0   • ofloxacin (Floxin Otic) 0.3 % otic solution Administer 10 drops into both ears Daily. 10 mL 0   • ONETOUCH VERIO test strip      • rosuvastatin (CRESTOR) 20 MG tablet Take 1 tablet by mouth Every Night. 90 tablet 1   • vitamin B-12 (CYANOCOBALAMIN) 1000 MCG tablet Take 1 tablet by mouth Daily. 90 tablet 1     No current facility-administered medications for this visit.         OBJECTIVE    Pulse 106   Ht 182.9 cm (72\")   Wt 95.3 kg (210 lb)   SpO2 98%   " BMI 28.48 kg/m²       Review of Systems   Constitutional: Negative.    HENT: Negative.    Eyes: Negative.    Respiratory: Negative.    Cardiovascular: Negative.    Gastrointestinal: Negative.    Endocrine: Negative.    Genitourinary: Negative.    Musculoskeletal: Positive for back pain.   Skin: Positive for wound.   Allergic/Immunologic: Negative.    Neurological: Negative.    Hematological: Negative.    Psychiatric/Behavioral: Negative.          Diabetic Foot Exam Performed and Monofilament Test Performed       Constitutional: he appears well-developed and well-nourished.   HEENT: Normocephalic. Atraumatic  CV: No tenderness. RRR  Resp: Non-labored respiration. No wheezes.   Psychiatric: he has a normal mood and affect. his   behavior is normal.      Lower Extremity Exam:  Vascular: DP/PT pulses palpable 2+.   Positive hair growth.   No perimalleolar edema  Neuro: Protective sensation Diminished, b/l.  Light touch sensation diminished, b/l  DTRs intact  Integument: Anterior venous leg ulcer predominantly healed, scabbed over  Ulceration to plantar medial left heel, full-thickness   Following debridement total wound surface measures 5.2 x 3.2 x 0.4 cm.  Increasing granular base.  No masses  Webspaces c/d/i  Musculoskeletal: LE muscle strength 4/5  Gait Normal  Mild hammertoe deformity toes 2 through 4 on right.  Ankle ROM full without pain or crepitus, b/l      Left heel wound debridement:  Risks and benefits discussed.  Left heel ulcer debrided of surrounding hyperkeratosis and devitalized tissue with iris scissors to level of subq  Pressure hemostasis obtained        ASSESSMENT AND PLAN    Diagnoses and all orders for this visit:    1. Skin ulcer of left heel with fat layer exposed (HCC) (Primary)  -     MRI Foot Left Without Contrast; Future    2. Diabetic polyneuropathy associated with type 2 diabetes mellitus (HCC)    3. Cellulitis of left lower extremity  -     MRI Foot Left Without Contrast;  Future        -Wound debridement as above.  Wound more granular today.  -Home Health in place.    -Continue collagen dressing changes.  Will obtain insurance benefits for TheraSkin allograft application  -Continue modified weightbearing  -ESR/CRP from most recent lab work mildly elevated.  Will obtain MRI to help rule out deeper infection/osteomyelitis  -Recheck 1 week            This document has been electronically signed by Jacoby Serrano DPM on August 16, 2022 16:25 CDT     EMR Dragon/Transcription disclaimer:   Much of this encounter note is an electronic transcription/translation of spoken language to printed text. The electronic translation of spoken language may permit erroneous, or at times, nonsensical words or phrases to be inadvertently transcribed; Although I have reviewed the note for such errors, some may still exist.    Jacoby Serrano DPM  8/16/2022  16:25 CDT

## 2022-08-19 ENCOUNTER — HOME CARE VISIT (OUTPATIENT)
Dept: HOME HEALTH SERVICES | Facility: CLINIC | Age: 72
End: 2022-08-19

## 2022-08-19 PROCEDURE — G0299 HHS/HOSPICE OF RN EA 15 MIN: HCPCS

## 2022-08-22 ENCOUNTER — HOSPITAL ENCOUNTER (OUTPATIENT)
Dept: MRI IMAGING | Facility: HOSPITAL | Age: 72
Discharge: HOME | End: 2022-08-22
Admitting: PODIATRIST

## 2022-08-22 VITALS
SYSTOLIC BLOOD PRESSURE: 134 MMHG | DIASTOLIC BLOOD PRESSURE: 80 MMHG | HEART RATE: 95 BPM | RESPIRATION RATE: 20 BRPM | OXYGEN SATURATION: 99 % | TEMPERATURE: 97.6 F

## 2022-08-22 DIAGNOSIS — L97.422 SKIN ULCER OF LEFT HEEL WITH FAT LAYER EXPOSED: ICD-10-CM

## 2022-08-22 DIAGNOSIS — L03.116 CELLULITIS OF LEFT LOWER EXTREMITY: ICD-10-CM

## 2022-08-22 PROCEDURE — 25010000002 GADOTERIDOL PER 1 ML: Performed by: PODIATRIST

## 2022-08-22 PROCEDURE — A9579 GAD-BASE MR CONTRAST NOS,1ML: HCPCS | Performed by: PODIATRIST

## 2022-08-22 PROCEDURE — 73720 MRI LWR EXTREMITY W/O&W/DYE: CPT

## 2022-08-22 RX ADMIN — GADOTERIDOL 20 ML: 279.3 INJECTION, SOLUTION INTRAVENOUS at 17:15

## 2022-08-23 ENCOUNTER — TELEPHONE (OUTPATIENT)
Dept: PODIATRY | Facility: CLINIC | Age: 72
End: 2022-08-23

## 2022-08-23 ENCOUNTER — DOCUMENTATION (OUTPATIENT)
Dept: ONCOLOGY | Facility: CLINIC | Age: 72
End: 2022-08-23

## 2022-08-23 ENCOUNTER — OFFICE VISIT (OUTPATIENT)
Dept: PODIATRY | Facility: CLINIC | Age: 72
End: 2022-08-23

## 2022-08-23 VITALS — HEIGHT: 72 IN | HEART RATE: 72 BPM | BODY MASS INDEX: 28.44 KG/M2 | OXYGEN SATURATION: 98 % | WEIGHT: 210 LBS

## 2022-08-23 DIAGNOSIS — E11.42 DIABETIC POLYNEUROPATHY ASSOCIATED WITH TYPE 2 DIABETES MELLITUS: ICD-10-CM

## 2022-08-23 DIAGNOSIS — L97.422 SKIN ULCER OF LEFT HEEL WITH FAT LAYER EXPOSED: Primary | ICD-10-CM

## 2022-08-23 DIAGNOSIS — L03.116 CELLULITIS OF LEFT LOWER EXTREMITY: ICD-10-CM

## 2022-08-23 DIAGNOSIS — M86.172 ACUTE OSTEOMYELITIS OF CALCANEUM, LEFT: Primary | ICD-10-CM

## 2022-08-23 PROCEDURE — 99214 OFFICE O/P EST MOD 30 MIN: CPT | Performed by: PODIATRIST

## 2022-08-23 RX ORDER — SODIUM CHLORIDE 9 MG/ML
250 INJECTION, SOLUTION INTRAVENOUS ONCE
Status: CANCELLED | OUTPATIENT
Start: 2022-08-24

## 2022-08-23 NOTE — TELEPHONE ENCOUNTER
[facility], REQUESTS A CALL BACK RE PT [facility].  CALL BACK # [phone].  THANK YOU.

## 2022-08-23 NOTE — PROGRESS NOTES
Jadiel Dutton  1950  71 y.o. male   PCP- Shayy Banda , MARY  7/6/2022  BS: 71  per pt    Patient return to clinic for follow up on left heel wound.        08/23/2022      Chief Complaint   Patient presents with   • Left Foot - Wound Check         History of Present Illness     Jadiel Dutton is a 71 y.o. male with history of diabetes who presents for f/u evaluation of left heel ulceration.  MRI yesterday for concern of calcaneal osteomyelitis.  Past Medical History:   Diagnosis Date   • Abdominal pain    • Abnormal weight loss    • Acute bronchitis    • Anxiety state    • Benign essential hypertension    • Chronic sinusitis    • Constipation    • Cystitis    • Degenerative joint disease involving multiple joints    • Diabetic neuropathy (HCC)      bilateral hands      • Diastolic dysfunction    • Diverticular disease of colon    • Dyspnea    • Epigastric pain    • Essential hypertension    • Foot ulcer (HCC)    • Gastroesophageal reflux disease    • Generalized anxiety disorder    • Hyperlipidemia    • Inflammatory bowel disease     Possible Inflammatory Bowel Disease      • Lumbar pain     Pain radiating to lumbar region of back   n      • Pleuritic pain    • Radiculopathy     site unspecified      • Type 2 diabetes mellitus (HCC)    • Vesicular eczema of hands and feet          Past Surgical History:   Procedure Laterality Date   • BACK SURGERY      LOWER BACK SURGERY   • CARPAL TUNNEL RELEASE Bilateral    • COLONOSCOPY  06/11/2012    Internal & external hemorrhoids found.   • COLONOSCOPY N/A 8/22/2017    Procedure: COLONOSCOPY;  Surgeon: Cesar Hollis MD;  Location: [facility] ENDOSCOPY;  Service:    • COLONOSCOPY N/A 3/1/2022    Procedure: COLONOSCOPY 8:30;  Surgeon: Jacoby Robertson DO;  Location: [facility] ENDOSCOPY;  Service: Gastroenterology;  Laterality: N/A;   • DEQUERVAIN RELEASE Bilateral    • ENDOSCOPY  06/11/2012    Slight stricture in the distal esophagus. Gastritis in stomach. Biopsy  taken. Normal duodenum.   • ENDOSCOPY     • ENDOSCOPY N/A 8/22/2017    Procedure: ESOPHAGOGASTRODUODENOSCOPY possible dilation ;  Surgeon: Cesar Hollis MD;  Location: [facility] ENDOSCOPY;  Service:    • ENDOSCOPY N/A 3/1/2022    Procedure: ESOPHAGOGASTRODUODENOSCOPY 8:30;  Surgeon: Jacoby Robertson DO;  Location: [facility] ENDOSCOPY;  Service: Gastroenterology;  Laterality: N/A;   • EYE SURGERY Bilateral     cataracts removed with implants   • HAMMER TOE REPAIR Left 12/18/2019    Procedure: FIFTH METATARSAL EXOSTECTOMY, FOURTH HAMMER TOE CORRECTION, GASTROCNEMIUS RECESSION;  Surgeon: Jacoby Serrano DPM;  Location: [facility] OR;  Service: Podiatry   • SHOULDER ARTHROSCOPY Left 2/7/2020    Procedure: LEFT SHOULDER ARTHROSCOPY with rotator cuff repair, DEDRICK procedure, and Subacromial decompression;  Surgeon: Troy Barillas MD;  Location: [facility] OR;  Service: Orthopedics;  Laterality: Left;   • ULNAR TUNNEL RELEASE Bilateral          Family History   Problem Relation Age of Onset   • No Known Problems Other    • Hypertension Mother    • Heart attack Father    • No Known Problems Sister    • No Known Problems Brother    • No Known Problems Daughter    • No Known Problems Son    • No Known Problems Maternal Aunt    • No Known Problems Maternal Uncle    • No Known Problems Paternal Aunt    • No Known Problems Paternal Uncle    • Cancer Maternal Grandmother    • Hodgkin's lymphoma Maternal Grandmother    • No Known Problems Maternal Grandfather    • No Known Problems Paternal Grandmother    • No Known Problems Paternal Grandfather          Social History     Socioeconomic History   • Marital status: Single   Tobacco Use   • Smoking status: Never Smoker   • Smokeless tobacco: Never Used   Vaping Use   • Vaping Use: Never used   Substance and Sexual Activity   • Alcohol use: No   • Drug use: No   • Sexual activity: Defer         Current Outpatient Medications   Medication Sig Dispense Refill   • albuterol  "sulfate  (90 Base) MCG/ACT inhaler Inhale 2 puffs Every 4 (Four) Hours As Needed for Wheezing. 1 g 0   • azelastine (ASTELIN) 0.1 % nasal spray azelastine 137 mcg (0.1 %) nasal spray aerosol     • B-D UF III MINI PEN NEEDLES 31G X 5 MM misc USE FOUR TIMES A DAY WITH INSULIN PENS 360 each 2   • cetirizine (zyrTEC) 10 MG tablet Take 10 mg by mouth Daily.     • dexlansoprazole (DEXILANT) 60 MG capsule Take 60 mg by mouth Daily As Needed.     • docusate sodium (COLACE) 100 MG capsule Take 1 capsule by mouth 2 (Two) Times a Day As Needed for Constipation. 60 capsule 2   • doxycycline (VIBRAMYCIN) 100 MG capsule Take 1 capsule by mouth 2 (Two) Times a Day. 20 capsule 0   • famotidine (PEPCID) 20 MG tablet Take 20 mg by mouth Daily.     • folic acid (FOLVITE) 1 MG tablet Take 1 tablet by mouth Daily. 90 tablet 1   • furosemide (LASIX) 40 MG tablet TAKE 1 TABLET EVERY DAY 60 tablet 0   • gabapentin (NEURONTIN) 300 MG capsule Take 1 capsule by mouth 4 (Four) Times a Day As Needed.     • HYDROcodone-acetaminophen (NORCO)  MG per tablet Take 1 tablet by mouth Every 6 (Six) Hours As Needed.     • Lancets (OneTouch Delica Plus Ismfkt24M) misc USE 1 TO CHECK GLUCOSE 4 TIMES DAILY FOR 25 DAYS     • lisinopril-hydrochlorothiazide (PRINZIDE,ZESTORETIC) 20-25 MG per tablet Take 1 tablet by mouth Daily. 90 tablet 1   • meclizine (ANTIVERT) 25 MG tablet Take 1 tablet by mouth 3 (Three) Times a Day As Needed for Dizziness. 30 tablet 0   • ofloxacin (Floxin Otic) 0.3 % otic solution Administer 10 drops into both ears Daily. 10 mL 0   • ONETOUCH VERIO test strip      • rosuvastatin (CRESTOR) 20 MG tablet Take 1 tablet by mouth Every Night. 90 tablet 1   • vitamin B-12 (CYANOCOBALAMIN) 1000 MCG tablet Take 1 tablet by mouth Daily. 90 tablet 1     No current facility-administered medications for this visit.         OBJECTIVE    Pulse 72   Ht 182.9 cm (72\")   Wt 95.3 kg (210 lb)   SpO2 98%   BMI 28.48 kg/m²       Review of " Systems   Constitutional: Negative.    HENT: Negative.    Eyes: Negative.    Respiratory: Negative.    Cardiovascular: Negative.    Gastrointestinal: Negative.    Endocrine: Negative.    Genitourinary: Negative.    Musculoskeletal: Positive for back pain.   Skin: Positive for wound.   Allergic/Immunologic: Negative.    Neurological: Negative.    Hematological: Negative.    Psychiatric/Behavioral: Negative.          Diabetic Foot Exam Performed and Monofilament Test Performed       Constitutional: he appears well-developed and well-nourished.   HEENT: Normocephalic. Atraumatic  CV: No tenderness. RRR  Resp: Non-labored respiration. No wheezes.   Psychiatric: he has a normal mood and affect. his   behavior is normal.      Lower Extremity Exam:  Vascular: DP/PT pulses palpable 2+.   Positive hair growth.   No perimalleolar edema  Neuro: Protective sensation Diminished, b/l.  Light touch sensation diminished, b/l  DTRs intact  Integument: Anterior venous leg ulcer predominantly healed, scabbed over  Ulceration to plantar medial left heel, full-thickness   Following debridement total wound surface measures 5.0 x 3.2 x 0.4 cm.  Increasing granular base.  No masses  Webspaces c/d/i  Musculoskeletal: LE muscle strength 4/5  Gait Normal  Mild hammertoe deformity toes 2 through 4 on right.  Ankle ROM full without pain or crepitus, b/l          ASSESSMENT AND PLAN    Diagnoses and all orders for this visit:    1. Skin ulcer of left heel with fat layer exposed (HCC) (Primary)    2. Diabetic polyneuropathy associated with type 2 diabetes mellitus (HCC)    3. Cellulitis of left lower extremity        -Formal read of MRI pending however on independent review of images he does have obvious enhancement of the calcaneal body concerning for osteomyelitis.  -Discussed treatment options.  We will place order for PICC line and initiate 4 to 6 weeks IV cefepime 2 g twice daily.  -Home Health in place.    -Continue collagen dressing  changes.  Will obtain insurance benefits for TheraSkin allograft application  -Continue modified weightbearing  -Check ESR/CRP and BMP weekly  -Recheck 1 week            This document has been electronically signed by Jacoby Serrano DPM on August 23, 2022 14:06 CDT     EMR Dragon/Transcription disclaimer:   Much of this encounter note is an electronic transcription/translation of spoken language to printed text. The electronic translation of spoken language may permit erroneous, or at times, nonsensical words or phrases to be inadvertently transcribed; Although I have reviewed the note for such errors, some may still exist.    Jacoby Serrano DPM  8/23/2022  14:06 CDT

## 2022-08-24 ENCOUNTER — HOSPITAL ENCOUNTER (OUTPATIENT)
Dept: ULTRASOUND IMAGING | Facility: HOSPITAL | Age: 72
Discharge: HOME | End: 2022-08-24

## 2022-08-24 ENCOUNTER — HOSPITAL ENCOUNTER (OUTPATIENT)
Dept: GENERAL RADIOLOGY | Facility: HOSPITAL | Age: 72
Discharge: HOME | End: 2022-08-24

## 2022-08-24 ENCOUNTER — HOME CARE VISIT (OUTPATIENT)
Dept: HOME HEALTH SERVICES | Facility: HOME HEALTHCARE | Age: 72
End: 2022-08-24

## 2022-08-24 ENCOUNTER — HOSPITAL ENCOUNTER (OUTPATIENT)
Dept: INTERVENTIONAL RADIOLOGY/VASCULAR | Facility: HOSPITAL | Age: 72
Discharge: HOME | End: 2022-08-24

## 2022-08-24 ENCOUNTER — INFUSION (OUTPATIENT)
Dept: ONCOLOGY | Facility: HOSPITAL | Age: 72
End: 2022-08-24

## 2022-08-24 ENCOUNTER — TELEPHONE (OUTPATIENT)
Dept: PODIATRY | Facility: CLINIC | Age: 72
End: 2022-08-24

## 2022-08-24 VITALS
HEART RATE: 83 BPM | DIASTOLIC BLOOD PRESSURE: 66 MMHG | SYSTOLIC BLOOD PRESSURE: 126 MMHG | TEMPERATURE: 96.3 F | RESPIRATION RATE: 20 BRPM

## 2022-08-24 DIAGNOSIS — M86.172 ACUTE OSTEOMYELITIS OF CALCANEUM, LEFT: ICD-10-CM

## 2022-08-24 DIAGNOSIS — Z79.4 TYPE 2 DIABETES MELLITUS WITH COMPLICATION, WITH LONG-TERM CURRENT USE OF INSULIN: ICD-10-CM

## 2022-08-24 DIAGNOSIS — M86.172 ACUTE OSTEOMYELITIS OF CALCANEUM, LEFT: Primary | ICD-10-CM

## 2022-08-24 DIAGNOSIS — E11.8 TYPE 2 DIABETES MELLITUS WITH COMPLICATION, WITH LONG-TERM CURRENT USE OF INSULIN: ICD-10-CM

## 2022-08-24 LAB
ANION GAP SERPL CALCULATED.3IONS-SCNC: 9 MMOL/L (ref 5–15)
BUN SERPL-MCNC: 10 MG/DL (ref 8–23)
BUN/CREAT SERPL: 8.3 (ref 7–25)
CALCIUM SPEC-SCNC: 9.1 MG/DL (ref 8.6–10.5)
CHLORIDE SERPL-SCNC: 95 MMOL/L (ref 98–107)
CO2 SERPL-SCNC: 34 MMOL/L (ref 22–29)
CREAT SERPL-MCNC: 1.21 MG/DL (ref 0.76–1.27)
CRP SERPL-MCNC: 3.38 MG/DL (ref 0–0.5)
EGFRCR SERPLBLD CKD-EPI 2021: 64.8 ML/MIN/1.73
ERYTHROCYTE [SEDIMENTATION RATE] IN BLOOD: 38 MM/HR (ref 0–15)
GLUCOSE SERPL-MCNC: 39 MG/DL (ref 65–99)
HBA1C MFR BLD: 6.6 % (ref 4.8–5.6)
POTASSIUM SERPL-SCNC: 2.9 MMOL/L (ref 3.5–5.2)
SODIUM SERPL-SCNC: 138 MMOL/L (ref 136–145)

## 2022-08-24 PROCEDURE — 25010000002 CEFEPIME PER 500 MG: Performed by: PODIATRIST

## 2022-08-24 PROCEDURE — 86140 C-REACTIVE PROTEIN: CPT

## 2022-08-24 PROCEDURE — 83036 HEMOGLOBIN GLYCOSYLATED A1C: CPT

## 2022-08-24 PROCEDURE — 80048 BASIC METABOLIC PNL TOTAL CA: CPT

## 2022-08-24 PROCEDURE — 85651 RBC SED RATE NONAUTOMATED: CPT

## 2022-08-24 PROCEDURE — C1751 CATH, INF, PER/CENT/MIDLINE: HCPCS

## 2022-08-24 PROCEDURE — 96365 THER/PROPH/DIAG IV INF INIT: CPT | Performed by: NURSE PRACTITIONER

## 2022-08-24 RX ORDER — SODIUM CHLORIDE 9 MG/ML
250 INJECTION, SOLUTION INTRAVENOUS ONCE
Status: DISCONTINUED | OUTPATIENT
Start: 2022-08-24 | End: 2022-08-24 | Stop reason: HOSPADM

## 2022-08-24 RX ORDER — SODIUM CHLORIDE 9 MG/ML
250 INJECTION, SOLUTION INTRAVENOUS ONCE
OUTPATIENT
Start: 2022-08-28

## 2022-08-24 RX ADMIN — CEFEPIME HYDROCHLORIDE 2 G: 2 INJECTION, POWDER, FOR SOLUTION INTRAVENOUS at 09:51

## 2022-08-24 RX ADMIN — SODIUM CHLORIDE 250 ML: 9 INJECTION, SOLUTION INTRAVENOUS at 09:51

## 2022-08-24 NOTE — TELEPHONE ENCOUNTER
HEBERT, [facility], REQUESTS A CALL BACK ASA RE THE 12 HR ANTIBIOTIC INFUSION DR COVINGTON ORDERED  FOR PT CANT BE CARRIED OUT AT THIS FACILITY.  HEBERT SAID A 24 HOUR INFUSION IS WHAT THEY CAN DO.  CALL BACK # [phone] FOR HEBERT,  [phone] FOR LIZBET.  THANK YOU.

## 2022-08-24 NOTE — PROGRESS NOTES
TWO PATIENT IDENTIFIERS WERE USED. CONSENT WAS SIGNED PER PATIENT EDUCATION MATERIAL WAS GIVEN TO PATIENT AND / OR FAMILY. THE PATIENT WAS DRAPED WITH FULL BODY DRAPE AND PATIENT'S RIGHT ARM WAS PREPPED WITH CHLORAPREP.  ULTRASOUND WAS USED TO LOCALIZE THERIGHT BASILIC  VEIN. SUBCUTANEOUS TISSUE AT THE CATHETER SITE WAS INFILTRATED WITH 2% LIDOCAINE. UNDER ULTRASOUND GUIDANCE, THE VEIN WAS ACCESSED WITH A 21GAUGE  NEEDLE. AN 0.018 WIRE WAS THEN THREADED THROUGH THE NEEDLE INTO THE CENTRAL VENOUS SYSTEM. THE 21GAUGE  NEEDLE WAS REMOVED AND A 5 [place] PEEL AWAY SHEATH WAS PLACED OVER THE WIRE. THE PICC LINE CATHETER WAS CUT AT 38 CM. THE PICC LINE CATHETER WAS THEN PLACED OVER THE WIRE INTO THE VEIN, THE SHEATH WAS PEELED AWAY,WIRE WAS REMOVED. CATHETER WAS FLUSHED WITH NORMAL SALINE AND TIPS APPLIED. BIOPATCH PLACED. CATHETER SECURED WITH STATLOCK AND TEGADERM. PATIENT TOLERATED PROCEDURE WELL. THIS WAS DONE IN THE    OTHER      IMPRESSION: SUCCESSFUL PLACEMENT OF SINGLE LUMEN SOLO PICC        Lila Laurent  8/24/2022  08:14 CDT

## 2022-08-24 NOTE — TELEPHONE ENCOUNTER
Spoke with [facility], [facility] and home health to get this straight. Patient has received his PICC line and getting first dose of antibiotics. [facility] should be reaching out to Home health and getting his antibiotics sent out to patients house.

## 2022-08-24 NOTE — NURSING NOTE
Pt informed that his glucose was 39 this morning. He states that he hadnt ate this morning but had since had a snack and a spirte over here with his infusion. He states he will check it when he gets home.

## 2022-08-24 NOTE — PROGRESS NOTES
TWO PATIENT IDENTIFIERS WERE USED. CONSENT WAS SIGNED PER PATIENT EDUCATION MATERIAL WAS GIVEN TO PATIENT AND / OR FAMILY. THE PATIENT WAS DRAPED WITH FULL BODY DRAPE AND PATIENT'S RIGHT ARM WAS PREPPED WITH CHLORAPREP.  ULTRASOUND WAS USED TO LOCALIZE THERIGHT BASILIC  VEIN. SUBCUTANEOUS TISSUE AT THE CATHETER SITE WAS INFILTRATED WITH 2% LIDOCAINE. UNDER ULTRASOUND GUIDANCE, THE VEIN WAS ACCESSED WITH A 21GAUGE  NEEDLE. AN 0.018 WIRE WAS THEN THREADED THROUGH THE NEEDLE INTO THE CENTRAL VENOUS SYSTEM. THE 21GAUGE  NEEDLE WAS REMOVED AND A 4 [place] PEEL AWAY SHEATH WAS PLACED OVER THE WIRE. THE PICC LINE CATHETER WAS CUT AT 44 CM. THE PICC LINE CATHETER WAS THEN PLACED OVER THE WIRE INTO THE VEIN, THE SHEATH WAS PEELED AWAY,WIRE WAS REMOVED. CATHETER WAS FLUSHED WITH NORMAL SALINE AND TIPS APPLIED. BIOPATCH PLACED. CATHETER SECURED WITH STATLOCK AND TEGADERM. PATIENT TOLERATED PROCEDURE WELL. THIS WAS DONE IN THE   ANGIOSUITE      IMPRESSION: SUCCESSFUL PLACEMENT OF SINGLE LUMEN SOLO PICC        Lila Laurent  8/24/2022  09:52 CDT

## 2022-08-25 ENCOUNTER — HOME CARE VISIT (OUTPATIENT)
Dept: HOME HEALTH SERVICES | Facility: CLINIC | Age: 72
End: 2022-08-25

## 2022-08-25 PROCEDURE — G0299 HHS/HOSPICE OF RN EA 15 MIN: HCPCS

## 2022-08-26 ENCOUNTER — HOME CARE VISIT (OUTPATIENT)
Dept: HOME HEALTH SERVICES | Facility: CLINIC | Age: 72
End: 2022-08-26

## 2022-08-26 VITALS — TEMPERATURE: 97.8 F

## 2022-08-26 VITALS
OXYGEN SATURATION: 99 % | DIASTOLIC BLOOD PRESSURE: 90 MMHG | HEART RATE: 88 BPM | SYSTOLIC BLOOD PRESSURE: 138 MMHG | TEMPERATURE: 97.4 F | RESPIRATION RATE: 18 BRPM

## 2022-08-26 VITALS
OXYGEN SATURATION: 99 % | DIASTOLIC BLOOD PRESSURE: 84 MMHG | SYSTOLIC BLOOD PRESSURE: 130 MMHG | TEMPERATURE: 97.4 F | HEART RATE: 84 BPM | RESPIRATION RATE: 18 BRPM

## 2022-08-26 PROCEDURE — G0299 HHS/HOSPICE OF RN EA 15 MIN: HCPCS

## 2022-08-26 NOTE — CASE COMMUNICATION
Patient now receiving 4 weeks of twice daily IV antibiotics due to wound infection.  No other changes in care noted at this time.

## 2022-08-27 ENCOUNTER — HOME CARE VISIT (OUTPATIENT)
Dept: HOME HEALTH SERVICES | Facility: CLINIC | Age: 72
End: 2022-08-27

## 2022-08-27 ENCOUNTER — HOME CARE VISIT (OUTPATIENT)
Dept: HOME HEALTH SERVICES | Facility: HOME HEALTHCARE | Age: 72
End: 2022-08-27

## 2022-08-27 VITALS
HEART RATE: 80 BPM | TEMPERATURE: 97.6 F | RESPIRATION RATE: 16 BRPM | OXYGEN SATURATION: 100 % | SYSTOLIC BLOOD PRESSURE: 130 MMHG | DIASTOLIC BLOOD PRESSURE: 80 MMHG

## 2022-08-27 PROCEDURE — G0299 HHS/HOSPICE OF RN EA 15 MIN: HCPCS

## 2022-08-28 ENCOUNTER — HOME CARE VISIT (OUTPATIENT)
Dept: HOME HEALTH SERVICES | Facility: HOME HEALTHCARE | Age: 72
End: 2022-08-28

## 2022-08-28 ENCOUNTER — HOME CARE VISIT (OUTPATIENT)
Dept: HOME HEALTH SERVICES | Facility: CLINIC | Age: 72
End: 2022-08-28

## 2022-08-28 ENCOUNTER — NURSE TRIAGE (OUTPATIENT)
Dept: CALL CENTER | Facility: HOSPITAL | Age: 72
End: 2022-08-28

## 2022-08-28 VITALS
SYSTOLIC BLOOD PRESSURE: 112 MMHG | TEMPERATURE: 97.4 F | RESPIRATION RATE: 16 BRPM | HEART RATE: 84 BPM | DIASTOLIC BLOOD PRESSURE: 70 MMHG | OXYGEN SATURATION: 99 %

## 2022-08-28 PROCEDURE — G0299 HHS/HOSPICE OF RN EA 15 MIN: HCPCS

## 2022-08-28 NOTE — HOME HEALTH
HH visit on call for IV administration. Pt sitting in his chair upon arrival. Pt does complain of constant pain to his lower back and (L) foot, VS stable. SIngle lumen PICC line flushed with 10ml NS and antibiotic, Cefepime 2gm started. After medication infused, flushed with 10ml NS, then 5ml Heparin. Pt tolerated well.

## 2022-08-28 NOTE — TELEPHONE ENCOUNTER
"    Reason for Disposition  • Patient sounds very sick or weak to the triager    Additional Information  • Negative: Shock suspected (e.g., cold/pale/clammy skin, too weak to stand, low BP, rapid pulse)  • Negative: Difficult to awaken or acting confused (e.g., disoriented, slurred speech)  • Negative: Sounds like a life-threatening emergency to the triager  • Negative: Chest pain  • Negative: Arm pains with exertion (e.g., walking)  • Negative: Muscle aches from influenza (flu) suspected  • Negative: Sickle cell pain crisis (sickle cell attack) suspected  • Negative: Muscle aches from heat exposure suspected  • Negative: Lyme disease suspected (e.g., bull's eye rash or tick bite / exposure in past month)  • Negative: Pain only in back  • Negative: Pain in one arm OR arm pains caused by recent vigorous activity (e.g., sports, lifting, overuse)  • Negative: Pain in one leg OR leg pains caused by recent vigorous activity (e.g., sports, lifting, overuse)  • Negative: Rash over large area or most of the body (widespread or generalized)  • Negative: Dark (cola colored) or red-colored urine  • Negative: [1] Drinking very little AND [2] dehydration suspected (e.g., no urine > 12 hours, very dry mouth, very lightheaded)  • Negative: [1] SEVERE pain (e.g., excruciating, unable to do any normal activities) AND [2] not improved 2 hours after pain medicine    Answer Assessment - Initial Assessment Questions  1. ONSET: \"When did the muscle aches or body pains start?\"       Having muscle jerks since starting new IV medication, Cefethine.  He has a pic line.   2. LOCATION: \"What part of your body is hurting?\" (e.g., entire body, arms, legs)       Entire body has muscle jerks.    3. SEVERITY: \"How bad is the pain?\" (Scale 1-10; or mild, moderate, severe)    - MILD (1-3): doesn't interfere with normal activities     - MODERATE (4-7): interferes with normal activities or awakens from sleep     - SEVERE (8-10):  excruciating pain, " "unable to do any normal activities       Moderate.    4. CAUSE: \"What do you think is causing the pains?\"      They think new medication is causing the muscle jerks.    5. FEVER: \"Have you been having fever?\"      His temperature today is 96.8.    6. OTHER SYMPTOMS: \"Do you have any other symptoms?\" (e.g., chest pain, weakness, rash, cold or flu symptoms, weight loss)      He is very weak, has to have help to walk.  Also eating and drinking very little.    7. PREGNANCY: \"Is there any chance you are pregnant?\" \"When was your last menstrual period?\"      Male   8. TRAVEL: \"Have you traveled out of the country in the last month?\" (e.g., travel history, exposures)      Unknown.    Protocols used: MUSCLE ACHES AND BODY PAIN-ADULT-AH      "

## 2022-08-28 NOTE — HOME HEALTH
HH visit made today for administration of IV antibiotic, Cefipime 2gm Q 12 hours via PICC line to (R) basilic. VS stable, pt does complain with constant pain to his lower  back, 6/10 presently and continues taking Hydrocodone as ordered. SN flushed PICC line with 10ml NS, administered Cefipime 2gm over 30 minutes, then flushed PICC line with 10ml NS, 5ml Heparin. Pt tolerated well. Will administer PM dose of Cefipime at [id number].    Next SNV: administer IV antibiotic, Cefipime 2gm

## 2022-08-29 ENCOUNTER — HOME CARE VISIT (OUTPATIENT)
Dept: HOME HEALTH SERVICES | Facility: HOME HEALTHCARE | Age: 72
End: 2022-08-29

## 2022-08-29 ENCOUNTER — HOME CARE VISIT (OUTPATIENT)
Dept: HOME HEALTH SERVICES | Facility: CLINIC | Age: 72
End: 2022-08-29

## 2022-08-29 VITALS
DIASTOLIC BLOOD PRESSURE: 74 MMHG | SYSTOLIC BLOOD PRESSURE: 124 MMHG | TEMPERATURE: 97.4 F | OXYGEN SATURATION: 100 % | RESPIRATION RATE: 22 BRPM | HEART RATE: 86 BPM | TEMPERATURE: 98.1 F

## 2022-08-29 VITALS
SYSTOLIC BLOOD PRESSURE: 130 MMHG | RESPIRATION RATE: 16 BRPM | OXYGEN SATURATION: 100 % | DIASTOLIC BLOOD PRESSURE: 80 MMHG | HEART RATE: 80 BPM | TEMPERATURE: 97.6 F

## 2022-08-29 RX ORDER — ONDANSETRON 4 MG/1
4 TABLET, FILM COATED ORAL EVERY 8 HOURS PRN
Qty: 30 TABLET | Refills: 0 | Status: SHIPPED | OUTPATIENT
Start: 2022-08-29

## 2022-08-29 NOTE — HOME HEALTH
HH visit this PM for IV antibiotic- Cefipime, 2gm Q 12 hours via PICC line (R) basilic.  Pt sitting in his chair upon arrival. Pt does complain of constant pain to his lower back and (L) foot, VS stable. SIngle lumen PICC line flushed with 10ml NS and antibiotic, Cefepime 2gm started. After medication infused, flushed with 10ml NS, then 5ml Heparin. Pt tolerated well.

## 2022-08-29 NOTE — CASE COMMUNICATION
"Patient refused for SN to come for visit stating he does not want any more of the IV antibiotics and he is going to get his PICC line out today.  Stated his \"muscles were jumping\" and that he was \"deathly sick\" yesterday.  "

## 2022-08-30 ENCOUNTER — OFFICE VISIT (OUTPATIENT)
Dept: PODIATRY | Facility: CLINIC | Age: 72
End: 2022-08-30

## 2022-08-30 ENCOUNTER — HOME CARE VISIT (OUTPATIENT)
Dept: HOME HEALTH SERVICES | Facility: CLINIC | Age: 72
End: 2022-08-30

## 2022-08-30 ENCOUNTER — HOME CARE VISIT (OUTPATIENT)
Dept: HOME HEALTH SERVICES | Facility: HOME HEALTHCARE | Age: 72
End: 2022-08-30

## 2022-08-30 VITALS — BODY MASS INDEX: 28.44 KG/M2 | OXYGEN SATURATION: 98 % | WEIGHT: 210 LBS | HEART RATE: 72 BPM | HEIGHT: 72 IN

## 2022-08-30 DIAGNOSIS — M86.172 ACUTE OSTEOMYELITIS OF CALCANEUM, LEFT: Primary | ICD-10-CM

## 2022-08-30 DIAGNOSIS — L97.422 SKIN ULCER OF LEFT HEEL WITH FAT LAYER EXPOSED: ICD-10-CM

## 2022-08-30 DIAGNOSIS — E11.42 DIABETIC POLYNEUROPATHY ASSOCIATED WITH TYPE 2 DIABETES MELLITUS: ICD-10-CM

## 2022-08-30 PROCEDURE — 99213 OFFICE O/P EST LOW 20 MIN: CPT | Performed by: PODIATRIST

## 2022-08-30 NOTE — PROGRESS NOTES
Jadiel Dutton  1950  71 y.o. male   PCP- Shayy Banda , MARY  7/6/2022  BS: 110 per pt    Patient return to clinic for follow up on left heel wound.        08/30/2022        Chief Complaint   Patient presents with   • Left Foot - Wound Check, Follow-up         History of Present Illness     Jadiel Dutton is a 71 y.o. male with history of diabetes who presents for f/u evaluation of left heel ulceration.  MRI on 822 was concerning for calcaneal osteomyelitis.  Patient was sent for PICC placement started on 2 g cefepime every 12 hours.  Patient received 8 doses of IV cefepime states that he began to experience nausea and muscle cramps.  He therefore denied care from home health and additional IV antibiotics.  Patient's last received dose of cefepime was reportedly 8/27/2020 2 in the AM.  States his nausea has improved since self discontinuing the antibiotics.  Past Medical History:   Diagnosis Date   • Abdominal pain    • Abnormal weight loss    • Acute bronchitis    • Anxiety state    • Benign essential hypertension    • Chronic sinusitis    • Constipation    • Cystitis    • Degenerative joint disease involving multiple joints    • Diabetic neuropathy (HCC)      bilateral hands      • Diastolic dysfunction    • Diverticular disease of colon    • Dyspnea    • Epigastric pain    • Essential hypertension    • Foot ulcer (HCC)    • Gastroesophageal reflux disease    • Generalized anxiety disorder    • Hyperlipidemia    • Inflammatory bowel disease     Possible Inflammatory Bowel Disease      • Lumbar pain     Pain radiating to lumbar region of back   n      • Pleuritic pain    • Radiculopathy     site unspecified      • Type 2 diabetes mellitus (HCC)    • Vesicular eczema of hands and feet          Past Surgical History:   Procedure Laterality Date   • BACK SURGERY      LOWER BACK SURGERY   • CARPAL TUNNEL RELEASE Bilateral    • COLONOSCOPY  06/11/2012    Internal & external hemorrhoids found.   •  COLONOSCOPY N/A 8/22/2017    Procedure: COLONOSCOPY;  Surgeon: Cesar Hollis MD;  Location: [facility] ENDOSCOPY;  Service:    • COLONOSCOPY N/A 3/1/2022    Procedure: COLONOSCOPY 8:30;  Surgeon: Jacoby Robertson DO;  Location: [facility] ENDOSCOPY;  Service: Gastroenterology;  Laterality: N/A;   • DEQUERVAIN RELEASE Bilateral    • ENDOSCOPY  06/11/2012    Slight stricture in the distal esophagus. Gastritis in stomach. Biopsy taken. Normal duodenum.   • ENDOSCOPY     • ENDOSCOPY N/A 8/22/2017    Procedure: ESOPHAGOGASTRODUODENOSCOPY possible dilation ;  Surgeon: Cesar Hollis MD;  Location: [facility] ENDOSCOPY;  Service:    • ENDOSCOPY N/A 3/1/2022    Procedure: ESOPHAGOGASTRODUODENOSCOPY 8:30;  Surgeon: Jacoby Robertson DO;  Location: [facility] ENDOSCOPY;  Service: Gastroenterology;  Laterality: N/A;   • EYE SURGERY Bilateral     cataracts removed with implants   • HAMMER TOE REPAIR Left 12/18/2019    Procedure: FIFTH METATARSAL EXOSTECTOMY, FOURTH HAMMER TOE CORRECTION, GASTROCNEMIUS RECESSION;  Surgeon: Jacoby Serrano DPM;  Location: [facility] OR;  Service: Podiatry   • SHOULDER ARTHROSCOPY Left 2/7/2020    Procedure: LEFT SHOULDER ARTHROSCOPY with rotator cuff repair, DEDRICK procedure, and Subacromial decompression;  Surgeon: Troy Barillas MD;  Location: [facility] OR;  Service: Orthopedics;  Laterality: Left;   • ULNAR TUNNEL RELEASE Bilateral          Family History   Problem Relation Age of Onset   • No Known Problems Other    • Hypertension Mother    • Heart attack Father    • No Known Problems Sister    • No Known Problems Brother    • No Known Problems Daughter    • No Known Problems Son    • No Known Problems Maternal Aunt    • No Known Problems Maternal Uncle    • No Known Problems Paternal Aunt    • No Known Problems Paternal Uncle    • Cancer Maternal Grandmother    • Hodgkin's lymphoma Maternal Grandmother    • No Known Problems Maternal Grandfather    • No Known Problems Paternal  Grandmother    • No Known Problems Paternal Grandfather          Social History     Socioeconomic History   • Marital status: Single   Tobacco Use   • Smoking status: Never Smoker   • Smokeless tobacco: Never Used   Vaping Use   • Vaping Use: Never used   Substance and Sexual Activity   • Alcohol use: No   • Drug use: No   • Sexual activity: Defer         Current Outpatient Medications   Medication Sig Dispense Refill   • albuterol sulfate  (90 Base) MCG/ACT inhaler Inhale 2 puffs Every 4 (Four) Hours As Needed for Wheezing. 1 g 0   • azelastine (ASTELIN) 0.1 % nasal spray azelastine 137 mcg (0.1 %) nasal spray aerosol     • B-D UF III MINI PEN NEEDLES 31G X 5 MM misc USE FOUR TIMES A DAY WITH INSULIN PENS 360 each 2   • cetirizine (zyrTEC) 10 MG tablet Take 10 mg by mouth Daily.     • dexlansoprazole (DEXILANT) 60 MG capsule Take 60 mg by mouth Daily As Needed.     • docusate sodium (COLACE) 100 MG capsule Take 1 capsule by mouth 2 (Two) Times a Day As Needed for Constipation. 60 capsule 2   • doxycycline (VIBRAMYCIN) 100 MG capsule Take 1 capsule by mouth 2 (Two) Times a Day. 20 capsule 0   • famotidine (PEPCID) 20 MG tablet Take 20 mg by mouth Daily.     • folic acid (FOLVITE) 1 MG tablet Take 1 tablet by mouth Daily. 90 tablet 1   • furosemide (LASIX) 40 MG tablet TAKE 1 TABLET EVERY DAY 60 tablet 0   • gabapentin (NEURONTIN) 300 MG capsule Take 1 capsule by mouth 4 (Four) Times a Day As Needed.     • HYDROcodone-acetaminophen (NORCO)  MG per tablet Take 1 tablet by mouth Every 6 (Six) Hours As Needed.     • Lancets (OneTouch Delica Plus Tanpcf63A) misc USE 1 TO CHECK GLUCOSE 4 TIMES DAILY FOR 25 DAYS     • lisinopril-hydrochlorothiazide (PRINZIDE,ZESTORETIC) 20-25 MG per tablet Take 1 tablet by mouth Daily. 90 tablet 1   • meclizine (ANTIVERT) 25 MG tablet Take 1 tablet by mouth 3 (Three) Times a Day As Needed for Dizziness. 30 tablet 0   • ofloxacin (Floxin Otic) 0.3 % otic solution Administer 10  "drops into both ears Daily. 10 mL 0   • ondansetron (Zofran) 4 MG tablet Take 1 tablet by mouth Every 8 (Eight) Hours As Needed for Nausea or Vomiting. 30 tablet 0   • ONETOUCH VERIO test strip      • rosuvastatin (CRESTOR) 20 MG tablet Take 1 tablet by mouth Every Night. 90 tablet 1   • vitamin B-12 (CYANOCOBALAMIN) 1000 MCG tablet Take 1 tablet by mouth Daily. 90 tablet 1   • cefepime 2 gm IVPB in 100 ml NS (MBP) Infuse 2 g into a venous catheter 2 (Two) Times a Day. Indications: Infection of the Skin and/or Soft Tissue       No current facility-administered medications for this visit.         OBJECTIVE    Pulse 72   Ht 182.9 cm (72\")   Wt 95.3 kg (210 lb)   SpO2 98%   BMI 28.48 kg/m²       Review of Systems   Constitutional: Negative.    HENT: Negative.    Eyes: Negative.    Respiratory: Negative.    Cardiovascular: Negative.    Gastrointestinal: Negative.    Endocrine: Negative.    Genitourinary: Negative.    Musculoskeletal: Positive for back pain.   Skin: Positive for wound.   Allergic/Immunologic: Negative.    Neurological: Negative.    Hematological: Negative.    Psychiatric/Behavioral: Negative.          Diabetic Foot Exam Performed and Monofilament Test Performed       Constitutional: he appears well-developed and well-nourished.   HEENT: Normocephalic. Atraumatic  CV: No tenderness. RRR  Resp: Non-labored respiration. No wheezes.   Psychiatric: he has a normal mood and affect. his   behavior is normal.      Lower Extremity Exam:  Vascular: DP/PT pulses palpable 2+.   Positive hair growth.   No perimalleolar edema  Neuro: Protective sensation Diminished, b/l.  Light touch sensation diminished, b/l  DTRs intact  Integument: Anterior venous leg ulcer  Healed.  Ulceration to plantar medial left heel, full-thickness   Following debridement total wound surface measures 5.0 x 3.2 x 0.4 cm.  95% granular base.  Mild serous drainage.  Mild surrounding hyperkeratosis.  No probe to bone.  No masses  Webspaces " c/d/i  Musculoskeletal: LE muscle strength 4/5  Gait Normal  Mild hammertoe deformity toes 2 through 4 on right.  Ankle ROM full without pain or crepitus, b/l          ASSESSMENT AND PLAN    Diagnoses and all orders for this visit:    1. Acute osteomyelitis of calcaneum, left (HCC) (Primary)    2. Skin ulcer of left heel with fat layer exposed (HCC)    3. Diabetic polyneuropathy associated with type 2 diabetes mellitus (HCC)        -Patient wound appearance stable.  Had an at length discussion regarding his adverse reaction to IV cefepime but also his need for continued IV antibiotics.  His CRP has decreased from 5.7 to 3.38.  Stressed that findings consistent with calcaneal osteomyelitis do put him at significant risk of limb loss.  We discussed transition to IV vancomycin for remainder of antibiotic therapy.  Patient is frustrated with duration of this wound site and is seeking a second opinion.  I did encourage this however reinforced my recommendation to not wait to resume antibiotic therapy.  Discussed sending him to the infusion center to flush his PICC and begin vancomycin.  Patient declined and stated he will contact the office tomorrow if he wishes to proceed.  -Wound site was redressed.  Advised continued partial protected weightbearing and stressed need for offloading.            This document has been electronically signed by Jacoby Serrano DPM on August 30, 2022 11:45 CDT     EMR Dragon/Transcription disclaimer:   Much of this encounter note is an electronic transcription/translation of spoken language to printed text. The electronic translation of spoken language may permit erroneous, or at times, nonsensical words or phrases to be inadvertently transcribed; Although I have reviewed the note for such errors, some may still exist.    Jacoby Serrano DPM  8/30/2022  11:45 CDT

## 2022-08-30 NOTE — CASE COMMUNICATION
Patient continues to decline for us to come for IV antibiotic infusion.   also aware of patient's refusal.

## 2022-08-31 ENCOUNTER — HOME CARE VISIT (OUTPATIENT)
Dept: HOME HEALTH SERVICES | Facility: CLINIC | Age: 72
End: 2022-08-31

## 2022-08-31 ENCOUNTER — TELEPHONE (OUTPATIENT)
Dept: PODIATRY | Facility: CLINIC | Age: 72
End: 2022-08-31

## 2022-08-31 ENCOUNTER — HOME CARE VISIT (OUTPATIENT)
Dept: HOME HEALTH SERVICES | Facility: HOME HEALTHCARE | Age: 72
End: 2022-08-31

## 2022-08-31 NOTE — CASE COMMUNICATION
Patient will not answer phone this date for any communication, patient told MD he would let him know today what he wanted to do but patient will not return calls or answer any calls from staff this date.

## 2022-08-31 NOTE — TELEPHONE ENCOUNTER
Pt is requesting a call back about his foot. He would not go into more detail with me. Just asked a nurse call him back.

## 2022-08-31 NOTE — CASE COMMUNICATION
THE FOLLOWING INSTRUCTIONS WERE REVIEWED UPON DISCHARGE:    Keep any future medical appointments.    Call your doctor if you develop a new or worsening symptom, especially if you develop s/s of infection at picc line insertion site.    Continue to take the medications prescribed by your doctor. A medication list is attached. Be sure to keep them informed of all medications you take including over the counter herbal, vitamins/minerals an d the ones you take only when needed.    Follow the diabetic diet as ordered by your doctor.     Continue wound care as ordered by MD Serrano -  to perform wound care twice weekly to left heel as follows:  -remove old dressing and disgard per policy  -clean with wound cleanser or soap and water  -pat dry  -apply collagen to wound bed  -cover with adaptic  -cover wtih abd pad  -secure with kerlix and ace wrap.       SN DISCHARGE:  radha ent discharged to self care in the home 8/31/22.    CONDITION/PROBLEMS AT TIMES OF ADMISSION:  Patient has a non-healing wound to left heel    SUMMARY OF NURSING CARE PROVIDED:   has been providing assessment and dressing change to wound to left heel.  On 8/24/22 MD Serrano ordered patient to receive IV antibiotics for wound infection BID x 4 weeks and PICC line was placed to right arm.  However patient has refused SN to come to home  since Sunday at which time he stated he was sick from the antibiotics and his muscles were jumping.  On Monday when this nurse spoke with patient he stated he was not going to receive any further IV antibiotic treatments.  MD Serrano has been aware of this on-going situation.    PROGRESSION TOWARDS GOALS:  Not yet met but patient declines services         Instructions given to Patient    Instructions provided per Mailed

## 2022-09-01 ENCOUNTER — TRANSCRIBE ORDERS (OUTPATIENT)
Dept: WOUND CARE | Facility: HOSPITAL | Age: 72
End: 2022-09-01

## 2022-09-01 DIAGNOSIS — L02.612 ABSCESS OF LEFT HEEL: Primary | ICD-10-CM

## 2022-09-01 DIAGNOSIS — M86.172 ACUTE OSTEOMYELITIS OF LEFT FOOT: ICD-10-CM

## 2022-09-02 ENCOUNTER — TELEPHONE (OUTPATIENT)
Dept: PODIATRY | Facility: CLINIC | Age: 72
End: 2022-09-02

## 2022-09-02 NOTE — TELEPHONE ENCOUNTER
Returned patients call regarding request for appointment with Dr. Serrano. Dr. Serrano recommenced referral to a provider of patients choice for wound care, [facility] or any location patient request. Patient stated he would like to do research before choosing.

## 2022-09-20 ENCOUNTER — DOCUMENTATION (OUTPATIENT)
Dept: FAMILY MEDICINE CLINIC | Facility: CLINIC | Age: 72
End: 2022-09-20

## 2022-09-20 ENCOUNTER — TELEPHONE (OUTPATIENT)
Dept: FAMILY MEDICINE CLINIC | Facility: CLINIC | Age: 72
End: 2022-09-20

## 2022-09-20 NOTE — PROGRESS NOTES
Received a call today from hospitalist Dr. Mishra at [facility] regarding patient.  According to Dr. Mishra patient was admitted for a wound on his foot and during hospitalization he was found to be hypoglycemic.  She reports that glucose of 41.  Patient discharged today but has surgery scheduled as an outpatient on Thursday and Dr. Mishra was asking that I have him come into the office to check blood sugar again before that time.  She reports that patient informed her that he was not taking insulin anymore because his appetite was decreased he had lost weight did not feel like he needed insulin.  She also reported that patient did not feel symptomatic at all with hypoglycemia.  Per her request patient is called today and asked to come into the office tomorrow for evaluation physical exam and lab work especially to check blood sugar.  When MA called him he says that he will try to come in tomorrow but is unsure if he will be able to or not.  He is advised on the need to have this evaluated further prior to his surgery as scheduled on Thursday.  States that he will try to be here tomorrow for evaluation and fingerstick.  I will also need to review his medications with him and consider changing or stopping medications accordingly.  Patient aware of plan.

## 2022-09-20 NOTE — TELEPHONE ENCOUNTER
Dr. Mishra from [facility] called to talk to MARLEY Banda about patient and how he needs to come into tomorrow before his surgery on Thursday to go over some diabetic things with PT. I called and told PT and he said he would try and come in but he is supposed to have his foot wrapped tomorrow so doesn't know if he will be able to make.

## 2022-10-06 RX ORDER — AZITHROMYCIN 250 MG/1
TABLET, FILM COATED ORAL
Qty: 6 TABLET | Refills: 0 | Status: SHIPPED | OUTPATIENT
Start: 2022-10-06 | End: 2022-10-12

## 2022-10-06 RX ORDER — AZITHROMYCIN 250 MG/1
TABLET, FILM COATED ORAL
Qty: 6 TABLET | Refills: 0 | OUTPATIENT
Start: 2022-10-06

## 2022-10-12 ENCOUNTER — OFFICE VISIT (OUTPATIENT)
Dept: FAMILY MEDICINE CLINIC | Facility: CLINIC | Age: 72
End: 2022-10-12

## 2022-10-12 VITALS — BODY MASS INDEX: 28.44 KG/M2 | HEIGHT: 72 IN | WEIGHT: 210 LBS

## 2022-10-12 DIAGNOSIS — J06.9 ACUTE URI: Primary | ICD-10-CM

## 2022-10-12 DIAGNOSIS — J30.9 ALLERGIC RHINITIS, UNSPECIFIED SEASONALITY, UNSPECIFIED TRIGGER: ICD-10-CM

## 2022-10-12 PROCEDURE — 99443 PR PHYS/QHP TELEPHONE EVALUATION 21-30 MIN: CPT | Performed by: NURSE PRACTITIONER

## 2022-10-12 RX ORDER — METHYLPREDNISOLONE 4 MG/1
TABLET ORAL
Qty: 1 EACH | Refills: 0 | Status: SHIPPED | OUTPATIENT
Start: 2022-10-12 | End: 2022-11-16

## 2022-10-12 RX ORDER — AZELASTINE 1 MG/ML
1 SPRAY, METERED NASAL
COMMUNITY
End: 2023-04-06

## 2022-10-12 RX ORDER — BROMPHENIRAMINE MALEATE, PSEUDOEPHEDRINE HYDROCHLORIDE, AND DEXTROMETHORPHAN HYDROBROMIDE 2; 30; 10 MG/5ML; MG/5ML; MG/5ML
SYRUP ORAL
COMMUNITY
Start: 2022-10-09 | End: 2022-11-16

## 2022-10-12 NOTE — PROGRESS NOTES
"  This was an audio and video enabled telemedicine encounter.    Subjective   Jadiel Dutton is a 72 y.o. male who presents with complaints of URI and Wheezing  pt here today for telephone visit and the patient is at home and I am in the office.    The patient reports he has congestion and sinus issues that began approximately on 10/06/2022, or 10/07/2022. The patient adds he finished a Z-Yonas on 10/11/2022. He adds he was swabbed for COVID-19 on 10/10/2022, which he was informed it was negative. The patient adds he has an inhaler that helps; however, it does not break it up like it should. He adds Dr. Riddle gave him cough syrup on 10/10/2022, bromfed. The patient mentions he has been taking Tylenol 1 tablet per day. He denies running a fever. The patient notes he has had mild body aches and a mild headache. He adds he has not checked his temperature or oxygen level on 10/12/2022. The patient adds he does alright as long as he uses an inhaler; however, without the inhaler, he experiences shortness of breath. He notes he is not taking anything for his blood glucose secondary to it being low. The patient adds he has lost weight.    The following portions of the patient's history were reviewed and updated as appropriate: allergies, current medications, past family history, past medical history, past social history, past surgical history and problem list.    Vitals:    10/12/22 0917   Weight: 95.3 kg (210 lb)   Height: 182.9 cm (72\")   PainSc: 0-No pain     Body mass index is 28.48 kg/m².  Past Medical History:   Diagnosis Date   • Abdominal pain    • Abnormal weight loss    • Acute bronchitis    • Anxiety state    • Benign essential hypertension    • Chronic sinusitis    • Constipation    • Cystitis    • Degenerative joint disease involving multiple joints    • Diabetic neuropathy (HCC)      bilateral hands      • Diastolic dysfunction    • Diverticular disease of colon    • Dyspnea    • Epigastric pain    • " Essential hypertension    • Foot ulcer (HCC)    • Gastroesophageal reflux disease    • Generalized anxiety disorder    • Hyperlipidemia    • Inflammatory bowel disease     Possible Inflammatory Bowel Disease      • Lumbar pain     Pain radiating to lumbar region of back   n      • Pleuritic pain    • Radiculopathy     site unspecified      • Type 2 diabetes mellitus (HCC)    • Vesicular eczema of hands and feet      History of Present Illness     The following portions of the patient's history were reviewed and updated as appropriate: allergies, current medications, past family history, past medical history, past social history, past surgical history and problem list.    Review of Systems    Objective   Physical Exam     Deferred, telephone visit.    Assessment & Plan   Diagnoses and all orders for this visit:    1. Acute URI (Primary)  -     methylPREDNISolone (MEDROL) 4 MG dose pack; Take as directed on package instructions.  Dispense: 1 each; Refill: 0    2. Allergic rhinitis, unspecified seasonality, unspecified trigger  -     methylPREDNISolone (MEDROL) 4 MG dose pack; Take as directed on package instructions.  Dispense: 1 each; Refill: 0      BMI is >= 25 and <30. (Overweight) The following options were offered after discussion;: exercise counseling/recommendations and nutrition counseling/recommendations     Plan:     I will prescribe the patient a Medrol Dosepak and he will continue with Bromfed as needed. The patient has just finished a Z-Yonas and is advised the medication will stay in his system and continue working for another 5 days after he stops the medication. If his symptoms should persist or worsen, he will contact the clinic. Otherwise, I will see the patient back as scheduled for chronic conditions. All questions and concerns are addressed with understanding noted. The patient is in agreement to above plan.     This visit has been rescheduled as a phone visit to comply with patient safety concerns  in accordance with CDC recommendations. Total time of discussion was 22 minutes.    You have chosen to receive care through a telephone visit. Do you consent to use a telephone visit for your medical care today? Yes      Transcribed from ambient dictation for MARY Escobar by Lexi Lomas.  10/12/22   10:32 CDT    Patient or patient representative verbalized consent to the visit recording.  I have personally performed the services described in this document as transcribed by the above individual, and it is both accurate and complete.  MARY Escobar  10/12/2022  11:32 CDT

## 2022-11-09 RX ORDER — AZITHROMYCIN 250 MG/1
TABLET, FILM COATED ORAL
Qty: 6 TABLET | Refills: 0 | Status: SHIPPED | OUTPATIENT
Start: 2022-11-09 | End: 2022-12-15

## 2022-11-16 ENCOUNTER — OFFICE VISIT (OUTPATIENT)
Dept: FAMILY MEDICINE CLINIC | Facility: CLINIC | Age: 72
End: 2022-11-16

## 2022-11-16 VITALS
OXYGEN SATURATION: 98 % | HEART RATE: 89 BPM | DIASTOLIC BLOOD PRESSURE: 72 MMHG | SYSTOLIC BLOOD PRESSURE: 116 MMHG | HEIGHT: 72 IN | TEMPERATURE: 96 F | BODY MASS INDEX: 28.48 KG/M2

## 2022-11-16 DIAGNOSIS — J40 BRONCHITIS: ICD-10-CM

## 2022-11-16 DIAGNOSIS — J32.0 CHRONIC MAXILLARY SINUSITIS: Primary | ICD-10-CM

## 2022-11-16 PROCEDURE — 96372 THER/PROPH/DIAG INJ SC/IM: CPT | Performed by: NURSE PRACTITIONER

## 2022-11-16 PROCEDURE — 99214 OFFICE O/P EST MOD 30 MIN: CPT | Performed by: NURSE PRACTITIONER

## 2022-11-16 RX ORDER — TRIAMCINOLONE ACETONIDE 40 MG/ML
60 INJECTION, SUSPENSION INTRA-ARTICULAR; INTRAMUSCULAR ONCE
Status: COMPLETED | OUTPATIENT
Start: 2022-11-16 | End: 2022-11-16

## 2022-11-16 RX ORDER — DOXYCYCLINE HYCLATE 100 MG/1
100 CAPSULE ORAL 2 TIMES DAILY
Qty: 14 CAPSULE | Refills: 0 | Status: SHIPPED | OUTPATIENT
Start: 2022-11-16 | End: 2022-11-23

## 2022-11-16 RX ADMIN — TRIAMCINOLONE ACETONIDE 60 MG: 40 INJECTION, SUSPENSION INTRA-ARTICULAR; INTRAMUSCULAR at 15:29

## 2022-11-17 NOTE — PROGRESS NOTES
"Chief Complaint  Sinus Problem (Sinus infection, been coughing and had congestion for about a month now.)    Subjective        Jadiel Dutton presents to [facility]  History of Present Illness    The patient presents today with complaints of a possible sinus infection.    He reports that he has been experiencing sinus issues for approximately 1 month. The patient adds that he has been coughing up dark brown yellow phlegm. He adds that his throat is sore and he has a raw feeling in his chest. He adds that he was given a steroid pack by Dr. Riddle, which provided some relief. The patient adds that he took his last dose of Bromfed last night, which provides some relief. He denies any fever. The patient adds that he has been taking an at home COVID-19 test, which was negative. He adds that he has been experiencing shortness of breath when he gets up and lays down at night. The patient adds that he has been wheezing.      Objective   Vital Signs:  /72   Pulse 89   Temp 96 °F (35.6 °C)   Ht 182.9 cm (72\")   SpO2 98%   BMI 28.48 kg/m²   Estimated body mass index is 28.48 kg/m² as calculated from the following:    Height as of this encounter: 182.9 cm (72\").    Weight as of 10/12/22: 95.3 kg (210 lb).    BMI is >= 25 and <30. (Overweight) The following options were offered after discussion;: exercise counseling/recommendations and nutrition counseling/recommendations      Physical Exam  Vitals and nursing note reviewed.   Constitutional:       General: He is not in acute distress.     Appearance: Normal appearance. He is not ill-appearing, toxic-appearing or diaphoretic.   HENT:      Head: Normocephalic and atraumatic.      Right Ear: Ear canal and external ear normal. There is no impacted cerumen. Tympanic membrane is retracted.      Left Ear: Ear canal and external ear normal. There is no impacted cerumen. Tympanic membrane is retracted.      Ears:      " Comments: TMs retracted.     Nose: Congestion and rhinorrhea present.      Right Sinus: Maxillary sinus tenderness present.      Left Sinus: Maxillary sinus tenderness present.      Mouth/Throat:      Pharynx: Posterior oropharyngeal erythema present.      Comments: Cobblestoning appearance noted.  Cardiovascular:      Rate and Rhythm: Normal rate.      Heart sounds: Normal heart sounds. No murmur heard.    No friction rub. No gallop.   Pulmonary:      Effort: Pulmonary effort is normal. No respiratory distress.      Breath sounds: Normal breath sounds. No stridor. No wheezing, rhonchi or rales.      Comments: Pulse oximetry in room is 98 percent.   Musculoskeletal:      Cervical back: Neck supple.   Lymphadenopathy:      Cervical: No cervical adenopathy.   Skin:     General: Skin is warm and dry.      Coloration: Skin is not jaundiced or pale.      Findings: No bruising, erythema, lesion or rash.   Neurological:      Mental Status: He is alert and oriented to person, place, and time.      Cranial Nerves: No cranial nerve deficit.      Motor: No weakness.      Coordination: Coordination normal.      Gait: Gait normal.   Psychiatric:         Mood and Affect: Mood normal.         Behavior: Behavior normal.         Thought Content: Thought content normal.         Judgment: Judgment normal.        Result Review :                Assessment and Plan   Diagnoses and all orders for this visit:    1. Chronic maxillary sinusitis (Primary)  -     doxycycline (VIBRAMYCIN) 100 MG capsule; Take 1 capsule by mouth 2 (Two) Times a Day for 7 days.  Dispense: 14 capsule; Refill: 0  -     triamcinolone acetonide (KENALOG-40) injection 60 mg    2. Bronchitis  -     doxycycline (VIBRAMYCIN) 100 MG capsule; Take 1 capsule by mouth 2 (Two) Times a Day for 7 days.  Dispense: 14 capsule; Refill: 0  -     triamcinolone acetonide (KENALOG-40) injection 60 mg    He is given a Kenalog injection IM in office today. He tolerated it well. I will  prescribe him doxycycline as above since he has failed Z-Yonas and if his symptoms should persist or worsen, he will return for follow-up. Otherwise, I will see him back as scheduled for chronic conditions. All questions and concerns are addressed with understanding noted. The patient is in agreement to above plan.     I spent 33 minutes caring for Jadiel on this date of service. This time includes time spent by me in the following activities:preparing for the visit, performing a medically appropriate examination and/or evaluation , counseling and educating the patient/family/caregiver, ordering medications, tests, or procedures and documenting information in the medical record  Follow Up   Return if symptoms worsen or fail to improve, for Recheck, or sooner as needed.  Patient was given instructions and counseling regarding his condition or for health maintenance advice. Please see specific information pulled into the AVS if appropriate.     Transcribed from ambient dictation for MARY Escobar by Kamryn Boateng.  11/16/22   19:04 CST    Patient or patient representative verbalized consent to the visit recording.  I have personally performed the services described in this document as transcribed by the above individual, and it is both accurate and complete.  MARY Escobar  11/17/2022  13:55 CST

## 2022-12-15 ENCOUNTER — OFFICE VISIT (OUTPATIENT)
Dept: FAMILY MEDICINE CLINIC | Facility: CLINIC | Age: 72
End: 2022-12-15

## 2022-12-15 VITALS
BODY MASS INDEX: 28.44 KG/M2 | SYSTOLIC BLOOD PRESSURE: 128 MMHG | HEART RATE: 82 BPM | HEIGHT: 72 IN | OXYGEN SATURATION: 99 % | TEMPERATURE: 97 F | DIASTOLIC BLOOD PRESSURE: 70 MMHG | WEIGHT: 210 LBS

## 2022-12-15 DIAGNOSIS — J06.9 ACUTE URI: ICD-10-CM

## 2022-12-15 DIAGNOSIS — T14.8XXA MUSCLE STRAIN: Primary | ICD-10-CM

## 2022-12-15 DIAGNOSIS — J30.2 SEASONAL ALLERGIC RHINITIS, UNSPECIFIED TRIGGER: ICD-10-CM

## 2022-12-15 PROCEDURE — 96372 THER/PROPH/DIAG INJ SC/IM: CPT | Performed by: NURSE PRACTITIONER

## 2022-12-15 PROCEDURE — 99213 OFFICE O/P EST LOW 20 MIN: CPT | Performed by: NURSE PRACTITIONER

## 2022-12-15 RX ORDER — METAXALONE 800 MG/1
800 TABLET ORAL 3 TIMES DAILY PRN
Qty: 30 TABLET | Refills: 0 | Status: SHIPPED | OUTPATIENT
Start: 2022-12-15 | End: 2023-02-13

## 2022-12-15 RX ORDER — PREDNISONE 20 MG/1
20 TABLET ORAL 2 TIMES DAILY
Qty: 10 TABLET | Refills: 0 | Status: SHIPPED | OUTPATIENT
Start: 2022-12-15 | End: 2022-12-20

## 2022-12-15 RX ORDER — KETOROLAC TROMETHAMINE 30 MG/ML
30 INJECTION, SOLUTION INTRAMUSCULAR; INTRAVENOUS ONCE
Status: COMPLETED | OUTPATIENT
Start: 2022-12-15 | End: 2022-12-15

## 2022-12-15 RX ORDER — IBUPROFEN 600 MG/1
600 TABLET ORAL EVERY 6 HOURS PRN
Qty: 40 TABLET | Refills: 0 | Status: SHIPPED | OUTPATIENT
Start: 2022-12-15

## 2022-12-15 RX ADMIN — KETOROLAC TROMETHAMINE 30 MG: 30 INJECTION, SOLUTION INTRAMUSCULAR; INTRAVENOUS at 11:43

## 2022-12-15 NOTE — PROGRESS NOTES
"Subjective   Jadiel Dutton is a 72 y.o. male presents for sickness.    Vitals:    12/15/22 1055   BP: 128/70   Pulse: 82   Temp: 97 °F (36.1 °C)   SpO2: 99%   Weight: 95.3 kg (210 lb)   Height: 182.9 cm (72\")   PainSc:   6   PainLoc: Back     Body mass index is 28.48 kg/m².  Past Medical History:   Diagnosis Date   • Abdominal pain    • Abnormal weight loss    • Acute bronchitis    • Anxiety state    • Benign essential hypertension    • Chronic sinusitis    • Constipation    • Cystitis    • Degenerative joint disease involving multiple joints    • Diabetic neuropathy (HCC)      bilateral hands      • Diastolic dysfunction    • Diverticular disease of colon    • Dyspnea    • Epigastric pain    • Essential hypertension    • Foot ulcer (HCC)    • Gastroesophageal reflux disease    • Generalized anxiety disorder    • Hyperlipidemia    • Inflammatory bowel disease     Possible Inflammatory Bowel Disease      • Lumbar pain     Pain radiating to lumbar region of back   n      • Pleuritic pain    • Radiculopathy     site unspecified      • Type 2 diabetes mellitus (HCC)    • Vesicular eczema of hands and feet      History of Present Illness     The patient admits he is doing great. He admits sinus is still with him with drainage where he has to clear his throat 100 times. He admits to feeling a bit scratchy up in his nose. He denies fever. He admits to having a little bit of \"thick discharge\" coming out several days ago. he denies taking over-the-counter medicine. He does admit to using \"saline washes.\" He denies numbness and tingling down his arm.     The patient feels his right upper back is hurting, woke up, put his feet on the floor and put on his shoes. He states pain started in R upper back, behind his R scapula but his arm does not hurt to raise it. He admits getting in a certain spot and he was afraid to sneeze. The patient did not have a muscle relaxer but took Tylenol, which did not \"knock it.\" He recalls a " Kenalog injection in November 16th. He recalls taking a Medrol Dosepak. He admits it hurts to breathe. The patient admits to take Flonase and still has some.    He had taken doxycycline for his sinuses. The patient admits he gets hoarse.     He admits his blood glucose has been running fine. He sees Dr. Gottlieb in January for diabetes.    The following portions of the patient's history were reviewed and updated as appropriate: allergies, current medications, past family history, past medical history, past social history, past surgical history and problem list.    Review of Systems    Objective   Physical Exam  Vitals and nursing note reviewed.   Constitutional:       General: He is not in acute distress.     Appearance: Normal appearance. He is not ill-appearing, toxic-appearing or diaphoretic.   HENT:      Head: Normocephalic and atraumatic.      Comments: TMs are bulging.  He does not have tenderness to palpation of maxillary or frontal sinuses.  Cardiovascular:      Rate and Rhythm: Normal rate and regular rhythm.      Heart sounds: Normal heart sounds. No murmur heard.    No friction rub. No gallop.   Pulmonary:      Effort: Pulmonary effort is normal. No respiratory distress.      Breath sounds: Normal breath sounds. No stridor. No wheezing, rhonchi or rales.   Musculoskeletal:         General: Tenderness present.      Comments: Right trapezius muscle tender to palpation, somewhat tight on exam.  He has full range of motion of the right upper extremity.  He is sitting in a wheelchair today with a boot on his left foot, which is a recent finding for him secondary to surgery.   Skin:     General: Skin is warm and dry.      Coloration: Skin is not jaundiced or pale.      Findings: No bruising, erythema, lesion or rash.   Neurological:      Mental Status: He is alert and oriented to person, place, and time.   Psychiatric:         Mood and Affect: Mood normal.         Behavior: Behavior normal.         Thought  Content: Thought content normal.         Judgment: Judgment normal.       Assessment & Plan   Diagnoses and all orders for this visit:    1. Muscle strain (Primary)  -     metaxalone (Skelaxin) 800 MG tablet; Take 1 tablet by mouth 3 (Three) Times a Day As Needed for Muscle Spasms.  Dispense: 30 tablet; Refill: 0  -     ibuprofen (ADVIL,MOTRIN) 600 MG tablet; Take 1 tablet by mouth Every 6 (Six) Hours As Needed for Mild Pain.  Dispense: 40 tablet; Refill: 0  -     ketorolac (TORADOL) injection 30 mg  -     predniSONE (DELTASONE) 20 MG tablet; Take 1 tablet by mouth 2 (Two) Times a Day for 5 days.  Dispense: 10 tablet; Refill: 0    2. Acute URI  -     predniSONE (DELTASONE) 20 MG tablet; Take 1 tablet by mouth 2 (Two) Times a Day for 5 days.  Dispense: 10 tablet; Refill: 0    3. Seasonal allergic rhinitis, unspecified trigger  -     predniSONE (DELTASONE) 20 MG tablet; Take 1 tablet by mouth 2 (Two) Times a Day for 5 days.  Dispense: 10 tablet; Refill: 0    He given prednisone as above. I will prescribe Skelaxin as above. He was also given a 30 mg Toradol injection in office and tolerated well. He is advised no NSAIDs for the rest of today but is given ibuprofen 600 to use as needed starting tomorrow additionally if needed. Otherwise, continue with Tylenol as needed for pain. If his symptoms persist or worsen, he will return to clinic for follow-up. I will see him back as scheduled for chronic conditions. All questions and concerns are addressed with understanding noted. The patient agrees to the above plan.    I spent 30 minutes caring for Jadiel Dutton on this date of service. This time includes time spent by me in the following activities: preparing for the visit, reviewing tests, performing a medically appropriate examination and/or evaluation, counseling and educating the patient/family/caregiver, ordering medications, tests, or procedures and documenting information in the medical record       BMI is >= 25 and  <30. (Overweight) The following options were offered after discussion;: nutrition counseling/recommendations     Transcribed from ambient dictation for MARY Escobar by Celsa Ruff.  12/15/22   13:38 CST    Patient or patient representative verbalized consent to the visit recording.  I have personally performed the services described in this document as transcribed by the above individual, and it is both accurate and complete.  MARY Escobar  12/15/2022  14:20 CST

## 2022-12-29 RX ORDER — LORATADINE 10 MG/1
10 TABLET ORAL DAILY
Qty: 30 TABLET | Refills: 2 | Status: SHIPPED | OUTPATIENT
Start: 2022-12-29 | End: 2023-03-29 | Stop reason: SDUPTHER

## 2023-01-18 ENCOUNTER — OFFICE VISIT (OUTPATIENT)
Dept: FAMILY MEDICINE CLINIC | Facility: CLINIC | Age: 73
End: 2023-01-18
Payer: MEDICARE

## 2023-01-18 VITALS
HEIGHT: 72 IN | BODY MASS INDEX: 28.44 KG/M2 | WEIGHT: 210 LBS | HEART RATE: 58 BPM | TEMPERATURE: 97 F | SYSTOLIC BLOOD PRESSURE: 120 MMHG | DIASTOLIC BLOOD PRESSURE: 74 MMHG | OXYGEN SATURATION: 100 %

## 2023-01-18 DIAGNOSIS — J06.9 ACUTE URI: Primary | ICD-10-CM

## 2023-01-18 DIAGNOSIS — J02.9 ACUTE PHARYNGITIS, UNSPECIFIED ETIOLOGY: ICD-10-CM

## 2023-01-18 PROCEDURE — 99213 OFFICE O/P EST LOW 20 MIN: CPT | Performed by: NURSE PRACTITIONER

## 2023-01-18 RX ORDER — GABAPENTIN 300 MG/1
300 CAPSULE ORAL 3 TIMES DAILY
COMMUNITY
Start: 2022-12-29

## 2023-01-18 RX ORDER — ESOMEPRAZOLE MAGNESIUM 40 MG/1
40 CAPSULE, DELAYED RELEASE ORAL DAILY
COMMUNITY
Start: 2022-12-29

## 2023-01-18 RX ORDER — HYDROCODONE BITARTRATE AND ACETAMINOPHEN 10; 325 MG/1; MG/1
TABLET ORAL
COMMUNITY
Start: 2022-12-29

## 2023-01-18 RX ORDER — AZITHROMYCIN 250 MG/1
TABLET, FILM COATED ORAL
Qty: 6 TABLET | Refills: 0 | Status: SHIPPED | OUTPATIENT
Start: 2023-01-18 | End: 2023-02-13

## 2023-01-18 RX ORDER — GUAIFENESIN 600 MG/1
1200 TABLET, EXTENDED RELEASE ORAL 2 TIMES DAILY
Qty: 60 TABLET | Refills: 0 | Status: SHIPPED | OUTPATIENT
Start: 2023-01-18

## 2023-01-18 NOTE — PROGRESS NOTES
"Subjective   Jadiel Dutton is a 72 y.o. male who presents to the clinic with complaints of a sore throat.    The patient reports he has been experiencing a sore throat for approximately 1 week. He states he is unable to get his throat clear up and he is hoarse. He denies having a fever or coughing. He mentions he has been clearing his throat frequently and adds he has been taking loratadine, which has been helpful. The patient reports he has been using 2 sprays of Astelin nasal spray one time a day. He states he has been trying to clear his throat, but it causes him to lose his breath and mentions he has not had any antibiotics recently. He notes he was given prednisone, which did not help, although he adds the Z-Yonas works well for him.    The patient reports his foot has healed.    Vitals:    01/18/23 1440   BP: 120/74   Pulse: 58   Temp: 97 °F (36.1 °C)   SpO2: 100%   Weight: 95.3 kg (210 lb)   Height: 182.9 cm (72\")   PainSc: 0-No pain     Body mass index is 28.48 kg/m².  Past Medical History:   Diagnosis Date   • Abdominal pain    • Abnormal weight loss    • Acute bronchitis    • Anxiety state    • Benign essential hypertension    • Chronic sinusitis    • Constipation    • Cystitis    • Degenerative joint disease involving multiple joints    • Diabetic neuropathy (HCC)      bilateral hands      • Diastolic dysfunction    • Diverticular disease of colon    • Dyspnea    • Epigastric pain    • Essential hypertension    • Foot ulcer (HCC)    • Gastroesophageal reflux disease    • Generalized anxiety disorder    • Hyperlipidemia    • Inflammatory bowel disease     Possible Inflammatory Bowel Disease      • Lumbar pain     Pain radiating to lumbar region of back   n      • Pleuritic pain    • Radiculopathy     site unspecified      • Type 2 diabetes mellitus (HCC)    • Vesicular eczema of hands and feet      History of Present Illness     The following portions of the patient's history were reviewed and updated " as appropriate: allergies, current medications, past family history, past medical history, past social history, past surgical history and problem list.    Review of Systems    Objective   Physical Exam  Vitals and nursing note reviewed.   Constitutional:       General: He is not in acute distress.     Appearance: Normal appearance. He is not ill-appearing, toxic-appearing or diaphoretic.   HENT:      Head: Normocephalic and atraumatic.      Comments: Posterior pharynx with erythema, cobblestoning appearance is noted.  Neck is supple without any adenopathy.  TMs are bulging bilaterally.     Right Ear: Tympanic membrane is bulging.      Left Ear: Tympanic membrane is bulging.      Nose: Congestion present.      Mouth/Throat:      Pharynx: Posterior oropharyngeal erythema present.   Cardiovascular:      Rate and Rhythm: Normal rate and regular rhythm.      Heart sounds: Normal heart sounds. No murmur heard.    No friction rub. No gallop.   Pulmonary:      Effort: Pulmonary effort is normal. No respiratory distress.      Breath sounds: Normal breath sounds. No stridor. No wheezing, rhonchi or rales.   Musculoskeletal:      Cervical back: Neck supple. No rigidity or tenderness.   Lymphadenopathy:      Cervical: No cervical adenopathy.   Skin:     General: Skin is warm and dry.      Coloration: Skin is not jaundiced or pale.      Findings: No bruising, erythema, lesion or rash.   Neurological:      Mental Status: He is alert and oriented to person, place, and time.      Gait: Gait abnormal (not ambulatory today, in WC assisted by staff and friend).   Psychiatric:         Mood and Affect: Mood normal.         Behavior: Behavior normal.         Thought Content: Thought content normal.         Judgment: Judgment normal.        Assessment & Plan   Diagnoses and all orders for this visit:    1. Acute URI (Primary)  -     guaiFENesin (Mucinex) 600 MG 12 hr tablet; Take 2 tablets by mouth 2 (Two) Times a Day.  Dispense: 60  tablet; Refill: 0  -     azithromycin (Zithromax Z-Yonas) 250 MG tablet; Take 2 po now and 1 po d2-5  Dispense: 6 tablet; Refill: 0    2. Acute pharyngitis, unspecified etiology  -     guaiFENesin (Mucinex) 600 MG 12 hr tablet; Take 2 tablets by mouth 2 (Two) Times a Day.  Dispense: 60 tablet; Refill: 0  -     azithromycin (Zithromax Z-Yonas) 250 MG tablet; Take 2 po now and 1 po d2-5  Dispense: 6 tablet; Refill: 0    Plan:    The patient will continue taking  Astelin and continue on loratadine. I will give him guaifenesin and a Z-Yonas as above. He is requesting a steroid injection on 01/18/2023, but he just had one, not even 2 months ago, so this is not given to him on 01/18/2023. If his symptoms should persist or worsen, he will return for recheck. Otherwise, I will see him back as scheduled for chronic conditions. He will follow up with podiatry until cleared and released.     BMI is >= 25 and <30. (Overweight) The following options were offered after discussion;: exercise counseling/recommendations and nutrition counseling/recommendations    Transcribed from ambient dictation for MARY Escobar by Lexi Lomas.  01/18/23   17:34 CST

## 2023-02-13 ENCOUNTER — OFFICE VISIT (OUTPATIENT)
Dept: FAMILY MEDICINE CLINIC | Facility: CLINIC | Age: 73
End: 2023-02-13
Payer: MEDICARE

## 2023-02-13 VITALS
DIASTOLIC BLOOD PRESSURE: 72 MMHG | SYSTOLIC BLOOD PRESSURE: 120 MMHG | HEART RATE: 91 BPM | HEIGHT: 72 IN | WEIGHT: 210 LBS | BODY MASS INDEX: 28.44 KG/M2 | OXYGEN SATURATION: 100 % | TEMPERATURE: 97.7 F

## 2023-02-13 DIAGNOSIS — J30.9 ALLERGIC RHINITIS, UNSPECIFIED SEASONALITY, UNSPECIFIED TRIGGER: ICD-10-CM

## 2023-02-13 DIAGNOSIS — J01.00 ACUTE NON-RECURRENT MAXILLARY SINUSITIS: Primary | ICD-10-CM

## 2023-02-13 PROCEDURE — 96372 THER/PROPH/DIAG INJ SC/IM: CPT | Performed by: NURSE PRACTITIONER

## 2023-02-13 PROCEDURE — 99213 OFFICE O/P EST LOW 20 MIN: CPT | Performed by: NURSE PRACTITIONER

## 2023-02-13 RX ORDER — TIZANIDINE 2 MG/1
2 TABLET ORAL 2 TIMES DAILY PRN
COMMUNITY
Start: 2023-01-27

## 2023-02-13 RX ORDER — DOXYCYCLINE HYCLATE 100 MG/1
100 CAPSULE ORAL 2 TIMES DAILY
Qty: 14 CAPSULE | Refills: 0 | Status: SHIPPED | OUTPATIENT
Start: 2023-02-13 | End: 2023-02-20

## 2023-02-13 RX ORDER — TRIAMCINOLONE ACETONIDE 40 MG/ML
60 INJECTION, SUSPENSION INTRA-ARTICULAR; INTRAMUSCULAR ONCE
Status: COMPLETED | OUTPATIENT
Start: 2023-02-13 | End: 2023-02-13

## 2023-02-13 RX ADMIN — TRIAMCINOLONE ACETONIDE 60 MG: 40 INJECTION, SUSPENSION INTRA-ARTICULAR; INTRAMUSCULAR at 10:47

## 2023-02-13 NOTE — PROGRESS NOTES
"Chief Complaint  Sinusitis    Subjective        Jadiel Aroldo Dutton presents to [facility] [place]  History of Present Illness  Pt here today with complaints of sinus pain and pressure, \"drainage and I can't get it up or down\", mild cough, states symptoms present x 1 1/2 weeks, denies fever or chills, denies body aches. States he has used an inhaler, flonase, tylenol, cough medicine without symptom relief. He states his symptoms will get better and then worse again. Declines seeing an allergist or ENT although his symptoms seem to be recurrent. He states steroid injections have helped him in the past however he is advised that he cannot have these more than every 3 months due to potential for breakdown of his bone, side effects. He reports he has not had a steroid injection for the last 3 months or longer to his recollection. He has also been taking claritin without symptom relief. States he does not want testing for covid, flu.     Objective   Vital Signs:  /72   Pulse 91   Temp 97.7 °F (36.5 °C)   Ht 182.9 cm (72\")   Wt 95.3 kg (210 lb)   SpO2 100%   BMI 28.48 kg/m²   Estimated body mass index is 28.48 kg/m² as calculated from the following:    Height as of this encounter: 182.9 cm (72\").    Weight as of this encounter: 95.3 kg (210 lb).     BMI is >= 25 and <30. (Overweight) The following options were offered after discussion;: exercise counseling/recommendations and nutrition counseling/recommendations    Physical Exam  Vitals and nursing note reviewed.   Constitutional:       General: He is not in acute distress.     Appearance: Normal appearance. He is not ill-appearing, toxic-appearing or diaphoretic.   HENT:      Head: Normocephalic and atraumatic.      Right Ear: Ear canal and external ear normal. Decreased hearing noted. Tympanic membrane is retracted.      Left Ear: Ear canal and external ear normal. Decreased hearing noted. Tympanic membrane is " retracted.      Nose: Congestion and rhinorrhea present.      Mouth/Throat:      Pharynx: Posterior oropharyngeal erythema present.      Comments: Mild erythema with cobblestoning appearance noted to posterior pharynx.   Cardiovascular:      Rate and Rhythm: Normal rate and regular rhythm.      Heart sounds: Normal heart sounds. No murmur heard.    No friction rub. No gallop.   Pulmonary:      Effort: Pulmonary effort is normal. No respiratory distress.      Breath sounds: Normal breath sounds. No stridor. No wheezing, rhonchi or rales.   Musculoskeletal:      Cervical back: Neck supple. No tenderness.   Skin:     General: Skin is warm and dry.      Coloration: Skin is not jaundiced or pale.      Findings: No bruising, erythema, lesion or rash.   Neurological:      Mental Status: He is alert and oriented to person, place, and time.      Cranial Nerves: No cranial nerve deficit.      Motor: No weakness.      Coordination: Coordination normal.      Gait: Gait normal.   Psychiatric:         Mood and Affect: Mood normal.         Behavior: Behavior normal.         Thought Content: Thought content normal.         Judgment: Judgment normal.        Result Review :               Assessment and Plan   Diagnoses and all orders for this visit:    1. Acute non-recurrent maxillary sinusitis (Primary)  -     doxycycline (VIBRAMYCIN) 100 MG capsule; Take 1 capsule by mouth 2 (Two) Times a Day for 7 days.  Dispense: 14 capsule; Refill: 0  -     triamcinolone acetonide (KENALOG-40) injection 60 mg    2. Allergic rhinitis, unspecified seasonality, unspecified trigger    he is treated with kenalog injection as above and is advised that he does not need to have steroid injections recurrently/frequently. He is advised to continue with flonase, and is treated with doxycycline which he states he has used in the past and has done well with in the past, if his symptoms persist or worsen he is asked to RTC for recheck. All questions and  concerns addressed with understanding noted. He is aware and is in agreement to this plan.      I spent 25 minutes caring for Jadiel on this date of service. This time includes time spent by me in the following activities:preparing for the visit, performing a medically appropriate examination and/or evaluation , counseling and educating the patient/family/caregiver, ordering medications, tests, or procedures and documenting information in the medical record  Follow Up   Return if symptoms worsen or fail to improve, for Recheck, or sooner as needed.  Patient was given instructions and counseling regarding his condition or for health maintenance advice. Please see specific information pulled into the AVS if appropriate.

## 2023-02-19 NOTE — PROGRESS NOTES
"Jadiel Dutton is a 69 y.o. male returns for     Chief Complaint   Patient presents with   • Left Shoulder - Follow-up       HISTORY OF PRESENT ILLNESS:6 week recheck left shoulder.  S/P        02/07/20 (5m) Troy Barillas MD    LEFT SHOULDER ARTHROSCOPY with rotator cuff repair, DEDRICK procedure, and Subacromial decompression - Left       Pain scale today 3/10  Slowly getting better  Has mild ache in left shoulder.  Has occasional pop with certain motions.  Pain at night is better  Slowly improving and pain is better than it was prior to surgery.       CONCURRENT MEDICAL HISTORY:    The following portions of the patient's history were reviewed and updated as appropriate: allergies, current medications, past family history, past medical history, past social history, past surgical history and problem list.     ROS  No fevers or chills.  No chest pain or shortness of air.  No GI or  disturbances.    PHYSICAL EXAMINATION:       Ht 182.9 cm (72\")   Wt 104 kg (229 lb)   BMI 31.06 kg/m²     Physical Exam   Constitutional: He is oriented to person, place, and time. He appears well-developed and well-nourished.   Neurological: He is alert and oriented to person, place, and time.   Psychiatric: He has a normal mood and affect. His behavior is normal. Judgment and thought content normal.       GAIT:     []  Normal  [x]  Antalgic    Assistive device: [x]  None  []  Walker     []  Crutches  []  Cane     []  Wheelchair  []  Stretcher    Left Shoulder Exam     Tenderness   The patient is experiencing no tenderness.     Range of Motion   Active abduction: 120   Forward flexion: 120     Muscle Strength   Abduction: 4/5   Supraspinatus: 4/5     Tests   Rosales test: negative    Other   Erythema: absent  Sensation: normal  Pulse: present               Patient's Body mass index is 31.06 kg/m². BMI is above normal parameters. Recommendations include: exercise counseling and nutrition " counseling.          ASSESSMENT:    Diagnoses and all orders for this visit:    Traumatic incomplete tear of left rotator cuff, subsequent encounter    Status post arthroscopy of left shoulder    Status post left rotator cuff repair    Chronic left shoulder pain    Type 2 diabetes mellitus with complication, with long-term current use of insulin (CMS/[facility])          PLAN    Continue to progress ROM and STR as tolerated  No restrictions but will still have some physical limitation  Cont HEP    Activity as tolerated    F/u 8 weeks.    Return in about 8 weeks (around 8/27/2020) for recheck.    Troy Barillas MD   1 Principal Discharge DX:	Pain, joint, foot, right

## 2023-03-08 DIAGNOSIS — I10 ESSENTIAL HYPERTENSION: Chronic | ICD-10-CM

## 2023-03-08 DIAGNOSIS — R60.0 FLUID RETENTION IN LEGS: ICD-10-CM

## 2023-03-08 DIAGNOSIS — E78.01 FAMILIAL HYPERCHOLESTEROLEMIA: ICD-10-CM

## 2023-03-08 RX ORDER — ROSUVASTATIN CALCIUM 20 MG/1
TABLET, COATED ORAL
Qty: 90 TABLET | Refills: 1 | Status: SHIPPED | OUTPATIENT
Start: 2023-03-08

## 2023-03-08 RX ORDER — LISINOPRIL AND HYDROCHLOROTHIAZIDE 25; 20 MG/1; MG/1
TABLET ORAL
Qty: 90 TABLET | Refills: 1 | Status: SHIPPED | OUTPATIENT
Start: 2023-03-08

## 2023-03-09 DIAGNOSIS — J06.9 ACUTE URI: ICD-10-CM

## 2023-03-09 DIAGNOSIS — J02.9 ACUTE PHARYNGITIS, UNSPECIFIED ETIOLOGY: ICD-10-CM

## 2023-03-09 RX ORDER — AZITHROMYCIN 250 MG/1
TABLET, FILM COATED ORAL
Qty: 6 TABLET | Refills: 0 | OUTPATIENT
Start: 2023-03-09

## 2023-03-29 ENCOUNTER — OFFICE VISIT (OUTPATIENT)
Dept: FAMILY MEDICINE CLINIC | Facility: CLINIC | Age: 73
End: 2023-03-29
Payer: MEDICARE

## 2023-03-29 VITALS
WEIGHT: 210 LBS | HEART RATE: 57 BPM | BODY MASS INDEX: 28.44 KG/M2 | HEIGHT: 72 IN | DIASTOLIC BLOOD PRESSURE: 82 MMHG | SYSTOLIC BLOOD PRESSURE: 134 MMHG | OXYGEN SATURATION: 99 %

## 2023-03-29 DIAGNOSIS — R05.1 ACUTE COUGH: ICD-10-CM

## 2023-03-29 DIAGNOSIS — J01.40 ACUTE PANSINUSITIS, RECURRENCE NOT SPECIFIED: Primary | ICD-10-CM

## 2023-03-29 PROCEDURE — 1160F RVW MEDS BY RX/DR IN RCRD: CPT | Performed by: NURSE PRACTITIONER

## 2023-03-29 PROCEDURE — 3075F SYST BP GE 130 - 139MM HG: CPT | Performed by: NURSE PRACTITIONER

## 2023-03-29 PROCEDURE — 1159F MED LIST DOCD IN RCRD: CPT | Performed by: NURSE PRACTITIONER

## 2023-03-29 PROCEDURE — 99213 OFFICE O/P EST LOW 20 MIN: CPT | Performed by: NURSE PRACTITIONER

## 2023-03-29 PROCEDURE — 3079F DIAST BP 80-89 MM HG: CPT | Performed by: NURSE PRACTITIONER

## 2023-03-29 RX ORDER — AZITHROMYCIN 250 MG/1
TABLET, FILM COATED ORAL
Qty: 6 TABLET | Refills: 0 | Status: SHIPPED | OUTPATIENT
Start: 2023-03-29 | End: 2023-04-06

## 2023-03-29 RX ORDER — FLUTICASONE PROPIONATE 50 MCG
2 SPRAY, SUSPENSION (ML) NASAL DAILY
Qty: 9.9 ML | Refills: 3 | Status: SHIPPED | OUTPATIENT
Start: 2023-03-29

## 2023-03-29 RX ORDER — PREDNISONE 20 MG/1
20 TABLET ORAL 2 TIMES DAILY
Qty: 10 TABLET | Refills: 0 | Status: SHIPPED | OUTPATIENT
Start: 2023-03-29 | End: 2023-04-03

## 2023-03-29 RX ORDER — ALBUTEROL SULFATE 90 UG/1
2 AEROSOL, METERED RESPIRATORY (INHALATION) EVERY 4 HOURS PRN
Qty: 6.7 G | Refills: 3 | Status: SHIPPED | OUTPATIENT
Start: 2023-03-29

## 2023-03-29 RX ORDER — LORATADINE 10 MG/1
10 TABLET ORAL DAILY
Qty: 30 TABLET | Refills: 2 | Status: SHIPPED | OUTPATIENT
Start: 2023-03-29

## 2023-03-29 NOTE — PROGRESS NOTES
CC: Sinusitis (3-4 weeks )      Subjective:  Jadiel Dutton is a 72 y.o. male who presents for         Patient MARY Chavez presents to office with complaints of possible sinus infection ongoing x3 to 4 weeks. He has sinus pain/pressure, Nasal congestion, PND, sore throat. Blowing yellow chunks from his nose, and productive cough that is white and yellow. He has only had a low grade fever of 99. Denies associated n/v or diarrhea. Has treated with Zyrtec, Flonase, and Tylenol without resolution of symptoms.      The following portions of the patient's history were reviewed and updated as appropriate: allergies, current medications, past family history, past medical history, past social history, past surgical history and problem list.    Past Medical History:   Diagnosis Date   • Abdominal pain    • Abnormal weight loss    • Acute bronchitis    • Anxiety state    • Benign essential hypertension    • Chronic sinusitis    • Constipation    • Cystitis    • Degenerative joint disease involving multiple joints    • Diabetic neuropathy (HCC)      bilateral hands      • Diastolic dysfunction    • Diverticular disease of colon    • Dyspnea    • Epigastric pain    • Essential hypertension    • Foot ulcer (HCC)    • Gastroesophageal reflux disease    • Generalized anxiety disorder    • Hyperlipidemia    • Inflammatory bowel disease     Possible Inflammatory Bowel Disease      • Lumbar pain     Pain radiating to lumbar region of back   n      • Pleuritic pain    • Radiculopathy     site unspecified      • Type 2 diabetes mellitus (HCC)    • Vesicular eczema of hands and feet          Current Outpatient Medications:   •  azelastine (ASTELIN) 0.1 % nasal spray, azelastine 137 mcg (0.1 %) nasal spray aerosol, Disp: , Rfl:   •  azelastine (ASTELIN) 0.1 % nasal spray, 1 spray into the nostril(s) as directed by provider., Disp: , Rfl:   •  B-D UF III MINI PEN NEEDLES 31G X 5 MM misc, USE FOUR TIMES A DAY WITH INSULIN PENS,  Disp: 360 each, Rfl: 2  •  dexlansoprazole (DEXILANT) 60 MG capsule, Take 1 capsule by mouth Daily As Needed., Disp: , Rfl:   •  docusate sodium (COLACE) 100 MG capsule, Take 1 capsule by mouth 2 (Two) Times a Day As Needed for Constipation., Disp: 60 capsule, Rfl: 2  •  esomeprazole (nexIUM) 40 MG capsule, Take 1 capsule by mouth Daily., Disp: , Rfl:   •  folic acid (FOLVITE) 1 MG tablet, Take 1 tablet by mouth Daily., Disp: 90 tablet, Rfl: 1  •  furosemide (LASIX) 40 MG tablet, TAKE 1 TABLET EVERY DAY, Disp: 60 tablet, Rfl: 0  •  gabapentin (NEURONTIN) 300 MG capsule, Take 1 capsule by mouth 3 (Three) Times a Day., Disp: , Rfl:   •  guaiFENesin (Mucinex) 600 MG 12 hr tablet, Take 2 tablets by mouth 2 (Two) Times a Day., Disp: 60 tablet, Rfl: 0  •  HYDROcodone-acetaminophen (NORCO)  MG per tablet, TAKE ONE TABLET BY MOUTH 2-3 TIMES DAILY AS NEEDED, Disp: , Rfl:   •  ibuprofen (ADVIL,MOTRIN) 600 MG tablet, Take 1 tablet by mouth Every 6 (Six) Hours As Needed for Mild Pain., Disp: 40 tablet, Rfl: 0  •  Lancets (OneTouch Delica Plus Jiliov24Z) misc, USE 1 TO CHECK GLUCOSE 4 TIMES DAILY FOR 25 DAYS, Disp: , Rfl:   •  lisinopril-hydrochlorothiazide (PRINZIDE,ZESTORETIC) 20-25 MG per tablet, TAKE 1 TABLET EVERY DAY, Disp: 90 tablet, Rfl: 1  •  loratadine (Claritin) 10 MG tablet, Take 1 tablet by mouth Daily., Disp: 30 tablet, Rfl: 2  •  meclizine (ANTIVERT) 25 MG tablet, Take 1 tablet by mouth 3 (Three) Times a Day As Needed for Dizziness., Disp: 30 tablet, Rfl: 0  •  ofloxacin (Floxin Otic) 0.3 % otic solution, Administer 10 drops into both ears Daily., Disp: 10 mL, Rfl: 0  •  ondansetron (Zofran) 4 MG tablet, Take 1 tablet by mouth Every 8 (Eight) Hours As Needed for Nausea or Vomiting., Disp: 30 tablet, Rfl: 0  •  ONETOUCH VERIO test strip, , Disp: , Rfl:   •  rosuvastatin (CRESTOR) 20 MG tablet, TAKE 1 TABLET EVERY NIGHT, Disp: 90 tablet, Rfl: 1  •  tiZANidine (ZANAFLEX) 2 MG tablet, Take 1 tablet by mouth 2  "(Two) Times a Day As Needed., Disp: , Rfl:   •  vitamin B-12 (CYANOCOBALAMIN) 1000 MCG tablet, Take 1 tablet by mouth Daily., Disp: 90 tablet, Rfl: 1  •  albuterol sulfate  (90 Base) MCG/ACT inhaler, Inhale 2 puffs Every 4 (Four) Hours As Needed for Wheezing or Shortness of Air., Disp: 6.7 g, Rfl: 3  •  azithromycin (Zithromax Z-Yonas) 250 MG tablet, Take 2 tablets the first day, then 1 tablet daily for 4 days., Disp: 6 tablet, Rfl: 0  •  fluticasone (FLONASE) 50 MCG/ACT nasal spray, 2 sprays into the nostril(s) as directed by provider Daily., Disp: 9.9 mL, Rfl: 3  •  predniSONE (DELTASONE) 20 MG tablet, Take 1 tablet by mouth 2 (Two) Times a Day for 5 days., Disp: 10 tablet, Rfl: 0    Review of Systems    Review of Systems   Constitutional: Negative.    HENT: Positive for congestion, postnasal drip, rhinorrhea, sinus pressure, sinus pain and sore throat.    Eyes: Negative.    Respiratory: Positive for cough.    Cardiovascular: Negative.    Gastrointestinal: Negative.    Endocrine: Negative.    Genitourinary: Negative.    Musculoskeletal: Negative.    Skin: Negative.    Allergic/Immunologic: Negative.    Neurological: Negative.    Hematological: Negative.    Psychiatric/Behavioral: Negative.    All other systems reviewed and are negative.      Objective  Vitals:    03/29/23 1055   BP: 134/82   Pulse: 57   SpO2: 99%   Weight: 95.3 kg (210 lb)   Height: 182.9 cm (72\")     Body mass index is 28.48 kg/m².    Physical Exam    Physical Exam  Vitals and nursing note reviewed.   Constitutional:       General: He is not in acute distress.     Appearance: Normal appearance. He is not ill-appearing, toxic-appearing or diaphoretic.   HENT:      Head: Normocephalic and atraumatic.      Comments: There is frontal and maxillary sinus pressure on palpation.     Right Ear: Ear canal and external ear normal. There is no impacted cerumen.      Left Ear: Ear canal and external ear normal. There is no impacted cerumen.      Ears: "      Comments: Bilateral TMs with fluid noted     Nose: Congestion and rhinorrhea present.      Mouth/Throat:      Mouth: Mucous membranes are moist.      Pharynx: Posterior oropharyngeal erythema present. No oropharyngeal exudate.      Comments: +Purulent PND noted  Cardiovascular:      Rate and Rhythm: Normal rate and regular rhythm.      Pulses: Normal pulses.      Heart sounds: Normal heart sounds. No murmur heard.    No friction rub. No gallop.   Pulmonary:      Effort: Pulmonary effort is normal. No respiratory distress.      Breath sounds: Normal breath sounds. No stridor. No wheezing, rhonchi or rales.   Chest:      Chest wall: No tenderness.   Musculoskeletal:      Cervical back: Normal range of motion and neck supple. No rigidity or tenderness.   Lymphadenopathy:      Cervical: No cervical adenopathy.   Neurological:      Mental Status: He is alert.             Diagnoses and all orders for this visit:    1. Acute pansinusitis, recurrence not specified (Primary)  -     predniSONE (DELTASONE) 20 MG tablet; Take 1 tablet by mouth 2 (Two) Times a Day for 5 days.  Dispense: 10 tablet; Refill: 0  -     azithromycin (Zithromax Z-Yonas) 250 MG tablet; Take 2 tablets the first day, then 1 tablet daily for 4 days.  Dispense: 6 tablet; Refill: 0  -     fluticasone (FLONASE) 50 MCG/ACT nasal spray; 2 sprays into the nostril(s) as directed by provider Daily.  Dispense: 9.9 mL; Refill: 3    2. Acute cough  -     albuterol sulfate  (90 Base) MCG/ACT inhaler; Inhale 2 puffs Every 4 (Four) Hours As Needed for Wheezing or Shortness of Air.  Dispense: 6.7 g; Refill: 3    Other orders  -     loratadine (Claritin) 10 MG tablet; Take 1 tablet by mouth Daily.  Dispense: 30 tablet; Refill: 2         Patient with acute pansinusitis and acute cough.  We will treat with prednisone, as Zithromax, Flonase, albuterol inhaler, and loratadine.  Patient instructed on how to take/use these medications and possible side effects.  He is  advised to increase fluids.  He may take ibuprofen as needed.  He is to follow-up with his PCP if he has any worsening or no improvement in symptoms.  Answered all questions.  Patient verbalized understanding of plan of care.        This document has been electronically signed by MARY Hill on March 29, 2023 12:35 CDT

## 2023-04-06 ENCOUNTER — LAB (OUTPATIENT)
Dept: LAB | Facility: OTHER | Age: 73
End: 2023-04-06
Payer: MEDICARE

## 2023-04-06 ENCOUNTER — OFFICE VISIT (OUTPATIENT)
Dept: FAMILY MEDICINE CLINIC | Facility: CLINIC | Age: 73
End: 2023-04-06
Payer: MEDICARE

## 2023-04-06 VITALS
DIASTOLIC BLOOD PRESSURE: 70 MMHG | HEIGHT: 72 IN | OXYGEN SATURATION: 100 % | TEMPERATURE: 97 F | BODY MASS INDEX: 28.44 KG/M2 | SYSTOLIC BLOOD PRESSURE: 130 MMHG | HEART RATE: 92 BPM | WEIGHT: 210 LBS

## 2023-04-06 DIAGNOSIS — J02.9 SORE THROAT: ICD-10-CM

## 2023-04-06 DIAGNOSIS — H65.06 RECURRENT ACUTE SEROUS OTITIS MEDIA OF BOTH EARS: ICD-10-CM

## 2023-04-06 DIAGNOSIS — J01.00 ACUTE NON-RECURRENT MAXILLARY SINUSITIS: Primary | ICD-10-CM

## 2023-04-06 DIAGNOSIS — R09.81 NASAL CONGESTION: ICD-10-CM

## 2023-04-06 LAB — S PYO AG THROAT QL: NEGATIVE

## 2023-04-06 PROCEDURE — 1160F RVW MEDS BY RX/DR IN RCRD: CPT | Performed by: NURSE PRACTITIONER

## 2023-04-06 PROCEDURE — 1159F MED LIST DOCD IN RCRD: CPT | Performed by: NURSE PRACTITIONER

## 2023-04-06 PROCEDURE — 99213 OFFICE O/P EST LOW 20 MIN: CPT | Performed by: NURSE PRACTITIONER

## 2023-04-06 PROCEDURE — 87880 STREP A ASSAY W/OPTIC: CPT | Performed by: NURSE PRACTITIONER

## 2023-04-06 PROCEDURE — 3078F DIAST BP <80 MM HG: CPT | Performed by: NURSE PRACTITIONER

## 2023-04-06 PROCEDURE — 87081 CULTURE SCREEN ONLY: CPT | Performed by: NURSE PRACTITIONER

## 2023-04-06 PROCEDURE — 3075F SYST BP GE 130 - 139MM HG: CPT | Performed by: NURSE PRACTITIONER

## 2023-04-06 RX ORDER — BROMPHENIRAMINE MALEATE, PSEUDOEPHEDRINE HYDROCHLORIDE, AND DEXTROMETHORPHAN HYDROBROMIDE 2; 30; 10 MG/5ML; MG/5ML; MG/5ML
5 SYRUP ORAL 4 TIMES DAILY PRN
Qty: 118 ML | Refills: 0 | Status: SHIPPED | OUTPATIENT
Start: 2023-04-06

## 2023-04-06 RX ORDER — DOXYCYCLINE HYCLATE 100 MG/1
100 CAPSULE ORAL 2 TIMES DAILY
Qty: 14 CAPSULE | Refills: 0 | Status: SHIPPED | OUTPATIENT
Start: 2023-04-06

## 2023-04-06 NOTE — PROGRESS NOTES
"Chief Complaint  Thrush (Possible thrush) and Sore Throat    Subjective        Jadiel Dutton presents to [facility] [place]  History of Present Illness  Patient here today with significant other with complaints of noticing some white patches on the back of the tongue and the posterior pharynx with complaints of sore throat and low-grade fever up to 99.9 for the last week.  He states that he was given prednisone and Z-Yonas last week with a different provider for similar complaints and his symptoms have improved but have not resolved.  They had some concern that he may have thrush and wanted follow-up today.  Still complaining of razor like pain, sharp pain with swallowing, soreness to his right throat, neck.  Temp of 99.92 days ago.  Has been using Astelin nasal spray as well which was refilled for him last week.  Mild cough  Objective   Vital Signs:  /70   Pulse 92   Temp 97 °F (36.1 °C)   Ht 182.9 cm (72\")   Wt 95.3 kg (210 lb)   SpO2 100%   BMI 28.48 kg/m²   Estimated body mass index is 28.48 kg/m² as calculated from the following:    Height as of this encounter: 182.9 cm (72\").    Weight as of this encounter: 95.3 kg (210 lb).     Physical Exam  Vitals and nursing note reviewed.   Constitutional:       General: He is not in acute distress.     Appearance: Normal appearance. He is not ill-appearing, toxic-appearing or diaphoretic.   HENT:      Head: Normocephalic and atraumatic.      Right Ear: Hearing, ear canal and external ear normal. No decreased hearing noted. A middle ear effusion is present. There is no impacted cerumen.      Left Ear: Hearing, ear canal and external ear normal. No decreased hearing noted. There is no impacted cerumen. Tympanic membrane is bulging.      Nose: Congestion present.      Right Sinus: Maxillary sinus tenderness present.      Left Sinus: Maxillary sinus tenderness present.      Mouth/Throat:      Pharynx: Posterior " oropharyngeal erythema present. No oropharyngeal exudate.      Comments: No white exudate noted on exam, he does have erythema to post pharynx  Cardiovascular:      Rate and Rhythm: Normal rate and regular rhythm.      Heart sounds: Normal heart sounds. No murmur heard.    No friction rub. No gallop.   Pulmonary:      Effort: Pulmonary effort is normal. No respiratory distress.      Breath sounds: Normal breath sounds. No stridor. No wheezing, rhonchi or rales.   Musculoskeletal:      Cervical back: Neck supple. Tenderness present.   Lymphadenopathy:      Cervical: Cervical adenopathy present.   Skin:     General: Skin is warm and dry.      Coloration: Skin is not jaundiced or pale.      Findings: No bruising, erythema, lesion or rash.   Neurological:      Mental Status: He is alert and oriented to person, place, and time.      Cranial Nerves: No cranial nerve deficit.      Motor: No weakness.      Coordination: Coordination normal.   Psychiatric:         Mood and Affect: Mood normal.         Behavior: Behavior normal.         Thought Content: Thought content normal.         Judgment: Judgment normal.        Result Review :                 Assessment and Plan   Diagnoses and all orders for this visit:    1. Acute non-recurrent maxillary sinusitis (Primary)  -     brompheniramine-pseudoephedrine-DM 30-2-10 MG/5ML syrup; Take 5 mL by mouth 4 (Four) Times a Day As Needed for Congestion or Cough.  Dispense: 118 mL; Refill: 0  -     doxycycline (VIBRAMYCIN) 100 MG capsule; Take 1 capsule by mouth 2 (Two) Times a Day.  Dispense: 14 capsule; Refill: 0    2. Sore throat  -     brompheniramine-pseudoephedrine-DM 30-2-10 MG/5ML syrup; Take 5 mL by mouth 4 (Four) Times a Day As Needed for Congestion or Cough.  Dispense: 118 mL; Refill: 0  -     Rapid Strep A Screen - Swab, Throat; Future  -     Rapid Strep A Screen - Swab, Throat  -     Beta Strep Culture, Throat - Swab, Throat    3. Nasal congestion  -      brompheniramine-pseudoephedrine-DM 30-2-10 MG/5ML syrup; Take 5 mL by mouth 4 (Four) Times a Day As Needed for Congestion or Cough.  Dispense: 118 mL; Refill: 0    4. Recurrent acute serous otitis media of both ears  -     brompheniramine-pseudoephedrine-DM 30-2-10 MG/5ML syrup; Take 5 mL by mouth 4 (Four) Times a Day As Needed for Congestion or Cough.  Dispense: 118 mL; Refill: 0    {BMI is 28.5 (Overweight) The following options were offered after discussion diet and exercise.       He is swabbed for strep, will inform of results via phone, advised to hold all over-the-counter cough and cold medication and is given Bromfed to use as needed, I will also treat with doxycycline as above.  He has numerous antibiotic allergies according to his allergy list.  Requests a steroid injection however he just had a Kenalog injection in February, advised this is too early to receive another 1 today.  Patient aware and in agreement.  All questions and concerns addressed with understanding verbalized.  He is aware and agreement to this plan.  If his symptoms should persist or worsen he will return for follow-up otherwise I will see him back as scheduled for chronic conditions.    I spent 25 minutes caring for Jadiel on this date of service. This time includes time spent by me in the following activities:preparing for the visit, reviewing tests, performing a medically appropriate examination and/or evaluation , counseling and educating the patient/family/caregiver, ordering medications, tests, or procedures and documenting information in the medical record  Follow Up   Return if symptoms worsen or fail to improve, for Recheck, or sooner as needed.  Patient was given instructions and counseling regarding his condition or for health maintenance advice. Please see specific information pulled into the AVS if appropriate.

## 2023-04-08 LAB — BACTERIA SPEC AEROBE CULT: NORMAL

## 2023-04-17 NOTE — TELEPHONE ENCOUNTER
PT FRIEND KD GOLDSTEIN REQUESTS A CALL BACK RE CHANGED THE PT BANDAGE BECAUSE IT WAS WET.  SHE REQUESTS TO   SPEAK TO SOMEONE ABOUT RE DRESSING IT  AND THE REDNESS AROUND THE WOUND.  CALL BACK # [phone].  THANK YOU.  
Spoke with Ermelinda and she informed me that she has been changing Jadiel Marija dressings. She was asking if it was okay to change dressings daily because his dressing was wet. Then she was expressing concern that his heel looked bad and he had some redness around the ankle. Friend of patient stated she was going to come to doctor appointment on Monday.   
MD notified

## 2023-04-28 DIAGNOSIS — R09.81 NASAL CONGESTION: ICD-10-CM

## 2023-04-28 DIAGNOSIS — J01.00 ACUTE NON-RECURRENT MAXILLARY SINUSITIS: ICD-10-CM

## 2023-04-28 DIAGNOSIS — J02.9 SORE THROAT: ICD-10-CM

## 2023-04-28 DIAGNOSIS — H65.06 RECURRENT ACUTE SEROUS OTITIS MEDIA OF BOTH EARS: ICD-10-CM

## 2023-05-01 RX ORDER — BROMPHENIRAMINE MALEATE, PSEUDOEPHEDRINE HYDROCHLORIDE, AND DEXTROMETHORPHAN HYDROBROMIDE 2; 30; 10 MG/5ML; MG/5ML; MG/5ML
5 SYRUP ORAL 4 TIMES DAILY PRN
Qty: 118 ML | Refills: 0 | OUTPATIENT
Start: 2023-05-01

## 2023-05-11 ENCOUNTER — TELEPHONE (OUTPATIENT)
Dept: FAMILY MEDICINE CLINIC | Facility: CLINIC | Age: 73
End: 2023-05-11

## 2023-05-11 ENCOUNTER — OFFICE VISIT (OUTPATIENT)
Dept: FAMILY MEDICINE CLINIC | Facility: CLINIC | Age: 73
End: 2023-05-11
Payer: MEDICARE

## 2023-05-11 VITALS
SYSTOLIC BLOOD PRESSURE: 118 MMHG | BODY MASS INDEX: 28.44 KG/M2 | HEIGHT: 72 IN | WEIGHT: 210 LBS | HEART RATE: 110 BPM | OXYGEN SATURATION: 98 % | DIASTOLIC BLOOD PRESSURE: 68 MMHG

## 2023-05-11 DIAGNOSIS — E66.3 OVERWEIGHT (BMI 25.0-29.9): ICD-10-CM

## 2023-05-11 DIAGNOSIS — T14.8XXA MUSCLE STRAIN: ICD-10-CM

## 2023-05-11 DIAGNOSIS — R09.81 NASAL CONGESTION: ICD-10-CM

## 2023-05-11 DIAGNOSIS — J02.9 ACUTE PHARYNGITIS, UNSPECIFIED ETIOLOGY: ICD-10-CM

## 2023-05-11 DIAGNOSIS — J40 BRONCHITIS: ICD-10-CM

## 2023-05-11 DIAGNOSIS — I10 ESSENTIAL HYPERTENSION: Primary | Chronic | ICD-10-CM

## 2023-05-11 RX ORDER — LOSARTAN POTASSIUM AND HYDROCHLOROTHIAZIDE 25; 100 MG/1; MG/1
1 TABLET ORAL DAILY
Qty: 30 TABLET | Refills: 2 | Status: SHIPPED | OUTPATIENT
Start: 2023-05-11

## 2023-05-11 RX ORDER — ALBUTEROL SULFATE 90 UG/1
2 AEROSOL, METERED RESPIRATORY (INHALATION) EVERY 4 HOURS PRN
Qty: 1 G | Refills: 2 | Status: SHIPPED | OUTPATIENT
Start: 2023-05-11

## 2023-05-11 RX ORDER — CEPHALEXIN 500 MG/1
500 CAPSULE ORAL 3 TIMES DAILY
Qty: 30 CAPSULE | Refills: 0 | Status: SHIPPED | OUTPATIENT
Start: 2023-05-11

## 2023-05-11 RX ORDER — IBUPROFEN 600 MG/1
600 TABLET ORAL EVERY 6 HOURS PRN
Qty: 40 TABLET | Refills: 0 | Status: SHIPPED | OUTPATIENT
Start: 2023-05-11

## 2023-05-11 NOTE — PROGRESS NOTES
"Chief Complaint  Sore Throat    Subjective        Jadiel Dutton presents to [facility] [place]  History of Present Illness  Patient here today for recheck of hypertension complaining of sore throat cough which is a dry cough most of the time productive at times with white-yellowish sputum and greenish thick nasal congestion.  He has reports of recurrent sinusitis, has a globus sensation in the back of his throat which he states he cannot \"get up or get down.  He has talked to the pharmacist and they suggested he change albuterol to Ventolin and change lisinopril to a different antihypertensive that is not an ACE.  Denies fever chills body aches.  Objective   Vital Signs:  /68   Pulse 110   Ht 182.9 cm (72\")   Wt 95.3 kg (210 lb)   SpO2 98%   BMI 28.48 kg/m²   Estimated body mass index is 28.48 kg/m² as calculated from the following:    Height as of this encounter: 182.9 cm (72\").    Weight as of this encounter: 95.3 kg (210 lb).     BMI is >= 25 and <30. (Overweight) The following options were offered after discussion;: exercise counseling/recommendations and nutrition counseling/recommendations    Physical Exam  Vitals and nursing note reviewed.   Constitutional:       General: He is not in acute distress.     Appearance: Normal appearance. He is not ill-appearing, toxic-appearing or diaphoretic.   HENT:      Head: Normocephalic and atraumatic.      Right Ear: Hearing, ear canal and external ear normal. Tympanic membrane is retracted.      Left Ear: Hearing, ear canal and external ear normal. Tympanic membrane is retracted.      Ears:      Comments: Voice hoarse     Nose: Congestion present.      Right Sinus: Maxillary sinus tenderness present.      Left Sinus: Maxillary sinus tenderness present.      Mouth/Throat:      Lips: Pink.      Pharynx: Posterior oropharyngeal erythema present.      Comments: Cobblestoning appearance noted  Cardiovascular:      " Rate and Rhythm: Normal rate and regular rhythm.      Heart sounds: Normal heart sounds. No murmur heard.    No friction rub. No gallop.   Pulmonary:      Effort: Pulmonary effort is normal. No respiratory distress.      Breath sounds: Normal breath sounds. No stridor. No wheezing, rhonchi or rales.   Musculoskeletal:      Cervical back: No rigidity or tenderness.      Lumbar back: Tenderness present. Decreased range of motion.      Comments: He is ambulatory without assist, gait guarded, slowed    Lymphadenopathy:      Cervical: No cervical adenopathy.   Skin:     General: Skin is warm and dry.      Coloration: Skin is not jaundiced or pale.      Findings: No bruising, erythema, lesion or rash.      Comments: For nails on right hand index middle and ring finger are all removed after he states he used some  with an appropriate gloves earlier today.   Neurological:      Mental Status: He is alert and oriented to person, place, and time.      Motor: Weakness present.      Gait: Gait abnormal.   Psychiatric:         Mood and Affect: Mood normal.         Behavior: Behavior normal.         Thought Content: Thought content normal.         Judgment: Judgment normal.        Result Review :               Assessment and Plan   Diagnoses and all orders for this visit:    1. Essential hypertension (Primary)  -     losartan-hydrochlorothiazide (Hyzaar) 100-25 MG per tablet; Take 1 tablet by mouth Daily.  Dispense: 30 tablet; Refill: 2    2. Muscle strain  -     ibuprofen (ADVIL,MOTRIN) 600 MG tablet; Take 1 tablet by mouth Every 6 (Six) Hours As Needed for Mild Pain.  Dispense: 40 tablet; Refill: 0    3. Acute pharyngitis, unspecified etiology  -     cephalexin (Keflex) 500 MG capsule; Take 1 capsule by mouth 3 (Three) Times a Day.  Dispense: 30 capsule; Refill: 0    4. Nasal congestion  -     cephalexin (Keflex) 500 MG capsule; Take 1 capsule by mouth 3 (Three) Times a Day.  Dispense: 30 capsule; Refill: 0    5.  Bronchitis  -     albuterol sulfate HFA (Ventolin HFA) 108 (90 Base) MCG/ACT inhaler; Inhale 2 puffs Every 4 (Four) Hours As Needed for Wheezing.  Dispense: 1 g; Refill: 2  -     cephalexin (Keflex) 500 MG capsule; Take 1 capsule by mouth 3 (Three) Times a Day.  Dispense: 30 capsule; Refill: 0    6. Overweight (BMI 25.0-29.9)    I will change him from albuterol inhaler to Ventolin, change from lisinopril HCTZ to Hyzaar as above.  He is given refills on ibuprofen and he is treated with Keflex.  He states that he has been on Keflex before without any problems except for some GI upset which he is agreeing to take today to see if this would help him better than the doxycycline that he was on last time he was sick.  Advised to gargle with warm salt water or Listerine push fluids use voice rest and if symptoms should persist or worsen let me know so I can refer him on to ENT.  Otherwise he will see his PCP for chronic conditions as scheduled.  All questions and concerns addressed with understand verbalized.  Patient aware and in agreement to this plan.     I spent 30 minutes caring for Jadiel on this date of service. This time includes time spent by me in the following activities:preparing for the visit, performing a medically appropriate examination and/or evaluation , counseling and educating the patient/family/caregiver, ordering medications, tests, or procedures and documenting information in the medical record  Follow Up   Return if symptoms worsen or fail to improve, for Recheck, or sooner as needed.  Patient was given instructions and counseling regarding his condition or for health maintenance advice. Please see specific information pulled into the AVS if appropriate.

## 2023-05-11 NOTE — TELEPHONE ENCOUNTER
I will discuss with him at his office visit this afternoon.    ----- Message from Adele Ricci MA sent at 5/9/2023  1:24 PM CDT -----  PT's pharmacy called. Patient has been taking lisinopril-hctz. Has had a dry cough for a while and wanting to know if he can switch to losartan. Thinking maybe this could be causing the cough he has had.

## 2023-08-09 DIAGNOSIS — I10 ESSENTIAL HYPERTENSION: Chronic | ICD-10-CM

## 2023-08-09 RX ORDER — LOSARTAN POTASSIUM AND HYDROCHLOROTHIAZIDE 25; 100 MG/1; MG/1
TABLET ORAL
Qty: 90 TABLET | Refills: 2 | Status: SHIPPED | OUTPATIENT
Start: 2023-08-09

## 2023-08-11 DIAGNOSIS — J40 BRONCHITIS: ICD-10-CM

## 2023-08-14 DIAGNOSIS — J40 BRONCHITIS: ICD-10-CM

## 2023-08-14 RX ORDER — ALBUTEROL SULFATE 90 UG/1
2 AEROSOL, METERED RESPIRATORY (INHALATION) EVERY 4 HOURS PRN
Qty: 6.7 G | Refills: 0 | OUTPATIENT
Start: 2023-08-14

## 2023-08-14 RX ORDER — ALBUTEROL SULFATE 90 UG/1
2 AEROSOL, METERED RESPIRATORY (INHALATION) EVERY 4 HOURS PRN
Qty: 6.7 G | Refills: 0 | Status: SHIPPED | OUTPATIENT
Start: 2023-08-14

## 2023-08-24 ENCOUNTER — OFFICE VISIT (OUTPATIENT)
Dept: FAMILY MEDICINE CLINIC | Facility: CLINIC | Age: 73
End: 2023-08-24
Payer: MEDICARE

## 2023-08-24 VITALS
DIASTOLIC BLOOD PRESSURE: 62 MMHG | HEART RATE: 98 BPM | BODY MASS INDEX: 30.2 KG/M2 | HEIGHT: 72 IN | WEIGHT: 223 LBS | TEMPERATURE: 97.8 F | SYSTOLIC BLOOD PRESSURE: 104 MMHG | OXYGEN SATURATION: 99 %

## 2023-08-24 DIAGNOSIS — I10 ESSENTIAL HYPERTENSION: Chronic | ICD-10-CM

## 2023-08-24 DIAGNOSIS — R05.2 SUBACUTE COUGH: ICD-10-CM

## 2023-08-24 DIAGNOSIS — J02.9 ACUTE PHARYNGITIS, UNSPECIFIED ETIOLOGY: ICD-10-CM

## 2023-08-24 DIAGNOSIS — M17.11 OSTEOARTHRITIS OF RIGHT KNEE, UNSPECIFIED OSTEOARTHRITIS TYPE: ICD-10-CM

## 2023-08-24 DIAGNOSIS — R09.81 NASAL CONGESTION: ICD-10-CM

## 2023-08-24 DIAGNOSIS — Z00.00 MEDICARE ANNUAL WELLNESS VISIT, SUBSEQUENT: Primary | ICD-10-CM

## 2023-08-24 DIAGNOSIS — J01.00 ACUTE NON-RECURRENT MAXILLARY SINUSITIS: ICD-10-CM

## 2023-08-24 DIAGNOSIS — J40 BRONCHITIS: ICD-10-CM

## 2023-08-24 RX ORDER — BROMPHENIRAMINE MALEATE, PSEUDOEPHEDRINE HYDROCHLORIDE, AND DEXTROMETHORPHAN HYDROBROMIDE 2; 30; 10 MG/5ML; MG/5ML; MG/5ML
5 SYRUP ORAL 4 TIMES DAILY PRN
Qty: 118 ML | Refills: 0 | Status: SHIPPED | OUTPATIENT
Start: 2023-08-24

## 2023-08-24 RX ORDER — MELOXICAM 7.5 MG/1
1 TABLET ORAL DAILY
COMMUNITY
Start: 2023-08-14 | End: 2023-08-24 | Stop reason: SDUPTHER

## 2023-08-24 RX ORDER — MELOXICAM 7.5 MG/1
7.5 TABLET ORAL DAILY
Qty: 30 TABLET | Refills: 2 | Status: SHIPPED | OUTPATIENT
Start: 2023-08-24

## 2023-08-24 RX ORDER — CEPHALEXIN 500 MG/1
500 CAPSULE ORAL 3 TIMES DAILY
Qty: 30 CAPSULE | Refills: 0 | Status: SHIPPED | OUTPATIENT
Start: 2023-08-24

## 2023-08-24 RX ORDER — LISINOPRIL AND HYDROCHLOROTHIAZIDE 25; 20 MG/1; MG/1
1 TABLET ORAL
COMMUNITY
Start: 2023-06-29

## 2023-08-24 NOTE — PROGRESS NOTES
The ABCs of the Annual Wellness Visit  Subsequent Medicare Wellness Visit    Subjective    Jadiel Dutton is a 72 y.o. male who presents for a Subsequent Medicare Wellness Visit.    The following portions of the patient's history were reviewed and   updated as appropriate: allergies, current medications, past family history, past medical history, past social history, past surgical history, and problem list.    Compared to one year ago, the patient feels his physical   health is worse.    Compared to one year ago, the patient feels his mental   health is the same.    Recent Hospitalizations:  He was not admitted to the hospital during the last year.       Current Medical Providers:  Patient Care Team:  Shayy Banda APRN as PCP - General (Family Medicine)  Jacoby Serrano DPM as Consulting Physician (Podiatry)    Outpatient Medications Prior to Visit   Medication Sig Dispense Refill    Acetaminophen (TYLENOL PO) 250 mg Every 12 (Twelve) Hours.      albuterol sulfate  (90 Base) MCG/ACT inhaler INHALE 2 PUFFS EVERY 4 (FOUR) HOURS AS NEEDED FOR WHEEZING. 6.7 g 0    B-D UF III MINI PEN NEEDLES 31G X 5 MM misc USE FOUR TIMES A DAY WITH INSULIN PENS 360 each 2    dexlansoprazole (DEXILANT) 60 MG capsule Take 1 capsule by mouth Daily As Needed.      docusate sodium (COLACE) 100 MG capsule Take 1 capsule by mouth 2 (Two) Times a Day As Needed for Constipation. 60 capsule 2    esomeprazole (nexIUM) 40 MG capsule Take 1 capsule by mouth Daily.      fluticasone (FLONASE) 50 MCG/ACT nasal spray 2 sprays into the nostril(s) as directed by provider Daily. 9.9 mL 3    folic acid (FOLVITE) 1 MG tablet Take 1 tablet by mouth Daily. 90 tablet 1    furosemide (LASIX) 40 MG tablet TAKE 1 TABLET EVERY DAY 60 tablet 0    gabapentin (NEURONTIN) 300 MG capsule Take 1 capsule by mouth 3 (Three) Times a Day.      guaiFENesin (Mucinex) 600 MG 12 hr tablet Take 2 tablets by mouth 2 (Two) Times a Day. 60 tablet 0     HYDROcodone-acetaminophen (NORCO)  MG per tablet TAKE ONE TABLET BY MOUTH 2-3 TIMES DAILY AS NEEDED      ibuprofen (ADVIL,MOTRIN) 600 MG tablet Take 1 tablet by mouth Every 6 (Six) Hours As Needed for Mild Pain. 40 tablet 0    Lancets (OneTouch Delica Plus [id number]) misc USE 1 TO CHECK GLUCOSE 4 TIMES DAILY FOR 25 DAYS      lisinopril-hydrochlorothiazide (PRINZIDE,ZESTORETIC) 20-25 MG per tablet Take 1 tablet by mouth.      loratadine (Claritin) 10 MG tablet Take 1 tablet by mouth Daily. 30 tablet 2    meclizine (ANTIVERT) 25 MG tablet Take 1 tablet by mouth 3 (Three) Times a Day As Needed for Dizziness. 30 tablet 0    ofloxacin (Floxin Otic) 0.3 % otic solution Administer 10 drops into both ears Daily. 10 mL 0    ondansetron (Zofran) 4 MG tablet Take 1 tablet by mouth Every 8 (Eight) Hours As Needed for Nausea or Vomiting. 30 tablet 0    ONETOUCH VERIO test strip       rosuvastatin (CRESTOR) 20 MG tablet TAKE 1 TABLET EVERY NIGHT 90 tablet 1    tiZANidine (ZANAFLEX) 2 MG tablet Take 1 tablet by mouth 2 (Two) Times a Day As Needed.      vitamin B-12 (CYANOCOBALAMIN) 1000 MCG tablet Take 1 tablet by mouth Daily. 90 tablet 1    cephalexin (Keflex) 500 MG capsule Take 1 capsule by mouth 3 (Three) Times a Day. 30 capsule 0    meloxicam (MOBIC) 7.5 MG tablet Take 1 tablet by mouth Daily.      losartan-hydrochlorothiazide (HYZAAR) 100-25 MG per tablet TAKE 1 TABLET BY MOUTH DAILY. REPLACES LISINOPRIL 90 tablet 2     No facility-administered medications prior to visit.       Opioid medication/s are on active medication list.  and I have evaluated his active treatment plan and pain score trends (see table).  Vitals:    08/24/23 1252   PainSc: 0-No pain     I have reviewed the chart for potential of high risk medication and harmful drug interactions in the elderly.          Aspirin is not on active medication list.  Aspirin use is not indicated based on review of current medical condition/s. Risk of harm outweighs  potential benefits.  .    Patient Active Problem List   Diagnosis    Essential hypertension    Familial hypercholesterolemia    Type 2 diabetes mellitus with complication, with long-term current use of insulin    DDD (degenerative disc disease), thoracolumbar    Gastroesophageal reflux disease    Left lower quadrant pain    Injury of kidney    Cellulitis    Spinal stenosis of cervical region    Degeneration of thoracic or thoracolumbar intervertebral disc    Abscess of foot    Hypokalemia    Other specified mononeuropathies    Leukocytosis    Neuropathic pain syndrome (non-herpetic)    Systemic infection    Spinal stenosis of lumbar region    Tachycardia    Spondylolisthesis    Drug-induced constipation    Dysphagia    Nausea    Generalized abdominal cramping    Chronic pansinusitis    Anxiety state    Bilateral hand pain    Ulnar neuropathy of both upper extremities    Dupuytren's contracture of both hands    Bilateral carpal tunnel syndrome    Skin ulcer of toe of left foot with fat layer exposed    Diabetic polyneuropathy associated with type 2 diabetes mellitus    Hammer toe of left foot    Equinus contracture of ankle    Exostosis of bone of foot    Pain of left clavicle    Traumatic incomplete tear of left rotator cuff    Strain of acromioclavicular joint    Post-operative state    Hypercholesterolemia    Status post arthroscopy of left shoulder    Status post left rotator cuff repair    Chronic left shoulder pain    Trigger thumb of right hand    Dupuytren's contracture of right hand    Atrophy of muscle of multiple sites    Iron deficiency anemia due to chronic blood loss    Adverse effect of iron    Personal history of colonic polyps    Folate deficiency    Acute osteomyelitis of calcaneum, left     Advance Care Planning   Advance Care Planning     Advance Directive is on file.  ACP discussion was held with the patient during this visit. Patient has an advance directive in EMR which is still valid.     "  Objective    Vitals:    23 1252 23 1315   BP:  104/62   Pulse: 98    Temp: 97.8 øF (36.6 øC)    SpO2: 99%    Weight: 101 kg (223 lb)    Height: 182.9 cm (72\")    PainSc: 0-No pain      Estimated body mass index is 30.24 kg/mý as calculated from the following:    Height as of this encounter: 182.9 cm (72\").    Weight as of this encounter: 101 kg (223 lb).           Does the patient have evidence of cognitive impairment? No    Lab Results   Component Value Date    TRIG 106.0 2023    HDL 40.3 2023    LDL 37.5 2023        HEALTH RISK ASSESSMENT    Smoking Status:  Social History     Tobacco Use   Smoking Status Never   Smokeless Tobacco Never     Alcohol Consumption:  Social History     Substance and Sexual Activity   Alcohol Use No     Fall Risk Screen:    APRIL Fall Risk Assessment was completed, and patient is at LOW risk for falls.Assessment completed on:2023    Depression Screenin/24/2023     1:00 PM   PHQ-2/PHQ-9 Depression Screening   Little Interest or Pleasure in Doing Things 0-->not at all   Feeling Down, Depressed or Hopeless 0-->not at all   PHQ-9: Brief Depression Severity Measure Score 0       Health Habits and Functional and Cognitive Screenin/24/2023    12:00 PM   Functional & Cognitive Status   Do you have difficulty preparing food and eating? No   Do you have difficulty bathing yourself, getting dressed or grooming yourself? No   Do you have difficulty using the toilet? No   Do you have difficulty moving around from place to place? No   Do you have trouble with steps or getting out of a bed or a chair? No   Current Diet Well Balanced Diet   Dental Exam Up to date   Eye Exam Up to date   Exercise (times per week) 0 times per week   Current Exercises Include No Regular Exercise   Do you need help using the phone?  No   Are you deaf or do you have serious difficulty hearing?  No   Do you need help to go to places out of walking distance? No   Do you " need help shopping? No   Do you need help preparing meals?  No   Do you need help with housework?  No   Do you need help with laundry? No   Do you need help taking your medications? No   Do you need help managing money? No   Do you ever drive or ride in a car without wearing a seat belt? No   Have you felt unusual stress, anger or loneliness in the last month? No   Who do you live with? Alone   If you need help, do you have trouble finding someone available to you? No   Have you been bothered in the last four weeks by sexual problems? No   Do you have difficulty concentrating, remembering or making decisions? No       Age-appropriate Screening Schedule:  Refer to the list below for future screening recommendations based on patient's age, sex and/or medical conditions. Orders for these recommended tests are listed in the plan section. The patient has been provided with a written plan.    Health Maintenance   Topic Date Due    DIABETIC EYE EXAM  07/08/2020    COVID-19 Vaccine (2 - Booster for Hunter series) 12/29/2023 (Originally 5/4/2021)    Pneumococcal Vaccine 65+ (1 - PCV) 08/24/2024 (Originally 9/19/1956)    ZOSTER VACCINE (2 of 2) 08/24/2024 (Originally 7/11/2017)    DIABETIC FOOT EXAM  08/30/2023    INFLUENZA VACCINE  10/01/2023    HEMOGLOBIN A1C  02/03/2024    LIPID PANEL  08/03/2024    URINE MICROALBUMIN  08/03/2024    ANNUAL WELLNESS VISIT  08/24/2024    TDAP/TD VACCINES (3 - Td or Tdap) 11/05/2029    COLORECTAL CANCER SCREENING  03/01/2032    HEPATITIS C SCREENING  Addressed                  CMS Preventative Services Quick Reference  Risk Factors Identified During Encounter  Fall Risk-High or Moderate: Discussed Fall Prevention in the home  Immunizations Discussed/Encouraged:  UTD  Polypharmacy: Medication List reviewed and Medication changes were made  Dental Screening Recommended  Vision Screening Recommended  The above risks/problems have been discussed with the patient.  Pertinent information has been  "shared with the patient in the After Visit Summary.  An After Visit Summary and PPPS were made available to the patient.    Follow Up:   Next Medicare Wellness visit to be scheduled in 1 year.       Additional E&M Note during same encounter follows:  Patient has multiple medical problems which are significant and separately identifiable that require additional work above and beyond the Medicare Wellness Visit.      Chief Complaint  Cough    Subjective        Cough    Jadiel Dutton is also being seen today for complaints of right knee pain, denies injury, states that he hears and feels a popping sensation at times.  He is also complaining today of nasal congestion scratchy throat dry cough productive cough at times and drainage down the back of his throat which is producing a cough he feels.  He states that he had 3 Keflex tablets at home from a previous prescription and took them yesterday and the day before.  He states that as long as he takes them with food it does not upset his stomach and he is able to take although this is listed on his allergy list.    Review of Systems   Respiratory:  Positive for cough.      Objective   Vital Signs:  /62   Pulse 98   Temp 97.8 øF (36.6 øC)   Ht 182.9 cm (72\")   Wt 101 kg (223 lb)   SpO2 99%   BMI 30.24 kg/mý     Physical Exam  Vitals and nursing note reviewed.   Constitutional:       General: He is not in acute distress.     Appearance: Normal appearance. He is obese. He is not ill-appearing, toxic-appearing or diaphoretic.   HENT:      Head: Normocephalic and atraumatic.      Right Ear: Tympanic membrane, ear canal and external ear normal. There is no impacted cerumen.      Left Ear: Tympanic membrane, ear canal and external ear normal. There is no impacted cerumen.      Nose: Congestion and rhinorrhea present.      Right Sinus: Maxillary sinus tenderness present.      Left Sinus: Maxillary sinus tenderness present.      Mouth/Throat:      Pharynx: " Posterior oropharyngeal erythema present.   Cardiovascular:      Rate and Rhythm: Normal rate and regular rhythm.      Heart sounds: Normal heart sounds. No murmur heard.    No friction rub. No gallop.   Pulmonary:      Effort: Pulmonary effort is normal. No respiratory distress.      Breath sounds: Normal breath sounds. No stridor. No wheezing, rhonchi or rales.      Comments: Pulse ox on RA 99%  Abdominal:      Comments: Abd rounded with obesity    Musculoskeletal:         General: Tenderness present.      Cervical back: Neck supple. No rigidity or tenderness.      Right knee: Bony tenderness present. Decreased range of motion. Tenderness present.      Comments: Pt ambulatory without assist, gait slowed, guarded   Lymphadenopathy:      Cervical: No cervical adenopathy.   Skin:     General: Skin is warm and dry.      Coloration: Skin is not jaundiced or pale.      Findings: No bruising, erythema, lesion or rash.   Neurological:      Mental Status: He is alert and oriented to person, place, and time.      Cranial Nerves: No cranial nerve deficit.      Motor: Weakness present.      Coordination: Coordination normal.      Gait: Gait abnormal.   Psychiatric:         Mood and Affect: Mood normal.         Behavior: Behavior normal.         Thought Content: Thought content normal.         Judgment: Judgment normal.        The following data was reviewed by: MARY Escobar on 08/24/2023:    Data reviewed : Radiologic studies R knee xray from today reviewed          Assessment and Plan   Diagnoses and all orders for this visit:    1. Medicare annual wellness visit, subsequent (Primary)    2. Acute non-recurrent maxillary sinusitis  -     cephalexin (Keflex) 500 MG capsule; Take 1 capsule by mouth 3 (Three) Times a Day.  Dispense: 30 capsule; Refill: 0  -     brompheniramine-pseudoephedrine-DM 30-2-10 MG/5ML syrup; Take 5 mL by mouth 4 (Four) Times a Day As Needed for Congestion, Cough or Allergies.  Dispense: 118 mL;  Refill: 0    3. Essential hypertension    4. Osteoarthritis of right knee, unspecified osteoarthritis type  -     meloxicam (MOBIC) 7.5 MG tablet; Take 1 tablet by mouth Daily.  Dispense: 30 tablet; Refill: 2  -     XR Knee 1 or 2 View Right    5. Nasal congestion  -     cephalexin (Keflex) 500 MG capsule; Take 1 capsule by mouth 3 (Three) Times a Day.  Dispense: 30 capsule; Refill: 0  -     brompheniramine-pseudoephedrine-DM 30-2-10 MG/5ML syrup; Take 5 mL by mouth 4 (Four) Times a Day As Needed for Congestion, Cough or Allergies.  Dispense: 118 mL; Refill: 0    6. Bronchitis    7. Acute pharyngitis, unspecified etiology  -     cephalexin (Keflex) 500 MG capsule; Take 1 capsule by mouth 3 (Three) Times a Day.  Dispense: 30 capsule; Refill: 0  -     brompheniramine-pseudoephedrine-DM 30-2-10 MG/5ML syrup; Take 5 mL by mouth 4 (Four) Times a Day As Needed for Congestion, Cough or Allergies.  Dispense: 118 mL; Refill: 0    8. Subacute cough  -     brompheniramine-pseudoephedrine-DM 30-2-10 MG/5ML syrup; Take 5 mL by mouth 4 (Four) Times a Day As Needed for Congestion, Cough or Allergies.  Dispense: 118 mL; Refill: 0    Medicare wellness is completed, Keflex is prescribed as above as well as Bromfed as needed.  He tells me that Bromfed has helped his symptoms in the past.  He is already started Keflex on his own from a previous prescription and is prescribed this again today.  Has tolerated this fine in the past.  I will obtain right knee x-ray and give him meloxicam refill.  He is advised no additional NSAIDs over-the-counter.  He sees pain clinic and will follow-up with them and take medicine as they have prescribed as well as needed.  He will return here if symptoms should persist or worsen otherwise I will see him back as scheduled for chronic conditions.  All questions and concerns addressed with understanding verbalized.  Patient aware and in agreement to above plan.     I spent 30 minutes caring for Jadiel on  this date of service. This time includes time spent by me in the following activities:preparing for the visit, reviewing tests, performing a medically appropriate examination and/or evaluation , counseling and educating the patient/family/caregiver, ordering medications, tests, or procedures, and documenting information in the medical record  Follow Up   Return if symptoms worsen or fail to improve, for Recheck, or sooner as needed.  Patient was given instructions and counseling regarding his condition or for health maintenance advice. Please see specific information pulled into the AVS if appropriate.

## 2023-09-06 ENCOUNTER — OFFICE VISIT (OUTPATIENT)
Dept: FAMILY MEDICINE CLINIC | Facility: CLINIC | Age: 73
End: 2023-09-06
Payer: MEDICARE

## 2023-09-06 VITALS
TEMPERATURE: 96 F | SYSTOLIC BLOOD PRESSURE: 104 MMHG | BODY MASS INDEX: 29.53 KG/M2 | HEART RATE: 100 BPM | DIASTOLIC BLOOD PRESSURE: 62 MMHG | WEIGHT: 218 LBS | OXYGEN SATURATION: 95 % | HEIGHT: 72 IN

## 2023-09-06 DIAGNOSIS — J40 BRONCHITIS: Primary | ICD-10-CM

## 2023-09-06 DIAGNOSIS — E11.8 TYPE 2 DIABETES MELLITUS WITH COMPLICATION, WITH LONG-TERM CURRENT USE OF INSULIN: Chronic | ICD-10-CM

## 2023-09-06 DIAGNOSIS — Z79.4 TYPE 2 DIABETES MELLITUS WITH COMPLICATION, WITH LONG-TERM CURRENT USE OF INSULIN: Chronic | ICD-10-CM

## 2023-09-06 PROCEDURE — 3074F SYST BP LT 130 MM HG: CPT | Performed by: NURSE PRACTITIONER

## 2023-09-06 PROCEDURE — 1159F MED LIST DOCD IN RCRD: CPT | Performed by: NURSE PRACTITIONER

## 2023-09-06 PROCEDURE — 96372 THER/PROPH/DIAG INJ SC/IM: CPT | Performed by: NURSE PRACTITIONER

## 2023-09-06 PROCEDURE — 99213 OFFICE O/P EST LOW 20 MIN: CPT | Performed by: NURSE PRACTITIONER

## 2023-09-06 PROCEDURE — 3078F DIAST BP <80 MM HG: CPT | Performed by: NURSE PRACTITIONER

## 2023-09-06 PROCEDURE — 1160F RVW MEDS BY RX/DR IN RCRD: CPT | Performed by: NURSE PRACTITIONER

## 2023-09-06 RX ORDER — PREDNISONE 20 MG/1
20 TABLET ORAL 2 TIMES DAILY
Qty: 20 TABLET | Refills: 0 | Status: SHIPPED | OUTPATIENT
Start: 2023-09-06 | End: 2023-09-16

## 2023-09-06 RX ORDER — TRIAMCINOLONE ACETONIDE 40 MG/ML
60 INJECTION, SUSPENSION INTRA-ARTICULAR; INTRAMUSCULAR ONCE
Status: COMPLETED | OUTPATIENT
Start: 2023-09-06 | End: 2023-09-06

## 2023-09-06 RX ORDER — DOXYCYCLINE HYCLATE 100 MG/1
1 CAPSULE ORAL 2 TIMES DAILY
COMMUNITY
End: 2023-09-06

## 2023-09-06 RX ADMIN — TRIAMCINOLONE ACETONIDE 60 MG: 40 INJECTION, SUSPENSION INTRA-ARTICULAR; INTRAMUSCULAR at 13:56

## 2023-09-06 NOTE — PROGRESS NOTES
"Chief Complaint  Bronchitis and Follow-up    Subjective        Jadiel Dutton presents to [facility]  Bronchitis  Pt here today with girlfriend complaining of cough, wheezes , weakness, shortness of breath, head congestion for the last week or longer. He was seen last of August and was given keflex and cough medicine, symptoms persisted and he went to urgent care on 9-3-23 for evaluation. Had cxr which he states was negative, this is reviewed and confirmed. He was advised to stop keflex and was started on doxycycline as well as given a rocephin injection but symptoms are still persisting. He has been using albuterol inhaler prn which helps somewhat. Nishant fever. Does not smoke. Has not been on steroids with this illness yet he states. He is diabetic. Monitors blood sugars regularly. States is compliant with regards to diabetes treatment. Has had 2 neg home covid tests per pt report.     Objective   Vital Signs:  /62   Pulse 100   Temp 96 °F (35.6 °C)   Ht 182.9 cm (72\")   Wt 98.9 kg (218 lb)   SpO2 95%   BMI 29.57 kg/m²   Estimated body mass index is 29.57 kg/m² as calculated from the following:    Height as of this encounter: 182.9 cm (72\").    Weight as of this encounter: 98.9 kg (218 lb).     Physical Exam  Vitals and nursing note reviewed.   Constitutional:       General: He is not in acute distress.     Appearance: Normal appearance. He is not ill-appearing, toxic-appearing or diaphoretic.   HENT:      Head: Normocephalic and atraumatic.      Right Ear: Tympanic membrane, ear canal and external ear normal. There is no impacted cerumen.      Left Ear: Tympanic membrane, ear canal and external ear normal. There is no impacted cerumen.      Nose: Nose normal.   Cardiovascular:      Rate and Rhythm: Normal rate and regular rhythm.      Heart sounds: Normal heart sounds. No murmur heard.    No friction rub. No gallop.   Pulmonary:      Effort: " Pulmonary effort is normal.      Breath sounds: Examination of the right-upper field reveals wheezing. Examination of the left-upper field reveals wheezing. Examination of the right-middle field reveals wheezing. Examination of the left-middle field reveals wheezing. Wheezing and rhonchi present.      Comments: Wheezes ausc to upper lobes mainly anteriorly and posteriorly with pulse ox on RA 95%, no resp distress noted in office however productive cough is observed  Skin:     General: Skin is warm and dry.      Coloration: Skin is not jaundiced or pale.      Findings: No bruising, erythema, lesion or rash.   Neurological:      Mental Status: He is alert and oriented to person, place, and time.      Motor: Weakness present.      Coordination: Coordination normal.   Psychiatric:         Mood and Affect: Mood normal.         Behavior: Behavior normal.         Thought Content: Thought content normal.         Judgment: Judgment normal.      Result Review :  The following data was reviewed by: MARY Escobar on 09/06/2023:    Data reviewed : urgent care record from 9/3/23 reviewed today         Assessment and Plan   Diagnoses and all orders for this visit:    1. Bronchitis (Primary)  -     predniSONE (DELTASONE) 20 MG tablet; Take 1 tablet by mouth 2 (Two) Times a Day for 10 days.  Dispense: 20 tablet; Refill: 0  -     tiotropium (Spiriva HandiHaler) 18 MCG per inhalation capsule; Place 1 capsule into inhaler and inhale Daily.  Dispense: 30 capsule; Refill: 0  -     triamcinolone acetonide (KENALOG-40) injection 60 mg    2. Type 2 diabetes mellitus with complication, with long-term current use of insulin    9-3-23 urgent care records reviewed. He is to continue using albuterol inhaler prn, is given kenalog inj IM in office today and tolerated well. He is also prescribed spiriva inhaler with instruction on how to use as well as po prednisone and is advised not to start these until Friday / Saturday and only if  needed. He is STRONGLY advised to monitor his blood sugars closely while on steroids especially and call back or come back with any concerns or questions.  All questions and concerns addressed with understanding verbalized.  Patient aware and in agreement to this plan.     I spent 25 minutes caring for Jadiel on this date of service. This time includes time spent by me in the following activities:preparing for the visit, obtaining and/or reviewing a separately obtained history, performing a medically appropriate examination and/or evaluation , counseling and educating the patient/family/caregiver, ordering medications, tests, or procedures, and documenting information in the medical record  Follow Up   Return if symptoms worsen or fail to improve, for Recheck, or sooner as needed.  Patient was given instructions and counseling regarding his condition or for health maintenance advice. Please see specific information pulled into the AVS if appropriate.

## 2023-09-07 DIAGNOSIS — J01.40 ACUTE PANSINUSITIS, RECURRENCE NOT SPECIFIED: ICD-10-CM

## 2023-09-07 RX ORDER — FLUTICASONE PROPIONATE 50 MCG
2 SPRAY, SUSPENSION (ML) NASAL DAILY
Qty: 16 G | Refills: 1 | Status: SHIPPED | OUTPATIENT
Start: 2023-09-07

## 2023-09-15 DIAGNOSIS — J40 BRONCHITIS: ICD-10-CM

## 2023-09-18 RX ORDER — ALBUTEROL SULFATE 90 UG/1
2 AEROSOL, METERED RESPIRATORY (INHALATION) EVERY 4 HOURS PRN
Qty: 6.7 G | Refills: 0 | Status: SHIPPED | OUTPATIENT
Start: 2023-09-18

## (undated) DEVICE — SUT ETHLN 3/0 FS1 663G

## (undated) DEVICE — PK SHLDR ARTHSCP 60

## (undated) DEVICE — CVR FLUOROSCOPE C/ARM W/TP 36X28IN

## (undated) DEVICE — SYR LL 3CC

## (undated) DEVICE — Device: Brand: DISPOSABLE ELECTROSURGICAL SNARE

## (undated) DEVICE — STERILE POLYISOPRENE POWDER-FREE SURGICAL GLOVES WITH EMOLLIENT COATING: Brand: PROTEXIS

## (undated) DEVICE — TP NDL SCORPION SUREFIRE SD SLOT

## (undated) DEVICE — PRECISION THIN (9.0 X 0.38 X 31.0MM)

## (undated) DEVICE — PAD GRND REM POLYHESIVE A/ DISP

## (undated) DEVICE — NDL HYPO ECLPS SFTY 25G 1 1/2IN

## (undated) DEVICE — GLV SURG SENSICARE PI PF LF 7 GRN STRL

## (undated) DEVICE — GLV SURG SENSICARE PI LF PF 7.5 GRN STRL

## (undated) DEVICE — SINGLE-USE BIOPSY FORCEPS: Brand: RADIAL JAW 4

## (undated) DEVICE — SPNG GZ WOVN 4X4IN 12PLY 10/BX STRL

## (undated) DEVICE — SUT ETHLN 4/0 FS2 18IN 662H

## (undated) DEVICE — STERILE POLYISOPRENE POWDER-FREE SURGICAL GLOVES: Brand: PROTEXIS

## (undated) DEVICE — GLV SURG SENSICARE POLYISPRN W/ALOE PF LF 6.5 GRN STRL

## (undated) DEVICE — Device

## (undated) DEVICE — SOL IRR NACL 0.9PCT BT 1000ML

## (undated) DEVICE — CONN TBG Y 5 IN 1 LF STRL

## (undated) DEVICE — ABL OPES COOLCUT ASPIR 3MM 90D

## (undated) DEVICE — TBG PUMP ARTHSCP MAIN AR6400 16FT

## (undated) DEVICE — PK POD 60

## (undated) DEVICE — CANN SMPL SOFTECH BIFLO ETCO2 A/M 7FT

## (undated) DEVICE — PAD,ABDOMINAL,8"X10",ST,LF: Brand: MEDLINE

## (undated) DEVICE — SHOULDER SUSPENSION KIT 6 PER BOX

## (undated) DEVICE — BITEBLOCK ENDO W/STRAP 60F A/ LF DISP

## (undated) DEVICE — 3M™ STERI-STRIP™ REINFORCED ADHESIVE SKIN CLOSURES, R1547, 1/2 IN X 4 IN (12 MM X 100 MM), 6 STRIPS/ENVELOPE: Brand: 3M™ STERI-STRIP™

## (undated) DEVICE — GLV SURG TRIUMPH PF LTX 7 STRL

## (undated) DEVICE — POINTED DRILL BIT

## (undated) DEVICE — PRECISION THIN (5.5 X 0.38 X 18.0MM)

## (undated) DEVICE — DRSNG GZ CURAD XEROFORM NONADHS 5X9IN STRL

## (undated) DEVICE — DISPOSABLE TOURNIQUET CUFF SINGLE BLADDER, DUAL PORT AND QUICK CONNECT CONNECTOR: Brand: COLOR CUFF

## (undated) DEVICE — TRAP SXN POLYP QUICKCATCH LF

## (undated) DEVICE — UNDRPD BREATH 23X36 BG/10

## (undated) DEVICE — BLD CHISEL PROCISION SS SM 1 1/4IN

## (undated) DEVICE — GLV SURG TRIUMPH LT PF LTX 8 STRL

## (undated) DEVICE — TBG ARTHSCP DUALWAVE

## (undated) DEVICE — SOL LACTATED RINGER 1000ML

## (undated) DEVICE — GOWN,AURORA,NOREINF,RAGLAN,XL,STERILE: Brand: MEDLINE

## (undated) DEVICE — CANN TWST IN 7CM 8.25MM ID

## (undated) DEVICE — SUT VIC 3/0 SH 27IN J416H

## (undated) DEVICE — BLD SHAVER RESECTOR SERR AGGR 5MM 13CM

## (undated) DEVICE — ADHS LIQ MASTISOL 2/3ML

## (undated) DEVICE — ANTIBACTERIAL UNDYED BRAIDED (POLYGLACTIN 910), SYNTHETIC ABSORBABLE SUTURE: Brand: COATED VICRYL

## (undated) DEVICE — SYR LL TP 10ML STRL

## (undated) DEVICE — CONTAINER,SPECIMEN,OR STERILE,4OZ: Brand: MEDLINE

## (undated) DEVICE — NDL HYPO PRECISIONGLIDE/REG 18G 1IN PNK

## (undated) DEVICE — SUT ETHLN 3-0 FS118IN 663H